# Patient Record
Sex: FEMALE | Race: WHITE | NOT HISPANIC OR LATINO | Employment: OTHER | URBAN - METROPOLITAN AREA
[De-identification: names, ages, dates, MRNs, and addresses within clinical notes are randomized per-mention and may not be internally consistent; named-entity substitution may affect disease eponyms.]

---

## 2017-01-04 ENCOUNTER — LAB VISIT (OUTPATIENT)
Dept: LAB | Facility: HOSPITAL | Age: 67
End: 2017-01-04
Attending: FAMILY MEDICINE
Payer: MEDICARE

## 2017-01-04 DIAGNOSIS — E11.9 TYPE 2 DIABETES MELLITUS WITHOUT COMPLICATION, WITHOUT LONG-TERM CURRENT USE OF INSULIN: ICD-10-CM

## 2017-01-04 PROCEDURE — 36415 COLL VENOUS BLD VENIPUNCTURE: CPT | Mod: PO

## 2017-01-04 PROCEDURE — 83036 HEMOGLOBIN GLYCOSYLATED A1C: CPT

## 2017-01-05 LAB
ESTIMATED AVG GLUCOSE: 146 MG/DL
HBA1C MFR BLD HPLC: 6.7 %

## 2017-01-11 ENCOUNTER — OFFICE VISIT (OUTPATIENT)
Dept: FAMILY MEDICINE | Facility: CLINIC | Age: 67
End: 2017-01-11
Payer: MEDICARE

## 2017-01-11 VITALS
DIASTOLIC BLOOD PRESSURE: 78 MMHG | HEIGHT: 66 IN | WEIGHT: 202.38 LBS | HEART RATE: 84 BPM | BODY MASS INDEX: 32.53 KG/M2 | SYSTOLIC BLOOD PRESSURE: 116 MMHG | TEMPERATURE: 98 F

## 2017-01-11 DIAGNOSIS — E03.9 ACQUIRED HYPOTHYROIDISM: ICD-10-CM

## 2017-01-11 DIAGNOSIS — E11.9 TYPE 2 DIABETES MELLITUS WITHOUT COMPLICATION, WITHOUT LONG-TERM CURRENT USE OF INSULIN: Primary | ICD-10-CM

## 2017-01-11 LAB
BILIRUB SERPL-MCNC: ABNORMAL MG/DL
BLOOD URINE, POC: ABNORMAL
COLOR, POC UA: YELLOW
GLUCOSE UR QL STRIP: NORMAL
KETONES UR QL STRIP: ABNORMAL
LEUKOCYTE ESTERASE URINE, POC: ABNORMAL
NITRITE, POC UA: ABNORMAL
PH, POC UA: 5
PROTEIN, POC: ABNORMAL
SPECIFIC GRAVITY, POC UA: 1.02
UROBILINOGEN, POC UA: NORMAL

## 2017-01-11 PROCEDURE — 99999 PR PBB SHADOW E&M-EST. PATIENT-LVL III: CPT | Mod: PBBFAC,,, | Performed by: FAMILY MEDICINE

## 2017-01-11 PROCEDURE — 99213 OFFICE O/P EST LOW 20 MIN: CPT | Mod: PBBFAC,PO | Performed by: FAMILY MEDICINE

## 2017-01-11 PROCEDURE — 99214 OFFICE O/P EST MOD 30 MIN: CPT | Mod: S$PBB,,, | Performed by: FAMILY MEDICINE

## 2017-01-11 PROCEDURE — 82570 ASSAY OF URINE CREATININE: CPT

## 2017-01-11 PROCEDURE — 81002 URINALYSIS NONAUTO W/O SCOPE: CPT | Mod: PBBFAC,PO | Performed by: FAMILY MEDICINE

## 2017-01-11 RX ORDER — LEVOTHYROXINE SODIUM 88 UG/1
88 TABLET ORAL DAILY
COMMUNITY
End: 2017-01-11 | Stop reason: SDUPTHER

## 2017-01-11 RX ORDER — LEVOTHYROXINE SODIUM 88 UG/1
88 TABLET ORAL DAILY
Qty: 90 TABLET | Refills: 3 | Status: SHIPPED | OUTPATIENT
Start: 2017-01-11 | End: 2018-01-08 | Stop reason: SDUPTHER

## 2017-01-11 RX ORDER — LEVOTHYROXINE SODIUM 100 UG/1
100 TABLET ORAL DAILY
Qty: 90 TABLET | Refills: 3 | Status: SHIPPED | OUTPATIENT
Start: 2017-01-11 | End: 2018-01-08 | Stop reason: SDUPTHER

## 2017-01-11 RX ORDER — ROSUVASTATIN CALCIUM 10 MG/1
TABLET, FILM COATED ORAL
Qty: 90 TABLET | Refills: 3 | Status: SHIPPED | OUTPATIENT
Start: 2017-01-11 | End: 2018-04-04 | Stop reason: SDUPTHER

## 2017-01-11 NOTE — MR AVS SNAPSHOT
[facility]  [address] [facility], [address]  [facility] [address]  Phone: [phone]  Fax: [phone]                  Bebe Valdez   2017 1:20 PM   Office Visit    Description:  Female : 1950   Provider:  Anil Nelson MD   Department:  [facility]           Reason for Visit     Diabetes           Diagnoses this Visit        Comments    Type 2 diabetes mellitus without complication, without long-term current use of insulin    -  Primary     Acquired hypothyroidism                To Do List           Goals (5 Years of Data)     None      Follow-Up and Disposition     Return in about 4 months (around 2017).       These Medications        Disp Refills Start End    levothyroxine (SYNTHROID) 88 MCG tablet 90 tablet 3 2017     Take 1 tablet (88 mcg total) by mouth once daily. - Oral    Pharmacy: [facility] [id number] - [place] [address] AT [facility] Ph #: [phone]       levothyroxine (SYNTHROID) 100 MCG tablet 90 tablet 3 2017     Take 1 tablet (100 mcg total) by mouth once daily. - Oral    Pharmacy: [facility] [id number] - [place] - [address] AT [facility] Ph #: [phone]       CRESTOR 10 mg tablet 90 tablet 3 2017     TK 1 T PO QD    Pharmacy: [id number]  [place] [address] AT [facility] Ph #: [phone]         [facility]     [facility] Care Line -  Assistance  Registered nurses in the [facility] provide clinical advisement, health education, appointment booking, and other advisory services.  Call for this free service at [phone].             Medications           Message regarding Medications     Verify the changes and/or additions to your medication regime listed below are the same as discussed with your clinician today.  If any of  these changes or additions are incorrect, please notify your healthcare provider.        START taking these NEW medications        Refills    levothyroxine (SYNTHROID) 88 MCG tablet 3    Sig: Take 1 tablet (88 mcg total) by mouth once daily.    Class: Normal    Route: Oral      STOP taking these medications     betamethasone dipropionate (DIPROLENE) 0.05 % cream Apply topically 2 (two) times daily.    VIT C/E/ZN/COPPR/LUTEIN/ZEAXAN (PRESERVISION AREDS 2 ORAL) Take by mouth.           Verify that the below list of medications is an accurate representation of the medications you are currently taking.  If none reported, the list may be blank. If incorrect, please contact your healthcare provider. Carry this list with you in case of emergency.           Current Medications     ACAI BERRY EXTRACT ORAL Take by mouth.    ascorbic acid (VITAMIN C) 100 MG tablet Take 100 mg by mouth once daily.    CINNAMON BARK (CINNAMON ORAL) Take by mouth 2 (two) times daily.    CRESTOR 10 mg tablet TK 1 T PO QD    diclofenac-misoprostol  mg-mcg (ARTHROTEC 50)  mg-mcg per tablet Take 1 tablet by mouth 2 (two) times daily.    ERGOCALCIFEROL, VITAMIN D2, (VITAMIN D ORAL) Take by mouth.    fexofenadine-pseudoephedrine (ALLEGRA-D 12 HOUR)  mg per tablet Take 1 tablet by mouth.    fish oil-omega-3 fatty acids 300-1,000 mg capsule Take 2 g by mouth once daily.    levothyroxine (SYNTHROID) 100 MCG tablet Take 1 tablet (100 mcg total) by mouth once daily.    levothyroxine (SYNTHROID) 88 MCG tablet Take 1 tablet (88 mcg total) by mouth once daily.    metformin (GLUCOPHAGE-XR) 500 MG 24 hr tablet Take 1 tablet (500 mg total) by mouth 2 (two) times daily with meals.    metronidazole 1% (METROGEL) 1 % Gel Apply topically once daily.    MV,CA,MIN/IRON/FA/GUARANA/CAFF (ONE-A-DAY WOMEN'S ACTIVE ORAL) Take by mouth.    MV-MN/OM3/DHA/EPA/FISH/LUT/JOSE G (LIPOTRIAD VISIONARY ORAL) Take by mouth.    psyllium (METAMUCIL) packet Take 1 packet by  "mouth once daily.    UNABLE TO FIND Berberine           Clinical Reference Information           Vital Signs - Last Recorded  Most recent update: 1/11/2017  1:30 PM by Ange Brock MA    BP Pulse Temp Ht Wt LMP    116/78 (BP Location: Left arm, Patient Position: Sitting, BP Method: Manual) 84 97.8 °F (36.6 °C) (Oral) 5' 6" (1.676 m) 91.8 kg (202 lb 6.1 oz) 07/18/2001    BMI                32.67 kg/m2          Blood Pressure          Most Recent Value    BP  116/78      Allergies as of 1/11/2017     Tetracycline      Immunizations Administered on Date of Encounter - 1/11/2017     None      "

## 2017-01-11 NOTE — PROGRESS NOTES
Subjective:       Patient ID: Bebe Valdez is a 66 y.o. female.    Chief Complaint: Diabetes (3 month follow up, need to discuss synthroid)    HPI Comments: Disclaimer: This note has been generated using voice-recognition software. There may be typographical errors that have been missed during proof-reading    67 yo presents today for follow up of Type II DM.  Doing well on present dose of Metformin without side effects.  Has been trying to increase exercise.  Last A1C 6.7.  Had recent treatment for UTI about 2 weeks ago after complaints of dysuria/frequency.  Asymptomatic now    Diabetes   Pertinent negatives for hypoglycemia include no dizziness or headaches. Pertinent negatives for diabetes include no fatigue, no polydipsia, no polyphagia, no polyuria and no weakness.     Review of Systems   Constitutional: Negative for activity change, fatigue and unexpected weight change.   Endocrine: Negative for polydipsia, polyphagia and polyuria.   Genitourinary: Negative for difficulty urinating, dysuria and frequency.   Neurological: Negative for dizziness, weakness, light-headedness and headaches.       Objective:      Physical Exam   Constitutional: She appears well-developed and well-nourished. No distress.   Neck: Normal range of motion. No JVD present.   Cardiovascular: Normal rate, regular rhythm and intact distal pulses.    No murmur heard.  Pulmonary/Chest: Effort normal and breath sounds normal. She has no wheezes. She has no rales.   Lymphadenopathy:     She has no cervical adenopathy.   Neurological: She has normal reflexes.       Assessment:       1.  Type II DM  2.  hypothyroid  Plan:       1.  Continue present medications  2.  Urine microalbumin  3.  F/u 4 months

## 2017-01-12 LAB
CREAT UR-MCNC: 138 MG/DL
MICROALBUMIN UR DL<=1MG/L-MCNC: 11 UG/ML
MICROALBUMIN/CREATININE RATIO: 8 UG/MG

## 2017-01-17 ENCOUNTER — TELEPHONE (OUTPATIENT)
Dept: FAMILY MEDICINE | Facility: CLINIC | Age: 67
End: 2017-01-17

## 2017-01-17 DIAGNOSIS — E11.9 TYPE 2 DIABETES MELLITUS WITHOUT COMPLICATION, WITHOUT LONG-TERM CURRENT USE OF INSULIN: Primary | ICD-10-CM

## 2017-01-17 NOTE — TELEPHONE ENCOUNTER
----- Message from Cindy Yeboah sent at 1/16/2017 11:27 AM CST -----  Contact: self/[phone]  Pt called in regard to getting orders to get lab work done on April 26, at  11 am.        Please advise

## 2017-04-24 ENCOUNTER — LAB VISIT (OUTPATIENT)
Dept: LAB | Facility: HOSPITAL | Age: 67
End: 2017-04-24
Attending: FAMILY MEDICINE
Payer: MEDICARE

## 2017-04-24 DIAGNOSIS — E11.9 TYPE 2 DIABETES MELLITUS WITHOUT COMPLICATION, WITHOUT LONG-TERM CURRENT USE OF INSULIN: ICD-10-CM

## 2017-04-24 PROCEDURE — 83036 HEMOGLOBIN GLYCOSYLATED A1C: CPT

## 2017-04-24 PROCEDURE — 36415 COLL VENOUS BLD VENIPUNCTURE: CPT | Mod: PO

## 2017-04-25 LAB
ESTIMATED AVG GLUCOSE: 151 MG/DL
HBA1C MFR BLD HPLC: 6.9 %

## 2017-05-10 ENCOUNTER — OFFICE VISIT (OUTPATIENT)
Dept: FAMILY MEDICINE | Facility: CLINIC | Age: 67
End: 2017-05-10
Payer: MEDICARE

## 2017-05-10 ENCOUNTER — TELEPHONE (OUTPATIENT)
Dept: FAMILY MEDICINE | Facility: CLINIC | Age: 67
End: 2017-05-10

## 2017-05-10 VITALS
HEIGHT: 66 IN | SYSTOLIC BLOOD PRESSURE: 118 MMHG | BODY MASS INDEX: 32.38 KG/M2 | DIASTOLIC BLOOD PRESSURE: 74 MMHG | WEIGHT: 201.5 LBS | HEART RATE: 80 BPM | TEMPERATURE: 99 F

## 2017-05-10 DIAGNOSIS — E11.9 CONTROLLED TYPE 2 DIABETES MELLITUS WITHOUT COMPLICATION, WITHOUT LONG-TERM CURRENT USE OF INSULIN: Primary | ICD-10-CM

## 2017-05-10 DIAGNOSIS — Z12.11 SCREENING FOR COLON CANCER: ICD-10-CM

## 2017-05-10 DIAGNOSIS — E11.9 DIABETES MELLITUS WITHOUT COMPLICATION: Primary | ICD-10-CM

## 2017-05-10 LAB
CREAT UR-MCNC: 73 MG/DL
MICROALBUMIN UR DL<=1MG/L-MCNC: 6 UG/ML
MICROALBUMIN/CREATININE RATIO: 8.2 UG/MG

## 2017-05-10 PROCEDURE — 82570 ASSAY OF URINE CREATININE: CPT

## 2017-05-10 PROCEDURE — 99999 PR PBB SHADOW E&M-EST. PATIENT-LVL III: CPT | Mod: PBBFAC,,, | Performed by: FAMILY MEDICINE

## 2017-05-10 PROCEDURE — 99213 OFFICE O/P EST LOW 20 MIN: CPT | Mod: PBBFAC,PO | Performed by: FAMILY MEDICINE

## 2017-05-10 PROCEDURE — 99214 OFFICE O/P EST MOD 30 MIN: CPT | Mod: S$PBB,,, | Performed by: FAMILY MEDICINE

## 2017-05-10 NOTE — TELEPHONE ENCOUNTER
----- Message from Etta Pierson sent at 5/10/2017  2:50 PM CDT -----  Doctor appointment and lab have been scheduled.  Please link lab orders to the lab appointment.  Date of doctor appointment:  08/16/2017  Physical or EP:  3 month follow up  Date of lab appointment:  08/11/2017  Comments:

## 2017-05-10 NOTE — PROGRESS NOTES
Subjective:       Patient ID: Bebe Valdez is a 66 y.o. female.    Chief Complaint: Diabetes (4 month follow up)    HPI Comments: Disclaimer: This note has been generated using voice-recognition software. There may be typographical errors that have been missed during proof-reading    67 yo presents for follow up of type II DM, last A1C 6.9.  Presently on Metformin 500mg bid, frequently forgets to take evening dose  Does not check BGs routinely    Diabetes   Pertinent negatives for diabetes include no chest pain, no fatigue, no polydipsia, no polyphagia, no polyuria and no weakness.     Review of Systems   Constitutional: Negative for activity change, fatigue and unexpected weight change.   Respiratory: Negative for shortness of breath.    Cardiovascular: Negative for chest pain, palpitations and leg swelling.   Endocrine: Negative for polydipsia, polyphagia and polyuria.   Neurological: Negative for weakness and numbness.       Objective:      Physical Exam   Constitutional: She appears well-developed and well-nourished. No distress.   Neck: Normal range of motion. No JVD present. No thyromegaly present.   Cardiovascular: Normal rate, regular rhythm and intact distal pulses.    No murmur heard.  Pulmonary/Chest: Effort normal and breath sounds normal. She has no wheezes. She has no rales.   Musculoskeletal: She exhibits no edema.   Lymphadenopathy:     She has no cervical adenopathy.       Assessment:       1. Diabetes mellitus without complication    2. Screening for colon cancer        Plan:       1.  She does not wish to change dosage of Metformin.  Plan to take 1000mg daily  2.  Urine microalbumin  3.  Encourage weight loss, exercise  4.  F/u 3 months  5.  Needs repeat colonoscopy

## 2017-05-10 NOTE — MR AVS SNAPSHOT
[facility]  [address] [facility], [address]  [facility] [address]  Phone: [phone]  Fax: [phone]                  Bebe Valdez   5/10/2017 1:40 PM   Office Visit    Description:  Female : 1950   Provider:  Anil Nelson MD   Department:  [facility]           Reason for Visit     Diabetes           Diagnoses this Visit        Comments    Diabetes mellitus without complication    -  Primary     Screening for colon cancer                To Do List           Goals (5 Years of Data)     None      Follow-Up and Disposition     Return in about 3 months (around 8/10/2017).      [facility] On Call     [facility] On Call Nurse Care Line -  Assistance  Unless otherwise directed by your provider, please contact [facility] On-Call, our nurse care line that is available for  assistance.     Registered nurses in the [facility] On Call Center provide: appointment scheduling, clinical advisement, health education, and other advisory services.  Call: [phone] (toll free)               Medications           Message regarding Medications     Verify the changes and/or additions to your medication regime listed below are the same as discussed with your clinician today.  If any of these changes or additions are incorrect, please notify your healthcare provider.             Verify that the below list of medications is an accurate representation of the medications you are currently taking.  If none reported, the list may be blank. If incorrect, please contact your healthcare provider. Carry this list with you in case of emergency.           Current Medications     ACAI BERRY EXTRACT ORAL Take by mouth.    ascorbic acid (VITAMIN C) 100 MG tablet Take 100 mg by mouth once daily.    CINNAMON BARK (CINNAMON ORAL) Take by mouth 2 (two) times daily.    CRESTOR 10 mg tablet TK 1 T PO QD    ERGOCALCIFEROL, VITAMIN D2, (VITAMIN D ORAL) Take by mouth.    fexofenadine-pseudoephedrine  "(ALLEGRA-D 12 HOUR)  mg per tablet Take 1 tablet by mouth.    fish oil-omega-3 fatty acids 300-1,000 mg capsule Take 2 g by mouth once daily.    levothyroxine (SYNTHROID) 100 MCG tablet Take 1 tablet (100 mcg total) by mouth once daily.    levothyroxine (SYNTHROID) 88 MCG tablet Take 1 tablet (88 mcg total) by mouth once daily.    metformin (GLUCOPHAGE-XR) 500 MG 24 hr tablet Take 1 tablet (500 mg total) by mouth 2 (two) times daily with meals.    metronidazole 1% (METROGEL) 1 % Gel Apply topically once daily.    MV,CA,MIN/IRON/FA/GUARANA/CAFF (ONE-A-DAY WOMEN'S ACTIVE ORAL) Take by mouth.    MV-MN/OM3/DHA/EPA/FISH/LUT/JOSE G (LIPOTRIAD VISIONARY ORAL) Take by mouth.    psyllium (METAMUCIL) packet Take 1 packet by mouth once daily.    UNABLE TO FIND Berberine    diclofenac-misoprostol  mg-mcg (ARTHROTEC 50)  mg-mcg per tablet Take 1 tablet by mouth 2 (two) times daily.           Clinical Reference Information           Your Vitals Were     BP Pulse Temp Height Weight Last Period    118/74 (BP Location: Right arm, Patient Position: Sitting, BP Method: Manual) 80 98.5 °F (36.9 °C) (Oral) 5' 6" (1.676 m) 91.4 kg (201 lb 8 oz) 07/18/2001    BMI                32.52 kg/m2          Blood Pressure          Most Recent Value    BP  118/74      Allergies as of 5/10/2017     Tetracycline      Immunizations Administered on Date of Encounter - 5/10/2017     None      Orders Placed During Today's Visit      Normal Orders This Visit    Case request GI: COLONOSCOPY     Microalbumin/creatinine urine ratio       Language Assistance Services     ATTENTION: Language assistance services are available, free of charge. Please call [phone].      ATENCIÓN: Si jack ana m, tiene a cho disposición servicios gratuitos de asistencia lingüística. Llame al [phone].     CHÚ Ý: N?u b?n nói Ti?ng Vi?t, có các d?ch v? h? tr? ngôn ng? mi?n phí dành cho b?n. G?i s? [phone].         [facility] " complies with applicable Federal civil rights laws and does not discriminate on the basis of race, color, national origin, age, disability, or sex.

## 2017-05-24 DIAGNOSIS — Z12.11 SPECIAL SCREENING FOR MALIGNANT NEOPLASMS, COLON: Primary | ICD-10-CM

## 2017-05-24 RX ORDER — POLYETHYLENE GLYCOL 3350, SODIUM SULFATE ANHYDROUS, SODIUM BICARBONATE, SODIUM CHLORIDE, POTASSIUM CHLORIDE 236; 22.74; 6.74; 5.86; 2.97 G/4L; G/4L; G/4L; G/4L; G/4L
4 POWDER, FOR SOLUTION ORAL ONCE
Qty: 4000 ML | Refills: 0 | Status: SHIPPED | OUTPATIENT
Start: 2017-05-24 | End: 2017-05-24

## 2017-06-06 ENCOUNTER — HOSPITAL ENCOUNTER (OUTPATIENT)
Facility: HOSPITAL | Age: 67
Discharge: HOME | End: 2017-06-06
Attending: COLON & RECTAL SURGERY | Admitting: COLON & RECTAL SURGERY
Payer: MEDICARE

## 2017-06-06 ENCOUNTER — ANESTHESIA (OUTPATIENT)
Dept: ENDOSCOPY | Facility: HOSPITAL | Age: 67
End: 2017-06-06
Payer: MEDICARE

## 2017-06-06 ENCOUNTER — SURGERY (OUTPATIENT)
Age: 67
End: 2017-06-06

## 2017-06-06 ENCOUNTER — ANESTHESIA EVENT (OUTPATIENT)
Dept: ENDOSCOPY | Facility: HOSPITAL | Age: 67
End: 2017-06-06
Payer: MEDICARE

## 2017-06-06 VITALS
BODY MASS INDEX: 30.01 KG/M2 | TEMPERATURE: 98 F | HEART RATE: 73 BPM | OXYGEN SATURATION: 100 % | RESPIRATION RATE: 16 BRPM | WEIGHT: 198 LBS | SYSTOLIC BLOOD PRESSURE: 130 MMHG | HEIGHT: 68 IN | DIASTOLIC BLOOD PRESSURE: 75 MMHG

## 2017-06-06 DIAGNOSIS — Z12.11 SCREENING FOR COLON CANCER: ICD-10-CM

## 2017-06-06 LAB — POCT GLUCOSE: 147 MG/DL (ref 70–110)

## 2017-06-06 PROCEDURE — 37000008 HC ANESTHESIA 1ST 15 MINUTES: Performed by: COLON & RECTAL SURGERY

## 2017-06-06 PROCEDURE — 25000003 PHARM REV CODE 250: Performed by: NURSE ANESTHETIST, CERTIFIED REGISTERED

## 2017-06-06 PROCEDURE — D9220A PRA ANESTHESIA: Mod: 33,CRNA,, | Performed by: NURSE ANESTHETIST, CERTIFIED REGISTERED

## 2017-06-06 PROCEDURE — D9220A PRA ANESTHESIA: Mod: 33,ANES,, | Performed by: ANESTHESIOLOGY

## 2017-06-06 PROCEDURE — 25000003 PHARM REV CODE 250: Performed by: NURSE PRACTITIONER

## 2017-06-06 PROCEDURE — 63600175 PHARM REV CODE 636 W HCPCS: Performed by: NURSE ANESTHETIST, CERTIFIED REGISTERED

## 2017-06-06 PROCEDURE — 37000009 HC ANESTHESIA EA ADD 15 MINS: Performed by: COLON & RECTAL SURGERY

## 2017-06-06 PROCEDURE — G0121 COLON CA SCRN NOT HI RSK IND: HCPCS | Mod: ,,, | Performed by: COLON & RECTAL SURGERY

## 2017-06-06 PROCEDURE — G0121 COLON CA SCRN NOT HI RSK IND: HCPCS | Performed by: COLON & RECTAL SURGERY

## 2017-06-06 RX ORDER — PROPOFOL 10 MG/ML
VIAL (ML) INTRAVENOUS
Status: DISCONTINUED | OUTPATIENT
Start: 2017-06-06 | End: 2017-06-06

## 2017-06-06 RX ORDER — SODIUM CHLORIDE 9 MG/ML
INJECTION, SOLUTION INTRAVENOUS CONTINUOUS
Status: DISCONTINUED | OUTPATIENT
Start: 2017-06-06 | End: 2017-06-06 | Stop reason: HOSPADM

## 2017-06-06 RX ORDER — LIDOCAINE HCL/PF 100 MG/5ML
SYRINGE (ML) INTRAVENOUS
Status: DISCONTINUED | OUTPATIENT
Start: 2017-06-06 | End: 2017-06-06

## 2017-06-06 RX ORDER — PROPOFOL 10 MG/ML
VIAL (ML) INTRAVENOUS CONTINUOUS PRN
Status: DISCONTINUED | OUTPATIENT
Start: 2017-06-06 | End: 2017-06-06

## 2017-06-06 RX ADMIN — LIDOCAINE HYDROCHLORIDE 50 MG: 20 INJECTION, SOLUTION INTRAVENOUS at 11:06

## 2017-06-06 RX ADMIN — PROPOFOL 200 MCG/KG/MIN: 10 INJECTION, EMULSION INTRAVENOUS at 11:06

## 2017-06-06 RX ADMIN — PROPOFOL 70 MG: 10 INJECTION, EMULSION INTRAVENOUS at 11:06

## 2017-06-06 RX ADMIN — SODIUM CHLORIDE: 0.9 INJECTION, SOLUTION INTRAVENOUS at 11:06

## 2017-06-06 NOTE — ANESTHESIA POSTPROCEDURE EVALUATION
"Anesthesia Post Evaluation    Patient: Bebe Valdez    Procedure(s) Performed: Procedure(s) (LRB):  COLONOSCOPY (N/A)    Final Anesthesia Type: general  Patient location during evaluation: GI PACU  Patient participation: Yes- Able to Participate  Level of consciousness: awake and alert  Post-procedure vital signs: reviewed and stable  Pain management: adequate  Airway patency: patent  PONV status at discharge: No PONV  Anesthetic complications: no      Cardiovascular status: blood pressure returned to baseline  Respiratory status: spontaneous ventilation  Hydration status: euvolemic  Follow-up not needed.        Visit Vitals  /68   Pulse 75   Temp 36.6 °C (97.8 °F) (Oral)   Resp 12   Ht 5' 8" (1.727 m)   Wt 89.8 kg (198 lb)   LMP 07/18/2001   SpO2 97%   Breastfeeding? No   BMI 30.11 kg/m²       Pain/Robert Score: Pain Assessment Performed: Yes (6/6/2017 11:55 AM)  Presence of Pain: denies (6/6/2017 11:55 AM)  Robert Score: 8 (6/6/2017 11:55 AM)      "

## 2017-06-06 NOTE — H&P
Endoscopy H&P    Procedure : Colonoscopy      asymptomatic screening exam      Past Medical History:   Diagnosis Date    Allergy     Hyperlipidemia     Hypothyroid     Obesity     Rosacea              Review of Systems -ROS:  GENERAL: No fever, chills, fatigability or weight loss.  CHEST: Denies SON, cyanosis, wheezing, cough and sputum production.  CARDIOVASCULAR: Denies chest pain, PND, orthopnea or reduced exercise tolerance.   Musculoskeletal ROS: negative for - gait disturbance or joint pain  Neurological ROS: negative for - confusion or memory loss        Physical Exam:  General: well developed, well nourished, no distress  Head: normocephalic  Neck: supple, symmetrical, trachea midline  Lungs:  clear to auscultation bilaterally and normal respiratory effort  Heart: regular rate and rhythm, S1, S2 normal, no murmur, rub or gallop and regular rate and rhythm  Abdomen: soft, non-tender non-distented; bowel sounds normal; no masses,  no organomegaly  Extremities: no cyanosis or edema, or clubbing       Moderate Sedation (choice): Mallampati Score 1    ASA : II    IMP: asymptomatic screening exam    Plan: Colonoscopy with Moderate sedation.  I have explained the procedure including indications, alternatives, expected outcomes and potential complications. The patient appears to understand and gives informed consent. The patient is medically ready for surgery.

## 2017-06-06 NOTE — TRANSFER OF CARE
"Anesthesia Transfer of Care Note    Patient: Bebe Valdez    Procedure(s) Performed: Procedure(s) (LRB):  COLONOSCOPY (N/A)    Patient location: PACU    Anesthesia Type: general    Transport from OR: Transported from OR on room air with adequate spontaneous ventilation    Post pain: adequate analgesia    Post assessment: no apparent anesthetic complications    Post vital signs: stable    Level of consciousness: awake    Nausea/Vomiting: no nausea/vomiting    Complications: none    Transfer of care protocol was followed      Last vitals:   Visit Vitals  BP (!) 151/69   Pulse 76   Temp 36.7 °C (98 °F)   Resp 15   Ht 5' 8" (1.727 m)   Wt 89.8 kg (198 lb)   LMP 07/18/2001   SpO2 98%   Breastfeeding? No   BMI 30.11 kg/m²     "

## 2017-06-06 NOTE — ANESTHESIA PREPROCEDURE EVALUATION
06/06/2017  Bebe Valdez is a 66 y.o., female.    Anesthesia Evaluation    I have reviewed the Patient Summary Reports.    I have reviewed the Nursing Notes.   I have reviewed the Medications.     Review of Systems  Anesthesia Hx:  No problems with previous Anesthesia  History of prior surgery of interest to airway management or planning: Previous anesthesia: General Denies Family Hx of Anesthesia complications.   Denies Personal Hx of Anesthesia complications.   Cardiovascular:   Exercise tolerance: good    Endocrine:   Diabetes Hypothyroidism        Physical Exam  General:  Well nourished    Airway/Jaw/Neck:  Airway Findings: Mouth Opening: Normal Tongue: Normal  General Airway Assessment: Adult  Mallampati: II  Improves to II with phonation.  TM Distance: Normal, at least 6 cm  Jaw/Neck Findings:  Neck ROM: Normal ROM      Dental:  Dental Findings: In tact   Chest/Lungs:  Chest/Lungs Findings: Clear to auscultation, Normal Respiratory Rate     Heart/Vascular:  Heart Findings: Rate: Normal  Rhythm: Regular Rhythm  Sounds: Normal        Mental Status:  Mental Status Findings:  Cooperative, Alert and Oriented         Anesthesia Plan  Type of Anesthesia, risks & benefits discussed:  Anesthesia Type:  general  Patient's Preference:   Intra-op Monitoring Plan: standard ASA monitors  Intra-op Monitoring Plan Comments:   Post Op Pain Control Plan:   Post Op Pain Control Plan Comments:   Induction:   IV  Beta Blocker:  Patient is not currently on a Beta-Blocker (No further documentation required).       Informed Consent: Patient understands risks and agrees with Anesthesia plan.  Questions answered. Anesthesia consent signed with patient.  ASA Score: 2     Day of Surgery Review of History & Physical:    H&P update referred to the surgeon.         Ready For Surgery From Anesthesia Perspective.

## 2017-06-06 NOTE — ANESTHESIA RELEASE NOTE
"Anesthesia Release from PACU Note    Patient: Bebe Valdez    Procedure(s) Performed: Procedure(s) (LRB):  COLONOSCOPY (N/A)    Anesthesia type: general    Post pain: Adequate analgesia    Post assessment: no apparent anesthetic complications    Last Vitals:   Visit Vitals  /68   Pulse 75   Temp 36.6 °C (97.8 °F) (Oral)   Resp 12   Ht 5' 8" (1.727 m)   Wt 89.8 kg (198 lb)   LMP 07/18/2001   SpO2 97%   Breastfeeding? No   BMI 30.11 kg/m²       Post vital signs: stable    Level of consciousness: awake and alert     Nausea/Vomiting: no nausea/no vomiting    Complications: none    Airway Patency: patent    Respiratory: unassisted    Cardiovascular: stable and blood pressure at baseline    Hydration: euvolemic  "

## 2017-06-06 NOTE — DISCHARGE INSTRUCTIONS
Colonoscopy     A camera attached to a flexible tube with a viewing lens is used to take video pictures.     Colonoscopy is a test to view the inside of your lower digestive tract (colon and rectum). Sometimes it can show the last part of the small intestine (ileum). During the test, small pieces of tissue may be removed for testing. This is called a biopsy. Small growths, such as polyps, may also be removed.   Why is colonoscopy done?  The test is done to help look for colon cancer. And it can help find the source of abdominal pain, bleeding, and changes in bowel habits. It may be needed once a year, depending on factors such as your:  · Age  · Health history  · Family health history  · Symptoms  · Results from any prior colonoscopy  Risks and possible complications  These include:  · Bleeding               · A puncture or tear in the colon   · Risks of anesthesia  · A cancer lesion not being seen  Getting ready   To prepare for the test:  · Talk with your healthcare provider about the risks of the test (see below). Also ask your healthcare provider about alternatives to the test.  · Tell your healthcare provider about any medicines you take. Also tell him or her about any health conditions you may have.  · Make sure your rectum and colon are empty for the test. Follow the diet and bowel prep instructions exactly. If you dont, the test may need to be rescheduled.  · Plan for a friend or family member to drive you home after the test.     Colonoscopy provides an inside view of the entire colon.     You may discuss the results with your doctor right away or at a future visit.  During the test   The test is usually done in the hospital on an outpatient basis. This means you go home the same day. The procedure takes about 30 minutes. During that time:  · You are given relaxing (sedating) medicine through an IV line. You may be drowsy, or fully asleep.  · The healthcare provider will first give you a physical exam to  check for anal and rectal problems.  · Then the anus is lubricated and the scope inserted.  · If you are awake, you may have a feeling similar to needing to have a bowel movement. You may also feel pressure as air is pumped into the colon. Its OK to pass gas during the procedure.  · Biopsy, polyp removal, or other treatments may be done during the test.  After the test   You may have gas right after the test. It can help to try to pass it to help prevent later bloating. Your healthcare provider may discuss the results with you right away. Or you may need to schedule a follow-up visit to talk about the results. After the test, you can go back to your normal eating and other activities. You may be tired from the sedation and need to rest for a few hours.  Date Last Reviewed: 11/1/2016  © 6638-6345 [employer]. [address] [place] [address], [place] [address]. All rights reserved. This information is not intended as a substitute for professional medical care. Always follow your healthcare professional's instructions.

## 2017-06-06 NOTE — PATIENT INSTRUCTIONS
Discharge Summary/Instructions for after Colonoscopy without   Biopsy/Polypectomy  Patient Name: Bebe Valdez  Patient MRN: [id number]  Patient YOB: 1950 Tuesday, June 06, 2017    Tre Alvarez MD  RESTRICTIONS ON ACTIVITY:  - Do not drive a car or operate machinery until the day after the procedure.      - The following day: return to full activity including work.  - Diet: Eat and drink normally unless instructed otherwise.  TREATMENT FOR COMMON SIDE EFFECTS:  - Mild abdominal pain and bloating or excessive gas: walk, eat lightly, and   use a heating pad.  SYMPTOMS TO WATCH FOR AND REPORT TO YOUR PHYSICIAN:  1. Severe abdominal pain.  2. Fever greater than 101 degrees F within 24 hours after a procedure.  3. A large amount of rectal bleeding. (A small amount of blood from the   rectum is not serious, especially if hemorrhoids are present.  3.  Because air was put into your colon during the procedure, expelling   large amounts of air from your rectum is normal.  4.  You may not have a bowel movement for 1-3 days because of the   colonoscopy prep.  This is normal.  5.  Call you Doctor or go to the emergency room if you notice any of the   following:   Chills and/or fever over 101   Persistent vomiting   Severe abdominal pain, other than gas cramps   Severe chest pain   Black, tarry stools   Any bleeding - exceeding one cup  Your doctor recommends these additional instructions:  You are being discharged to home.   Your physician has recommended a repeat colonoscopy in 10 years for   screening purposes.  If you have any questions or problems, please call your physician.  COLON AND RECTAL SURGERY OFFICE:  [phone]  EMERGENCY PHONE NUMBER: [phone]  Tre Alvarez MD  6/6/2017 11:51:45 AM  This report has been verified and signed electronically.

## 2017-06-13 ENCOUNTER — TELEPHONE (OUTPATIENT)
Dept: ENDOSCOPY | Facility: HOSPITAL | Age: 67
End: 2017-06-13

## 2017-06-27 ENCOUNTER — TELEPHONE (OUTPATIENT)
Dept: FAMILY MEDICINE | Facility: CLINIC | Age: 67
End: 2017-06-27

## 2017-06-27 DIAGNOSIS — E11.9 TYPE 2 DIABETES MELLITUS WITHOUT COMPLICATION, UNSPECIFIED LONG TERM INSULIN USE STATUS: ICD-10-CM

## 2017-06-27 DIAGNOSIS — E78.5 HYPERLIPIDEMIA, UNSPECIFIED HYPERLIPIDEMIA TYPE: ICD-10-CM

## 2017-06-27 DIAGNOSIS — Z00.00 ANNUAL PHYSICAL EXAM: Primary | ICD-10-CM

## 2017-06-27 DIAGNOSIS — E03.9 HYPOTHYROIDISM, UNSPECIFIED TYPE: ICD-10-CM

## 2017-06-27 NOTE — TELEPHONE ENCOUNTER
----- Message from Etta Pierson sent at 6/27/2017  2:38 PM CDT -----  Doctor appointment and lab have been scheduled.  Please link lab orders to the lab appointment.  Date of doctor appointment:  10/16/17  Physical or EP:  epp  Date of lab appointment:  10/09/17  Comments:

## 2017-08-14 ENCOUNTER — LAB VISIT (OUTPATIENT)
Dept: LAB | Facility: HOSPITAL | Age: 67
End: 2017-08-14
Attending: FAMILY MEDICINE
Payer: MEDICARE

## 2017-08-14 DIAGNOSIS — E11.9 CONTROLLED TYPE 2 DIABETES MELLITUS WITHOUT COMPLICATION, WITHOUT LONG-TERM CURRENT USE OF INSULIN: ICD-10-CM

## 2017-08-14 PROCEDURE — 36415 COLL VENOUS BLD VENIPUNCTURE: CPT | Mod: PO

## 2017-08-14 PROCEDURE — 83036 HEMOGLOBIN GLYCOSYLATED A1C: CPT

## 2017-08-15 ENCOUNTER — OFFICE VISIT (OUTPATIENT)
Dept: FAMILY MEDICINE | Facility: CLINIC | Age: 67
End: 2017-08-15
Payer: MEDICARE

## 2017-08-15 VITALS
BODY MASS INDEX: 32.17 KG/M2 | DIASTOLIC BLOOD PRESSURE: 74 MMHG | HEART RATE: 80 BPM | WEIGHT: 200.19 LBS | HEIGHT: 66 IN | TEMPERATURE: 97 F | SYSTOLIC BLOOD PRESSURE: 106 MMHG

## 2017-08-15 DIAGNOSIS — E11.9 TYPE 2 DIABETES MELLITUS WITHOUT COMPLICATION, WITHOUT LONG-TERM CURRENT USE OF INSULIN: Primary | ICD-10-CM

## 2017-08-15 LAB
ESTIMATED AVG GLUCOSE: 137 MG/DL
HBA1C MFR BLD HPLC: 6.4 %

## 2017-08-15 PROCEDURE — 1159F MED LIST DOCD IN RCRD: CPT | Mod: ,,, | Performed by: FAMILY MEDICINE

## 2017-08-15 PROCEDURE — 3008F BODY MASS INDEX DOCD: CPT | Mod: ,,, | Performed by: FAMILY MEDICINE

## 2017-08-15 PROCEDURE — 99213 OFFICE O/P EST LOW 20 MIN: CPT | Mod: PBBFAC,PO | Performed by: FAMILY MEDICINE

## 2017-08-15 PROCEDURE — 99214 OFFICE O/P EST MOD 30 MIN: CPT | Mod: S$PBB,,, | Performed by: FAMILY MEDICINE

## 2017-08-15 PROCEDURE — 82570 ASSAY OF URINE CREATININE: CPT

## 2017-08-15 PROCEDURE — 3044F HG A1C LEVEL LT 7.0%: CPT | Mod: ,,, | Performed by: FAMILY MEDICINE

## 2017-08-15 PROCEDURE — 99999 PR PBB SHADOW E&M-EST. PATIENT-LVL III: CPT | Mod: PBBFAC,,, | Performed by: FAMILY MEDICINE

## 2017-08-15 RX ORDER — METFORMIN HYDROCHLORIDE 500 MG/1
500 TABLET, EXTENDED RELEASE ORAL 2 TIMES DAILY WITH MEALS
Qty: 180 TABLET | Refills: 3 | Status: SHIPPED | OUTPATIENT
Start: 2017-08-15 | End: 2018-02-19 | Stop reason: SDUPTHER

## 2017-08-15 NOTE — PROGRESS NOTES
Subjective:       Patient ID: Bebe Valdez is a 67 y.o. female.    Chief Complaint: Diabetes    Disclaimer: This note has been generated using voice-recognition software. There may be typographical errors that have been missed during proof-reading    66 yo presents for follow up of Type II DM.  Last A1C 6.4, improved from previous 6.9.  Has increased exercise, following diet since last seen  Has appt for eye exam tomorrow      Diabetes   She has type 2 diabetes mellitus. No MedicAlert identification noted. Pertinent negatives for hypoglycemia include no confusion, dizziness, headaches, hunger, mood changes, nervousness/anxiousness, pallor, seizures, sleepiness, speech difficulty, sweats or tremors. Associated symptoms include polyphagia, polyuria and weight loss. Pertinent negatives for diabetes include no blurred vision, no chest pain, no fatigue, no foot paresthesias, no foot ulcerations, no polydipsia, no visual change and no weakness. Pertinent negatives for hypoglycemia complications include no blackouts, no hospitalization, no nocturnal hypoglycemia, no required assistance and no required glucagon injection. Symptoms are improving. Pertinent negatives for diabetic complications include no autonomic neuropathy, CVA, heart disease, impotence, nephropathy, peripheral neuropathy, PVD or retinopathy. Risk factors for coronary artery disease include family history, obesity, post-menopausal, stress and diabetes mellitus. Current diabetic treatment includes diet and oral agent (monotherapy). She is compliant with treatment most of the time. Her weight is decreasing steadily. She is following a generally healthy diet. Meal planning includes avoidance of concentrated sweets. She has not had a previous visit with a dietitian. She participates in exercise daily. There is no compliance with monitoring of blood glucose. She does not see a podiatrist.Eye exam is current.     Review of Systems   Constitutional: Positive for  weight loss. Negative for fatigue.   Eyes: Negative for blurred vision.   Respiratory: Negative for cough, chest tightness and shortness of breath.    Cardiovascular: Negative for chest pain.   Endocrine: Positive for polyphagia and polyuria. Negative for polydipsia.   Genitourinary: Negative for impotence.   Skin: Negative for pallor.   Neurological: Negative for dizziness, tremors, seizures, speech difficulty, weakness and headaches.   Psychiatric/Behavioral: Negative for confusion. The patient is not nervous/anxious.        Objective:      Physical Exam   Constitutional: She appears well-developed and well-nourished. No distress.   Eyes: EOM are normal. Pupils are equal, round, and reactive to light.   Neck: Normal range of motion. No JVD present. No thyromegaly present.   Cardiovascular: Normal rate and regular rhythm.    No murmur heard.  Pulses:       Carotid pulses are 2+ on the right side, and 2+ on the left side.       Radial pulses are 2+ on the right side, and 2+ on the left side.        Femoral pulses are 2+ on the right side, and 2+ on the left side.       Popliteal pulses are 2+ on the right side, and 2+ on the left side.        Dorsalis pedis pulses are 2+ on the right side, and 2+ on the left side.        Posterior tibial pulses are 2+ on the right side, and 2+ on the left side.   Pulmonary/Chest: Effort normal and breath sounds normal. She has no wheezes. She has no rales.   Musculoskeletal: She exhibits no edema.   Lymphadenopathy:     She has no cervical adenopathy.   Neurological: She is alert. She has normal reflexes.   Skin: No rash noted.       Assessment:       1. Type 2 diabetes mellitus without complication, without long-term current use of insulin        Plan:       1.  Labs reviewed with pt   2.  Continue present medications  3.  Microalbumin/cr ratio today  4.  RTC 2 months for physical exam

## 2017-08-16 ENCOUNTER — PATIENT MESSAGE (OUTPATIENT)
Dept: FAMILY MEDICINE | Facility: CLINIC | Age: 67
End: 2017-08-16

## 2017-08-16 LAB
CREAT UR-MCNC: 14 MG/DL
MICROALBUMIN UR DL<=1MG/L-MCNC: <2.5 UG/ML
MICROALBUMIN/CREATININE RATIO: ABNORMAL UG/MG

## 2017-10-09 ENCOUNTER — LAB VISIT (OUTPATIENT)
Dept: LAB | Facility: HOSPITAL | Age: 67
End: 2017-10-09
Attending: FAMILY MEDICINE
Payer: MEDICARE

## 2017-10-09 DIAGNOSIS — Z00.00 ANNUAL PHYSICAL EXAM: ICD-10-CM

## 2017-10-09 DIAGNOSIS — E78.5 HYPERLIPIDEMIA, UNSPECIFIED HYPERLIPIDEMIA TYPE: ICD-10-CM

## 2017-10-09 DIAGNOSIS — E03.9 HYPOTHYROIDISM, UNSPECIFIED TYPE: ICD-10-CM

## 2017-10-09 DIAGNOSIS — E11.9 TYPE 2 DIABETES MELLITUS WITHOUT COMPLICATION, UNSPECIFIED LONG TERM INSULIN USE STATUS: ICD-10-CM

## 2017-10-09 LAB
ALBUMIN SERPL BCP-MCNC: 3.5 G/DL
ALP SERPL-CCNC: 65 U/L
ALT SERPL W/O P-5'-P-CCNC: 20 U/L
ANION GAP SERPL CALC-SCNC: 8 MMOL/L
AST SERPL-CCNC: 16 U/L
BASOPHILS # BLD AUTO: 0.02 K/UL
BASOPHILS NFR BLD: 0.3 %
BILIRUB SERPL-MCNC: 0.6 MG/DL
BUN SERPL-MCNC: 14 MG/DL
CALCIUM SERPL-MCNC: 9.6 MG/DL
CHLORIDE SERPL-SCNC: 107 MMOL/L
CHOLEST SERPL-MCNC: 132 MG/DL
CHOLEST/HDLC SERPL: 2.6 {RATIO}
CO2 SERPL-SCNC: 26 MMOL/L
CREAT SERPL-MCNC: 0.7 MG/DL
DIFFERENTIAL METHOD: NORMAL
EOSINOPHIL # BLD AUTO: 0.2 K/UL
EOSINOPHIL NFR BLD: 4 %
ERYTHROCYTE [DISTWIDTH] IN BLOOD BY AUTOMATED COUNT: 13.3 %
EST. GFR  (AFRICAN AMERICAN): >60 ML/MIN/1.73 M^2
EST. GFR  (NON AFRICAN AMERICAN): >60 ML/MIN/1.73 M^2
ESTIMATED AVG GLUCOSE: 143 MG/DL
GLUCOSE SERPL-MCNC: 118 MG/DL
HBA1C MFR BLD HPLC: 6.6 %
HCT VFR BLD AUTO: 44 %
HDLC SERPL-MCNC: 51 MG/DL
HDLC SERPL: 38.6 %
HGB BLD-MCNC: 14.6 G/DL
LDLC SERPL CALC-MCNC: 60.8 MG/DL
LYMPHOCYTES # BLD AUTO: 2.2 K/UL
LYMPHOCYTES NFR BLD: 36.9 %
MCH RBC QN AUTO: 30.2 PG
MCHC RBC AUTO-ENTMCNC: 33.2 G/DL
MCV RBC AUTO: 91 FL
MONOCYTES # BLD AUTO: 0.5 K/UL
MONOCYTES NFR BLD: 8.3 %
NEUTROPHILS # BLD AUTO: 3 K/UL
NEUTROPHILS NFR BLD: 50.3 %
NONHDLC SERPL-MCNC: 81 MG/DL
PLATELET # BLD AUTO: 286 K/UL
PMV BLD AUTO: 11.2 FL
POTASSIUM SERPL-SCNC: 4.3 MMOL/L
PROT SERPL-MCNC: 6.9 G/DL
RBC # BLD AUTO: 4.83 M/UL
SODIUM SERPL-SCNC: 141 MMOL/L
TRIGL SERPL-MCNC: 101 MG/DL
TSH SERPL DL<=0.005 MIU/L-ACNC: 0.44 UIU/ML
WBC # BLD AUTO: 6.04 K/UL

## 2017-10-09 PROCEDURE — 80053 COMPREHEN METABOLIC PANEL: CPT

## 2017-10-09 PROCEDURE — 84443 ASSAY THYROID STIM HORMONE: CPT

## 2017-10-09 PROCEDURE — 36415 COLL VENOUS BLD VENIPUNCTURE: CPT | Mod: PO

## 2017-10-09 PROCEDURE — 83036 HEMOGLOBIN GLYCOSYLATED A1C: CPT

## 2017-10-09 PROCEDURE — 80061 LIPID PANEL: CPT

## 2017-10-09 PROCEDURE — 85025 COMPLETE CBC W/AUTO DIFF WBC: CPT

## 2017-10-16 ENCOUNTER — OFFICE VISIT (OUTPATIENT)
Dept: FAMILY MEDICINE | Facility: CLINIC | Age: 67
End: 2017-10-16
Payer: MEDICARE

## 2017-10-16 VITALS
HEART RATE: 72 BPM | HEIGHT: 66 IN | DIASTOLIC BLOOD PRESSURE: 62 MMHG | SYSTOLIC BLOOD PRESSURE: 100 MMHG | TEMPERATURE: 98 F | BODY MASS INDEX: 32.45 KG/M2 | WEIGHT: 201.94 LBS

## 2017-10-16 DIAGNOSIS — Z12.39 SCREENING BREAST EXAMINATION: Primary | ICD-10-CM

## 2017-10-16 DIAGNOSIS — Z00.00 ROUTINE GENERAL MEDICAL EXAMINATION AT A HEALTH CARE FACILITY: ICD-10-CM

## 2017-10-16 DIAGNOSIS — E78.5 HYPERLIPIDEMIA, UNSPECIFIED HYPERLIPIDEMIA TYPE: ICD-10-CM

## 2017-10-16 DIAGNOSIS — Z29.9 PREVENTIVE MEASURE: ICD-10-CM

## 2017-10-16 DIAGNOSIS — E03.9 ACQUIRED HYPOTHYROIDISM: ICD-10-CM

## 2017-10-16 DIAGNOSIS — E11.9 TYPE 2 DIABETES MELLITUS WITHOUT COMPLICATION, WITHOUT LONG-TERM CURRENT USE OF INSULIN: ICD-10-CM

## 2017-10-16 PROCEDURE — 99999 PR PBB SHADOW E&M-EST. PATIENT-LVL III: CPT | Mod: PBBFAC,,, | Performed by: FAMILY MEDICINE

## 2017-10-16 PROCEDURE — 99213 OFFICE O/P EST LOW 20 MIN: CPT | Mod: PBBFAC,PO | Performed by: FAMILY MEDICINE

## 2017-10-16 PROCEDURE — G0009 ADMIN PNEUMOCOCCAL VACCINE: HCPCS | Mod: PBBFAC,PO

## 2017-10-16 PROCEDURE — 90732 PPSV23 VACC 2 YRS+ SUBQ/IM: CPT | Mod: PBBFAC,PO

## 2017-10-16 PROCEDURE — 99397 PER PM REEVAL EST PAT 65+ YR: CPT | Mod: S$PBB,,, | Performed by: FAMILY MEDICINE

## 2017-10-16 NOTE — PROGRESS NOTES
Subjective:       Patient ID: Bebe Valdez is a 67 y.o. female.    Chief Complaint: Annual Exam    Disclaimer: This note has been generated using voice-recognition software. There may be typographical errors that have been missed during proof-reading    66 yo presents for routine exam, last exam one year ago  Immunizations:  Needs Pneumovax  Colonoscopy:  2017, normal  History of Type II DM, last A1C 6.6 about 2 weeks ago.  Does not check BGs routinely at home      Review of Systems   Constitutional: Negative.  Negative for activity change, appetite change, chills, diaphoresis, fatigue, fever and unexpected weight change.   HENT: Negative.  Negative for congestion, ear pain, hearing loss, rhinorrhea, sinus pressure, sore throat, tinnitus, trouble swallowing and voice change.    Eyes: Negative.  Negative for photophobia, pain and visual disturbance.   Respiratory: Negative.  Negative for cough, chest tightness and shortness of breath.    Cardiovascular: Negative.  Negative for chest pain, palpitations and leg swelling.   Gastrointestinal: Negative.  Negative for abdominal distention, abdominal pain, blood in stool, constipation, diarrhea, nausea and vomiting.   Genitourinary: Negative.  Negative for difficulty urinating, dysuria, frequency, hematuria, menstrual problem, pelvic pain, vaginal bleeding and vaginal discharge.   Musculoskeletal: Negative.  Negative for arthralgias, back pain, joint swelling, neck pain and neck stiffness.   Skin: Negative for color change, pallor and rash.   Neurological: Negative.  Negative for dizziness, tremors, speech difficulty, weakness, light-headedness, numbness and headaches.   Hematological: Negative for adenopathy. Does not bruise/bleed easily.   Psychiatric/Behavioral: Negative for agitation, confusion, dysphoric mood, hallucinations and sleep disturbance. The patient is not nervous/anxious.        Objective:      Physical Exam   Constitutional: She is oriented to person,  place, and time. She appears well-developed and well-nourished.   HENT:   Right Ear: Tympanic membrane and external ear normal.   Left Ear: Tympanic membrane and external ear normal.   Nose: Nose normal.   Mouth/Throat: Oropharynx is clear and moist.   Eyes: Conjunctivae and EOM are normal. Pupils are equal, round, and reactive to light.   Neck: Normal range of motion. Neck supple. No tracheal deviation present. No thyromegaly present.   Cardiovascular: Normal rate, regular rhythm, normal heart sounds and intact distal pulses.    Pulmonary/Chest: Effort normal. She has no wheezes. She has no rales. She exhibits no tenderness.   Abdominal: Soft. Bowel sounds are normal. She exhibits no distension and no mass. There is no rebound.   Genitourinary:   Genitourinary Comments: Breasts show no masses or nipple retraction, no axillary adenopathy     Musculoskeletal: Normal range of motion. She exhibits no edema or tenderness.   Lymphadenopathy:     She has no cervical adenopathy.   Neurological: She is alert and oriented to person, place, and time. She has normal reflexes. No cranial nerve deficit. Coordination normal.   Skin: Skin is warm and dry. No rash noted. No erythema.   Psychiatric: She has a normal mood and affect. Her behavior is normal.       Assessment:       1.  Physical exam  2.  Type II DM  3.  Hypothyroidism  4.  Hyperlipidemia  5.  obesity  Plan:       1.  Labs reviewed with pt  2.  Continue present medications  3.  Mammogram, Pneumovax  4.  F/u 4 months

## 2017-10-17 ENCOUNTER — TELEPHONE (OUTPATIENT)
Dept: FAMILY MEDICINE | Facility: CLINIC | Age: 67
End: 2017-10-17

## 2017-10-17 DIAGNOSIS — E11.9 TYPE 2 DIABETES MELLITUS WITHOUT COMPLICATION, WITHOUT LONG-TERM CURRENT USE OF INSULIN: Primary | ICD-10-CM

## 2017-10-17 NOTE — TELEPHONE ENCOUNTER
----- Message from Marce Dawson sent at 10/17/2017  9:37 AM CDT -----  Contact: self   Patient was in office on yesterday had 2nd pneumonia shot asks should her arm still be hurting from shot.  Would like to discuss RX  CRESTOR 10 mg tablet.    Please advise

## 2017-10-17 NOTE — TELEPHONE ENCOUNTER
Informed patient that it is normal to have pain at injection site. Advised to apply cool compress and take an anti-inflammatory. Instructed to report if area becomes red and warm to touch. Patient reports that when she picked up her Rx's one read rosuvastatin (brand for Synthroid). Informed patient that Synthroid is brand for levothyroxine and that Crestor is brand for rosuvastatin. Encouraged patient to return to pharmacy to verify what medications she has received. Patient verbalizes understanding.

## 2017-10-17 NOTE — TELEPHONE ENCOUNTER
----- Message from Marce Dawson sent at 10/17/2017  9:44 AM CDT -----  Contact: self [phone]  Doctor appointment and lab have been scheduled.  Please link lab orders to the lab appointment.  Date of doctor appointment:02/19    Physical or EP:  EP  Date of lab appointment:  02/14  Comments:

## 2017-10-18 ENCOUNTER — HOSPITAL ENCOUNTER (OUTPATIENT)
Dept: RADIOLOGY | Facility: HOSPITAL | Age: 67
Discharge: HOME | End: 2017-10-18
Attending: FAMILY MEDICINE
Payer: MEDICARE

## 2017-10-18 VITALS — HEIGHT: 66 IN | WEIGHT: 201 LBS | BODY MASS INDEX: 32.3 KG/M2

## 2017-10-18 DIAGNOSIS — Z12.39 SCREENING BREAST EXAMINATION: ICD-10-CM

## 2017-10-18 PROCEDURE — 77067 SCR MAMMO BI INCL CAD: CPT | Mod: 26,,, | Performed by: RADIOLOGY

## 2017-10-18 PROCEDURE — 77067 SCR MAMMO BI INCL CAD: CPT | Mod: TC

## 2017-10-18 PROCEDURE — 77063 BREAST TOMOSYNTHESIS BI: CPT | Mod: 26,,, | Performed by: RADIOLOGY

## 2017-11-27 ENCOUNTER — OFFICE VISIT (OUTPATIENT)
Dept: FAMILY MEDICINE | Facility: CLINIC | Age: 67
End: 2017-11-27
Payer: MEDICARE

## 2017-11-27 VITALS
WEIGHT: 201.5 LBS | HEART RATE: 76 BPM | HEIGHT: 66 IN | BODY MASS INDEX: 32.38 KG/M2 | SYSTOLIC BLOOD PRESSURE: 104 MMHG | DIASTOLIC BLOOD PRESSURE: 70 MMHG | TEMPERATURE: 98 F

## 2017-11-27 DIAGNOSIS — R35.0 URINARY FREQUENCY: Primary | ICD-10-CM

## 2017-11-27 DIAGNOSIS — N30.90 CYSTITIS: ICD-10-CM

## 2017-11-27 PROCEDURE — 99213 OFFICE O/P EST LOW 20 MIN: CPT | Mod: S$PBB,,, | Performed by: FAMILY MEDICINE

## 2017-11-27 PROCEDURE — 99999 PR PBB SHADOW E&M-EST. PATIENT-LVL III: CPT | Mod: PBBFAC,,, | Performed by: FAMILY MEDICINE

## 2017-11-27 PROCEDURE — 81002 URINALYSIS NONAUTO W/O SCOPE: CPT | Mod: PBBFAC,PO | Performed by: FAMILY MEDICINE

## 2017-11-27 PROCEDURE — 99213 OFFICE O/P EST LOW 20 MIN: CPT | Mod: PBBFAC,PO | Performed by: FAMILY MEDICINE

## 2017-11-27 PROCEDURE — 87086 URINE CULTURE/COLONY COUNT: CPT

## 2017-11-27 RX ORDER — NITROFURANTOIN (MACROCRYSTALS) 100 MG/1
100 CAPSULE ORAL EVERY 12 HOURS
Qty: 20 CAPSULE | Refills: 0 | Status: SHIPPED | OUTPATIENT
Start: 2017-11-27 | End: 2017-12-25 | Stop reason: SDUPTHER

## 2017-11-27 NOTE — PROGRESS NOTES
Subjective:       Patient ID: Bebe Valdez is a 67 y.o. female.    Chief Complaint: Urinary Frequency (urgency)    Disclaimer: This note has been generated using voice-recognition software. There may be typographical errors that have been missed during proof-reading    68 yo presents today with 2 day history of increased urine frequency, dysuria, without hematuria.  No abdominal pain, fever or chills      Review of Systems   Constitutional: Negative for chills and fever.   Gastrointestinal: Negative for abdominal distention and abdominal pain.   Genitourinary: Positive for dysuria, frequency and urgency. Negative for difficulty urinating and hematuria.       Objective:      Physical Exam   Constitutional: She appears well-developed and well-nourished. No distress.   Abdominal: Soft. Bowel sounds are normal. She exhibits no distension. There is no tenderness. There is no rebound.       Assessment:       1. Urinary frequency    2. Cystitis        Plan:       1.  Macrodantin bid  2.  Urine culture

## 2017-11-28 LAB — BACTERIA UR CULT: NO GROWTH

## 2017-12-21 ENCOUNTER — PATIENT MESSAGE (OUTPATIENT)
Dept: FAMILY MEDICINE | Facility: CLINIC | Age: 67
End: 2017-12-21

## 2017-12-25 ENCOUNTER — PATIENT MESSAGE (OUTPATIENT)
Dept: FAMILY MEDICINE | Facility: CLINIC | Age: 67
End: 2017-12-25

## 2017-12-25 DIAGNOSIS — N30.90 CYSTITIS: ICD-10-CM

## 2017-12-26 RX ORDER — NITROFURANTOIN (MACROCRYSTALS) 100 MG/1
CAPSULE ORAL
Qty: 20 CAPSULE | Refills: 0 | Status: SHIPPED | OUTPATIENT
Start: 2017-12-26 | End: 2018-02-19 | Stop reason: ALTCHOICE

## 2017-12-26 NOTE — TELEPHONE ENCOUNTER
----- Message from Madison Arlene sent at 12/26/2017  9:11 AM CST -----  Contact: Patient [phone]  Symptoms:   Coughing and congestion    Patient is requesting nitrofurantoin (MACRODANTIN) 100 MG capsule.    Pharmacy: [facility] [id number] [place] [address] [address] AT [facility]    Please call and advise.    Thank You

## 2018-01-08 RX ORDER — LEVOTHYROXINE SODIUM 100 UG/1
100 TABLET ORAL DAILY
Qty: 90 TABLET | Refills: 3 | Status: SHIPPED | OUTPATIENT
Start: 2018-01-08 | End: 2018-12-29 | Stop reason: SDUPTHER

## 2018-01-08 RX ORDER — LEVOTHYROXINE SODIUM 88 UG/1
88 TABLET ORAL DAILY
Qty: 90 TABLET | Refills: 3 | Status: SHIPPED | OUTPATIENT
Start: 2018-01-08 | End: 2019-01-03 | Stop reason: SDUPTHER

## 2018-02-14 ENCOUNTER — LAB VISIT (OUTPATIENT)
Dept: LAB | Facility: HOSPITAL | Age: 68
End: 2018-02-14
Attending: FAMILY MEDICINE
Payer: MEDICARE

## 2018-02-14 DIAGNOSIS — E11.9 TYPE 2 DIABETES MELLITUS WITHOUT COMPLICATION, WITHOUT LONG-TERM CURRENT USE OF INSULIN: ICD-10-CM

## 2018-02-14 LAB
ANION GAP SERPL CALC-SCNC: 6 MMOL/L
BUN SERPL-MCNC: 13 MG/DL
CALCIUM SERPL-MCNC: 10 MG/DL
CHLORIDE SERPL-SCNC: 106 MMOL/L
CO2 SERPL-SCNC: 29 MMOL/L
CREAT SERPL-MCNC: 0.7 MG/DL
EST. GFR  (AFRICAN AMERICAN): >60 ML/MIN/1.73 M^2
EST. GFR  (NON AFRICAN AMERICAN): >60 ML/MIN/1.73 M^2
ESTIMATED AVG GLUCOSE: 146 MG/DL
GLUCOSE SERPL-MCNC: 123 MG/DL
HBA1C MFR BLD HPLC: 6.7 %
POTASSIUM SERPL-SCNC: 4.4 MMOL/L
SODIUM SERPL-SCNC: 141 MMOL/L

## 2018-02-14 PROCEDURE — 36415 COLL VENOUS BLD VENIPUNCTURE: CPT | Mod: PO

## 2018-02-14 PROCEDURE — 83036 HEMOGLOBIN GLYCOSYLATED A1C: CPT

## 2018-02-14 PROCEDURE — 80048 BASIC METABOLIC PNL TOTAL CA: CPT

## 2018-02-19 ENCOUNTER — OFFICE VISIT (OUTPATIENT)
Dept: FAMILY MEDICINE | Facility: CLINIC | Age: 68
End: 2018-02-19
Payer: MEDICARE

## 2018-02-19 VITALS
HEART RATE: 79 BPM | TEMPERATURE: 98 F | RESPIRATION RATE: 16 BRPM | SYSTOLIC BLOOD PRESSURE: 130 MMHG | DIASTOLIC BLOOD PRESSURE: 86 MMHG | WEIGHT: 203.69 LBS | HEIGHT: 66 IN | BODY MASS INDEX: 32.73 KG/M2

## 2018-02-19 DIAGNOSIS — E11.9 TYPE 2 DIABETES MELLITUS WITHOUT COMPLICATION, WITHOUT LONG-TERM CURRENT USE OF INSULIN: Primary | ICD-10-CM

## 2018-02-19 PROCEDURE — 99213 OFFICE O/P EST LOW 20 MIN: CPT | Mod: PBBFAC,PO | Performed by: FAMILY MEDICINE

## 2018-02-19 PROCEDURE — 99999 PR PBB SHADOW E&M-EST. PATIENT-LVL III: CPT | Mod: PBBFAC,,, | Performed by: FAMILY MEDICINE

## 2018-02-19 PROCEDURE — 99213 OFFICE O/P EST LOW 20 MIN: CPT | Mod: S$PBB,,, | Performed by: FAMILY MEDICINE

## 2018-02-19 PROCEDURE — 1126F AMNT PAIN NOTED NONE PRSNT: CPT | Mod: ,,, | Performed by: FAMILY MEDICINE

## 2018-02-19 PROCEDURE — 1159F MED LIST DOCD IN RCRD: CPT | Mod: ,,, | Performed by: FAMILY MEDICINE

## 2018-02-19 RX ORDER — METFORMIN HYDROCHLORIDE 500 MG/1
500 TABLET, EXTENDED RELEASE ORAL 2 TIMES DAILY WITH MEALS
Qty: 180 TABLET | Refills: 3 | Status: SHIPPED | OUTPATIENT
Start: 2018-02-19 | End: 2018-10-22

## 2018-04-04 RX ORDER — ROSUVASTATIN CALCIUM 10 MG/1
TABLET, COATED ORAL
Qty: 90 TABLET | Refills: 0 | Status: SHIPPED | OUTPATIENT
Start: 2018-04-04 | End: 2018-07-01 | Stop reason: SDUPTHER

## 2018-06-19 ENCOUNTER — LAB VISIT (OUTPATIENT)
Dept: LAB | Facility: HOSPITAL | Age: 68
End: 2018-06-19
Attending: FAMILY MEDICINE
Payer: MEDICARE

## 2018-06-19 DIAGNOSIS — E11.9 TYPE 2 DIABETES MELLITUS WITHOUT COMPLICATION, WITHOUT LONG-TERM CURRENT USE OF INSULIN: ICD-10-CM

## 2018-06-19 LAB
ANION GAP SERPL CALC-SCNC: 7 MMOL/L
BUN SERPL-MCNC: 11 MG/DL
CALCIUM SERPL-MCNC: 9.9 MG/DL
CHLORIDE SERPL-SCNC: 107 MMOL/L
CO2 SERPL-SCNC: 27 MMOL/L
CREAT SERPL-MCNC: 0.7 MG/DL
EST. GFR  (AFRICAN AMERICAN): >60 ML/MIN/1.73 M^2
EST. GFR  (NON AFRICAN AMERICAN): >60 ML/MIN/1.73 M^2
ESTIMATED AVG GLUCOSE: 154 MG/DL
GLUCOSE SERPL-MCNC: 149 MG/DL
HBA1C MFR BLD HPLC: 7 %
POTASSIUM SERPL-SCNC: 4.4 MMOL/L
SODIUM SERPL-SCNC: 141 MMOL/L

## 2018-06-19 PROCEDURE — 36415 COLL VENOUS BLD VENIPUNCTURE: CPT | Mod: PO

## 2018-06-19 PROCEDURE — 83036 HEMOGLOBIN GLYCOSYLATED A1C: CPT

## 2018-06-19 PROCEDURE — 80048 BASIC METABOLIC PNL TOTAL CA: CPT

## 2018-07-01 RX ORDER — ROSUVASTATIN CALCIUM 10 MG/1
TABLET, COATED ORAL
Qty: 90 TABLET | Refills: 0 | Status: SHIPPED | OUTPATIENT
Start: 2018-07-01 | End: 2018-10-04 | Stop reason: SDUPTHER

## 2018-07-03 ENCOUNTER — TELEPHONE (OUTPATIENT)
Dept: FAMILY MEDICINE | Facility: CLINIC | Age: 68
End: 2018-07-03

## 2018-07-03 DIAGNOSIS — E03.9 HYPOTHYROIDISM, UNSPECIFIED TYPE: ICD-10-CM

## 2018-07-03 DIAGNOSIS — E11.9 TYPE 2 DIABETES MELLITUS WITHOUT COMPLICATION, WITHOUT LONG-TERM CURRENT USE OF INSULIN: Primary | ICD-10-CM

## 2018-07-03 DIAGNOSIS — E78.5 HYPERLIPIDEMIA, UNSPECIFIED HYPERLIPIDEMIA TYPE: ICD-10-CM

## 2018-07-03 NOTE — TELEPHONE ENCOUNTER
----- Message from Alison West sent at 7/3/2018 12:17 PM CDT -----  Doctor appointment and lab have been scheduled.  Please link lab orders to the lab appointment.  Date of doctor appointment:  10/22  Physical or EP:  Physical  Date of lab appointment:  10/15  Comments: pt would like to check for diabetes

## 2018-07-31 ENCOUNTER — EXTERNAL CHRONIC CARE MANAGEMENT (OUTPATIENT)
Dept: PRIMARY CARE CLINIC | Facility: CLINIC | Age: 68
End: 2018-07-31
Payer: MEDICARE

## 2018-07-31 PROCEDURE — 99490 CHRNC CARE MGMT STAFF 1ST 20: CPT | Mod: PBBFAC,PO | Performed by: FAMILY MEDICINE

## 2018-07-31 PROCEDURE — 99490 CHRNC CARE MGMT STAFF 1ST 20: CPT | Mod: S$PBB,,, | Performed by: FAMILY MEDICINE

## 2018-08-31 ENCOUNTER — EXTERNAL CHRONIC CARE MANAGEMENT (OUTPATIENT)
Dept: PRIMARY CARE CLINIC | Facility: CLINIC | Age: 68
End: 2018-08-31
Payer: MEDICARE

## 2018-08-31 PROCEDURE — 99490 CHRNC CARE MGMT STAFF 1ST 20: CPT | Mod: S$PBB,,, | Performed by: FAMILY MEDICINE

## 2018-08-31 PROCEDURE — 99490 CHRNC CARE MGMT STAFF 1ST 20: CPT | Mod: PBBFAC,PO | Performed by: FAMILY MEDICINE

## 2018-09-28 ENCOUNTER — PATIENT MESSAGE (OUTPATIENT)
Dept: FAMILY MEDICINE | Facility: CLINIC | Age: 68
End: 2018-09-28

## 2018-09-28 DIAGNOSIS — L25.9 CONTACT DERMATITIS, UNSPECIFIED CONTACT DERMATITIS TYPE, UNSPECIFIED TRIGGER: Primary | ICD-10-CM

## 2018-09-30 ENCOUNTER — EXTERNAL CHRONIC CARE MANAGEMENT (OUTPATIENT)
Dept: PRIMARY CARE CLINIC | Facility: CLINIC | Age: 68
End: 2018-09-30
Payer: MEDICARE

## 2018-09-30 PROCEDURE — 99490 CHRNC CARE MGMT STAFF 1ST 20: CPT | Mod: PBBFAC,PO | Performed by: FAMILY MEDICINE

## 2018-09-30 PROCEDURE — 99490 CHRNC CARE MGMT STAFF 1ST 20: CPT | Mod: S$PBB,,, | Performed by: FAMILY MEDICINE

## 2018-10-01 ENCOUNTER — TELEPHONE (OUTPATIENT)
Dept: DERMATOLOGY | Facility: CLINIC | Age: 68
End: 2018-10-01

## 2018-10-01 NOTE — TELEPHONE ENCOUNTER
I called and spoke to the patient and was able to schedule her for an appointment at 3:15pm tomorrow. She was pleased with this and thanked me for the call.

## 2018-10-01 NOTE — TELEPHONE ENCOUNTER
----- Message from Esteban Webb sent at 10/1/2018 10:27 AM CDT -----  Contact: Patient   Patient Requesting Sooner Appointment.     Reason for sooner appt.: Rash on face, referred by Dr Nelson   When is the first available appointment? 1/3/19  Communication Preference: [phone]  Additional Information: pt prefers to be seen with Dr Steven if possible

## 2018-10-02 ENCOUNTER — OFFICE VISIT (OUTPATIENT)
Dept: DERMATOLOGY | Facility: CLINIC | Age: 68
End: 2018-10-02
Payer: MEDICARE

## 2018-10-02 DIAGNOSIS — L74.0 HEAT RASH: Primary | ICD-10-CM

## 2018-10-02 DIAGNOSIS — L71.9 ROSACEA: ICD-10-CM

## 2018-10-02 PROCEDURE — 99999 PR PBB SHADOW E&M-EST. PATIENT-LVL II: CPT | Mod: PBBFAC,,, | Performed by: DERMATOLOGY

## 2018-10-02 PROCEDURE — 99213 OFFICE O/P EST LOW 20 MIN: CPT | Mod: S$PBB,,, | Performed by: DERMATOLOGY

## 2018-10-02 PROCEDURE — 99212 OFFICE O/P EST SF 10 MIN: CPT | Mod: PBBFAC | Performed by: DERMATOLOGY

## 2018-10-02 RX ORDER — HYDROCORTISONE 25 MG/ML
LOTION TOPICAL 2 TIMES DAILY
Qty: 59 ML | Refills: 2 | Status: SHIPPED | OUTPATIENT
Start: 2018-10-02 | End: 2019-04-29 | Stop reason: ALTCHOICE

## 2018-10-02 NOTE — PROGRESS NOTES
Subjective:       Patient ID:  Bebe Valdez is a 68 y.o. female who presents for   Chief Complaint   Patient presents with    Rash     chin, x days, asym, tx neosporin    Mole     R thigh, x yrs, asym, no tx     Pt presents today for a rash on the chin x days, asym, tx neosporin. 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 She notes that the redness on her chest seems to be better. The rash on her chin arose 3 days ago. It has improved with neosporin. It is asymptomatic. She has no other symptoms. She has known rosacea and occasionally uses metronidazole gel with no effect.    Patient also has had a bump on her right leg, present x years, which does not cause her any symptoms.         Review of Systems   Skin: Positive for rash.   Hematologic/Lymphatic: Does not bruise/bleed easily.        Objective:    Physical Exam   Constitutional: She appears well-developed and well-nourished. No distress.   Neurological: She is alert and oriented to person, place, and time. She is not disoriented.   Psychiatric: She has a normal mood and affect.   Skin:   Areas Examined (abnormalities noted in diagram):   Scalp / Hair Palpated and Inspected  Head / Face Inspection Performed  Neck Inspection Performed  Chest / Axilla Inspection Performed  Abdomen Inspection Performed  Genitals / Buttocks / Groin Inspection Performed  Back Inspection Performed  RUE Inspected  LUE Inspection Performed  RLE Inspected  LLE Inspection Performed  Nails and Digits Inspection Performed                   Diagram Legend     Erythematous scaling macule/papule c/w actinic keratosis       Vascular papule c/w angioma      Pigmented verrucoid papule/plaque c/w seborrheic keratosis      Yellow umbilicated papule c/w sebaceous hyperplasia      Irregularly shaped tan macule c/w lentigo     1-2 mm smooth white papules consistent with Milia      Movable subcutaneous cyst with punctum c/w epidermal inclusion cyst      Subcutaneous movable cyst  c/w pilar cyst      Firm pink to brown papule c/w dermatofibroma      Pedunculated fleshy papule(s) c/w skin tag(s)      Evenly pigmented macule c/w junctional nevus     Mildly variegated pigmented, slightly irregular-bordered macule c/w mildly atypical nevus      Flesh colored to evenly pigmented papule c/w intradermal nevus       Pink pearly papule/plaque c/w basal cell carcinoma      Erythematous hyperkeratotic cursted plaque c/w SCC      Surgical scar with no sign of skin cancer recurrence      Open and closed comedones      Inflammatory papules and pustules      Verrucoid papule consistent consistent with wart     Erythematous eczematous patches and plaques     Dystrophic onycholytic nail with subungual debris c/w onychomycosis     Umbilicated papule    Erythematous-base heme-crusted tan verrucoid plaque consistent with inflamed seborrheic keratosis     Erythematous Silvery Scaling Plaque c/w Psoriasis     See annotation      Assessment / Plan:        Heat rash  - neck and chin - pt also reports using a new mask from her  prior to the rash coming on - already resolving  -     hydrocortisone 2.5 % lotion; Apply topically 2 (two) times daily. To rash on chin/neck for 10 days  Dispense: 59 mL; Refill: 2    Rosacea - mainly ETR type  -     MIRVASO 0.33 % Gel; AAA face qday - dispense pump  Dispense: 30 g; Refill: 3  - given laser clinic brochure to consider PDL with Dr Yu    Dermatofibromas lower leg  Reassurance given to patient. No treatment is necessary.   Treatment of benign, asymptomatic lesions may be considered cosmetic.             No Follow-up on file.

## 2018-10-02 NOTE — LETTER
October 2, 2018      Anil Nelson MD  [address] [facility]  [address]  [facility] [address]           [facility]  [address]  [place] [address]  Phone: [phone]  Fax: [phone]          Patient: Bebe Valdez   MR Number: [id number]   YOB: 1950   Date of Visit: 10/2/2018       Dear Dr. Anil Nelson:    Thank you for referring Bebe Valdez to me for evaluation. Attached you will find relevant portions of my assessment and plan of care.    If you have questions, please do not hesitate to call me. I look forward to following Bebe Valdez along with you.    Sincerely,    Cleo Lan MD    Enclosure  CC:  No Recipients    If you would like to receive this communication electronically, please contact [email or web address] or [phone] to request more information on [employer] access.    For providers and/or their staff who would like to refer a patient to [facility], please contact us through our one-stop-shop provider referral line, [facility], [place], at [phone].    If you feel you have received this communication in error or would no longer like to receive these types of communications, please e-mail [email or web address]

## 2018-10-04 DIAGNOSIS — E78.5 HYPERLIPIDEMIA, UNSPECIFIED HYPERLIPIDEMIA TYPE: Primary | ICD-10-CM

## 2018-10-04 RX ORDER — ROSUVASTATIN CALCIUM 10 MG/1
10 TABLET, COATED ORAL DAILY
Qty: 90 TABLET | Refills: 0 | Status: SHIPPED | OUTPATIENT
Start: 2018-10-04 | End: 2018-12-29 | Stop reason: SDUPTHER

## 2018-10-06 ENCOUNTER — OFFICE VISIT (OUTPATIENT)
Dept: INTERNAL MEDICINE | Facility: CLINIC | Age: 68
End: 2018-10-06
Payer: MEDICARE

## 2018-10-06 VITALS
DIASTOLIC BLOOD PRESSURE: 83 MMHG | SYSTOLIC BLOOD PRESSURE: 134 MMHG | OXYGEN SATURATION: 97 % | HEIGHT: 66 IN | TEMPERATURE: 98 F | WEIGHT: 205.94 LBS | BODY MASS INDEX: 33.1 KG/M2 | HEART RATE: 83 BPM

## 2018-10-06 DIAGNOSIS — L50.9 HIVES: Primary | ICD-10-CM

## 2018-10-06 PROCEDURE — 99213 OFFICE O/P EST LOW 20 MIN: CPT | Mod: S$PBB,,, | Performed by: INTERNAL MEDICINE

## 2018-10-06 PROCEDURE — 99214 OFFICE O/P EST MOD 30 MIN: CPT | Mod: PBBFAC | Performed by: INTERNAL MEDICINE

## 2018-10-06 PROCEDURE — 99999 PR PBB SHADOW E&M-EST. PATIENT-LVL IV: CPT | Mod: PBBFAC,,, | Performed by: INTERNAL MEDICINE

## 2018-10-06 RX ORDER — HYDROXYZINE HYDROCHLORIDE 50 MG/1
50 TABLET, FILM COATED ORAL 3 TIMES DAILY PRN
Qty: 90 TABLET | Refills: 3 | Status: SHIPPED | OUTPATIENT
Start: 2018-10-06 | End: 2018-11-01

## 2018-10-06 NOTE — PROGRESS NOTES
HISTORY OF PRESENT ILLNESS:  She is a 68-year-old female coming to see me for   hives.  She reports that she developed hives this morning.  It is around her   belt line and on her breast and on her back.  She went to [employer] last night   and she got some food she said.  She has gotten this before.  It was a little   bit spicy and taste a little bit different and that she developed itching early   this morning.  She is not having any shortness of breath.  No PND, no orthopnea.    She develops hives when she gets tetracycline and that is well documented and   this has happened before, but she has not had it with anything else.  She has   not had any antibiotics.  No medicines are changed recently.    PAST MEDICAL HISTORY:  She is a patient of Dr. Nelson.  She has had   hyperlipidemia, hypothyroidism and diabetes.    PHYSICAL EXAMINATION:  GENERAL:  She is a well-appearing 68-year-old female in no acute distress.  No   hives on the face.  HEENT:  Her ear canals are open.  TMs clear.  Oropharynx is clear.  CHEST:  Clear without any wheeze.  CARDIOVASCULAR:  S1 and S2, regular rate and rhythm without murmur, gallop or   rub.  ABDOMEN:  Soft, obese and nontender.    I first thought she had just the heat rash, but it is actually hives around her   waist and on her breast, but also on her back and on her buttocks.  They are   bilateral.  They are not associated with any vesicles or anything for shingles.    ASSESSMENT:  Hives.  We are going to treat her antihistamines.  We tried Atarax   and we will switch her laundry detergents such as right in the beltline,   wondering she can switch her tide to tide clear and she is getting rid of those   perfumes would help at all.  If she worsens acutely, come on into the hospital   tonight, but if it gets better, but if she has a reoccurrence, next step would   be going to see our friends in All[facility].      JONATHAN  dd: 10/06/2018 16:41:06 (CDT)  td: 10/07/2018 16:32:41 (CDT)  Doc ID    #[id number]  Job ID #[id number]    CC:

## 2018-10-07 ENCOUNTER — OFFICE VISIT (OUTPATIENT)
Dept: URGENT CARE | Facility: CLINIC | Age: 68
End: 2018-10-07
Payer: MEDICARE

## 2018-10-07 VITALS
HEART RATE: 149 BPM | SYSTOLIC BLOOD PRESSURE: 112 MMHG | DIASTOLIC BLOOD PRESSURE: 72 MMHG | RESPIRATION RATE: 19 BRPM | TEMPERATURE: 102 F | WEIGHT: 205 LBS | OXYGEN SATURATION: 95 % | HEIGHT: 66 IN | BODY MASS INDEX: 32.95 KG/M2

## 2018-10-07 DIAGNOSIS — L50.9 HIVES: Primary | ICD-10-CM

## 2018-10-07 DIAGNOSIS — R50.9 FEVER, UNSPECIFIED FEVER CAUSE: ICD-10-CM

## 2018-10-07 PROCEDURE — 96372 THER/PROPH/DIAG INJ SC/IM: CPT | Mod: S$GLB,,, | Performed by: NURSE PRACTITIONER

## 2018-10-07 PROCEDURE — 99214 OFFICE O/P EST MOD 30 MIN: CPT | Mod: 25,S$GLB,, | Performed by: NURSE PRACTITIONER

## 2018-10-07 RX ORDER — BETAMETHASONE SODIUM PHOSPHATE AND BETAMETHASONE ACETATE 3; 3 MG/ML; MG/ML
6 INJECTION, SUSPENSION INTRA-ARTICULAR; INTRALESIONAL; INTRAMUSCULAR; SOFT TISSUE
Status: COMPLETED | OUTPATIENT
Start: 2018-10-07 | End: 2018-10-07

## 2018-10-07 RX ORDER — ACETAMINOPHEN 500 MG
1000 TABLET ORAL
Status: COMPLETED | OUTPATIENT
Start: 2018-10-07 | End: 2018-10-07

## 2018-10-07 RX ORDER — METHYLPREDNISOLONE 4 MG/1
TABLET ORAL
Qty: 21 TABLET | Refills: 0 | Status: SHIPPED | OUTPATIENT
Start: 2018-10-07 | End: 2018-10-22

## 2018-10-07 RX ADMIN — BETAMETHASONE SODIUM PHOSPHATE AND BETAMETHASONE ACETATE 6 MG: 3; 3 INJECTION, SUSPENSION INTRA-ARTICULAR; INTRALESIONAL; INTRAMUSCULAR; SOFT TISSUE at 05:10

## 2018-10-07 RX ADMIN — Medication 1000 MG: at 04:10

## 2018-10-07 NOTE — PATIENT INSTRUCTIONS
YOUR BLOOD SUGAR WILL BE ELEVATED D/T STEROIDS    FOLLOW UP WITH YOUR PCP THIS WEEK    CONTINUE ANTIHISTAMINE GIVEN BY PCP     Understanding Hives (Urticaria)  Urticaria (hives) are red, itchy, and swollen areas on the skin. They are most often an allergic reaction from eating a food or taking a medicine. Sometimes the cause may be unknown. A single hive can vary in size from a half inch to several inches. Hives can appear all over the body. Or they may appear on only one part of the body.  What causes hives?  Hives can be caused by food and beverages such as:  · Tree nuts (almonds, walnuts, hazelnuts)  · Peanuts  · Eggs  · Shellfish  · Milk  Hives can also be caused by medicines such as:  · Antibiotics, especially penicillin and sulfa-based medicines   · Anticonvulsant or antiseizure medicines   · Chemotherapy medicines   Other causes of hives include:  · Infection or virus  · Heat  · Cold air or cold water  · Exercise  · Scratching or rubbing your skin, or wearing tight-fitting clothes that rub your skin  · Being exposed to sunlight or light from a light bulb, in rare cases  · Inhaled-chemicals in the environment from foods and drugs, insects, plants, or other sources  In some cases, hives may occur again and again with no specific cause.  If you have hives  · Avoid the food, drink, medicine, or other factor that may be causing the hives.  · Make a thick paste of baking soda and water. Apply the paste directly to your skin. This can help lessen itching.  · Talk with your healthcare provider right away if you think a medicine gave you hives.  Watch for anaphylaxis  If you have hives, watch for symptoms of a severe reaction that can affect your entire body. This is called anaphylaxis. Symptoms can include swollen areas of the body, wheezing, trouble breathing or swallowing, and a hoarse voice. This reaction may happen right away. Or it may happen in an hour or more. In extreme cases, the airways from mouth to  lungs may swell and make breathing difficult. This is a medical emergency. Use epinephrine medicine if you have it, and call 911 or go to the emergency room.     When to call your healthcare provider  Call your healthcare provider if:  · Your hives feel uncomfortable  · You have never had hives before  · Your symptoms don't go away or come back  · Your symptoms get worse or new symptoms develop such as:   ¨ Sneezing, coughing, runny or stuffy nose  ¨ Itching of the eyes, nose, or roof of the mouth  ¨ Itching, burning, stinging, or pain  ¨ Dry, flaky, cracking, or scaly skin  ¨ Red or purple spots  When to call 911  Call 911 right away if you have:  · Swelling in your lips, tongue, or throat, called angioedema  · Drooling  · Trouble breathing, talking, or swallowing  · Cool, moist or pale (blue in color) skin  · Fast and weak heartbeat  · Wheezing or short of breath  · Feeling lightheaded or confused  · Diarrhea  · Severe nausea or vomiting  · Seizure  · Feeling dizzy or weak, or a sudden drop in blood pressure   Date Last Reviewed: 4/1/2017  © 5791-2842 [employer]. [address] [place] [address]. All rights reserved. This information is not intended as a substitute for professional medical care. Always follow your healthcare professional's instructions.        Self-Care for Skin Rashes     Pat your skin dry. Do not rub.     When your skin reacts to a substance your body is sensitive to, it can cause a rash. You can treat most rashes at home by keeping the skin clean and dry. Many rashes may get better on their own within 2 to 3 days. You may need medical attention if your rash itches, drains, or hurts, particularly if the rash is getting worse.  What can cause a skin rash?  · Sun poisoning, caused by too much exposure to the sun  · An irritant or allergic reaction to a certain type of food, plant, or chemical, such as  shellfish, poison ivy, and or cleaning products  · An infection caused  by a fungus (ringworm), virus (chickenpox), or bacteria (strep)  · Bites or infestation caused by insects or pests, such as ticks, lice, or mites  · Dry skin, which is often seen during the winter months and in older people  How can I control itching and skin damage?  · Take soothing lukewarm baths in a colloidal oatmeal product. You can buy this at the [employer].  · Do your best not to scratch. Clip fingernails short, especially in young children, to reduce skin damage if scratching does occur.  · Use moisturizing skin lotion instead of scratching your dry skin.  · Use sunscreen whenever going out into direct sun.  · Use only mild cleansing agents whenever possible.  · Wash with mild, nonirritating soap and warm water.  · Wear clothing that breathes, such as cotton shirts or canvas shoes.  · If fluid is seeping from the rash, cover it loosely with clean gauze to absorb the discharge.  · Many rashes are contagious. Prevent the rash from spreading to others by washing your hands often before or after touching others with any skin rash.  Use medicine  · Antihistamines such as diphenhydramine can help control itching. But use with caution because they can make you drowsy.  · Using over-the-counter hydrocortisone cream on small rashes may help reduce swelling and itching  · Most over-the-counter antifungal medicines can treat athletes foot and many other fungal infections of the skin.  Check with your healthcare provider  Call your healthcare provider if:  · You were told that you have a fungal infection on your skin to make sure you have the correct type of medicine.  · You have questions or concerns about medicines or their side effects.     Call 911  Call 911 if either of these occur:  · Your tongue or lips start to swell  · You have difficulty breathing      Call your healthcare provider  Call your healthcare provider if any of these occur:  · Temperature of more than 101.0°F (38.3°C), or as directed  · Sore  throat, a cough, or unusual fatigue  · Red, oozy, or painful rash gets worse. These are signs of infection.  · Rash covers your face, genitals, or most of your body  · Crusty sores or red rings that begin to spread  · You were exposed to someone who has a contagious rash, such as scabies or lice.  · Red bulls-eye rash with a white center (a sign of Lyme disease)  · You were told that you have resistant bacteria (MRSA) on your skin.   Date Last Reviewed: 5/12/2015 © 2000-2017 [employer]. [address] [place] [address]. All rights reserved. This information is not intended as a substitute for professional medical care. Always follow your healthcare professional's instructions.        Febrile Illness, Uncertain Cause (Adult)  You have a fever, but the cause is not certain. A fever is a natural reaction of the body to an illness such as infection due to a virus or bacteria. In most cases, the temperature itself is not harmful. It actually helps the body fight infections. A fever does not need to be treated unless you feel very uncomfortable.  Sometimes a fever can be an early sign of a more serious infection, so make sure to follow up if your condition worsens.  Home care  Unless given other instructions by your healthcare provider, follow these guidelines when caring for yourself at home.  General care  · If your symptoms are severe, rest at home for the first 2 to 3 days. When you resume activity, don't let yourself get too tired.  · Do not smoke. Also avoid being exposed to secondhand smoke.  · Your appetite may be poor, so a light diet is fine. Avoid dehydration by drinking 6 to 8 glasses of fluids per day (such as water, soft drinks, sports drinks, juices, tea, or soup). Extra fluids will help loosen secretions in the nose and lungs.  Medicines  · You can take acetaminophen or ibuprofen for pain, unless you were given a different fever-reducing/pain medicine to use. (Note: If you have  chronic liver or kidney disease or have ever had a stomach ulcer or gastrointestinal bleeding, talk with your healthcare provider before using these medicines. Also talk to your provider if you are taking medicine to prevent blood clots.) Aspirin should never be given to anyone younger than 18 years of age who is ill with a viral infection or fever. It may cause severe liver or brain damage.  · If you were given antibiotics, take them until they are used up, or your healthcare provider tells you to stop. It is important to finish the antibiotics even though you feel better. This is to make sure the infection has cleared. Be aware that antibiotics are not usually given for a fever with an unknown cause.  · Over-the-counter medicines will not shorten the duration of the illness. However, they may be helpful for the following symptoms: cough, sore throat, or nasal and sinus congestion. Ask your pharmacist for product suggestions. (Note: Do not use decongestants if you have high blood pressure.)  Follow-up care  Follow up with your healthcare provider, or as advised.  · If a culture was done, you will be notified if your treatment needs to be changed. You can call as directed for the results.  · If X-rays, a CT, or an ultrasound were done, a specialist will review them. You will be notified of any findings that may affect your care.  Call 911  Contact emergency services right away if any of these occur:  · Trouble breathing or swallowing, or wheezing  · Chest pain  · Confusion  · Extreme drowsiness or trouble awakening  · Fainting or loss of consciousness  · Rapid heart rate  · Low blood pressure  · Vomiting blood, or large amounts of blood in stool  · Seizure  When to seek medical advice  Call your healthcare provider right away if any of these occur:  · Cough with lots of colored sputum (mucus) or blood in your sputum  · Severe headache  · Face, neck, throat, or ear pain  · Feeling drowsy  · Abdominal pain  · Repeated  vomiting or diarrhea  · Joint pain or a new rash  · Burning when urinating  · Fever of 100.4°F (38°C) or higher, that does not get better after taking fever-reducing medicine  · Feeling weak or dizzy  Date Last Reviewed: 7/30/2015  © 4153-4100 [employer]. [address] [place] [address]. All rights reserved. This information is not intended as a substitute for professional medical care. Always follow your healthcare professional's instructions.

## 2018-10-07 NOTE — PROGRESS NOTES
"Subjective:       Patient ID: Bebe Valdez is a 68 y.o. female.    Vitals:  height is 5' 6" (1.676 m) and weight is 93 kg (205 lb). Her oral temperature is 101.9 °F (38.8 °C) (abnormal). Her blood pressure is 112/72 and her pulse is 149 (abnormal). Her respiration is 19 and oxygen saturation is 95%.     Chief Complaint: Urticaria    Pt reports hives that began on Saturday. Pt says she used hydrocortisone cream for the first time from 10/2-10/6. Pt denies facial swelling, trouble breathing, or wheezing. Pt reports seeing her PCP yesterday and given hydroxyzine without any relief. Pt reports severe itching. Pt denies dysuria, n/v/d, sore throat, ear pain, cough, congestion or recent travel. Pt denies using any new cream, perfume or detergents.       Urticaria   This is a new problem. The current episode started yesterday. The problem has been gradually worsening since onset. The rash is diffuse. The rash is characterized by redness and itchiness. She was exposed to a new medication (hydrocortisone lotion). Pertinent negatives include no diarrhea, fever, joint pain, shortness of breath, sore throat or vomiting. Past treatments include antihistamine. The treatment provided no relief.     Review of Systems   Constitution: Negative for chills and fever.   HENT: Negative for sore throat.    Eyes: Negative for blurred vision.   Cardiovascular: Negative for chest pain.   Respiratory: Negative for shortness of breath.    Skin: Positive for color change. Negative for rash.   Musculoskeletal: Negative for back pain and joint pain.   Gastrointestinal: Negative for abdominal pain, diarrhea, nausea and vomiting.   Neurological: Negative for headaches.   Psychiatric/Behavioral: The patient is not nervous/anxious.        Objective:      Physical Exam   Constitutional: She is oriented to person, place, and time. She appears well-developed and well-nourished.   HENT:   Head: Normocephalic and atraumatic. Head is without abrasion, " without contusion and without laceration.   Right Ear: Hearing, tympanic membrane, external ear and ear canal normal.   Left Ear: Hearing, tympanic membrane, external ear and ear canal normal.   Nose: Nose normal.   Mouth/Throat: Oropharynx is clear and moist and mucous membranes are normal. No oropharyngeal exudate, posterior oropharyngeal edema, posterior oropharyngeal erythema or tonsillar abscesses.   Eyes: Conjunctivae, EOM and lids are normal. Pupils are equal, round, and reactive to light.   Neck: Trachea normal, full passive range of motion without pain and phonation normal. Neck supple.   Cardiovascular: Normal rate, regular rhythm and normal heart sounds.   Pulmonary/Chest: Effort normal and breath sounds normal. No stridor. No respiratory distress. She has no decreased breath sounds. She has no wheezes.   Musculoskeletal: Normal range of motion.   Lymphadenopathy:     She has no cervical adenopathy.   Neurological: She is alert and oriented to person, place, and time.   Skin: Skin is warm, dry and intact. Capillary refill takes less than 2 seconds. Rash noted. No abrasion, no bruising, no burn, no ecchymosis, no laceration and no lesion noted. Rash is urticarial. No erythema.   Psychiatric: She has a normal mood and affect. Her speech is normal and behavior is normal. Judgment and thought content normal. Cognition and memory are normal.   Nursing note and vitals reviewed.      Assessment:       1. Hives    2. Fever, unspecified fever cause        Plan:         Hives  -     betamethasone acetate-betamethasone sodium phosphate injection 6 mg; Inject 1 mL (6 mg total) into the muscle one time.  -     methylPREDNISolone (MEDROL DOSEPACK) 4 mg tablet; Take as directed on package  Dispense: 21 tablet; Refill: 0    Fever, unspecified fever cause  -     acetaminophen tablet 1,000 mg; Take 2 tablets (1,000 mg total) by mouth one time.      Patient Instructions         YOUR BLOOD SUGAR WILL BE ELEVATED D/T  STEROIDS    FOLLOW UP WITH YOUR PCP THIS WEEK    CONTINUE ANTIHISTAMINE GIVEN BY PCP     Understanding Hives (Urticaria)  Urticaria (hives) are red, itchy, and swollen areas on the skin. They are most often an allergic reaction from eating a food or taking a medicine. Sometimes the cause may be unknown. A single hive can vary in size from a half inch to several inches. Hives can appear all over the body. Or they may appear on only one part of the body.  What causes hives?  Hives can be caused by food and beverages such as:  · Tree nuts (almonds, walnuts, hazelnuts)  · Peanuts  · Eggs  · Shellfish  · Milk  Hives can also be caused by medicines such as:  · Antibiotics, especially penicillin and sulfa-based medicines   · Anticonvulsant or antiseizure medicines   · Chemotherapy medicines   Other causes of hives include:  · Infection or virus  · Heat  · Cold air or cold water  · Exercise  · Scratching or rubbing your skin, or wearing tight-fitting clothes that rub your skin  · Being exposed to sunlight or light from a light bulb, in rare cases  · Inhaled-chemicals in the environment from foods and drugs, insects, plants, or other sources  In some cases, hives may occur again and again with no specific cause.  If you have hives  · Avoid the food, drink, medicine, or other factor that may be causing the hives.  · Make a thick paste of baking soda and water. Apply the paste directly to your skin. This can help lessen itching.  · Talk with your healthcare provider right away if you think a medicine gave you hives.  Watch for anaphylaxis  If you have hives, watch for symptoms of a severe reaction that can affect your entire body. This is called anaphylaxis. Symptoms can include swollen areas of the body, wheezing, trouble breathing or swallowing, and a hoarse voice. This reaction may happen right away. Or it may happen in an hour or more. In extreme cases, the airways from mouth to lungs may swell and make breathing  difficult. This is a medical emergency. Use epinephrine medicine if you have it, and call 911 or go to the emergency room.     When to call your healthcare provider  Call your healthcare provider if:  · Your hives feel uncomfortable  · You have never had hives before  · Your symptoms don't go away or come back  · Your symptoms get worse or new symptoms develop such as:   ¨ Sneezing, coughing, runny or stuffy nose  ¨ Itching of the eyes, nose, or roof of the mouth  ¨ Itching, burning, stinging, or pain  ¨ Dry, flaky, cracking, or scaly skin  ¨ Red or purple spots  When to call 911  Call 911 right away if you have:  · Swelling in your lips, tongue, or throat, called angioedema  · Drooling  · Trouble breathing, talking, or swallowing  · Cool, moist or pale (blue in color) skin  · Fast and weak heartbeat  · Wheezing or short of breath  · Feeling lightheaded or confused  · Diarrhea  · Severe nausea or vomiting  · Seizure  · Feeling dizzy or weak, or a sudden drop in blood pressure   Date Last Reviewed: 4/1/2017  © 4378-1370 [employer]. [address] [place] [address]. All rights reserved. This information is not intended as a substitute for professional medical care. Always follow your healthcare professional's instructions.        Self-Care for Skin Rashes     Pat your skin dry. Do not rub.     When your skin reacts to a substance your body is sensitive to, it can cause a rash. You can treat most rashes at home by keeping the skin clean and dry. Many rashes may get better on their own within 2 to 3 days. You may need medical attention if your rash itches, drains, or hurts, particularly if the rash is getting worse.  What can cause a skin rash?  · Sun poisoning, caused by too much exposure to the sun  · An irritant or allergic reaction to a certain type of food, plant, or chemical, such as  shellfish, poison ivy, and or cleaning products  · An infection caused by a fungus (ringworm), virus  (chickenpox), or bacteria (strep)  · Bites or infestation caused by insects or pests, such as ticks, lice, or mites  · Dry skin, which is often seen during the winter months and in older people  How can I control itching and skin damage?  · Take soothing lukewarm baths in a colloidal oatmeal product. You can buy this at the [employer].  · Do your best not to scratch. Clip fingernails short, especially in young children, to reduce skin damage if scratching does occur.  · Use moisturizing skin lotion instead of scratching your dry skin.  · Use sunscreen whenever going out into direct sun.  · Use only mild cleansing agents whenever possible.  · Wash with mild, nonirritating soap and warm water.  · Wear clothing that breathes, such as cotton shirts or canvas shoes.  · If fluid is seeping from the rash, cover it loosely with clean gauze to absorb the discharge.  · Many rashes are contagious. Prevent the rash from spreading to others by washing your hands often before or after touching others with any skin rash.  Use medicine  · Antihistamines such as diphenhydramine can help control itching. But use with caution because they can make you drowsy.  · Using over-the-counter hydrocortisone cream on small rashes may help reduce swelling and itching  · Most over-the-counter antifungal medicines can treat athletes foot and many other fungal infections of the skin.  Check with your healthcare provider  Call your healthcare provider if:  · You were told that you have a fungal infection on your skin to make sure you have the correct type of medicine.  · You have questions or concerns about medicines or their side effects.     Call 911  Call 911 if either of these occur:  · Your tongue or lips start to swell  · You have difficulty breathing      Call your healthcare provider  Call your healthcare provider if any of these occur:  · Temperature of more than 101.0°F (38.3°C), or as directed  · Sore throat, a cough, or unusual  fatigue  · Red, oozy, or painful rash gets worse. These are signs of infection.  · Rash covers your face, genitals, or most of your body  · Crusty sores or red rings that begin to spread  · You were exposed to someone who has a contagious rash, such as scabies or lice.  · Red bulls-eye rash with a white center (a sign of Lyme disease)  · You were told that you have resistant bacteria (MRSA) on your skin.   Date Last Reviewed: 5/12/2015 © 2000-2017 [email or web address]. [address] [place] [address]. All rights reserved. This information is not intended as a substitute for professional medical care. Always follow your healthcare professional's instructions.        Febrile Illness, Uncertain Cause (Adult)  You have a fever, but the cause is not certain. A fever is a natural reaction of the body to an illness such as infection due to a virus or bacteria. In most cases, the temperature itself is not harmful. It actually helps the body fight infections. A fever does not need to be treated unless you feel very uncomfortable.  Sometimes a fever can be an early sign of a more serious infection, so make sure to follow up if your condition worsens.  Home care  Unless given other instructions by your healthcare provider, follow these guidelines when caring for yourself at home.  General care  · If your symptoms are severe, rest at home for the first 2 to 3 days. When you resume activity, don't let yourself get too tired.  · Do not smoke. Also avoid being exposed to secondhand smoke.  · Your appetite may be poor, so a light diet is fine. Avoid dehydration by drinking 6 to 8 glasses of fluids per day (such as water, soft drinks, sports drinks, juices, tea, or soup). Extra fluids will help loosen secretions in the nose and lungs.  Medicines  · You can take acetaminophen or ibuprofen for pain, unless you were given a different fever-reducing/pain medicine to use. (Note: If you have chronic liver or kidney  disease or have ever had a stomach ulcer or gastrointestinal bleeding, talk with your healthcare provider before using these medicines. Also talk to your provider if you are taking medicine to prevent blood clots.) Aspirin should never be given to anyone younger than 18 years of age who is ill with a viral infection or fever. It may cause severe liver or brain damage.  · If you were given antibiotics, take them until they are used up, or your healthcare provider tells you to stop. It is important to finish the antibiotics even though you feel better. This is to make sure the infection has cleared. Be aware that antibiotics are not usually given for a fever with an unknown cause.  · Over-the-counter medicines will not shorten the duration of the illness. However, they may be helpful for the following symptoms: cough, sore throat, or nasal and sinus congestion. Ask your pharmacist for product suggestions. (Note: Do not use decongestants if you have high blood pressure.)  Follow-up care  Follow up with your healthcare provider, or as advised.  · If a culture was done, you will be notified if your treatment needs to be changed. You can call as directed for the results.  · If X-rays, a CT, or an ultrasound were done, a specialist will review them. You will be notified of any findings that may affect your care.  Call 911  Contact emergency services right away if any of these occur:  · Trouble breathing or swallowing, or wheezing  · Chest pain  · Confusion  · Extreme drowsiness or trouble awakening  · Fainting or loss of consciousness  · Rapid heart rate  · Low blood pressure  · Vomiting blood, or large amounts of blood in stool  · Seizure  When to seek medical advice  Call your healthcare provider right away if any of these occur:  · Cough with lots of colored sputum (mucus) or blood in your sputum  · Severe headache  · Face, neck, throat, or ear pain  · Feeling drowsy  · Abdominal pain  · Repeated vomiting or  diarrhea  · Joint pain or a new rash  · Burning when urinating  · Fever of 100.4°F (38°C) or higher, that does not get better after taking fever-reducing medicine  · Feeling weak or dizzy  Date Last Reviewed: 7/30/2015  © 2901-7290 [employer]. [address] [place] [id number]. All rights reserved. This information is not intended as a substitute for professional medical care. Always follow your healthcare professional's instructions.

## 2018-10-08 ENCOUNTER — OFFICE VISIT (OUTPATIENT)
Dept: FAMILY MEDICINE | Facility: CLINIC | Age: 68
End: 2018-10-08
Payer: MEDICARE

## 2018-10-08 ENCOUNTER — TELEPHONE (OUTPATIENT)
Dept: FAMILY MEDICINE | Facility: CLINIC | Age: 68
End: 2018-10-08

## 2018-10-08 VITALS
TEMPERATURE: 98 F | SYSTOLIC BLOOD PRESSURE: 110 MMHG | WEIGHT: 205 LBS | HEIGHT: 66 IN | BODY MASS INDEX: 32.95 KG/M2 | HEART RATE: 77 BPM | DIASTOLIC BLOOD PRESSURE: 70 MMHG

## 2018-10-08 DIAGNOSIS — L50.9 URTICARIA: Primary | ICD-10-CM

## 2018-10-08 DIAGNOSIS — Z01.82 ENCOUNTER FOR ALLERGY TESTING: ICD-10-CM

## 2018-10-08 PROCEDURE — 99999 PR PBB SHADOW E&M-EST. PATIENT-LVL IV: CPT | Mod: PBBFAC,,, | Performed by: INTERNAL MEDICINE

## 2018-10-08 PROCEDURE — 99214 OFFICE O/P EST MOD 30 MIN: CPT | Mod: S$PBB,,, | Performed by: INTERNAL MEDICINE

## 2018-10-08 PROCEDURE — 99214 OFFICE O/P EST MOD 30 MIN: CPT | Mod: PBBFAC,PO | Performed by: INTERNAL MEDICINE

## 2018-10-08 NOTE — PROGRESS NOTES
Subjective:        Patient ID: Bebe Valdez is a 68 y.o. female.    Chief Complaint: Urticaria    HPI   Bebe Valdez presents for follow up of urticaria that started 3 days ago.  Pt was seen the day hives started and prescribed Hydroxyzine which made her a little sleepy but did not seem to help much with itching.  The hives worsened over the weekend so yesterday she went to  and received a steroid injection and Rx for medrol dose pack which she started this morning.  She feels a little better; the itching is now tolerable, but she woke up this morning with new hives (b/l eyelids).  The hives she had on her abdomen seem like they're starting to spread out.  The hives are located everywhere from scalp to legs.  She denies any tongue/throat swelling, difficulty breathing.    Sx started in the AM after she ate out at [employer] the night before.  She ordered a dish she had had in the past but noted that the sauce was a little spicier than usual.  Aside from that she doesn't recall any other new exposures including soaps, detergents, lotions, etc.  She has had 1 episode of hives in the past 2/2 Tetracycline.    Review of Systems  as per HPI      Objective:        Vitals:    10/08/18 1146   BP: 110/70   Pulse: 77   Temp: 97.8 °F (36.6 °C)     Physical Exam   Constitutional: She is oriented to person, place, and time. She appears well-developed and well-nourished. No distress.   HENT:   Head: Normocephalic and atraumatic.   Right Ear: External ear normal.   Left Ear: External ear normal.   Nose: Nose normal.   Eyes: Conjunctivae and EOM are normal.   Neck: Normal range of motion. Neck supple.   Neurological: She is alert and oriented to person, place, and time.   Skin: Skin is warm and dry.   Diffuse urticaria of posterior neck, b/l upper eyelids, b/l UEs and LEs and torso   Vitals reviewed.          Assessment:         1. Urticaria    2. Encounter for allergy testing              Plan:         Bebe was seen today  for urticaria.    Diagnoses and all orders for this visit:    Urticaria  -     Ambulatory referral/consult to Allergy    Encounter for allergy testing  -     Ambulatory referral/consult to Allergy    - Finish Medrol dose pack  - Resume Allegra daily  - Try Hydroxyzine again for itching tonight, may cause increased sedation with Allegra.  - Topicals for itching: OTC Benadryl ointment, calamine lotion, aloe, hydrocortisone cream  - Referral for allergy testing  - Reschedule annual labs due to acute treatment with steroids.          Patient Instructions   - Finish methylprednisolone (steroids) prescribed by urgent care  - Start taking Allegra daily until hives resolve  - For itching:     Try Hydroxyzine again (prescribed by urgent care), may cause sleepiness when taken when Allegra    Creams/ointments: aloe vera, Benadryl ointment, calamine, Hydrocortisone cream

## 2018-10-08 NOTE — PATIENT INSTRUCTIONS
- Finish methylprednisolone (steroids) prescribed by urgent care  - Start taking Allegra daily until hives resolve  - For itching:     Try Hydroxyzine again (prescribed by urgent care), may cause sleepiness when taken when Allegra    Creams/ointments: aloe vera, Benadryl ointment, calamine, Hydrocortisone cream

## 2018-10-08 NOTE — TELEPHONE ENCOUNTER
----- Message from Morena Mccracken sent at 10/8/2018  8:04 AM CDT -----  Contact: Patient [phone]  The patient is requesting a same day appointment as suggested by urgent care for hives.    Thank you

## 2018-10-09 ENCOUNTER — PATIENT MESSAGE (OUTPATIENT)
Dept: FAMILY MEDICINE | Facility: CLINIC | Age: 68
End: 2018-10-09

## 2018-10-15 ENCOUNTER — PATIENT MESSAGE (OUTPATIENT)
Dept: FAMILY MEDICINE | Facility: CLINIC | Age: 68
End: 2018-10-15

## 2018-10-16 ENCOUNTER — PATIENT MESSAGE (OUTPATIENT)
Dept: FAMILY MEDICINE | Facility: CLINIC | Age: 68
End: 2018-10-16

## 2018-10-16 RX ORDER — DICLOFENAC SODIUM AND MISOPROSTOL 75; 200 MG/1; UG/1
1 TABLET, DELAYED RELEASE ORAL 2 TIMES DAILY
Qty: 60 TABLET | Refills: 11 | Status: SHIPPED | OUTPATIENT
Start: 2018-10-16 | End: 2019-04-29

## 2018-10-19 ENCOUNTER — OFFICE VISIT (OUTPATIENT)
Dept: DERMATOLOGY | Facility: CLINIC | Age: 68
End: 2018-10-19
Payer: MEDICARE

## 2018-10-19 ENCOUNTER — LAB VISIT (OUTPATIENT)
Dept: LAB | Facility: HOSPITAL | Age: 68
End: 2018-10-19
Attending: FAMILY MEDICINE
Payer: MEDICARE

## 2018-10-19 DIAGNOSIS — E78.5 HYPERLIPIDEMIA, UNSPECIFIED HYPERLIPIDEMIA TYPE: ICD-10-CM

## 2018-10-19 DIAGNOSIS — I78.1 TELANGIECTASIA: Primary | ICD-10-CM

## 2018-10-19 DIAGNOSIS — E03.9 HYPOTHYROIDISM, UNSPECIFIED TYPE: ICD-10-CM

## 2018-10-19 DIAGNOSIS — E11.9 TYPE 2 DIABETES MELLITUS WITHOUT COMPLICATION, WITHOUT LONG-TERM CURRENT USE OF INSULIN: ICD-10-CM

## 2018-10-19 LAB
ALBUMIN SERPL BCP-MCNC: 3.5 G/DL
ALP SERPL-CCNC: 61 U/L
ALT SERPL W/O P-5'-P-CCNC: 19 U/L
ANION GAP SERPL CALC-SCNC: 7 MMOL/L
AST SERPL-CCNC: 16 U/L
BASOPHILS # BLD AUTO: 0.05 K/UL
BASOPHILS NFR BLD: 0.7 %
BILIRUB SERPL-MCNC: 0.5 MG/DL
BUN SERPL-MCNC: 12 MG/DL
CALCIUM SERPL-MCNC: 10.2 MG/DL
CHLORIDE SERPL-SCNC: 103 MMOL/L
CHOLEST SERPL-MCNC: 111 MG/DL
CHOLEST/HDLC SERPL: 2.6 {RATIO}
CO2 SERPL-SCNC: 27 MMOL/L
CREAT SERPL-MCNC: 0.8 MG/DL
DIFFERENTIAL METHOD: ABNORMAL
EOSINOPHIL # BLD AUTO: 0.3 K/UL
EOSINOPHIL NFR BLD: 4.5 %
ERYTHROCYTE [DISTWIDTH] IN BLOOD BY AUTOMATED COUNT: 12.8 %
EST. GFR  (AFRICAN AMERICAN): >60 ML/MIN/1.73 M^2
EST. GFR  (NON AFRICAN AMERICAN): >60 ML/MIN/1.73 M^2
ESTIMATED AVG GLUCOSE: 160 MG/DL
GLUCOSE SERPL-MCNC: 137 MG/DL
HBA1C MFR BLD HPLC: 7.2 %
HCT VFR BLD AUTO: 42.2 %
HDLC SERPL-MCNC: 43 MG/DL
HDLC SERPL: 38.7 %
HGB BLD-MCNC: 13.2 G/DL
IMM GRANULOCYTES # BLD AUTO: 0.04 K/UL
IMM GRANULOCYTES NFR BLD AUTO: 0.6 %
LDLC SERPL CALC-MCNC: 50.6 MG/DL
LYMPHOCYTES # BLD AUTO: 1.8 K/UL
LYMPHOCYTES NFR BLD: 26.7 %
MCH RBC QN AUTO: 28.9 PG
MCHC RBC AUTO-ENTMCNC: 31.3 G/DL
MCV RBC AUTO: 92 FL
MONOCYTES # BLD AUTO: 0.6 K/UL
MONOCYTES NFR BLD: 8.6 %
NEUTROPHILS # BLD AUTO: 4 K/UL
NEUTROPHILS NFR BLD: 58.9 %
NONHDLC SERPL-MCNC: 68 MG/DL
NRBC BLD-RTO: 0 /100 WBC
PLATELET # BLD AUTO: 367 K/UL
PMV BLD AUTO: 10.1 FL
POTASSIUM SERPL-SCNC: 4.2 MMOL/L
PROT SERPL-MCNC: 6.7 G/DL
RBC # BLD AUTO: 4.57 M/UL
SODIUM SERPL-SCNC: 137 MMOL/L
TRIGL SERPL-MCNC: 87 MG/DL
TSH SERPL DL<=0.005 MIU/L-ACNC: 1.26 UIU/ML
WBC # BLD AUTO: 6.74 K/UL

## 2018-10-19 PROCEDURE — 83036 HEMOGLOBIN GLYCOSYLATED A1C: CPT

## 2018-10-19 PROCEDURE — 84443 ASSAY THYROID STIM HORMONE: CPT

## 2018-10-19 PROCEDURE — 85025 COMPLETE CBC W/AUTO DIFF WBC: CPT

## 2018-10-19 PROCEDURE — 80061 LIPID PANEL: CPT

## 2018-10-19 PROCEDURE — 36415 COLL VENOUS BLD VENIPUNCTURE: CPT | Mod: PO

## 2018-10-19 PROCEDURE — 80053 COMPREHEN METABOLIC PANEL: CPT

## 2018-10-19 PROCEDURE — 99213 OFFICE O/P EST LOW 20 MIN: CPT | Mod: S$GLB,,, | Performed by: DERMATOLOGY

## 2018-10-19 NOTE — PROGRESS NOTES
Subjective:       Patient ID:  Bebe Valdez is a 68 y.o. female who presents for   Chief Complaint   Patient presents with    Laser Consultation     facial redness     Rosacea  - Initial  Affected locations: face  Duration: 30 years  Signs / symptoms: redness  Severity: mild to moderate  Timing: constant  Exacerbated by: alcohol and spicy foods.  Treatments tried: metrogel, mirvaso.  Improvement on treatment: no relief      Review of Systems   Musculoskeletal: Negative for joint swelling and arthralgias.   Skin: Negative for recent sunburn.   Hematologic/Lymphatic: Does not bruise/bleed easily.        Objective:    Physical Exam   Constitutional: She appears well-developed and well-nourished. No distress.   Neurological: She is alert and oriented to person, place, and time. She is not disoriented.   Psychiatric: She has a normal mood and affect.   Skin:   Areas Examined (abnormalities noted in diagram):   Head / Face Inspection Performed  Neck Inspection Performed  Chest / Axilla Inspection Performed              Diagram Legend     Erythematous scaling macule/papule c/w actinic keratosis       Vascular papule c/w angioma      Pigmented verrucoid papule/plaque c/w seborrheic keratosis      Yellow umbilicated papule c/w sebaceous hyperplasia      Irregularly shaped tan macule c/w lentigo     1-2 mm smooth white papules consistent with Milia      Movable subcutaneous cyst with punctum c/w epidermal inclusion cyst      Subcutaneous movable cyst c/w pilar cyst      Firm pink to brown papule c/w dermatofibroma      Pedunculated fleshy papule(s) c/w skin tag(s)      Evenly pigmented macule c/w junctional nevus     Mildly variegated pigmented, slightly irregular-bordered macule c/w mildly atypical nevus      Flesh colored to evenly pigmented papule c/w intradermal nevus       Pink pearly papule/plaque c/w basal cell carcinoma      Erythematous hyperkeratotic cursted plaque c/w SCC      Surgical scar with no sign of  skin cancer recurrence      Open and closed comedones      Inflammatory papules and pustules      Verrucoid papule consistent consistent with wart     Erythematous eczematous patches and plaques     Dystrophic onycholytic nail with subungual debris c/w onychomycosis     Umbilicated papule    Erythematous-base heme-crusted tan verrucoid plaque consistent with inflamed seborrheic keratosis     Erythematous Silvery Scaling Plaque c/w Psoriasis     See annotation      Assessment / Plan:        Telangiectasia  Discussed R/ B/ A/SE of PDL. Patient was given pre-procedural instructions.    Follow-up in about 2 weeks (around 11/2/2018) for laser clinic.

## 2018-10-22 ENCOUNTER — OFFICE VISIT (OUTPATIENT)
Dept: FAMILY MEDICINE | Facility: CLINIC | Age: 68
End: 2018-10-22
Payer: MEDICARE

## 2018-10-22 VITALS
BODY MASS INDEX: 32.66 KG/M2 | DIASTOLIC BLOOD PRESSURE: 80 MMHG | SYSTOLIC BLOOD PRESSURE: 110 MMHG | TEMPERATURE: 98 F | WEIGHT: 203.25 LBS | HEIGHT: 66 IN | RESPIRATION RATE: 20 BRPM

## 2018-10-22 DIAGNOSIS — E11.9 TYPE 2 DIABETES MELLITUS WITHOUT COMPLICATION, WITHOUT LONG-TERM CURRENT USE OF INSULIN: ICD-10-CM

## 2018-10-22 DIAGNOSIS — E78.5 HYPERLIPIDEMIA, UNSPECIFIED HYPERLIPIDEMIA TYPE: ICD-10-CM

## 2018-10-22 DIAGNOSIS — Z12.39 SCREENING BREAST EXAMINATION: Primary | ICD-10-CM

## 2018-10-22 DIAGNOSIS — Z00.00 ROUTINE GENERAL MEDICAL EXAMINATION AT A HEALTH CARE FACILITY: ICD-10-CM

## 2018-10-22 DIAGNOSIS — E03.9 ACQUIRED HYPOTHYROIDISM: ICD-10-CM

## 2018-10-22 LAB
ALBUMIN/CREAT UR: 8.6 UG/MG
CREAT UR-MCNC: 58 MG/DL
MICROALBUMIN UR DL<=1MG/L-MCNC: 5 UG/ML

## 2018-10-22 PROCEDURE — 99999 PR PBB SHADOW E&M-EST. PATIENT-LVL III: CPT | Mod: PBBFAC,,, | Performed by: FAMILY MEDICINE

## 2018-10-22 PROCEDURE — 99213 OFFICE O/P EST LOW 20 MIN: CPT | Mod: PBBFAC,PO | Performed by: FAMILY MEDICINE

## 2018-10-22 PROCEDURE — 99397 PER PM REEVAL EST PAT 65+ YR: CPT | Mod: S$PBB,,, | Performed by: FAMILY MEDICINE

## 2018-10-22 PROCEDURE — 82043 UR ALBUMIN QUANTITATIVE: CPT

## 2018-10-22 RX ORDER — METFORMIN HYDROCHLORIDE 500 MG/1
1000 TABLET, EXTENDED RELEASE ORAL 2 TIMES DAILY WITH MEALS
Qty: 360 TABLET | Refills: 3 | Status: SHIPPED | OUTPATIENT
Start: 2018-10-22 | End: 2019-12-17 | Stop reason: SDUPTHER

## 2018-10-22 NOTE — PROGRESS NOTES
Subjective:       Patient ID: Bebe Valdez is a 68 y.o. female.    Chief Complaint: Annual Exam    67 yo presents today for routine exam, last exam one year ago  Immunizations:  Needs Shingrix  Colonoscopy:  2017  Would like consult with Nutrition for better control of Type II DM.  Exercises 3x weekly, along with yoga  Normal pap smear 3 years ago      Review of Systems   Constitutional: Negative.  Negative for activity change, appetite change, chills, diaphoresis, fatigue, fever and unexpected weight change.   HENT: Negative.  Negative for congestion, ear pain, hearing loss, rhinorrhea, sinus pressure, sore throat, tinnitus, trouble swallowing and voice change.    Eyes: Negative.  Negative for photophobia, pain, discharge and visual disturbance.   Respiratory: Negative.  Negative for cough, chest tightness, shortness of breath and wheezing.    Cardiovascular: Negative.  Negative for chest pain, palpitations and leg swelling.   Gastrointestinal: Negative.  Negative for abdominal distention, abdominal pain, blood in stool, constipation, diarrhea, nausea and vomiting.   Endocrine: Negative for polydipsia and polyuria.   Genitourinary: Negative.  Negative for difficulty urinating, dysuria, frequency, hematuria, menstrual problem, pelvic pain, vaginal bleeding and vaginal discharge.   Musculoskeletal: Negative.  Negative for arthralgias, back pain, joint swelling, neck pain and neck stiffness.   Skin: Negative for color change, pallor and rash.   Neurological: Negative.  Negative for dizziness, tremors, speech difficulty, weakness, light-headedness, numbness and headaches.   Hematological: Negative for adenopathy. Does not bruise/bleed easily.   Psychiatric/Behavioral: Negative for agitation, confusion, dysphoric mood, hallucinations and sleep disturbance. The patient is not nervous/anxious.        Objective:      Physical Exam   Constitutional: She is oriented to person, place, and time. She appears well-developed  and well-nourished.   HENT:   Right Ear: Tympanic membrane, external ear and ear canal normal.   Left Ear: Tympanic membrane, external ear and ear canal normal.   Nose: Nose normal.   Mouth/Throat: Oropharynx is clear and moist. No posterior oropharyngeal erythema.   Eyes: Conjunctivae and EOM are normal. Pupils are equal, round, and reactive to light.   Neck: Normal range of motion. Neck supple. No tracheal deviation present. No thyromegaly present.   Cardiovascular: Normal rate, regular rhythm, normal heart sounds and intact distal pulses.   Pulses:       Carotid pulses are 2+ on the right side, and 2+ on the left side.       Radial pulses are 2+ on the right side, and 2+ on the left side.        Popliteal pulses are 2+ on the right side, and 2+ on the left side.        Dorsalis pedis pulses are 2+ on the right side, and 2+ on the left side.        Posterior tibial pulses are 1+ on the right side, and 1+ on the left side.   Pulmonary/Chest: Effort normal. She has no wheezes. She has no rales. She exhibits no tenderness.   Abdominal: Soft. Bowel sounds are normal. She exhibits no distension and no mass. There is no rebound.   Musculoskeletal: Normal range of motion. She exhibits no edema or tenderness.   Lymphadenopathy:     She has no cervical adenopathy.   Neurological: She is alert and oriented to person, place, and time. She has normal reflexes. No cranial nerve deficit. Coordination normal.   Skin: Skin is warm and dry. No rash noted. No erythema.   Psychiatric: She has a normal mood and affect. Her behavior is normal.       Assessment:         1.  Physical exam  2.  Type II DM  3.  Hypothyroid  4.  obesity  Plan:       1.  Labs reviewed with pt  2.  Increase Metformin to 1000gms bid, f/u 3 months  3.  mammogram

## 2018-10-29 ENCOUNTER — HOSPITAL ENCOUNTER (OUTPATIENT)
Dept: RADIOLOGY | Facility: HOSPITAL | Age: 68
Discharge: HOME | End: 2018-10-29
Attending: FAMILY MEDICINE
Payer: MEDICARE

## 2018-10-29 ENCOUNTER — NUTRITION (OUTPATIENT)
Dept: NUTRITION | Facility: CLINIC | Age: 68
End: 2018-10-29
Payer: MEDICARE

## 2018-10-29 VITALS — BODY MASS INDEX: 32.66 KG/M2 | WEIGHT: 203.25 LBS | HEIGHT: 66 IN

## 2018-10-29 DIAGNOSIS — Z12.39 SCREENING BREAST EXAMINATION: ICD-10-CM

## 2018-10-29 DIAGNOSIS — E11.69 DIABETES MELLITUS TYPE 2 IN OBESE: Primary | ICD-10-CM

## 2018-10-29 DIAGNOSIS — E66.9 OBESITY (BMI 30-39.9): ICD-10-CM

## 2018-10-29 DIAGNOSIS — E66.9 DIABETES MELLITUS TYPE 2 IN OBESE: Primary | ICD-10-CM

## 2018-10-29 DIAGNOSIS — Z71.3 DIETARY COUNSELING: ICD-10-CM

## 2018-10-29 PROCEDURE — 77063 BREAST TOMOSYNTHESIS BI: CPT | Mod: 26,,, | Performed by: RADIOLOGY

## 2018-10-29 PROCEDURE — 77067 SCR MAMMO BI INCL CAD: CPT | Mod: TC,PO

## 2018-10-29 PROCEDURE — 97802 MEDICAL NUTRITION INDIV IN: CPT | Mod: PBBFAC,PO | Performed by: DIETITIAN, REGISTERED

## 2018-10-29 PROCEDURE — 99999 PR PBB SHADOW E&M-EST. PATIENT-LVL III: CPT | Mod: PBBFAC,,,

## 2018-10-29 PROCEDURE — 77063 BREAST TOMOSYNTHESIS BI: CPT | Mod: TC,PO

## 2018-10-29 PROCEDURE — 99213 OFFICE O/P EST LOW 20 MIN: CPT | Mod: PBBFAC,PO

## 2018-10-29 PROCEDURE — 77067 SCR MAMMO BI INCL CAD: CPT | Mod: 26,,, | Performed by: RADIOLOGY

## 2018-10-29 NOTE — PROGRESS NOTES
"Referring Physician:Anil Nelson MD     Reason for visit:  Chief Complaint   Patient presents with    Diabetes    Obesity    Nutrition Counseling      Initial Visit    :1950     Allergies Reviewed  Meds Reviewed    Anthropometrics  Weight:92.2 kg (203 lb 4.2 oz)  Height:5' 6" (1.676 m)  BMI:Body mass index is 32.81 kg/m².   IBW:   59.0 kg  +/-10%    Meds:  Outpatient Medications Prior to Visit   Medication Sig Dispense Refill    ACAI BERRY EXTRACT ORAL Take by mouth.      ascorbic acid (VITAMIN C) 100 MG tablet Take 100 mg by mouth once daily.      CINNAMON BARK (CINNAMON ORAL) Take by mouth 2 (two) times daily.      diclofenac-misoprostol  mg-mcg (ARTHROTEC 75)  mg-mcg per tablet Take 1 tablet by mouth 2 (two) times daily. 60 tablet 11    ERGOCALCIFEROL, VITAMIN D2, (VITAMIN D ORAL) Take by mouth.      fexofenadine-pseudoephedrine (ALLEGRA-D 12 HOUR)  mg per tablet Take 1 tablet by mouth.      fish oil-omega-3 fatty acids 300-1,000 mg capsule Take 2 g by mouth once daily.      hydrocortisone 2.5 % lotion Apply topically 2 (two) times daily. To rash on chin/neck for 10 days 59 mL 2    hydrOXYzine (ATARAX) 50 MG tablet Take 1 tablet (50 mg total) by mouth 3 (three) times daily as needed for Itching (hives). 90 tablet 3    levothyroxine (SYNTHROID) 100 MCG tablet Take 1 tablet (100 mcg total) by mouth once daily. 90 tablet 3    levothyroxine (SYNTHROID) 88 MCG tablet Take 1 tablet (88 mcg total) by mouth once daily. 90 tablet 3    metFORMIN (GLUCOPHAGE-XR) 500 MG 24 hr tablet Take 2 tablets (1,000 mg total) by mouth 2 (two) times daily with meals. 360 tablet 3    MV,CA,MIN/IRON/FA/GUARANA/CAFF (ONE-A-DAY WOMEN'S ACTIVE ORAL) Take by mouth.      MV-MN/OM3/DHA/EPA/FISH/LUT/JOSE G (LIPOTRIAD VISIONARY ORAL) Take by mouth.      psyllium (METAMUCIL) packet Take 1 packet by mouth once daily.      rosuvastatin (CRESTOR) 10 MG tablet Take 1 tablet (10 mg total) by mouth once daily. " 90 tablet 0     No facility-administered medications prior to visit.          Vitamins/Supplements/Herbs:  See med list    Labs:   HgbA1c  7.2   Chol  111   HDL  43   LDL Chol  50.6   TG  87     Nutrition Prescription:   1770 Kcals/day( 30 kcal/kg IBW),  47 g protein( 0.8 g/kg IBW)     Support System:    Pt is ; lives alone and does her own grocery shopping.  Doesn't like to cook; prefers eating out/take-out food    Diet Hx:   Pt states she had diabetic diet education several years ago in [place], and is familiar with carb counting although she doesn't follow.  When asked about her usual grocery list, she states she buys milk, avocado, spinach, lots of different fruits, regular deli turkey or ham, wheat bread.  Snacks:  Atkins protein/low sugar bars.  States she stays up late, and likes to snack then while reading.     Breakfast:   Recipe from Dr Andrade:  Dry oats, unsweetened almond milk, tablespoon of honey left overnight; in the am adds fruit/berries.  Glass of skim milk.  Sometimes eats scrambled eggs.  Hot tea with lemon and honey  Lunch:   Varies; if at home may have a turkey sandwich on wheat with american cheese slice, lettuce, tomato, mustard and quiles.  Dinner:   Last night from [facility]:  Broccoli, green beans, 1/2 order of crawfish etoufee with rice, wheat roll, cherry pie.  Or may get big salad from [employer] with shrimp.    Current activity level and/or physical limitations:    Works out with  3 times/week (about 10 min cardio then weights) and yoga twice/week.  Pt states she has not lost any weight with this regimen.  States it's too hot to walk outside    Motivation to make changes/anticipated barriers and/or expected adherence:    Pt states her metformin dose has just been doubled 2/2 A1c, and pt is adamant about not going on insulin for her diabetes, so she plans to focus on carbohydrate controlled diet and increasing her physical activity.    Nutrition-Focus Physical  Findings:    Appears well nourished      Assessment:   Pt very attentive and asked numerous relevant questions about foods recommended & to avoid; reading food labels; sample meal plan and menus; grocery shopping/snacking tips.  All questions answered, and pt verbalized understanding of information.    Nutrition Diagnosis:   Excessive carbohydrate intake RT lack of prior exposure to information AEB HgbA1c >6%    Recommendations:   Carbohydrate controlled, low fat, high fiber diet.  Exercise goal:  30 minutes per day, 3-5 days per week.     Handouts provided and reviewed: Carbohydrate Counting for People With Diabetes; Label Reading Tips for People with Diabetes; Cardiac Nutrition Therapy; 1500 Calorie Sample 5-Day Menus; Weight Loss Tips; Cooking Tips for Weight Management; Get Fit Shopping List; Eat Fit Plan...Anytime/Anywhere;  Servings of Carbohydrates for Meal Planning; Walking Works; My Plate Planner;  Heart Healthy Eating:  Shopping Tips and Label Reading Tips        Strategies Implemented:    Use of [facility] [employer] frida when eating out; avoid honey/concentrated sweets    Consultation Time:37 minutes.    Follow Up:  Pt provided with dietitian contact number and advised to call with questions or make future appointment if further intervention needed.

## 2018-10-29 NOTE — PATIENT INSTRUCTIONS
Carbohydrate controlled, low fat, high fiber diet.  Exercise goal:  30 minutes per day, 3-5 days per week.

## 2018-10-30 ENCOUNTER — PROCEDURE VISIT (OUTPATIENT)
Dept: DERMATOLOGY | Facility: CLINIC | Age: 68
End: 2018-10-30

## 2018-10-30 DIAGNOSIS — Z41.1 ELECTIVE PROCEDURE FOR UNACCEPTABLE COSMETIC APPEARANCE: Primary | ICD-10-CM

## 2018-10-30 PROCEDURE — 17999 UNLISTD PX SKN MUC MEMB SUBQ: CPT | Mod: CSM,S$GLB,, | Performed by: DERMATOLOGY

## 2018-10-30 PROCEDURE — 99499 UNLISTED E&M SERVICE: CPT | Mod: CSM,S$GLB,, | Performed by: DERMATOLOGY

## 2018-10-31 ENCOUNTER — EXTERNAL CHRONIC CARE MANAGEMENT (OUTPATIENT)
Dept: PRIMARY CARE CLINIC | Facility: CLINIC | Age: 68
End: 2018-10-31
Payer: MEDICARE

## 2018-10-31 PROCEDURE — 99490 CHRNC CARE MGMT STAFF 1ST 20: CPT | Mod: PBBFAC,PO | Performed by: FAMILY MEDICINE

## 2018-10-31 PROCEDURE — 99490 CHRNC CARE MGMT STAFF 1ST 20: CPT | Mod: S$PBB,,, | Performed by: FAMILY MEDICINE

## 2018-11-01 ENCOUNTER — OFFICE VISIT (OUTPATIENT)
Dept: ALLERGY | Facility: CLINIC | Age: 68
End: 2018-11-01
Payer: MEDICARE

## 2018-11-01 VITALS — OXYGEN SATURATION: 98 % | BODY MASS INDEX: 32.73 KG/M2 | HEART RATE: 105 BPM | WEIGHT: 203.69 LBS | HEIGHT: 66 IN

## 2018-11-01 DIAGNOSIS — L50.8 ACUTE URTICARIA: Primary | ICD-10-CM

## 2018-11-01 DIAGNOSIS — J31.0 CHRONIC RHINITIS: ICD-10-CM

## 2018-11-01 DIAGNOSIS — T78.3XXA ANGIOEDEMA, INITIAL ENCOUNTER: ICD-10-CM

## 2018-11-01 PROCEDURE — 99204 OFFICE O/P NEW MOD 45 MIN: CPT | Mod: S$PBB,,, | Performed by: ALLERGY & IMMUNOLOGY

## 2018-11-01 PROCEDURE — 99213 OFFICE O/P EST LOW 20 MIN: CPT | Mod: PBBFAC,PO | Performed by: ALLERGY & IMMUNOLOGY

## 2018-11-01 PROCEDURE — 99999 PR PBB SHADOW E&M-EST. PATIENT-LVL III: CPT | Mod: PBBFAC,,, | Performed by: ALLERGY & IMMUNOLOGY

## 2018-11-01 NOTE — PROGRESS NOTES
Subjective:       Patient ID: Bebe Valdez is a 68 y.o. female.    Chief Complaint:  Urticaria (broke out in hives a few weeks ago. unknown trigger)      67 yo woman presents for new patient evaluation of hives. She states she had hives as a little girl from tetracycline other wise never had. Then 3 weeks ago she woke one morning covered head to toe in hives. She looked at what she ate night before and nothing different. She had large red raised bumps all over. She saw couple different doctors and told hives. Was given steroid for a week and this helped. She did have lip and palm swelling, thought was from steroid but not sure. Gradually went away over 5 days. Nothing recurred since then. No SOB with episode. She has no known allergy, no food, insect or latex allergy. Had scratch test 20 years ago and negative. She does have PND, congestion, sneeze, runny nose off and on and allegra D helps. No asthma. No eczema. She has DM, hypothyroid and had labs last week with normal TSH, CBC, CMP. no H/o sinus surgery.         Environmental History: see history section for home environment  Review of Systems   Constitutional: Negative for appetite change, chills, fatigue and fever.   HENT: Positive for congestion, facial swelling, postnasal drip, rhinorrhea and sneezing. Negative for ear discharge, ear pain, nosebleeds, sinus pressure, sore throat, trouble swallowing and voice change.    Eyes: Negative for discharge, redness, itching and visual disturbance.   Respiratory: Negative for cough, choking, chest tightness, shortness of breath and wheezing.    Cardiovascular: Negative for chest pain, palpitations and leg swelling.   Gastrointestinal: Negative for abdominal distention, abdominal pain, constipation, diarrhea, nausea and vomiting.   Genitourinary: Negative for difficulty urinating.   Musculoskeletal: Negative for arthralgias, gait problem, joint swelling and myalgias.   Skin: Positive for color change and rash.    Neurological: Negative for dizziness, syncope, weakness, light-headedness and headaches.   Hematological: Negative for adenopathy. Does not bruise/bleed easily.   Psychiatric/Behavioral: Negative for agitation, behavioral problems, confusion and sleep disturbance. The patient is not nervous/anxious.         Objective:      Physical Exam   Constitutional: She is oriented to person, place, and time. She appears well-developed and well-nourished. No distress.   HENT:   Head: Normocephalic and atraumatic.   Right Ear: Hearing, tympanic membrane, external ear and ear canal normal.   Left Ear: Hearing, tympanic membrane, external ear and ear canal normal.   Nose: No mucosal edema, rhinorrhea, sinus tenderness or septal deviation. No epistaxis. Right sinus exhibits no maxillary sinus tenderness and no frontal sinus tenderness. Left sinus exhibits no maxillary sinus tenderness and no frontal sinus tenderness.   Mouth/Throat: Uvula is midline, oropharynx is clear and moist and mucous membranes are normal. No uvula swelling.   Eyes: Conjunctivae are normal. Right eye exhibits no discharge. Left eye exhibits no discharge.   Neck: Normal range of motion. No thyromegaly present.   Cardiovascular: Normal rate, regular rhythm and normal heart sounds.   No murmur heard.  Pulmonary/Chest: Effort normal and breath sounds normal. No respiratory distress. She has no wheezes.   Abdominal: Soft. She exhibits no distension. There is no tenderness.   Musculoskeletal: Normal range of motion. She exhibits no edema or tenderness.   Lymphadenopathy:     She has no cervical adenopathy.   Neurological: She is alert and oriented to person, place, and time.   Skin: Skin is warm and dry. No rash noted. No erythema.   Psychiatric: She has a normal mood and affect. Her behavior is normal. Judgment and thought content normal.   Nursing note and vitals reviewed.      Laboratory:   none performed   Assessment:       1. Acute urticaria    2.  Angioedema, initial encounter    3. Chronic rhinitis         Plan:       1. Advised pt acute hives lasting a week are likely viral. No allergic trigger and recent labs normal unlikely any other systemic cause. Advised no labs or meds needed now, if recurs return to clinic  2. For rhinitis add fluticasone 2 SEN daily and allegra as needed

## 2018-11-01 NOTE — LETTER
November 1, 2018      Zoë Nava MD  [address]  [facility] [address]           [place] [facility]  [address]  [place] [address]  Phone: [phone]          Patient: Bebe Valdez   MR Number: [id number]   YOB: 1950   Date of Visit: 11/1/2018       Dear Dr. Zoë Nava:    Thank you for referring Bebe Valdez to me for evaluation. Attached you will find relevant portions of my assessment and plan of care.    If you have questions, please do not hesitate to call me. I look forward to following Bebe Valdez along with you.    Sincerely,    Dianne Alves MD    Enclosure  CC:  No Recipients    If you would like to receive this communication electronically, please contact [email or web address] or [phone] to request more information on [employer] Link access.    For providers and/or their staff who would like to refer a patient to [facility], please contact us through our one-stop-shop provider referral line, [facility], at [phone].    If you feel you have received this communication in error or would no longer like to receive these types of communications, please e-mail [email or web address]

## 2018-11-30 ENCOUNTER — EXTERNAL CHRONIC CARE MANAGEMENT (OUTPATIENT)
Dept: PRIMARY CARE CLINIC | Facility: CLINIC | Age: 68
End: 2018-11-30
Payer: MEDICARE

## 2018-11-30 PROCEDURE — 99490 CHRNC CARE MGMT STAFF 1ST 20: CPT | Mod: PBBFAC,PO | Performed by: FAMILY MEDICINE

## 2018-11-30 PROCEDURE — 99490 CHRNC CARE MGMT STAFF 1ST 20: CPT | Mod: S$PBB,,, | Performed by: FAMILY MEDICINE

## 2018-12-03 ENCOUNTER — TELEPHONE (OUTPATIENT)
Dept: DERMATOLOGY | Facility: CLINIC | Age: 68
End: 2018-12-03

## 2018-12-03 NOTE — TELEPHONE ENCOUNTER
Called PT and rescheduled appointment. Pt expressed concern that she thinks her face looked better before laser TX for rosacea. Told PT I would book her onto laser day for a normal medical appointment to express her concerns in regards to her face improving or becoming worse. Told PT if she chose to go through with laser that we would add her back onto the laser schedule.      ----- Message from Verito Mueller sent at 12/3/2018  1:30 PM CST -----  Contact: pt            Name of Who is Calling: ARTUR REIS [[id number]]      What is the request in detail: pt calling to reschedule laser appt.. Please advise      Can the clinic reply by [facility]: no      What Number to Call Back [facility]: [phone]

## 2018-12-29 DIAGNOSIS — E78.5 HYPERLIPIDEMIA, UNSPECIFIED HYPERLIPIDEMIA TYPE: ICD-10-CM

## 2018-12-29 RX ORDER — LEVOTHYROXINE SODIUM 100 UG/1
TABLET ORAL
Qty: 90 TABLET | Refills: 0 | Status: SHIPPED | OUTPATIENT
Start: 2018-12-29 | End: 2019-01-03 | Stop reason: SDUPTHER

## 2018-12-30 RX ORDER — ROSUVASTATIN CALCIUM 10 MG/1
TABLET, COATED ORAL
Qty: 90 TABLET | Refills: 0 | Status: SHIPPED | OUTPATIENT
Start: 2018-12-30 | End: 2019-03-30 | Stop reason: SDUPTHER

## 2019-01-03 ENCOUNTER — OFFICE VISIT (OUTPATIENT)
Dept: FAMILY MEDICINE | Facility: CLINIC | Age: 69
End: 2019-01-03
Payer: MEDICARE

## 2019-01-03 ENCOUNTER — TELEPHONE (OUTPATIENT)
Dept: FAMILY MEDICINE | Facility: CLINIC | Age: 69
End: 2019-01-03

## 2019-01-03 ENCOUNTER — HOSPITAL ENCOUNTER (OUTPATIENT)
Dept: RADIOLOGY | Facility: HOSPITAL | Age: 69
Discharge: HOME | End: 2019-01-03
Attending: INTERNAL MEDICINE
Payer: MEDICARE

## 2019-01-03 VITALS
HEIGHT: 66 IN | TEMPERATURE: 98 F | WEIGHT: 202.38 LBS | DIASTOLIC BLOOD PRESSURE: 70 MMHG | SYSTOLIC BLOOD PRESSURE: 100 MMHG | BODY MASS INDEX: 32.53 KG/M2 | RESPIRATION RATE: 20 BRPM

## 2019-01-03 DIAGNOSIS — J98.8 RESPIRATORY INFECTION: Primary | ICD-10-CM

## 2019-01-03 DIAGNOSIS — E03.9 ACQUIRED HYPOTHYROIDISM: ICD-10-CM

## 2019-01-03 DIAGNOSIS — J98.8 RESPIRATORY INFECTION: ICD-10-CM

## 2019-01-03 PROCEDURE — 99999 PR PBB SHADOW E&M-EST. PATIENT-LVL III: CPT | Mod: PBBFAC,,, | Performed by: INTERNAL MEDICINE

## 2019-01-03 PROCEDURE — 99999 PR PBB SHADOW E&M-EST. PATIENT-LVL III: ICD-10-PCS | Mod: PBBFAC,,, | Performed by: INTERNAL MEDICINE

## 2019-01-03 PROCEDURE — 99214 OFFICE O/P EST MOD 30 MIN: CPT | Mod: S$PBB,,, | Performed by: INTERNAL MEDICINE

## 2019-01-03 PROCEDURE — 71046 X-RAY EXAM CHEST 2 VIEWS: CPT | Mod: TC,FY,PO

## 2019-01-03 PROCEDURE — 99213 OFFICE O/P EST LOW 20 MIN: CPT | Mod: PBBFAC,PO | Performed by: INTERNAL MEDICINE

## 2019-01-03 PROCEDURE — 71046 XR CHEST PA AND LATERAL: ICD-10-PCS | Mod: 26,,, | Performed by: RADIOLOGY

## 2019-01-03 PROCEDURE — 99214 PR OFFICE/OUTPT VISIT, EST, LEVL IV, 30-39 MIN: ICD-10-PCS | Mod: S$PBB,,, | Performed by: INTERNAL MEDICINE

## 2019-01-03 PROCEDURE — 71046 X-RAY EXAM CHEST 2 VIEWS: CPT | Mod: 26,,, | Performed by: RADIOLOGY

## 2019-01-03 RX ORDER — PROMETHAZINE HYDROCHLORIDE AND CODEINE PHOSPHATE 6.25; 1 MG/5ML; MG/5ML
5 SOLUTION ORAL EVERY 6 HOURS PRN
Qty: 180 ML | Refills: 0 | Status: SHIPPED | OUTPATIENT
Start: 2019-01-03 | End: 2019-01-30

## 2019-01-03 RX ORDER — LEVOFLOXACIN 500 MG/1
500 TABLET, FILM COATED ORAL DAILY
Qty: 5 TABLET | Refills: 0 | Status: SHIPPED | OUTPATIENT
Start: 2019-01-03 | End: 2019-01-07 | Stop reason: ALTCHOICE

## 2019-01-03 RX ORDER — LEVOTHYROXINE SODIUM 100 UG/1
100 TABLET ORAL DAILY
Qty: 90 TABLET | Refills: 3 | Status: SHIPPED | OUTPATIENT
Start: 2019-01-03 | End: 2020-01-06 | Stop reason: SDUPTHER

## 2019-01-03 RX ORDER — LEVOTHYROXINE SODIUM 88 UG/1
88 TABLET ORAL DAILY
Qty: 90 TABLET | Refills: 3 | Status: SHIPPED | OUTPATIENT
Start: 2019-01-03 | End: 2019-08-26

## 2019-01-03 NOTE — TELEPHONE ENCOUNTER
Please notify pt chest x-ray shows some fluid in her lungs.  We are going to treat it as pneumonia.  I sent an abx to [facility].  Follow up in 6 weeks with repeat chest x-ray before appt.

## 2019-01-03 NOTE — PROGRESS NOTES
Subjective:        Patient ID: Bebe Valdez is a 68 y.o. female.    Chief Complaint: Cough (ribs hurt with cough); Chest Congestion; and Hoarse    HPI   Bebe Valdez presents for the followin. Cough: Pt c/o cough, congestion, hoarseness that started 3 days ago.  Cough is mostly dry.  Pt has been coughing so much that her L lower ribs hurt.  Denies fever.  She has been taking Robitussin which helps a little and Tylenol when she felt warm.    2. Hypothyroidism: Pt would like a refill of Synthroid.  She prefers brand and she takes 100mcg + 88mcg daily.    Review of Systems  as per HPI      Objective:        Vitals:    19 1024   BP: 100/70   Resp: 20   Temp: 98.1 °F (36.7 °C)     Physical Exam   Constitutional: She is oriented to person, place, and time. She appears well-developed and well-nourished. No distress.   hoarse   HENT:   Head: Normocephalic and atraumatic.   Right Ear: External ear normal.   Left Ear: External ear normal.   Nose: Nose normal.   Mouth/Throat: Oropharynx is clear and moist. No oropharyngeal exudate.   Eyes: Conjunctivae and EOM are normal.   Neck: Neck supple.   Cardiovascular: Normal rate and regular rhythm.   Pulmonary/Chest: Effort normal. No respiratory distress. She has no wheezes. She has rales (coarse breath sounds in b/l upper lobes).   Lymphadenopathy:     She has no cervical adenopathy.   Neurological: She is alert and oriented to person, place, and time.   Skin: Skin is warm and dry. She is not diaphoretic.   Psychiatric: She has a normal mood and affect. Her behavior is normal.   Vitals reviewed.          Assessment:         1. Respiratory infection    2. Acquired hypothyroidism              Plan:         Bebe was seen today for cough, chest congestion and hoarse.    Diagnoses and all orders for this visit:    Respiratory infection: CXR today to r/o PNA.  If normal, recommend treating sx and allowing up to 7-10 days for improvement.  Promethazine/codeine for  cough; cautioned about possible sedation.  Okay to continue Robitussin as needed.  -     promethazine-codeine 6.25-10 mg/5 ml (PHENERGAN WITH CODEINE) 6.25-10 mg/5 mL syrup; Take 5 mLs by mouth every 6 (six) hours as needed for Cough. May cause sedation.  -     X-Ray Chest PA And Lateral; Future    Acquired hypothyroidism: Refill Synthroid 100 + 88 mcg daily.  Last TSH 3 months ago WNL.  -     SYNTHROID 88 mcg tablet; Take 1 tablet (88 mcg total) by mouth once daily.  -     SYNTHROID 100 mcg tablet; Take 1 tablet (100 mcg total) by mouth once daily.          Follow-up in about 1 week (around 1/10/2019), or if symptoms worsen or fail to improve.    Patient Instructions   FOR FEVER, ACHES AND PAIN:    Acetaminophen (Tylenol): Maximum 1 gram (1000mg) per dose, up to 3 times per day.  Maximum 3000mg in 24 hours    Ibuprofen (Motrin, Advil): Maximum 800mg per dose, up to 3 times per day.  Maximum 2400mg in 24 hours.  Take with food.  If you take this daily for more than 1 week, start taking over the counter Omeprazole (Prilosec) or Esomeprazole (Nexium) daily to protect your stomach.    Naproxen (Aleve): Maximum 500mg per dose, up to 2 times per day.  Maximum 2 doses in 24 hours.  Take with food.  If you take this daily for more than 1 week, start taking over the counter Omeprazole (Prilosec) or Esomeprazole (Nexium) daily to protect your stomach.    FOR ALLERGIES AND NASAL SINUS CONGESTION:  (sneezing, itching, runny nose, stuff nose)    Take an oral antihistamine (Claritin, Zyrtec, Allegra, Benadryl) daily (in the evening if it makes you sleepy).    Use a nasal steroid spray (Flonase, Nasonex, Nasacort, Rhinocort) twice daily until congestion gets better, then decrease to as needed.    Take over the counter decongestants like phenylephrine and pseudoephedrine.  **Do not take these if you have high blood pressure**    FOR CHEST CONGESTION AND MUCOUS:    Take over the counter Guaifenesin - this will loosen and break up  thick mucous.    Vicks Vapor Rub    FOR COUGH:    Over the counter Dextromethorphan    Cough drops    Warm liquids with honey    FOR THROAT PAIN:    Over the counter Chloraseptic spray, Cepacol lozenges    Warm liquids

## 2019-01-03 NOTE — PATIENT INSTRUCTIONS
FOR FEVER, ACHES AND PAIN:    Acetaminophen (Tylenol): Maximum 1 gram (1000mg) per dose, up to 3 times per day.  Maximum 3000mg in 24 hours    Ibuprofen (Motrin, Advil): Maximum 800mg per dose, up to 3 times per day.  Maximum 2400mg in 24 hours.  Take with food.  If you take this daily for more than 1 week, start taking over the counter Omeprazole (Prilosec) or Esomeprazole (Nexium) daily to protect your stomach.    Naproxen (Aleve): Maximum 500mg per dose, up to 2 times per day.  Maximum 2 doses in 24 hours.  Take with food.  If you take this daily for more than 1 week, start taking over the counter Omeprazole (Prilosec) or Esomeprazole (Nexium) daily to protect your stomach.    FOR ALLERGIES AND NASAL SINUS CONGESTION:  (sneezing, itching, runny nose, stuff nose)    Take an oral antihistamine (Claritin, Zyrtec, Allegra, Benadryl) daily (in the evening if it makes you sleepy).    Use a nasal steroid spray (Flonase, Nasonex, Nasacort, Rhinocort) twice daily until congestion gets better, then decrease to as needed.    Take over the counter decongestants like phenylephrine and pseudoephedrine.  **Do not take these if you have high blood pressure**    FOR CHEST CONGESTION AND MUCOUS:    Take over the counter Guaifenesin - this will loosen and break up thick mucous.    Vicks Vapor Rub    FOR COUGH:    Over the counter Dextromethorphan    Cough drops    Warm liquids with honey    FOR THROAT PAIN:    Over the counter Chloraseptic spray, Cepacol lozenges    Warm liquids

## 2019-01-03 NOTE — TELEPHONE ENCOUNTER
Notified patient of chest xray results and prescription sent. Asked if she wanted to schedule follow up in 6 weeks, patient states that she is seeing Dr. Nelson 01/22/19.

## 2019-01-07 ENCOUNTER — OFFICE VISIT (OUTPATIENT)
Dept: FAMILY MEDICINE | Facility: CLINIC | Age: 69
End: 2019-01-07
Payer: MEDICARE

## 2019-01-07 VITALS
RESPIRATION RATE: 20 BRPM | WEIGHT: 202.81 LBS | HEIGHT: 66 IN | SYSTOLIC BLOOD PRESSURE: 90 MMHG | BODY MASS INDEX: 32.59 KG/M2 | DIASTOLIC BLOOD PRESSURE: 60 MMHG | TEMPERATURE: 98 F

## 2019-01-07 DIAGNOSIS — L50.9 URTICARIA: Primary | ICD-10-CM

## 2019-01-07 DIAGNOSIS — E11.9 TYPE 2 DIABETES MELLITUS WITHOUT COMPLICATION, WITHOUT LONG-TERM CURRENT USE OF INSULIN: ICD-10-CM

## 2019-01-07 PROCEDURE — 99999 PR PBB SHADOW E&M-EST. PATIENT-LVL III: CPT | Mod: PBBFAC,,, | Performed by: FAMILY MEDICINE

## 2019-01-07 PROCEDURE — 99999 PR PBB SHADOW E&M-EST. PATIENT-LVL III: ICD-10-PCS | Mod: PBBFAC,,, | Performed by: FAMILY MEDICINE

## 2019-01-07 PROCEDURE — 99213 OFFICE O/P EST LOW 20 MIN: CPT | Mod: PBBFAC,PO | Performed by: FAMILY MEDICINE

## 2019-01-07 PROCEDURE — 99214 PR OFFICE/OUTPT VISIT, EST, LEVL IV, 30-39 MIN: ICD-10-PCS | Mod: S$PBB,,, | Performed by: FAMILY MEDICINE

## 2019-01-07 PROCEDURE — 99214 OFFICE O/P EST MOD 30 MIN: CPT | Mod: S$PBB,,, | Performed by: FAMILY MEDICINE

## 2019-01-07 RX ORDER — HYDROXYZINE HYDROCHLORIDE 25 MG/1
50 TABLET, FILM COATED ORAL 3 TIMES DAILY PRN
Qty: 60 TABLET | Refills: 1 | Status: SHIPPED | OUTPATIENT
Start: 2019-01-07 | End: 2019-04-29 | Stop reason: ALTCHOICE

## 2019-01-07 RX ORDER — METHYLPREDNISOLONE 4 MG/1
TABLET ORAL
Qty: 30 TABLET | Refills: 0 | Status: SHIPPED | OUTPATIENT
Start: 2019-01-07 | End: 2019-01-30

## 2019-01-07 NOTE — PROGRESS NOTES
Subjective:       Patient ID: Bebe Valdez is a 68 y.o. female.    Chief Complaint: Urticaria and Cough (pneumonia)    69 yo presents today for evaluation of a diffuse pruritic rash which started suddenly 2 days ago.  She was seen earlier with diagnosis of pneumonia and started on po Levaquin.  She is still having a cough which is non productive but denies fever or chills.  She has prior history of Type II DM, scheduled for f/u A1C in 2 weeks,  Denies polyuria/dypsia/phagia      Review of Systems   Constitutional: Negative for activity change, chills and fever.   HENT: Positive for congestion.    Respiratory: Positive for cough. Negative for shortness of breath and wheezing.    Endocrine: Negative for polydipsia, polyphagia and polyuria.   Skin: Positive for rash.       Objective:      Physical Exam   Constitutional: She appears well-developed and well-nourished. No distress.   HENT:   Right Ear: Tympanic membrane and ear canal normal.   Left Ear: Tympanic membrane and ear canal normal.   Nose: Rhinorrhea present.   Mouth/Throat: No posterior oropharyngeal erythema.   Neck: Normal range of motion. No JVD present. No thyromegaly present.   Cardiovascular: Normal rate and regular rhythm.   No murmur heard.  Pulmonary/Chest: She has rales.   Few rales noted LLL   Lymphadenopathy:     She has no cervical adenopathy.   Skin:   Diffuse urticarial rash mostly involving anterior/posterior chest and abdomen, with some involvement of upper/lower extremities       Assessment:       1. Urticaria    2. Type 2 diabetes mellitus without complication, without long-term current use of insulin        Plan:       1.  Stop Levaquin  2.  Medrol dosepak, pt warned that this may increase BG  3.  Atarax prn  4.  F/u 3 days

## 2019-01-10 ENCOUNTER — PATIENT MESSAGE (OUTPATIENT)
Dept: FAMILY MEDICINE | Facility: CLINIC | Age: 69
End: 2019-01-10

## 2019-01-10 ENCOUNTER — PES CALL (OUTPATIENT)
Dept: ADMINISTRATIVE | Facility: CLINIC | Age: 69
End: 2019-01-10

## 2019-01-11 ENCOUNTER — PATIENT MESSAGE (OUTPATIENT)
Dept: FAMILY MEDICINE | Facility: CLINIC | Age: 69
End: 2019-01-11

## 2019-01-14 ENCOUNTER — TELEPHONE (OUTPATIENT)
Dept: FAMILY MEDICINE | Facility: CLINIC | Age: 69
End: 2019-01-14

## 2019-01-14 DIAGNOSIS — E11.9 TYPE 2 DIABETES MELLITUS WITHOUT COMPLICATION, WITHOUT LONG-TERM CURRENT USE OF INSULIN: Primary | ICD-10-CM

## 2019-01-14 NOTE — TELEPHONE ENCOUNTER
----- Message from Jd Ardon sent at 1/14/2019  8:54 AM CST -----  Contact: Self [phone]  Pt coming in for labs please link orders

## 2019-01-15 ENCOUNTER — TELEPHONE (OUTPATIENT)
Dept: FAMILY MEDICINE | Facility: CLINIC | Age: 69
End: 2019-01-15

## 2019-01-15 ENCOUNTER — LAB VISIT (OUTPATIENT)
Dept: LAB | Facility: HOSPITAL | Age: 69
End: 2019-01-15
Attending: FAMILY MEDICINE
Payer: MEDICARE

## 2019-01-15 DIAGNOSIS — E11.9 TYPE 2 DIABETES MELLITUS WITHOUT COMPLICATION, WITHOUT LONG-TERM CURRENT USE OF INSULIN: ICD-10-CM

## 2019-01-15 LAB
ESTIMATED AVG GLUCOSE: 157 MG/DL
HBA1C MFR BLD HPLC: 7.1 %

## 2019-01-15 PROCEDURE — 83036 HEMOGLOBIN GLYCOSYLATED A1C: CPT

## 2019-01-15 PROCEDURE — 36415 COLL VENOUS BLD VENIPUNCTURE: CPT | Mod: PO

## 2019-01-15 NOTE — TELEPHONE ENCOUNTER
Spoke with the patient. She would like to keep her current appointment & discuss the X-Ray at that time with Dr. Nelson.

## 2019-01-22 ENCOUNTER — OFFICE VISIT (OUTPATIENT)
Dept: FAMILY MEDICINE | Facility: CLINIC | Age: 69
End: 2019-01-22
Payer: MEDICARE

## 2019-01-22 VITALS
HEART RATE: 96 BPM | SYSTOLIC BLOOD PRESSURE: 104 MMHG | WEIGHT: 200.19 LBS | TEMPERATURE: 98 F | HEIGHT: 66 IN | DIASTOLIC BLOOD PRESSURE: 70 MMHG | BODY MASS INDEX: 32.17 KG/M2

## 2019-01-22 DIAGNOSIS — E11.9 TYPE 2 DIABETES MELLITUS WITHOUT COMPLICATION, WITHOUT LONG-TERM CURRENT USE OF INSULIN: Primary | ICD-10-CM

## 2019-01-22 DIAGNOSIS — J18.9 PNEUMONIA OF BOTH LOWER LOBES DUE TO INFECTIOUS ORGANISM: ICD-10-CM

## 2019-01-22 PROCEDURE — 99214 PR OFFICE/OUTPT VISIT, EST, LEVL IV, 30-39 MIN: ICD-10-PCS | Mod: S$PBB,,, | Performed by: FAMILY MEDICINE

## 2019-01-22 PROCEDURE — 99999 PR PBB SHADOW E&M-EST. PATIENT-LVL III: CPT | Mod: PBBFAC,,, | Performed by: FAMILY MEDICINE

## 2019-01-22 PROCEDURE — 99214 OFFICE O/P EST MOD 30 MIN: CPT | Mod: S$PBB,,, | Performed by: FAMILY MEDICINE

## 2019-01-22 PROCEDURE — 99999 PR PBB SHADOW E&M-EST. PATIENT-LVL III: ICD-10-PCS | Mod: PBBFAC,,, | Performed by: FAMILY MEDICINE

## 2019-01-22 PROCEDURE — 99213 OFFICE O/P EST LOW 20 MIN: CPT | Mod: PBBFAC,PO | Performed by: FAMILY MEDICINE

## 2019-01-22 NOTE — PROGRESS NOTES
Subjective:       Patient ID: Bebe Valdez is a 68 y.o. female.    Chief Complaint: Diabetes (follow up) and Pneumonia (follow up)    67 yo presents today for follow up of Type II DM.  Tolerating medications without side effects.  BG monitoring at home [id number], no episodes of hypoglycemia  Last A1C 7.1  Has not been able to exercise recently secondary to diagnosis of pneumonia      Diabetes   She has type 2 diabetes mellitus. No MedicAlert identification noted. The initial diagnosis of diabetes was made 10 years ago. Pertinent negatives for hypoglycemia include no confusion, dizziness, headaches, hunger, mood changes, nervousness/anxiousness, pallor, seizures, sleepiness, speech difficulty, sweats or tremors. Associated symptoms include polyuria. Pertinent negatives for diabetes include no blurred vision, no chest pain, no fatigue, no foot paresthesias, no foot ulcerations, no polydipsia, no polyphagia, no visual change, no weakness and no weight loss. Pertinent negatives for hypoglycemia complications include no blackouts, no hospitalization, no nocturnal hypoglycemia, no required assistance and no required glucagon injection. Symptoms are stable. Pertinent negatives for diabetic complications include no autonomic neuropathy, CVA, heart disease, impotence, nephropathy, peripheral neuropathy, PVD or retinopathy. Risk factors for coronary artery disease include family history, obesity, post-menopausal and diabetes mellitus. Current diabetic treatment includes diet and oral agent (monotherapy). She is compliant with treatment most of the time. Her weight is stable. She is following a generally healthy diet. Meal planning includes avoidance of concentrated sweets. She has had a previous visit with a dietitian. She participates in exercise three times a week. There is no compliance with monitoring of blood glucose. Her home blood glucose trend is fluctuating minimally. She does not see a podiatrist.Eye exam is  current.     Review of Systems   Constitutional: Negative for chills, fatigue, fever and weight loss.   Eyes: Negative for blurred vision.   Respiratory: Negative for cough and shortness of breath.    Cardiovascular: Negative for chest pain.   Endocrine: Positive for polyuria. Negative for polydipsia and polyphagia.   Genitourinary: Negative for impotence.   Skin: Negative for pallor.   Neurological: Negative for dizziness, tremors, seizures, speech difficulty, weakness and headaches.   Psychiatric/Behavioral: Negative for confusion. The patient is not nervous/anxious.        Objective:      Physical Exam   Constitutional: She appears well-developed and well-nourished. No distress.   HENT:   Right Ear: Tympanic membrane and ear canal normal.   Left Ear: Tympanic membrane and ear canal normal.   Mouth/Throat: No posterior oropharyngeal erythema.   Cardiovascular: Normal rate and regular rhythm.   No murmur heard.  Pulses:       Femoral pulses are 2+ on the right side, and 2+ on the left side.       Popliteal pulses are 2+ on the right side, and 2+ on the left side.        Dorsalis pedis pulses are 2+ on the right side, and 2+ on the left side.        Posterior tibial pulses are 2+ on the right side, and 2+ on the left side.   Pulmonary/Chest: Effort normal and breath sounds normal. She has no wheezes. She has no rales.       Assessment:     1.  Type II DM  2.  pneumonia    Plan:       1.  Labs reviewed with pt, continue present dose, plan to repeat labs in 4 months  2.  Repeat CXR in 3-4 weeks

## 2019-01-28 ENCOUNTER — PATIENT MESSAGE (OUTPATIENT)
Dept: FAMILY MEDICINE | Facility: CLINIC | Age: 69
End: 2019-01-28

## 2019-01-29 ENCOUNTER — OFFICE VISIT (OUTPATIENT)
Dept: DERMATOLOGY | Facility: CLINIC | Age: 69
End: 2019-01-29
Payer: MEDICARE

## 2019-01-29 DIAGNOSIS — I78.1 TELANGIECTASIA: ICD-10-CM

## 2019-01-29 DIAGNOSIS — L71.9 ROSACEA: Primary | ICD-10-CM

## 2019-01-29 DIAGNOSIS — R23.8 VENOUS LAKE OF LIP: ICD-10-CM

## 2019-01-29 PROCEDURE — 99213 OFFICE O/P EST LOW 20 MIN: CPT | Mod: S$GLB,,, | Performed by: DERMATOLOGY

## 2019-01-29 PROCEDURE — 99213 PR OFFICE/OUTPT VISIT, EST, LEVL III, 20-29 MIN: ICD-10-PCS | Mod: S$GLB,,, | Performed by: DERMATOLOGY

## 2019-01-29 NOTE — PROGRESS NOTES
Subjective:       Patient ID:  Bebe Valdez is a 68 y.o. female who presents for   Chief Complaint   Patient presents with    Rosacea     face, worse after laser TX?      Pt is here today with a c/o of rosacea to her face. Pt states that she had laser tx in clinic a few months ago. Pt is concerned that rosacea is worse/unchanged after laser tx. Pt has only had one laser tx so far.     She also complains of a purplish spot on her lip that has been present for a few months. No change in size, shape, or color. Patient denies any itching, bleeding, pain, or rapid growth. She wants to make sure that it is nothing worrisome.        Rosacea  - Follow-up  Diagnosis: Rosacea.  Symptom course: unchanged  Currently using: PDL.  Affected locations: face  Signs / symptoms: redness  Severity: mild to moderate      Review of Systems   Eyes: Negative for eye irritation and eyelid inflammation.   Skin: Negative for itching and rash.        Objective:    Physical Exam   Constitutional: She appears well-developed and well-nourished. No distress.   HENT:   Mouth/Throat: Lips normal.    Eyes: Lids are normal.  No conjunctival no injection.   Neurological: She is alert and oriented to person, place, and time. She is not disoriented.   Psychiatric: She has a normal mood and affect.   Skin:   Areas Examined (abnormalities noted in diagram):   Head / Face Inspection Performed  Neck Inspection Performed              Diagram Legend     Erythematous scaling macule/papule c/w actinic keratosis       Vascular papule c/w angioma      Pigmented verrucoid papule/plaque c/w seborrheic keratosis      Yellow umbilicated papule c/w sebaceous hyperplasia      Irregularly shaped tan macule c/w lentigo     1-2 mm smooth white papules consistent with Milia      Movable subcutaneous cyst with punctum c/w epidermal inclusion cyst      Subcutaneous movable cyst c/w pilar cyst      Firm pink to brown papule c/w dermatofibroma      Pedunculated fleshy  papule(s) c/w skin tag(s)      Evenly pigmented macule c/w junctional nevus     Mildly variegated pigmented, slightly irregular-bordered macule c/w mildly atypical nevus      Flesh colored to evenly pigmented papule c/w intradermal nevus       Pink pearly papule/plaque c/w basal cell carcinoma      Erythematous hyperkeratotic cursted plaque c/w SCC      Surgical scar with no sign of skin cancer recurrence      Open and closed comedones      Inflammatory papules and pustules      Verrucoid papule consistent consistent with wart     Erythematous eczematous patches and plaques     Dystrophic onycholytic nail with subungual debris c/w onychomycosis     Umbilicated papule    Erythematous-base heme-crusted tan verrucoid plaque consistent with inflamed seborrheic keratosis     Erythematous Silvery Scaling Plaque c/w Psoriasis     See annotation      Assessment / Plan:        Rosacea  Telangiectasia  Telangiectasia do appeared improved s/p PDL #1. Discussed that multiple treatments are necessary to see full benefit.  Will schedule for PDL #2.    Venous lake of lip  Reassurance was given to the patient. No treatment is necessary.  PDL can help.    Follow-up for PDL.

## 2019-01-30 ENCOUNTER — TELEPHONE (OUTPATIENT)
Dept: FAMILY MEDICINE | Facility: CLINIC | Age: 69
End: 2019-01-30

## 2019-01-30 ENCOUNTER — OFFICE VISIT (OUTPATIENT)
Dept: INTERNAL MEDICINE | Facility: CLINIC | Age: 69
End: 2019-01-30
Payer: MEDICARE

## 2019-01-30 VITALS
OXYGEN SATURATION: 96 % | TEMPERATURE: 97 F | BODY MASS INDEX: 32.47 KG/M2 | HEART RATE: 93 BPM | DIASTOLIC BLOOD PRESSURE: 60 MMHG | SYSTOLIC BLOOD PRESSURE: 100 MMHG | HEIGHT: 66 IN | WEIGHT: 202 LBS | RESPIRATION RATE: 15 BRPM

## 2019-01-30 DIAGNOSIS — E11.9 TYPE 2 DIABETES MELLITUS WITHOUT COMPLICATION, WITHOUT LONG-TERM CURRENT USE OF INSULIN: ICD-10-CM

## 2019-01-30 DIAGNOSIS — E11.59 HYPERTENSION ASSOCIATED WITH DIABETES: ICD-10-CM

## 2019-01-30 DIAGNOSIS — D75.839 THROMBOCYTOSIS: ICD-10-CM

## 2019-01-30 DIAGNOSIS — E11.69 OBESITY, DIABETES, AND HYPERTENSION SYNDROME: ICD-10-CM

## 2019-01-30 DIAGNOSIS — E11.65 UNCONTROLLED TYPE 2 DIABETES MELLITUS WITH HYPERGLYCEMIA: ICD-10-CM

## 2019-01-30 DIAGNOSIS — E11.59 OBESITY, DIABETES, AND HYPERTENSION SYNDROME: ICD-10-CM

## 2019-01-30 DIAGNOSIS — E11.69 TYPE 2 DIABETES MELLITUS WITH HYPERCHOLESTEROLEMIA: ICD-10-CM

## 2019-01-30 DIAGNOSIS — I15.2 OBESITY, DIABETES, AND HYPERTENSION SYNDROME: ICD-10-CM

## 2019-01-30 DIAGNOSIS — E03.9 HYPOTHYROIDISM, UNSPECIFIED TYPE: ICD-10-CM

## 2019-01-30 DIAGNOSIS — E78.00 TYPE 2 DIABETES MELLITUS WITH HYPERCHOLESTEROLEMIA: ICD-10-CM

## 2019-01-30 DIAGNOSIS — Z00.00 ENCOUNTER FOR PREVENTIVE HEALTH EXAMINATION: Primary | ICD-10-CM

## 2019-01-30 DIAGNOSIS — E66.09 CLASS 1 OBESITY DUE TO EXCESS CALORIES WITH SERIOUS COMORBIDITY AND BODY MASS INDEX (BMI) OF 32.0 TO 32.9 IN ADULT: ICD-10-CM

## 2019-01-30 DIAGNOSIS — I15.2 HYPERTENSION ASSOCIATED WITH DIABETES: ICD-10-CM

## 2019-01-30 DIAGNOSIS — E66.9 OBESITY, DIABETES, AND HYPERTENSION SYNDROME: ICD-10-CM

## 2019-01-30 PROBLEM — E66.811 CLASS 1 OBESITY DUE TO EXCESS CALORIES WITH SERIOUS COMORBIDITY AND BODY MASS INDEX (BMI) OF 32.0 TO 32.9 IN ADULT: Status: ACTIVE | Noted: 2019-01-30

## 2019-01-30 PROBLEM — J18.9 PNEUMONIA DUE TO INFECTIOUS ORGANISM: Status: RESOLVED | Noted: 2019-01-22 | Resolved: 2019-01-30

## 2019-01-30 PROCEDURE — 99999 PR PBB SHADOW E&M-EST. PATIENT-LVL V: ICD-10-PCS | Mod: PBBFAC,,, | Performed by: NURSE PRACTITIONER

## 2019-01-30 PROCEDURE — G0439 PPPS, SUBSEQ VISIT: HCPCS | Mod: S$GLB,,, | Performed by: NURSE PRACTITIONER

## 2019-01-30 PROCEDURE — 99215 OFFICE O/P EST HI 40 MIN: CPT | Mod: PBBFAC,PO | Performed by: NURSE PRACTITIONER

## 2019-01-30 PROCEDURE — G0439 PR MEDICARE ANNUAL WELLNESS SUBSEQUENT VISIT: ICD-10-PCS | Mod: S$GLB,,, | Performed by: NURSE PRACTITIONER

## 2019-01-30 PROCEDURE — 99999 PR PBB SHADOW E&M-EST. PATIENT-LVL V: CPT | Mod: PBBFAC,,, | Performed by: NURSE PRACTITIONER

## 2019-01-30 RX ORDER — CHOLECALCIFEROL (VITAMIN D3) 25 MCG
1000 TABLET ORAL DAILY
COMMUNITY

## 2019-01-30 NOTE — PROGRESS NOTES
"Bebe Valdez presented for a  Medicare AWV and comprehensive Health Risk Assessment today. The following components were reviewed and updated:    · Medical history  · Family History  · Social history-retired teacher- self employed now-   · Allergies and Current Medications  · Health Risk Assessment  · Health Maintenance  · Care Team     ** See Completed Assessments for Annual Wellness Visit within the encounter summary.**       The following assessments were completed:  · Living Situation  · CAGE  · Depression Screening  · Timed Get Up and Go  · Whisper Test  · Cognitive Function Screening  · Nutrition Screening  · ADL Screening  · PAQ Screening    Vitals:    01/30/19 1305   BP: 100/60   Pulse: 93   Resp: 15   Temp: 97.3 °F (36.3 °C)   SpO2: 96%   Weight: 91.6 kg (202 lb)   Height: 5' 6" (1.676 m)     Body mass index is 32.6 kg/m².  Physical Exam   Constitutional: She is oriented to person, place, and time. She appears well-developed and well-nourished.   HENT:   Head: Normocephalic and atraumatic.   Right Ear: External ear normal.   Left Ear: External ear normal.   Mouth/Throat: No oropharyngeal exudate.   Cardiovascular: Normal rate, regular rhythm and normal heart sounds.   No murmur heard.  Pulmonary/Chest: Effort normal and breath sounds normal. No respiratory distress. She has no wheezes. She has no rales.   Abdominal: Soft. Bowel sounds are normal.   obese   Musculoskeletal: Normal range of motion. She exhibits no edema, tenderness or deformity.   Neurological: She is alert and oriented to person, place, and time. No cranial nerve deficit.   Skin: Skin is warm and dry.   Psychiatric: She has a normal mood and affect. Her behavior is normal. Judgment and thought content normal.   Nursing note and vitals reviewed.        Diagnoses and health risks identified today and associated recommendations/orders:    1. Type 2 diabetes mellitus without complication, without long-term current use of " insulin  stable followed by PCP    2. Hypothyroidism, unspecified type  stable followed by PCP    3. Encounter for preventive health examination  Assessments completed. Preventative health recommendations reviewed.       4. Class 1 obesity due to excess calories with serious comorbidity and body mass index (BMI) of 32.0 to 32.9 in adult  Chronic . Followed by PCP.  Reviewed with patient the Centers for Disease Control and Prevention (CDC)  weight recommendations for current BMI & ideal BMI range.   Low fat diet , DM diet & continue regular exercise regimen .  CDC handout with recommended goal weight range given to patient.     5. Obesity, diabetes, and hypertension syndrome  stable followed by PCP    6. Thrombocytosis  stable followed by PCP    7. Hypertension associated with diabetes  stable followed by PCP    8. Type 2 diabetes mellitus with hypercholesterolemia  stable followed by PCP    9. Uncontrolled type 2 diabetes mellitus with hyperglycemia  stable followed by PCP    10. DM type 2, uncontrolled, with retinopathy  stable followed by opthalmology      Provided Bebe with a 5-10 year written screening schedule and personal prevention plan. Recommendations were developed using the USPSTF age appropriate recommendations. Education, counseling, and referrals were provided as needed. After Visit Summary printed and given to patient which includes a list of additional screenings\tests needed. Does not check blood sugar at home- not interested in doing. States recent labs reflect medrol dose pack on glucose reading- SHe has a  & exercising & watching her diet. Fall prevention exercises.      Follow-up in about 1 year (around 1/30/2020) for HRA.    Ángela Berg NP

## 2019-01-30 NOTE — Clinical Note
Primary Care Providers:Anil Nelson MD, MD (General)Your patient was seen today for a HRA visit. No Gap(s) in care (HEDIS gaps) have been identified during this visit that require additional testing and possible follow up.No orders of the defined types were placed in this encounter.. I have included a copy of my visit note; please review the note and feel free to contact me with any questions. Thank you for allowing me to participate in the care of your patients.Ángela Berg NP

## 2019-01-30 NOTE — PROGRESS NOTES
I offered to discuss end of life issues, including information on how to make advance directives that the patient could use to name someone who would make medical decisions on their behalf if they became too ill to make themselves.    ___Patient declined  _X_Patient  Has completed.

## 2019-01-30 NOTE — PATIENT INSTRUCTIONS
Counseling and Referral of Other Preventative  (Italic type indicates deductible and co-insurance are waived)    Patient Name: Bebe Valdez  Today's Date: 1/30/2019    Health Maintenance       Date Due Completion Date    Hemoglobin A1c 07/15/2019   1/15/2019    Eye Exam 08/15/2019   8/15/2018 (Done)    Override on 8/15/2018: Done        DEXA SCAN 10/17/2019   10/17/2016    Lipid Panel 10/19/2019   10/19/2018    Foot Exam 10/22/2019   10/22/2018 (Done)    Override on 10/22/2018: Done    Urine Microalbumin 10/22/2019   10/22/2018    Low Dose Statin 01/30/2020 1/30/2019    Mammogram 10/29/2019   10/29/2018    TETANUS VACCINE 09/03/2025     9/3/2015    Colonoscopy 06/06/2027 6/6/2017    Override on 7/6/2007: Done        No orders of the defined types were placed in this encounter.    The following information is provided to all patients.  This information is to help you find resources for any of the problems found today that may be affecting your health:                Living healthy guide: www.[facility].[place]      Understanding Diabetes: www.diabetes.org      Eating healthy: www.cdc.gov/healthyweight      CDC home safety checklist: www.cdc.gov/steadi/patient.html      Agency on Aging: www.goea.[place].[facility]      Alcoholics anonymous (AA): www.aa.org      Physical Activity: www.sruthi.nih.gov/zp8rypp      Tobacco use: www.quitwithusla.org     Exercises to Prevent Falls  Certain types of exercises may help make you less likely to fall. Try the ones below. Or do other exercises that your health care provider suggests. Depending on your health, you may need to start slowly. Don't let that stop you. Even small amounts of exercise can help you. Be sure to talk to your health care provider before starting any exercise program.       Improve balance  Many types of exercise can help improve balance. Sánchez chi and yoga are good examples. Here's another one to try. You can do it anytime and almost anywhere.  · Stand next to a  "counter or solid support.  · Push yourself up onto your tiptoes.  · Hold for 5 seconds. If you start to lose your balance, hold on to the counter.  · Rest and repeat 5 times. Work up to holding for 20 to 30 seconds, if you can. Increase flexibility  Being more flexible makes it easier for you to move around safely. Try exercises like the seated hamstring stretch.  · Sit in a chair and put one foot on a stool.  · Straighten your leg and reach with both hands down either side of your leg. Reach as far down your leg as you can.  · Hold for about 20 seconds.  · Go back to the starting position. Then repeat 5 times. Switch legs. Build strength  "Resistance" exercises help build strength. You can do them without equipment. Or you can use weights, elastic bands, or special machines. One such exercise is called the biceps curl. You can hold a 1-pound weight or even a can of soup. Do this exercise at least 3 times a week. Strive for every day.  · Sit up straight in a chair.  · Keep your elbow close to your body and your wrist straight.  · Bend your arm, moving your hand up to your shoulder. Then slowly lower your arm.  · Repeat 5 times. Switch to the other arm.   Build your staying power  Aerobic exercises make your heart and lungs stronger so you can keep moving longer. Walking and swimming are two of the best types of exercises you can do. Using a stationary bike is great, too. Find an aerobic exercise that you enjoy. Start slowly and build up. Even 5 minutes is helpful. Aim for a goal of 30 minutes, at least 3 times a week. You don't have to do 30 minutes in 1 session. Break it up and walk a little throughout the day.  More helpful tips  · Start easy. Slowly work up to doing more.  · Talk with your health care provider about the best exercises for you.  · Call senior centers or health clubs about exercise programs.  · If needed, have a family member watch you walk every so often to check your stability.  · Exercise with a " friend. Choose an activity you both enjoy.  · Consider melina chi or yoga to strengthen your balance.  · Try exercises that you can do anytime, anywhere. Here are 2 examples. Have someone with you when you first try these:  ¨ Practice walking by placing 1 foot right in front of the other.  ¨ Stand up and sit down 10 times. Repeat this throughout the day.   Date Last Reviewed: 6/13/2015  © 8220-7263 [employer]. [address] [place] [address], [place] [address]. All rights reserved. This information is not intended as a substitute for professional medical care. Always follow your healthcare professional's instructions.

## 2019-01-31 ENCOUNTER — EXTERNAL CHRONIC CARE MANAGEMENT (OUTPATIENT)
Dept: PRIMARY CARE CLINIC | Facility: CLINIC | Age: 69
End: 2019-01-31
Payer: MEDICARE

## 2019-01-31 PROCEDURE — 99490 CHRNC CARE MGMT STAFF 1ST 20: CPT | Mod: S$PBB,,, | Performed by: FAMILY MEDICINE

## 2019-01-31 PROCEDURE — 99490 CHRNC CARE MGMT STAFF 1ST 20: CPT | Mod: PBBFAC,PO | Performed by: FAMILY MEDICINE

## 2019-01-31 PROCEDURE — 99490 PR CHRONIC CARE MGMT, 1ST 20 MIN: ICD-10-PCS | Mod: S$PBB,,, | Performed by: FAMILY MEDICINE

## 2019-02-13 ENCOUNTER — PATIENT MESSAGE (OUTPATIENT)
Dept: FAMILY MEDICINE | Facility: CLINIC | Age: 69
End: 2019-02-13

## 2019-02-13 DIAGNOSIS — J18.9 PNEUMONIA OF LOWER LOBE DUE TO INFECTIOUS ORGANISM, UNSPECIFIED LATERALITY: Primary | ICD-10-CM

## 2019-02-14 ENCOUNTER — TELEPHONE (OUTPATIENT)
Dept: FAMILY MEDICINE | Facility: CLINIC | Age: 69
End: 2019-02-14

## 2019-02-14 DIAGNOSIS — E11.9 TYPE 2 DIABETES MELLITUS WITHOUT COMPLICATION, WITHOUT LONG-TERM CURRENT USE OF INSULIN: Primary | ICD-10-CM

## 2019-02-14 NOTE — TELEPHONE ENCOUNTER
----- Message from Reyna Pozo sent at 2/14/2019  9:58 AM CST -----  Contact: [phone]  Patient is requesting orders for A1c blood check. Please advise

## 2019-02-15 ENCOUNTER — HOSPITAL ENCOUNTER (OUTPATIENT)
Dept: RADIOLOGY | Facility: HOSPITAL | Age: 69
Discharge: HOME | End: 2019-02-15
Attending: FAMILY MEDICINE
Payer: MEDICARE

## 2019-02-15 DIAGNOSIS — J18.9 PNEUMONIA OF LOWER LOBE DUE TO INFECTIOUS ORGANISM, UNSPECIFIED LATERALITY: ICD-10-CM

## 2019-02-15 PROCEDURE — 71046 X-RAY EXAM CHEST 2 VIEWS: CPT | Mod: TC,PO

## 2019-02-15 PROCEDURE — 71046 X-RAY EXAM CHEST 2 VIEWS: CPT | Mod: 26,,, | Performed by: RADIOLOGY

## 2019-02-15 PROCEDURE — 71046 XR CHEST PA AND LATERAL: ICD-10-PCS | Mod: 26,,, | Performed by: RADIOLOGY

## 2019-02-18 ENCOUNTER — PATIENT MESSAGE (OUTPATIENT)
Dept: FAMILY MEDICINE | Facility: CLINIC | Age: 69
End: 2019-02-18

## 2019-02-27 DIAGNOSIS — Z71.9 HEALTH EDUCATION/COUNSELING: Primary | ICD-10-CM

## 2019-02-28 ENCOUNTER — EXTERNAL CHRONIC CARE MANAGEMENT (OUTPATIENT)
Dept: PRIMARY CARE CLINIC | Facility: CLINIC | Age: 69
End: 2019-02-28
Payer: MEDICARE

## 2019-02-28 PROCEDURE — 99490 PR CHRONIC CARE MGMT, 1ST 20 MIN: ICD-10-PCS | Mod: S$PBB,,, | Performed by: FAMILY MEDICINE

## 2019-02-28 PROCEDURE — 99490 CHRNC CARE MGMT STAFF 1ST 20: CPT | Mod: PBBFAC,PN | Performed by: FAMILY MEDICINE

## 2019-02-28 PROCEDURE — 99490 CHRNC CARE MGMT STAFF 1ST 20: CPT | Mod: S$PBB,,, | Performed by: FAMILY MEDICINE

## 2019-03-07 ENCOUNTER — NUTRITION (OUTPATIENT)
Dept: NUTRITION | Facility: CLINIC | Age: 69
End: 2019-03-07
Payer: MEDICARE

## 2019-03-07 DIAGNOSIS — Z71.9 HEALTH EDUCATION/COUNSELING: ICD-10-CM

## 2019-03-07 LAB
ALBUMIN SERPL BCP-MCNC: 3.6 G/DL
ALP SERPL-CCNC: 53 U/L
ALT SERPL W/O P-5'-P-CCNC: 32 U/L
ANION GAP SERPL CALC-SCNC: 7 MMOL/L
AST SERPL-CCNC: 21 U/L
BILIRUB SERPL-MCNC: 0.6 MG/DL
BUN SERPL-MCNC: 13 MG/DL
CALCIUM SERPL-MCNC: 10.2 MG/DL
CHLORIDE SERPL-SCNC: 103 MMOL/L
CHOLEST SERPL-MCNC: 100 MG/DL
CHOLEST/HDLC SERPL: 2.7 {RATIO}
CO2 SERPL-SCNC: 27 MMOL/L
CREAT SERPL-MCNC: 0.8 MG/DL
CRP SERPL-MCNC: 3.44 MG/L
ERYTHROCYTE [DISTWIDTH] IN BLOOD BY AUTOMATED COUNT: 13.9 %
ERYTHROCYTE [SEDIMENTATION RATE] IN BLOOD BY WESTERGREN METHOD: <2 MM/HR
EST. GFR  (AFRICAN AMERICAN): >60 ML/MIN/1.73 M^2
EST. GFR  (NON AFRICAN AMERICAN): >60 ML/MIN/1.73 M^2
GGT SERPL-CCNC: 19 U/L
GLUCOSE SERPL-MCNC: 122 MG/DL
HCT VFR BLD AUTO: 38.6 %
HDLC SERPL-MCNC: 37 MG/DL
HDLC SERPL: 37 %
HGB BLD-MCNC: 12.5 G/DL
LDLC SERPL CALC-MCNC: 37.4 MG/DL
MCH RBC QN AUTO: 29.3 PG
MCHC RBC AUTO-ENTMCNC: 32.4 G/DL
MCV RBC AUTO: 91 FL
NONHDLC SERPL-MCNC: 63 MG/DL
PLATELET # BLD AUTO: 233 K/UL
PMV BLD AUTO: 11.7 FL
POTASSIUM SERPL-SCNC: 4.1 MMOL/L
PROT SERPL-MCNC: 6.3 G/DL
RBC # BLD AUTO: 4.26 M/UL
SODIUM SERPL-SCNC: 137 MMOL/L
TRIGL SERPL-MCNC: 128 MG/DL
WBC # BLD AUTO: 5.68 K/UL

## 2019-03-08 LAB
FOLATE SERPL-MCNC: 15.3 NG/ML
VIT B12 SERPL-MCNC: 360 PG/ML

## 2019-03-19 ENCOUNTER — PROCEDURE VISIT (OUTPATIENT)
Dept: DERMATOLOGY | Facility: CLINIC | Age: 69
End: 2019-03-19
Payer: MEDICARE

## 2019-03-19 DIAGNOSIS — Z41.1 ELECTIVE PROCEDURE FOR UNACCEPTABLE COSMETIC APPEARANCE: Primary | ICD-10-CM

## 2019-03-19 PROCEDURE — 17999 UNLISTD PX SKN MUC MEMB SUBQ: CPT | Mod: CSM,S$GLB,, | Performed by: DERMATOLOGY

## 2019-03-19 PROCEDURE — 17999 PR V BEAM,  25-50 PULSES: ICD-10-PCS | Mod: CSM,S$GLB,, | Performed by: DERMATOLOGY

## 2019-03-30 DIAGNOSIS — E78.5 HYPERLIPIDEMIA, UNSPECIFIED HYPERLIPIDEMIA TYPE: ICD-10-CM

## 2019-03-31 ENCOUNTER — EXTERNAL CHRONIC CARE MANAGEMENT (OUTPATIENT)
Dept: PRIMARY CARE CLINIC | Facility: CLINIC | Age: 69
End: 2019-03-31
Payer: MEDICARE

## 2019-03-31 PROCEDURE — 99490 PR CHRONIC CARE MGMT, 1ST 20 MIN: ICD-10-PCS | Mod: S$PBB,,, | Performed by: FAMILY MEDICINE

## 2019-03-31 PROCEDURE — 99490 CHRNC CARE MGMT STAFF 1ST 20: CPT | Mod: PBBFAC,PN | Performed by: FAMILY MEDICINE

## 2019-03-31 PROCEDURE — 99490 CHRNC CARE MGMT STAFF 1ST 20: CPT | Mod: S$PBB,,, | Performed by: FAMILY MEDICINE

## 2019-03-31 RX ORDER — ROSUVASTATIN CALCIUM 10 MG/1
TABLET, COATED ORAL
Qty: 90 TABLET | Refills: 0 | Status: SHIPPED | OUTPATIENT
Start: 2019-03-31 | End: 2019-06-28 | Stop reason: SDUPTHER

## 2019-04-04 ENCOUNTER — PATIENT OUTREACH (OUTPATIENT)
Dept: ADMINISTRATIVE | Facility: HOSPITAL | Age: 69
End: 2019-04-04

## 2019-04-05 ENCOUNTER — TELEPHONE (OUTPATIENT)
Dept: DERMATOLOGY | Facility: CLINIC | Age: 69
End: 2019-04-05

## 2019-04-05 NOTE — TELEPHONE ENCOUNTER
----- Message from Miles Lamar sent at 4/5/2019  9:29 AM CDT -----  Contact: ARTUR REIS [[id number]]  Name of Who is Calling: ARTUR REIS [[id number]]      What is the request in detail: Patient would like to speak with staff in regards to making her follow up appointment as requested by Dr. Montes De Oca. Next available May 10, patient states she needs to be seen sooner. Please advise      Can the clinic reply by [facility]: no      What Number to Call Back if not [facility]: [phone]

## 2019-04-10 DIAGNOSIS — Z71.9 HEALTH EDUCATION/COUNSELING: Primary | ICD-10-CM

## 2019-04-12 ENCOUNTER — OFFICE VISIT (OUTPATIENT)
Dept: DERMATOLOGY | Facility: CLINIC | Age: 69
End: 2019-04-12
Payer: MEDICARE

## 2019-04-12 ENCOUNTER — PATIENT MESSAGE (OUTPATIENT)
Dept: DERMATOLOGY | Facility: CLINIC | Age: 69
End: 2019-04-12

## 2019-04-12 DIAGNOSIS — L71.9 ROSACEA: Primary | ICD-10-CM

## 2019-04-12 DIAGNOSIS — I78.1 TELANGIECTASIA: ICD-10-CM

## 2019-04-12 PROCEDURE — 99214 OFFICE O/P EST MOD 30 MIN: CPT | Mod: S$GLB,,, | Performed by: DERMATOLOGY

## 2019-04-12 PROCEDURE — 99214 PR OFFICE/OUTPT VISIT, EST, LEVL IV, 30-39 MIN: ICD-10-PCS | Mod: S$GLB,,, | Performed by: DERMATOLOGY

## 2019-04-12 NOTE — PROGRESS NOTES
Subjective:       Patient ID:  Bebe Valdez is a 68 y.o. female who presents for   Chief Complaint   Patient presents with    Skin Discoloration     face, follow up      Pt is here for a follow up on rosacea. Pt states that she has a new red bump on her nose.     Skin Discoloration  - Follow-up  Diagnosis: rosacea.  Symptom course: worsening  Currently using: PDL x 2.  Affected locations: face  Signs / symptoms: redness  Severity: mild to moderate      Review of Systems   Skin: Negative for itching and rash.   Psychiatric/Behavioral: Positive for high stress.        Objective:    Physical Exam   Constitutional: She appears well-developed and well-nourished. No distress.   HENT:   Mouth/Throat: Lips normal.    Eyes: Lids are normal.  No conjunctival no injection.   Neurological: She is alert and oriented to person, place, and time. She is not disoriented.   Psychiatric: She has a normal mood and affect.   Skin:   Areas Examined (abnormalities noted in diagram):   Head / Face Inspection Performed  Neck Inspection Performed              Diagram Legend     Erythematous scaling macule/papule c/w actinic keratosis       Vascular papule c/w angioma      Pigmented verrucoid papule/plaque c/w seborrheic keratosis      Yellow umbilicated papule c/w sebaceous hyperplasia      Irregularly shaped tan macule c/w lentigo     1-2 mm smooth white papules consistent with Milia      Movable subcutaneous cyst with punctum c/w epidermal inclusion cyst      Subcutaneous movable cyst c/w pilar cyst      Firm pink to brown papule c/w dermatofibroma      Pedunculated fleshy papule(s) c/w skin tag(s)      Evenly pigmented macule c/w junctional nevus     Mildly variegated pigmented, slightly irregular-bordered macule c/w mildly atypical nevus      Flesh colored to evenly pigmented papule c/w intradermal nevus       Pink pearly papule/plaque c/w basal cell carcinoma      Erythematous hyperkeratotic cursted plaque c/w SCC      Surgical  scar with no sign of skin cancer recurrence      Open and closed comedones      Inflammatory papules and pustules      Verrucoid papule consistent consistent with wart     Erythematous eczematous patches and plaques     Dystrophic onycholytic nail with subungual debris c/w onychomycosis     Umbilicated papule    Erythematous-base heme-crusted tan verrucoid plaque consistent with inflamed seborrheic keratosis     Erythematous Silvery Scaling Plaque c/w Psoriasis     See annotation      Assessment / Plan:        Rosacea  Discussed that rosacea is a chronic condition without a definitive cure.  Reviewed with her the well-known triggers, such as stress, exercise, temperature extremes, alcohol, and spicy foods, that should be avoided to prevent a rosacea flare.  AAD informational brochure on the condition was provided to the patient.  Prescribed azelaic acid 15% + ivermectin 1% + metronidazole 1% cream. Apply to face daily - BID.    Telangiectasia  Improved, especially on nose. Rec'd that she wait until rosacea is better controlled before further PDL.       Follow up in about 2 months (around 6/12/2019), or if symptoms worsen or fail to improve.

## 2019-04-17 ENCOUNTER — NUTRITION (OUTPATIENT)
Dept: NUTRITION | Facility: CLINIC | Age: 69
End: 2019-04-17
Payer: MEDICARE

## 2019-04-17 DIAGNOSIS — Z71.9 HEALTH EDUCATION/COUNSELING: ICD-10-CM

## 2019-04-17 LAB
ALBUMIN SERPL BCP-MCNC: 4 G/DL (ref 3.5–5.2)
ALP SERPL-CCNC: 54 U/L (ref 55–135)
ALT SERPL W/O P-5'-P-CCNC: 20 U/L (ref 10–44)
ANION GAP SERPL CALC-SCNC: 9 MMOL/L (ref 8–16)
AST SERPL-CCNC: 15 U/L (ref 10–40)
BILIRUB SERPL-MCNC: 1.2 MG/DL (ref 0.1–1)
BUN SERPL-MCNC: 16 MG/DL (ref 8–23)
CALCIUM SERPL-MCNC: 11.1 MG/DL (ref 8.7–10.5)
CHLORIDE SERPL-SCNC: 103 MMOL/L (ref 95–110)
CHOLEST SERPL-MCNC: 102 MG/DL (ref 120–199)
CHOLEST/HDLC SERPL: 2.4 {RATIO} (ref 2–5)
CO2 SERPL-SCNC: 27 MMOL/L (ref 23–29)
CREAT SERPL-MCNC: 0.9 MG/DL (ref 0.5–1.4)
CRP SERPL-MCNC: 1.88 MG/L (ref 0–3.19)
ERYTHROCYTE [DISTWIDTH] IN BLOOD BY AUTOMATED COUNT: 13.2 % (ref 11.5–14.5)
ERYTHROCYTE [SEDIMENTATION RATE] IN BLOOD BY WESTERGREN METHOD: 9 MM/HR (ref 0–36)
EST. GFR  (AFRICAN AMERICAN): >60 ML/MIN/1.73 M^2
EST. GFR  (NON AFRICAN AMERICAN): >60 ML/MIN/1.73 M^2
FOLATE SERPL-MCNC: 16.9 NG/ML (ref 4–24)
GGT SERPL-CCNC: 18 U/L (ref 8–55)
GLUCOSE SERPL-MCNC: 126 MG/DL (ref 70–110)
HCT VFR BLD AUTO: 42.5 % (ref 37–48.5)
HDLC SERPL-MCNC: 43 MG/DL (ref 40–75)
HDLC SERPL: 42.2 % (ref 20–50)
HGB BLD-MCNC: 13.6 G/DL (ref 12–16)
LDLC SERPL CALC-MCNC: 40.4 MG/DL (ref 63–159)
MCH RBC QN AUTO: 28.8 PG (ref 27–31)
MCHC RBC AUTO-ENTMCNC: 32 G/DL (ref 32–36)
MCV RBC AUTO: 90 FL (ref 82–98)
NONHDLC SERPL-MCNC: 59 MG/DL
PLATELET # BLD AUTO: 302 K/UL (ref 150–350)
PMV BLD AUTO: 11.4 FL (ref 9.2–12.9)
POTASSIUM SERPL-SCNC: 4.3 MMOL/L (ref 3.5–5.1)
PROT SERPL-MCNC: 7.2 G/DL (ref 6–8.4)
RBC # BLD AUTO: 4.73 M/UL (ref 4–5.4)
SODIUM SERPL-SCNC: 139 MMOL/L (ref 136–145)
TRIGL SERPL-MCNC: 93 MG/DL (ref 30–150)
VIT B12 SERPL-MCNC: 378 PG/ML (ref 210–950)
WBC # BLD AUTO: 6.86 K/UL (ref 3.9–12.7)

## 2019-04-22 ENCOUNTER — LAB VISIT (OUTPATIENT)
Dept: LAB | Facility: HOSPITAL | Age: 69
End: 2019-04-22
Attending: FAMILY MEDICINE
Payer: MEDICARE

## 2019-04-22 DIAGNOSIS — E11.9 TYPE 2 DIABETES MELLITUS WITHOUT COMPLICATION, WITHOUT LONG-TERM CURRENT USE OF INSULIN: ICD-10-CM

## 2019-04-22 PROCEDURE — 83036 HEMOGLOBIN GLYCOSYLATED A1C: CPT

## 2019-04-22 PROCEDURE — 36415 COLL VENOUS BLD VENIPUNCTURE: CPT | Mod: PO

## 2019-04-23 LAB
ESTIMATED AVG GLUCOSE: 137 MG/DL (ref 68–131)
HBA1C MFR BLD HPLC: 6.4 % (ref 4–5.6)

## 2019-04-29 ENCOUNTER — OFFICE VISIT (OUTPATIENT)
Dept: FAMILY MEDICINE | Facility: CLINIC | Age: 69
End: 2019-04-29
Payer: MEDICARE

## 2019-04-29 VITALS
HEIGHT: 66 IN | WEIGHT: 191.13 LBS | DIASTOLIC BLOOD PRESSURE: 60 MMHG | BODY MASS INDEX: 30.72 KG/M2 | TEMPERATURE: 97 F | SYSTOLIC BLOOD PRESSURE: 92 MMHG | RESPIRATION RATE: 20 BRPM

## 2019-04-29 DIAGNOSIS — E11.618: Primary | ICD-10-CM

## 2019-04-29 DIAGNOSIS — M75.00: Primary | ICD-10-CM

## 2019-04-29 DIAGNOSIS — K64.4 EXTERNAL HEMORRHOIDS: ICD-10-CM

## 2019-04-29 DIAGNOSIS — E11.69 TYPE 2 DIABETES MELLITUS WITH HYPERCHOLESTEROLEMIA: ICD-10-CM

## 2019-04-29 DIAGNOSIS — E11.9 TYPE 2 DIABETES MELLITUS WITHOUT COMPLICATION, WITHOUT LONG-TERM CURRENT USE OF INSULIN: ICD-10-CM

## 2019-04-29 DIAGNOSIS — E78.00 TYPE 2 DIABETES MELLITUS WITH HYPERCHOLESTEROLEMIA: ICD-10-CM

## 2019-04-29 DIAGNOSIS — E03.9 HYPOTHYROIDISM, UNSPECIFIED TYPE: ICD-10-CM

## 2019-04-29 PROBLEM — E11.65 UNCONTROLLED TYPE 2 DIABETES MELLITUS WITH HYPERGLYCEMIA: Status: RESOLVED | Noted: 2019-01-30 | Resolved: 2019-04-29

## 2019-04-29 PROCEDURE — 99214 PR OFFICE/OUTPT VISIT, EST, LEVL IV, 30-39 MIN: ICD-10-PCS | Mod: S$PBB,,, | Performed by: FAMILY MEDICINE

## 2019-04-29 PROCEDURE — 99214 OFFICE O/P EST MOD 30 MIN: CPT | Mod: S$PBB,,, | Performed by: FAMILY MEDICINE

## 2019-04-29 PROCEDURE — 99999 PR PBB SHADOW E&M-EST. PATIENT-LVL III: ICD-10-PCS | Mod: PBBFAC,,, | Performed by: FAMILY MEDICINE

## 2019-04-29 PROCEDURE — 99213 OFFICE O/P EST LOW 20 MIN: CPT | Mod: PBBFAC,PO | Performed by: FAMILY MEDICINE

## 2019-04-29 PROCEDURE — 99999 PR PBB SHADOW E&M-EST. PATIENT-LVL III: CPT | Mod: PBBFAC,,, | Performed by: FAMILY MEDICINE

## 2019-04-29 RX ORDER — HYDROCORTISONE ACETATE PRAMOXINE HCL 1; 1 G/100G; G/100G
CREAM TOPICAL 2 TIMES DAILY
Qty: 1 TUBE | Refills: 6 | Status: SHIPPED | OUTPATIENT
Start: 2019-04-29 | End: 2019-08-26 | Stop reason: ALTCHOICE

## 2019-04-29 RX ORDER — CELECOXIB 200 MG/1
200 CAPSULE ORAL 2 TIMES DAILY
Qty: 30 CAPSULE | Refills: 3 | Status: SHIPPED | OUTPATIENT
Start: 2019-04-29 | End: 2019-05-15 | Stop reason: SINTOL

## 2019-04-29 NOTE — PATIENT INSTRUCTIONS
Frozen Shoulder (Adhesive Capsulitis)     Frozen shoulder is when pain and stiffness make it difficult to move your shoulder normally. This condition is also called adhesive capsulitis.  The shoulder is a ball-and-socket joint. The ball on the upper arm bone fits into a socket on the shoulder blade. The joint is covered by strong connective tissue called the shoulder capsule.  If the shoulder capsule becomes inflamed and swollen, movement is painful. Bands of scar tissue form on the joints surface. This limits your shoulder's range of motion.  In most cases, only one shoulder at a time is affected. Some people may get frozen shoulder in the other shoulder, at a later time.  Frozen shoulder develops slowly in 3 stages. Each stage can last a few months or longer:  1. Painful stage. Pain occurs when raising your arm up or to the side, or when reaching behind your back. Your range of motion decreases. The pain may be worse at night and keep you from sleeping.  2. Frozen stage. Shoulder may be less painful, but it is stiffer. Range of motion is very limited.  3. Thawing stage. Range of motion starts to improve with treatment.  Experts dont know what causes frozen shoulder. It may occur after an injury such as a fracture. Or it may happen if the shoulder is immobile for a long time, such as after surgery. It may also be an autoimmune response. Risk factors include being over age 40, or having diabetes, heart disease, lung disease, or an overactive thyroid.  The diagnosis is made by physical exam and an X-ray of the shoulder joint. Sometimes an MRI is done to look for other causes.  Mild cases may be treated with a home exercise program and anti-inflammatory medicines. More severe cases require physical therapy. In some cases a steroid is shot, or injected, into the shoulder area. In hard to treat cases, forced movement of the shoulder is done while you are asleep under general anesthesia. This will break up scar  tissue and increase your range of motion. In rare cases, surgery may be needed to remove the scar tissue. Over time, most people with frozen shoulder get back nearly all range of motion without pain. Recovery may take a few months up to 1 year. You may still feel some stiffness.  Home care  · If physical therapy was prescribed, keep up with any home exercise program you were given.  · Keep using the affected shoulder and arm as much as possible.  · You may use over-the-counter pain medicine to control pain, unless another pain medicine was prescribed. Anti-inflammatory pain medicines may work better than acetaminophen. If you have chronic liver or kidney disease or ever had a stomach ulcer or GI bleeding, talk with your healthcare provider before using these medicines.  Follow-up care  Follow up with your healthcare provider, or as advised. Or follow up sooner if pain increases or your shoulder motion decreases.  If X-rays or an MRI were done, you will be told of any new findings that may affect your care.  When to seek medical advice  Call your healthcare provider right away if any of these occur:  · Your shoulder is red or swollen  · Your arm feels weak or numb  · Your shoulder movement decreases  · Your shoulder pain increases even after using pain medicines  Date Last Reviewed: 11/24/2015  © 5182-8868 [employer]. [address] [place] [address], [place] [address]. All rights reserved. This information is not intended as a substitute for professional medical care. Always follow your healthcare professional's instructions.

## 2019-04-29 NOTE — PROGRESS NOTES
Subjective:       Patient ID: Bebe Valdez is a 68 y.o. female.    Chief Complaint: Diabetes; Hemorrhoids; Arm Pain (bilateral); and Shoulder Pain (bilateral)    67 yo presents today for follow up of type II DM.  Since last seen, has been following diet and exercising routinely.  BG monitoring at home in 120s.  Last A1C 6.4    Diabetes   She has type 2 diabetes mellitus. No MedicAlert identification noted. The initial diagnosis of diabetes was made 7 years ago. Pertinent negatives for hypoglycemia include no confusion, dizziness, headaches, hunger, mood changes, nervousness/anxiousness, pallor, seizures, sleepiness, speech difficulty, sweats or tremors. Pertinent negatives for diabetes include no blurred vision, no chest pain, no fatigue, no foot paresthesias, no foot ulcerations, no polydipsia, no polyphagia, no polyuria, no visual change, no weakness and no weight loss. Pertinent negatives for hypoglycemia complications include no blackouts, no hospitalization, no nocturnal hypoglycemia, no required assistance and no required glucagon injection. Symptoms are improving. Pertinent negatives for diabetic complications include no autonomic neuropathy, CVA, heart disease, impotence, nephropathy, peripheral neuropathy, PVD or retinopathy. Risk factors for coronary artery disease include obesity, post-menopausal and diabetes mellitus. Current diabetic treatment includes diet and oral agent (monotherapy). She is compliant with treatment most of the time. Her weight is decreasing steadily. She is following a generally healthy diet. Meal planning includes avoidance of concentrated sweets. She has had a previous visit with a dietitian. She participates in exercise three times a week. There is no compliance with monitoring of blood glucose. She does not see a podiatrist.Eye exam is current.     Review of Systems   Constitutional: Negative for activity change, fatigue, unexpected weight change and weight loss.   Eyes:  Negative for blurred vision.   Respiratory: Negative for chest tightness and shortness of breath.    Cardiovascular: Negative for chest pain.   Endocrine: Negative for polydipsia, polyphagia and polyuria.   Genitourinary: Negative for impotence.   Musculoskeletal: Positive for arthralgias.        Recurrent bilateral shoulder pain, worse on left side,     Skin: Negative for pallor.   Neurological: Negative for dizziness, tremors, seizures, speech difficulty, weakness and headaches.   Psychiatric/Behavioral: Negative for confusion. The patient is not nervous/anxious.        Objective:      Physical Exam   Constitutional: She appears well-developed and well-nourished. No distress.   Cardiovascular: Normal rate and regular rhythm.   No murmur heard.  Pulmonary/Chest: Effort normal and breath sounds normal. She has no wheezes. She has no rales.   Musculoskeletal: She exhibits no edema.   Shoulder exam shows FROM on right side but pain with abduction  Left shoulder exam shows decreased abduction to about 80 degrees with pain       Assessment:       1.  Type II DM  2.  Adhesive capsulitis  3.  Hypothyroid  4. History of external hemorrhoids   Plan:       1.  Labs reviewed with pt  2.  Continue present medications  3.  Celebrex bid, consult PT  4.  F/u 4 months

## 2019-04-30 ENCOUNTER — EXTERNAL CHRONIC CARE MANAGEMENT (OUTPATIENT)
Dept: PRIMARY CARE CLINIC | Facility: CLINIC | Age: 69
End: 2019-04-30
Payer: MEDICARE

## 2019-04-30 PROCEDURE — 99490 CHRNC CARE MGMT STAFF 1ST 20: CPT | Mod: S$PBB,,, | Performed by: FAMILY MEDICINE

## 2019-04-30 PROCEDURE — 99490 CHRNC CARE MGMT STAFF 1ST 20: CPT | Mod: PBBFAC,PN | Performed by: FAMILY MEDICINE

## 2019-04-30 PROCEDURE — 99490 PR CHRONIC CARE MGMT, 1ST 20 MIN: ICD-10-PCS | Mod: S$PBB,,, | Performed by: FAMILY MEDICINE

## 2019-05-01 ENCOUNTER — PATIENT MESSAGE (OUTPATIENT)
Dept: FAMILY MEDICINE | Facility: CLINIC | Age: 69
End: 2019-05-01

## 2019-05-05 ENCOUNTER — PATIENT MESSAGE (OUTPATIENT)
Dept: PRIMARY CARE CLINIC | Facility: CLINIC | Age: 69
End: 2019-05-05

## 2019-05-15 ENCOUNTER — PATIENT MESSAGE (OUTPATIENT)
Dept: PRIMARY CARE CLINIC | Facility: CLINIC | Age: 69
End: 2019-05-15

## 2019-05-15 ENCOUNTER — CLINICAL SUPPORT (OUTPATIENT)
Dept: REHABILITATION | Facility: HOSPITAL | Age: 69
End: 2019-05-15
Attending: FAMILY MEDICINE
Payer: MEDICARE

## 2019-05-15 DIAGNOSIS — M75.00: ICD-10-CM

## 2019-05-15 DIAGNOSIS — E11.618: ICD-10-CM

## 2019-05-15 DIAGNOSIS — M25.619 DECREASED RANGE OF MOTION OF SHOULDER, UNSPECIFIED LATERALITY: ICD-10-CM

## 2019-05-15 PROCEDURE — 97161 PT EVAL LOW COMPLEX 20 MIN: CPT | Mod: PO

## 2019-05-15 PROCEDURE — 97110 THERAPEUTIC EXERCISES: CPT | Mod: PO

## 2019-05-15 RX ORDER — DICLOFENAC SODIUM AND MISOPROSTOL 50; 200 MG/1; UG/1
1 TABLET, DELAYED RELEASE ORAL 2 TIMES DAILY PRN
Qty: 60 TABLET | Refills: 1 | Status: SHIPPED | OUTPATIENT
Start: 2019-05-15 | End: 2021-12-28

## 2019-05-15 NOTE — PLAN OF CARE
[facility]  Physical Therapy Initial Evaluation    Name: Bebe Valdez  Clinic Number: [id number]    Therapy Diagnosis:   Encounter Diagnoses   Name Primary?    Adhesive capsulitis of shoulder associated with type 2 diabetes mellitus     Decreased range of motion of shoulder, unspecified laterality      Physician: Anil Nelson MD    Physician Orders: PT Eval and Treat   Medical Diagnosis from Referral: E11.618,M75.00 (ICD-10-CM) - Adhesive capsulitis of shoulder associated with type 2 diabetes mellitus  Evaluation Date: 5/15/2019  Authorization Period Expiration: 12/31/19  Plan of Care Expiration:  7/12/19  Visit # / Visits authorized: 1/ 20    Time In: 1205 PM  Time Out: 1300 PM  Total Billable Time: 55 minutes    Precautions: Standard and Diabetes    Subjective   Date of onset: March 2019  History of current condition - Bebe reports: she noticed it began after riding in a float on [place] day. She states she has B upper arm pain and limited function.  She reports they both hurt equally. The passed couple of days she has noticed some limited mvmt. She feels the pain more at night time when she is going to sleep. Reaching behind her seems to be the most painful. She just moved into an apartment, and she states she cannot lift all of the boxes by herself. She states her pain is not always correlated to movement. Pt reports she works out regularly and performs yoga. She states she does have fear of making her current pain worse. No injections performed at this time. Denies numbness and tingling.     Medical History:   Past Medical History:   Diagnosis Date    Allergy     Hyperlipidemia     Hypertension associated with diabetes     Hypothyroid     Obesity     Rosacea        Surgical History:   Bebe Valdez  has a past surgical history that includes Colonoscopy (7/6/07); Cholecystectomy; Hysteroscopy; Dilation and curettage of uterus; and Colonoscopy (N/A,  6/6/2017).    Medications:   Bebe has a current medication list which includes the following prescription(s): acai berry extract, ascorbic acid (vitamin c), celecoxib, cinnamon bark, fexofenadine-pseudoephedrine  mg, fish oil-omega-3 fatty acids, metformin, mv,ca,min/iron/fa/guarana/caff, mv-mn/om3/dha/epa/fish/lut/cayla, pramoxine-hydrocortisone, psyllium, rosuvastatin, synthroid, synthroid, UNABLE TO FIND, and vitamin d.    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 She does work as a .  Prior Level of Function: independent, no pain with UE use  Current Level of Function: limited with B UE use including dressing and reaching    Pain:  Current 0/10, worst 4/10, best 0/10   Location: bilateral shoulder   Description: Sharp and fleeting, occasionally achy  Aggravating Factors: reaching behind her, at night,   Easing Factors: icy hot    Pts goals: to learn some exercises to help with her shoulder pain    Objective       Posture: Increased forward head, rounded shoulder posture with increased T/S kyphosis    Active Range of Motion:   Shoulder Left Right   Flexion 138 p! 143   Abduction 124p! 138   ER at 0 70 p! 77   ER at 90 NT NT   IR T12 p! T12      Passive Range of Motion:   Shoulder Left Right   Flexion 140 p! 155 p!   Abduction 130p! 153 p!   ER at 0 60p! 80 p!   ER at 90 - -   IR Full, no pain Full, no pain     Upper Extremity Strength  (R) UE  (L) UE    Shoulder flexion: 4/5 Shoulder flexion: 4/5   Shoulder Abduction: 4-/5 Shoulder abduction: 4-/5   Shoulder ER 4-/5 Shoulder ER 4-/5   Shoulder IR 4/5 Shoulder IR 4/5   Lower Trap 3+/5 Lower Trap 3+/5   Middle Trap 4-/5 Middle Trap 4-/5   Rhomboids 4-/5 Rhomboids 4-/5       Special  Tests:  AC Joint Left Rigth   AC Joint Compression Test - -   Empty Can Test - -   Drop Arm test - -   Hawkin's Kenndy + +   Neer's Test + +   Speed's test - -   Anterior Apprehension test - -   Relocation test - -   Posterior Apprehension test - -   Sulcus Sign - -       Joint Mobility: limited m obility with posterior and inferior glides and inferior distraction    Palpation: + TTP B bicipital groove, anterior and middle deltoids    Scapular Control/Dyskinesis:    Normal / Subtle / Obvious  Comments    Left  Reduced mvmt -    Right  Reduced mvmt -       CMS Impairment/Limitation/Restriction for FOTO Shoulder Survey    Therapist reviewed FOTO scores for Bebe Valdez on 5/15/2019.   FOTO documents entered into [employer] - see Media section.    Limitation Score: 42%  Category: Mobility    Current : CK = at least 40% but < 60% impaired, limited or restricted  Goal: CJ = at least 20% but < 40% impaired, limited or restricted         TREATMENT   Treatment Time In: 1245 PM  Treatment Time Out: 1300 PM  Total Treatment time separate from Evaluation: 15 minutes    Bebe received therapeutic exercises to develop strength, endurance, ROM and flexibility for 15 minutes including:  Wall washes fwd 15 x 5s  Wand ER 15 x 5s  Wand IR 15 x5s  scap retraction 2 x 10    Home Exercises and Patient Education Provided    Education provided:   - proper posture    See EMR under Patient Instructions for exercises provided 5/15/2019.    Assessment   Bebe is a 68 y.o. female referred to outpatient Physical Therapy with a medical diagnosis of shoulder adhesive capsulitis related to diabetes type II. Pt presents with decreased B shoulder ROM and joint mobility, decreased periscapular strength, poor posture and body awareness, and reduced functional mobility due to pain.    Pt prognosis is Good.   Pt will benefit from skilled outpatient Physical Therapy to address the deficits stated above and in the chart below, provide pt/family education,  and to maximize pt's level of independence.     Plan of care discussed with patient: Yes  Pt's spiritual, cultural and educational needs considered and patient is agreeable to the plan of care and goals as stated below:     Anticipated Barriers for therapy: none    Medical Necessity is demonstrated by the following  History  Co-morbidities and personal factors that may impact the plan of care Co-morbidities:   HTN, thrombocytosis, hypothyroid, clas 1 obesity    Personal Factors:   no deficits     high   Examination  Body Structures and Functions, activity limitations and participation restrictions that may impact the plan of care Body Regions:   upper extremities    Body Systems:    gross symmetry  ROM  strength  gross coordinated movement  balance  gait  transfers    Participation Restrictions:   Limited with reaching overhead and behind her back, pain at night    Activity limitations:   Learning and applying knowledge  no deficits    General Tasks and Commands  no deficits    Communication  no deficits    Mobility  lifting and carrying objects    Self care  washing oneself (bathing, drying, washing hands)  dressing    Domestic Life  shopping  cooking  doing house work (cleaning house, washing dishes, laundry)    Interactions/Relationships  no deficits    Life Areas  no deficits    Community and Social Life  community life         high   Clinical Presentation stable and uncomplicated low   Decision Making/ Complexity Score: low     Goals:      Short Term Goals:  4 weeks    -  Pt will increase B shoulder flexion ROM to at least 160 degrees in order to show improved functional mobility.  -  Pt will increase B shoulder abduction ROM to at least 150 degrees in order to show improved functional mobility.  - Pt will be independent and consistent with issued HEP in order to show carryover between therapy sessions.  - Pt. to demonstrate proper cervical and scapula retraction requiring min. to no verbal cues from  PT      Long Term Goals: 8 weeks  - Pt will report decreased shoulder pain to 1/10 at night in order to increase tolerance for sleeping at night.  - Pt will be able to reach behind her to get something from the back seat without pain in order to improve functional mobility.  - Pt will increase shoulder periscapular strength by 1 ms grade in order to increase tolerance to functional activities and ADLs.  - Pt will improve FOTO shoulder score to CJ at least 20% < 40% impaired, limited or restricted in order to demonstrate an increase in functional mobility and improved ADL performance.  - Pt will be independent with updated HEP to maintain gains following discharge with therapy.    Plan   Plan of care Certification: 5/15/2019 to 7/12/19.    Outpatient Physical Therapy 2 times weekly for 8 weeks to include the following interventions: Gait Training, Manual Therapy, Moist Heat/ Ice, Neuromuscular Re-ed, Patient Education and Therapeutic Exercise.     Ashley Holstein, PT

## 2019-05-31 ENCOUNTER — EXTERNAL CHRONIC CARE MANAGEMENT (OUTPATIENT)
Dept: PRIMARY CARE CLINIC | Facility: CLINIC | Age: 69
End: 2019-05-31
Payer: MEDICARE

## 2019-05-31 PROCEDURE — 99490 CHRNC CARE MGMT STAFF 1ST 20: CPT | Mod: PBBFAC,PN | Performed by: FAMILY MEDICINE

## 2019-05-31 PROCEDURE — 99490 PR CHRONIC CARE MGMT, 1ST 20 MIN: ICD-10-PCS | Mod: S$PBB,,, | Performed by: FAMILY MEDICINE

## 2019-05-31 PROCEDURE — 99490 CHRNC CARE MGMT STAFF 1ST 20: CPT | Mod: S$PBB,,, | Performed by: FAMILY MEDICINE

## 2019-06-03 ENCOUNTER — CLINICAL SUPPORT (OUTPATIENT)
Dept: REHABILITATION | Facility: HOSPITAL | Age: 69
End: 2019-06-03
Attending: FAMILY MEDICINE
Payer: MEDICARE

## 2019-06-03 DIAGNOSIS — M25.619 DECREASED RANGE OF MOTION OF SHOULDER, UNSPECIFIED LATERALITY: ICD-10-CM

## 2019-06-03 PROCEDURE — 97140 MANUAL THERAPY 1/> REGIONS: CPT | Mod: PO

## 2019-06-03 PROCEDURE — 97110 THERAPEUTIC EXERCISES: CPT | Mod: PO

## 2019-06-03 NOTE — PROGRESS NOTES
Physical Therapy Daily Treatment Note     Name: Bebe Valdez  Clinic Number: [id number]    Therapy Diagnosis:   Encounter Diagnosis   Name Primary?    Decreased range of motion of shoulder, unspecified laterality      Physician: Anil Nelson MD    Visit Date: 6/3/2019    Physician Orders: PT Eval and Treat   Medical Diagnosis from Referral: E11.618,M75.00 (ICD-10-CM) - Adhesive capsulitis of shoulder associated with type 2 diabetes mellitus  Evaluation Date: 5/15/2019  Authorization Period Expiration: 12/31/19  Plan of Care Expiration:  7/12/19  Visit # / Visits authorized: 2/ 20      Time In: 205 PM  Time Out: 300 PM  Total Billable Time: 30 minutes    Precautions: Standard    Subjective     Pt reports: her R shoulder is feeling worse after this past weekend. She fees like she may have slept wrong on it.  She was compliant with home exercise program.  Response to previous treatment: no change  Functional change: no change    Pain: 3/10  Location: right shoulder      Objective     Bebe received therapeutic exercises to develop strength, endurance, ROM, flexibility and posture for 45 minutes including:    Wand flexion 15 x 5s  Wand ER 15 x 5s B  Wand IR 15 x5s  Wand serratus punches 2 x 10  Pec stretch over bolster 3'  SL ER 2 x 10 B  Wall washes fwd 15 x 5s B  B shoulder extension 2 x 10 YTB  Rows YTB 2 x 10  B hip PROM    Bebe received the following manual therapy techniques: Joint mobilizations were applied to the: B shoulders for 10 minutes, including:  Post/inf mobs Grade II-III  Subscap release and stretch with L shoulder flex      Home Exercises Provided and Patient Education Provided     Education provided:   - proper posture in sitting and standing    Written Home Exercises Provided: yes.  Exercises were reviewed and Bebe was able to demonstrate them prior to the end of the session.  Bebe demonstrated good  understanding of the education provided.     See EMR under Patient Instructions for  exercises provided at initial eval.    Assessment     Good tolerance to initial tx session today with moderate verbal cues required to perform all therex with proper technique. Will assess response and adjust as needed.  Bebe is progressing well towards her goals.   Pt prognosis is Good.     Pt will continue to benefit from skilled outpatient physical therapy to address the deficits listed in the problem list box on initial evaluation, provide pt/family education and to maximize pt's level of independence in the home and community environment.     Pt's spiritual, cultural and educational needs considered and pt agreeable to plan of care and goals.     Anticipated barriers to physical therapy: none    Goals:        Short Term Goals:  4 weeks     -  Pt will increase B shoulder flexion ROM to at least 160 degrees in order to show improved functional mobility.  -  Pt will increase B shoulder abduction ROM to at least 150 degrees in order to show improved functional mobility.  - Pt will be independent and consistent with issued HEP in order to show carryover between therapy sessions.  - Pt. to demonstrate proper cervical and scapula retraction requiring min. to no verbal cues from PT        Long Term Goals: 8 weeks  - Pt will report decreased shoulder pain to 1/10 at night in order to increase tolerance for sleeping at night.  - Pt will be able to reach behind her to get something from the back seat without pain in order to improve functional mobility.  - Pt will increase shoulder periscapular strength by 1 ms grade in order to increase tolerance to functional activities and ADLs.  - Pt will improve FOTO shoulder score to CJ at least 20% < 40% impaired, limited or restricted in order to demonstrate an increase in functional mobility and improved ADL performance.  - Pt will be independent with updated HEP to maintain gains following discharge with therapy.    Plan     Cont to address B shoulder ROM, strength, and  stability    Ashley Holstein, PT

## 2019-06-10 ENCOUNTER — CLINICAL SUPPORT (OUTPATIENT)
Dept: REHABILITATION | Facility: HOSPITAL | Age: 69
End: 2019-06-10
Attending: FAMILY MEDICINE
Payer: MEDICARE

## 2019-06-10 DIAGNOSIS — M25.619 DECREASED RANGE OF MOTION OF SHOULDER, UNSPECIFIED LATERALITY: ICD-10-CM

## 2019-06-10 PROCEDURE — 97140 MANUAL THERAPY 1/> REGIONS: CPT | Mod: PO

## 2019-06-10 PROCEDURE — 97110 THERAPEUTIC EXERCISES: CPT | Mod: PO

## 2019-06-10 NOTE — PROGRESS NOTES
"  Physical Therapy Daily Treatment Note     Name: Bebe Valdez  Clinic Number: [id number]    Therapy Diagnosis:   Encounter Diagnosis   Name Primary?    Decreased range of motion of shoulder, unspecified laterality      Physician: Anil Nelson MD    Visit Date: 6/10/2019    Physician Orders: PT Eval and Treat   Medical Diagnosis from Referral: E11.618,M75.00 (ICD-10-CM) - Adhesive capsulitis of shoulder associated with type 2 diabetes mellitus  Evaluation Date: 5/15/2019  Authorization Period Expiration: 12/31/19  Plan of Care Expiration:  7/12/19  Visit # / Visits authorized: 3/ 20      Time In: 1:55 PM  Time Out: 2:47 PM  Total Billable Time: 45 minutes    Precautions: Standard    Subjective     Pt reports: "I think it's a little better, i'm not sure". Reports L is worse than the R.  Reports as the day goes on both of her shoulders start to hurt her more.   She was compliant with home exercise program.  Response to previous treatment: no change  Functional change: no change    Pain: 3/10  Location: right shoulder      Objective     Bebe received therapeutic exercises to develop strength, endurance, ROM, flexibility and posture for 35 minutes including:    Wand flexion 15 x 5s  Wand ER 15 x 5s B  Wand IR 15 x5s  Wand serratus punches 2 x 15  Pec stretch over towel roll 3'  SL ER 2 x 10 B  Wall washes fwd 15 x 5s B  B shoulder extension 2 x 10 YTB  Rows YTB 2 x 10  B shoulder PROM    Bebe received the following manual therapy techniques: Joint mobilizations were applied to the: B shoulders for 10 minutes, including:    Post/inf mobs Grade II   Subscap release and stretch with L shoulder flex      Home Exercises Provided and Patient Education Provided     Education provided:   - proper posture in sitting and standing    Written Home Exercises Provided: yes. And provided YTB for Rows   Exercises were reviewed and Bebe was able to demonstrate them prior to the end of the session.  Bebe demonstrated good  " understanding of the education provided.     See EMR under Patient Instructions for exercises provided 6/10/19.    Assessment     Patient tolerated session well today. Patient reports soreness in both of her shoulder throughout therex. Will cont to progress per patient's tolerance.   Bebe is progressing well towards her goals.   Pt prognosis is Good.     Pt will continue to benefit from skilled outpatient physical therapy to address the deficits listed in the problem list box on initial evaluation, provide pt/family education and to maximize pt's level of independence in the home and community environment.     Pt's spiritual, cultural and educational needs considered and pt agreeable to plan of care and goals.     Anticipated barriers to physical therapy: none    Goals:        Short Term Goals:  4 weeks     -  Pt will increase B shoulder flexion ROM to at least 160 degrees in order to show improved functional mobility.  -  Pt will increase B shoulder abduction ROM to at least 150 degrees in order to show improved functional mobility.  - Pt will be independent and consistent with issued HEP in order to show carryover between therapy sessions.  - Pt. to demonstrate proper cervical and scapula retraction requiring min. to no verbal cues from PT        Long Term Goals: 8 weeks  - Pt will report decreased shoulder pain to 1/10 at night in order to increase tolerance for sleeping at night.  - Pt will be able to reach behind her to get something from the back seat without pain in order to improve functional mobility.  - Pt will increase shoulder periscapular strength by 1 ms grade in order to increase tolerance to functional activities and ADLs.  - Pt will improve FOTO shoulder score to CJ at least 20% < 40% impaired, limited or restricted in order to demonstrate an increase in functional mobility and improved ADL performance.  - Pt will be independent with updated HEP to maintain gains following discharge with  therapy.    Plan     Cont to address B shoulder ROM, strength, and stability    Tete Dickson, PTA

## 2019-06-17 ENCOUNTER — CLINICAL SUPPORT (OUTPATIENT)
Dept: REHABILITATION | Facility: HOSPITAL | Age: 69
End: 2019-06-17
Attending: FAMILY MEDICINE
Payer: MEDICARE

## 2019-06-17 DIAGNOSIS — M25.619 DECREASED RANGE OF MOTION OF SHOULDER, UNSPECIFIED LATERALITY: ICD-10-CM

## 2019-06-17 PROCEDURE — 97140 MANUAL THERAPY 1/> REGIONS: CPT | Mod: PO

## 2019-06-17 PROCEDURE — 97110 THERAPEUTIC EXERCISES: CPT | Mod: PO

## 2019-06-17 NOTE — PROGRESS NOTES
Physical Therapy Daily Treatment Note     Name: Bebe Valdez  Clinic Number: [id number]    Therapy Diagnosis:   Encounter Diagnosis   Name Primary?    Decreased range of motion of shoulder, unspecified laterality      Physician: Anil Nelson MD    Visit Date: 6/17/2019    Physician Orders: PT Eval and Treat   Medical Diagnosis from Referral: E11.618,M75.00 (ICD-10-CM) - Adhesive capsulitis of shoulder associated with type 2 diabetes mellitus  Evaluation Date: 5/15/2019  Authorization Period Expiration: 12/31/19  Plan of Care Expiration:  7/12/19  Visit # / Visits authorized: 4/ 20      Time In: 200 PM  Time Out: 255 PM  Total Billable Time: 30 minutes (MT-1, TE-1)    Precautions: Standard    Subjective     Pt reports: she thinks her pain is getting better overall. She states she has been out of town and unable to do her exercises. Slight pain with carrying her suitcase.  She was compliant with home exercise program.  Response to previous treatment: no change  Functional change: no change    Pain: 3/10 with mvmt, 1/10 at rest  Location: B shoulders      Objective     Bebe received therapeutic exercises to develop strength, endurance, ROM, flexibility and posture for 45 minutes including:    Wand flexion 20 x 5s  Wand ER 20 x 5s B  Wand IR 20 x5s  Wand serratus punches 2 x 15  Pec stretch over towel roll 3'-declined due to back pain  SL ER 2 x 10 B  Wall washes fwd 15 x 5s B  B shoulder extension 2 x 10 YTB  Rows YTB 2 x 10  B shoulder PROM  Shoulder flex pulleys 3'  Prone rows 2 x 10  Prone H-abd 2 x 10    Bebe received the following manual therapy techniques: Joint mobilizations were applied to the: B shoulders for 10 minutes, including:    Post/inf mobs Grade II   Subscap release and stretch with L shoulder flex      Home Exercises Provided and Patient Education Provided     Education provided:   - proper posture in sitting and standing    Written Home Exercises Provided: yes. And provided YTB for Rows    Exercises were reviewed and Bebe was able to demonstrate them prior to the end of the session.  Bebe demonstrated good  understanding of the education provided.     See EMR under Patient Instructions for exercises provided 6/10/19.    Assessment     Pt able to tolerate the addition of exercises as bolded above without adverse effects. She required cueing during therex performance for good scapular excursion and to prevent upper trap compensation. Declined performance of pec stretch over towel roll due to low back pain, and she was also unable to perform pec stretch at doorway due to B shoulder pain. Will attempt to progress per niranjan.  Bebe is progressing well towards her goals.   Pt prognosis is Good.     Pt will continue to benefit from skilled outpatient physical therapy to address the deficits listed in the problem list box on initial evaluation, provide pt/family education and to maximize pt's level of independence in the home and community environment.     Pt's spiritual, cultural and educational needs considered and pt agreeable to plan of care and goals.     Anticipated barriers to physical therapy: none    Goals:        Short Term Goals:  4 weeks     -  Pt will increase B shoulder flexion ROM to at least 160 degrees in order to show improved functional mobility.  -  Pt will increase B shoulder abduction ROM to at least 150 degrees in order to show improved functional mobility.  - Pt will be independent and consistent with issued HEP in order to show carryover between therapy sessions.  - Pt. to demonstrate proper cervical and scapula retraction requiring min. to no verbal cues from PT        Long Term Goals: 8 weeks  - Pt will report decreased shoulder pain to 1/10 at night in order to increase tolerance for sleeping at night.  - Pt will be able to reach behind her to get something from the back seat without pain in order to improve functional mobility.  - Pt will increase shoulder periscapular  strength by 1 ms grade in order to increase tolerance to functional activities and ADLs.  - Pt will improve FOTO shoulder score to CJ at least 20% < 40% impaired, limited or restricted in order to demonstrate an increase in functional mobility and improved ADL performance.  - Pt will be independent with updated HEP to maintain gains following discharge with therapy.    Plan     Cont to address B shoulder ROM, strength, and stability    Ashley Holstein, MICHAEL

## 2019-06-26 ENCOUNTER — CLINICAL SUPPORT (OUTPATIENT)
Dept: REHABILITATION | Facility: HOSPITAL | Age: 69
End: 2019-06-26
Attending: FAMILY MEDICINE
Payer: MEDICARE

## 2019-06-26 DIAGNOSIS — M25.619 DECREASED RANGE OF MOTION OF SHOULDER, UNSPECIFIED LATERALITY: ICD-10-CM

## 2019-06-26 PROCEDURE — 97140 MANUAL THERAPY 1/> REGIONS: CPT | Mod: PO

## 2019-06-26 PROCEDURE — 97110 THERAPEUTIC EXERCISES: CPT | Mod: PO

## 2019-06-26 NOTE — PROGRESS NOTES
Physical Therapy Daily Treatment Note     Name: Bebe Valdez  Clinic Number: [id number]    Therapy Diagnosis:   Encounter Diagnosis   Name Primary?    Decreased range of motion of shoulder, unspecified laterality      Physician: Anil Nelson MD    Visit Date: 6/26/2019    Physician Orders: PT Eval and Treat   Medical Diagnosis from Referral: E11.618,M75.00 (ICD-10-CM) - Adhesive capsulitis of shoulder associated with type 2 diabetes mellitus  Evaluation Date: 5/15/2019  Authorization Period Expiration: 12/31/19  Plan of Care Expiration:  7/12/19  Visit # / Visits authorized: 5/ 20      Time In: 200 PM  Time Out: 2:50 PM  Total Billable Time: 30 minutes (MT-1, TE-1)    Precautions: Standard    Subjective     Pt reports: the L shoulder was hurting her a good bit yesterday and thinks maybe because she worked out with her .   She was compliant with home exercise program.  Response to previous treatment: no change  Functional change: no change    Pain: 5/10   Location: L shoulders      Objective     Bebe received therapeutic exercises to develop strength, endurance, ROM, flexibility and posture for 40 minutes including:    Wand flexion 20 x 5s  Wand ER 20 x 5s B  Wand IR 20 x5s  Wand serratus punches 2 x 15  Pec stretch over towel roll 3'-declined due to back pain   SL ER 2 x 10 B  Wall washes fwd 15 x 5s B  B shoulder extension 2 x 10 YTB  Rows YTB 2 x 10  B shoulder PROM  Shoulder flex pulleys 3'  Prone rows 2 x 10  Prone H-abd 2 x 10    Bebe received the following manual therapy techniques: Joint mobilizations were applied to the: B shoulders for 10 minutes, including:    Post/inf mobs Grade II   Subscap release and stretch with L shoulder flex      Home Exercises Provided and Patient Education Provided     Education provided:   - proper posture in sitting and standing    Written Home Exercises Provided: yes. And provided YTB for Rows   Exercises were reviewed and Bebe was able to demonstrate them  prior to the end of the session.  Bebe demonstrated good  understanding of the education provided.     See EMR under Patient Instructions for exercises provided 6/10/19.    Assessment     Pt tolerated treatment well today with no onset of adverse effects or increase in pain. Post treatment patient reports that her shoulders feel good. Will cont to progress per patient's tolerance.    Bebe is progressing well towards her goals.   Pt prognosis is Good.     Pt will continue to benefit from skilled outpatient physical therapy to address the deficits listed in the problem list box on initial evaluation, provide pt/family education and to maximize pt's level of independence in the home and community environment.     Pt's spiritual, cultural and educational needs considered and pt agreeable to plan of care and goals.     Anticipated barriers to physical therapy: none    Goals:        Short Term Goals:  4 weeks     -  Pt will increase B shoulder flexion ROM to at least 160 degrees in order to show improved functional mobility.  -  Pt will increase B shoulder abduction ROM to at least 150 degrees in order to show improved functional mobility.  - Pt will be independent and consistent with issued HEP in order to show carryover between therapy sessions.  - Pt. to demonstrate proper cervical and scapula retraction requiring min. to no verbal cues from PT        Long Term Goals: 8 weeks  - Pt will report decreased shoulder pain to 1/10 at night in order to increase tolerance for sleeping at night.  - Pt will be able to reach behind her to get something from the back seat without pain in order to improve functional mobility.  - Pt will increase shoulder periscapular strength by 1 ms grade in order to increase tolerance to functional activities and ADLs.  - Pt will improve FOTO shoulder score to CJ at least 20% < 40% impaired, limited or restricted in order to demonstrate an increase in functional mobility and improved ADL  performance.  - Pt will be independent with updated HEP to maintain gains following discharge with therapy.    Plan     Cont to address B shoulder ROM, strength, and stability    Tete Dickson, PTA

## 2019-06-28 DIAGNOSIS — E78.5 HYPERLIPIDEMIA, UNSPECIFIED HYPERLIPIDEMIA TYPE: ICD-10-CM

## 2019-06-28 RX ORDER — ROSUVASTATIN CALCIUM 10 MG/1
TABLET, COATED ORAL
Qty: 90 TABLET | Refills: 0 | Status: SHIPPED | OUTPATIENT
Start: 2019-06-28 | End: 2019-09-24 | Stop reason: SDUPTHER

## 2019-06-30 ENCOUNTER — EXTERNAL CHRONIC CARE MANAGEMENT (OUTPATIENT)
Dept: PRIMARY CARE CLINIC | Facility: CLINIC | Age: 69
End: 2019-06-30
Payer: MEDICARE

## 2019-06-30 PROCEDURE — 99490 PR CHRONIC CARE MGMT, 1ST 20 MIN: ICD-10-PCS | Mod: S$PBB,,, | Performed by: FAMILY MEDICINE

## 2019-06-30 PROCEDURE — 99490 CHRNC CARE MGMT STAFF 1ST 20: CPT | Mod: PBBFAC,PN | Performed by: FAMILY MEDICINE

## 2019-06-30 PROCEDURE — 99490 CHRNC CARE MGMT STAFF 1ST 20: CPT | Mod: S$PBB,,, | Performed by: FAMILY MEDICINE

## 2019-07-01 ENCOUNTER — CLINICAL SUPPORT (OUTPATIENT)
Dept: REHABILITATION | Facility: HOSPITAL | Age: 69
End: 2019-07-01
Attending: FAMILY MEDICINE
Payer: MEDICARE

## 2019-07-01 DIAGNOSIS — M25.611 DECREASED RANGE OF MOTION OF RIGHT SHOULDER: ICD-10-CM

## 2019-07-01 PROCEDURE — 97140 MANUAL THERAPY 1/> REGIONS: CPT | Mod: PO

## 2019-07-01 PROCEDURE — 97110 THERAPEUTIC EXERCISES: CPT | Mod: PO

## 2019-07-01 NOTE — PROGRESS NOTES
Physical Therapy Daily Treatment Note     Name: Bebe Valdez  Clinic Number: [id number]    Therapy Diagnosis:   Encounter Diagnosis   Name Primary?    Decreased range of motion of right shoulder      Physician: Anil Nelson MD    Visit Date: 7/1/2019    Physician Orders: PT Eval and Treat   Medical Diagnosis from Referral: E11.618,M75.00 (ICD-10-CM) - Adhesive capsulitis of shoulder associated with type 2 diabetes mellitus  Evaluation Date: 5/15/2019  Authorization Period Expiration: 12/31/19  Plan of Care Expiration:  7/12/19  Visit # / Visits authorized: 6/ 20      Time In: 3: 00 PM  Time Out: 3:55 pm  Total Billable Time: 55 minutes (MT-1, TE-3)    Precautions: Standard    Subjective     Pt reports: she was hurting a little this morning but its not bad anymore. Pt stated she felt a little sore after her last session but it went away the next day. Pt stated she is leaving tomorrow for a 2 week vacation and needs to remember to do her HEP .   She was compliant with home exercise program.  Response to previous treatment: no change  Functional change: no change    Pain: 5/10   Location: B shoulders , L>R    Objective     Bebe received therapeutic exercises to develop strength, endurance, ROM, flexibility and posture for 45 minutes including:    Wand flexion 20 x 5s  Wand ER 20 x 5s B  Wand IR 20 x5s  Wand serratus punches 2 x 15  Pec stretch over towel roll 3'-declined due to back pain   SL ER 2 x 10 B  Wall washes fwd 15 x 5s B  B shoulder extension 2 x 15 YTB  Rows OTB 2 x 15  B shoulder PROM  Shoulder flex pulleys 3'  Prone rows 2 x 10  Prone H-abd 2 x 10    Bebe received the following manual therapy techniques: Joint mobilizations were applied to the: B shoulders for 10 minutes, including:    Post/inf mobs Grade II   Subscap release and stretch with L shoulder flex      Home Exercises Provided and Patient Education Provided     Education provided:   - proper posture in sitting and standing  - HEP  compliance    Written Home Exercises Provided: updated HEP privided  Exercises were reviewed and Bebe was able to demonstrate them prior to the end of the session.  Bebe demonstrated good  understanding of the education provided.     See EMR under Patient Instructions for exercises provided 7/1/19.     Assessment     Pt was able to complete session with no adverse effects. Pt required cues with prone rows and extensions for proper scapular activation and shoulder retraction. Pt reports muscular fatigue towards completion. Pt encouraged to keep up with her HEP since she is going on a two week trip.    Bebe is progressing well towards her goals.   Pt prognosis is Good.     Pt will continue to benefit from skilled outpatient physical therapy to address the deficits listed in the problem list box on initial evaluation, provide pt/family education and to maximize pt's level of independence in the home and community environment.     Pt's spiritual, cultural and educational needs considered and pt agreeable to plan of care and goals.     Anticipated barriers to physical therapy: none    Goals:     Short Term Goals:  4 weeks     -  Pt will increase B shoulder flexion ROM to at least 160 degrees in order to show improved functional mobility.  -  Pt will increase B shoulder abduction ROM to at least 150 degrees in order to show improved functional mobility.  - Pt will be independent and consistent with issued HEP in order to show carryover between therapy sessions.  - Pt. to demonstrate proper cervical and scapula retraction requiring min. to no verbal cues from PT        Long Term Goals: 8 weeks  - Pt will report decreased shoulder pain to 1/10 at night in order to increase tolerance for sleeping at night.  - Pt will be able to reach behind her to get something from the back seat without pain in order to improve functional mobility.  - Pt will increase shoulder periscapular strength by 1 ms grade in order to  increase tolerance to functional activities and ADLs.  - Pt will improve FOTO shoulder score to CJ at least 20% < 40% impaired, limited or restricted in order to demonstrate an increase in functional mobility and improved ADL performance.  - Pt will be independent with updated HEP to maintain gains following discharge with therapy.    Plan     Cont to address B shoulder ROM, strength, and stability    Eden Ridley, PTA

## 2019-07-11 ENCOUNTER — TELEPHONE (OUTPATIENT)
Dept: PRIMARY CARE CLINIC | Facility: CLINIC | Age: 69
End: 2019-07-11

## 2019-07-11 NOTE — TELEPHONE ENCOUNTER
----- Message from Char Lemons sent at 7/11/2019 10:26 AM CDT -----  Contact: Self Call   [phone]  Doctor appointment and lab have been scheduled.  Please link lab orders to the lab appointment.  Date of doctor appointment:  11/5  Date of lab appointment:  10/29  Physical or EP: Epp  Comments:       Caller is requesting to schedule their annual screening mammogram appointment. Order is not listed in Epic.  Please enter order and contact patient to schedule.  Where would they like the mammogram performed?:  [place]   Would the patient rather receive a phone call back or a response via [facility]?:   Phone  Additional information:  Last mammo  10/29/2018

## 2019-07-22 ENCOUNTER — CLINICAL SUPPORT (OUTPATIENT)
Dept: REHABILITATION | Facility: HOSPITAL | Age: 69
End: 2019-07-22
Attending: FAMILY MEDICINE
Payer: MEDICARE

## 2019-07-22 DIAGNOSIS — M25.619 DECREASED RANGE OF MOTION OF SHOULDER, UNSPECIFIED LATERALITY: ICD-10-CM

## 2019-07-22 PROCEDURE — 97110 THERAPEUTIC EXERCISES: CPT | Mod: PO

## 2019-07-22 PROCEDURE — 97140 MANUAL THERAPY 1/> REGIONS: CPT | Mod: PO

## 2019-07-22 NOTE — PLAN OF CARE
Physical Therapy Daily Treatment Note/Updated POC     Name: Bebe Valdez  Clinic Number: [id number]    Therapy Diagnosis:   Encounter Diagnosis   Name Primary?    Decreased range of motion of shoulder, unspecified laterality      Physician: Anil Nelson MD    Visit Date: 7/22/2019    Physician Orders: PT Eval and Treat   Medical Diagnosis from Referral: E11.618,M75.00 (ICD-10-CM) - Adhesive capsulitis of shoulder associated with type 2 diabetes mellitus  Evaluation Date: 5/15/2019  Authorization Period Expiration: 12/31/19  Plan of Care Expiration:  9/20/19  Visit # / Visits authorized: 7/ 20      Time In: 200 PM  Time Out: 258 PM  Total Billable Time: 58 minutes (MT-1, TE-1)    Precautions: Standard    Subjective     Pt reports: her shoulder did pretty well while she on vacation. Every now and then she has a pain, but it did not stop her from doing anything.   She was compliant with home exercise program.  Response to previous treatment: no change  Functional change: no change    Pain: 5/10   Location: B shoulders , L>R    Objective     Reassess:    Active Range of Motion:   Shoulder Left Right   Flexion 138  155   Abduction 135 slight pain 145   ER at 0 77 77   ER at 90 NT NT   IR T10 p! (slight) T10      Passive Range of Motion:   Shoulder Left Right   Flexion 154 175   Abduction 145 157   ER at 0 70p! 80 p!   ER at 90 - -   IR Full, no pain Full, no pain      Upper Extremity Strength  (R) UE   (L) UE     Shoulder flexion: 4+/5 Shoulder flexion: 4+/5   Shoulder Abduction: 4/5 Shoulder abduction: 4/5   Shoulder ER 4-/5 Shoulder ER 4-/5   Shoulder IR 4/5 Shoulder IR 4/5   Lower Trap 3+/5 Lower Trap 3+/5   Middle Trap 4-/5 Middle Trap 4-/5   Rhomboids 4-/5 Rhomboids 4-/5        Bebe received therapeutic exercises to develop strength, endurance, ROM, flexibility and posture for 45 minutes including:    Wand flexion 20 x 5s 1#  Wand ER 20 x 5s B 1#  Wand IR 20 x5s 1#  Serratus punches 2 x 15 3#  SL ER 2 x 10  B  Wall washes fwd 15 x 5s B  Brugger's no band 2 x 10  B shoulder extension 2 x 10 OTB  Rows OTB 2 x 15  B shoulder PROM  Shoulder flex/scaption pulleys 3'  Prone rows 2 x 10  Prone H-abd 2 x 10    Bebe received the following manual therapy techniques: Joint mobilizations were applied to the: B shoulders for 10 minutes, including:    Post/inf mobs Grade II   Subscap release and stretch with L shoulder flex      Home Exercises Provided and Patient Education Provided     Education provided:   - proper posture in sitting and standing  - HEP compliance    Written Home Exercises Provided: updated HEP privided  Exercises were reviewed and Bebe was able to demonstrate them prior to the end of the session.  Bebe demonstrated good  understanding of the education provided.     See EMR under Patient Instructions for exercises provided 7/1/19.     Assessment     Pt is making good progress towards goals with improvements noted in B active and passive shoulder ROM. She is also reporting improved functional mobility in her daily life. She has met 2/4 STGs and is progressing towards LTGs. Her L shoulder remains more limited than her R due to pain. She is motivated to participate in therapy, and she is compliant with her HEP. Will cont to progress per niranjan.      Bebe is progressing well towards her goals.   Pt prognosis is Good.     Pt will continue to benefit from skilled outpatient physical therapy to address the deficits listed in the problem list box on initial evaluation, provide pt/family education and to maximize pt's level of independence in the home and community environment.     Pt's spiritual, cultural and educational needs considered and pt agreeable to plan of care and goals.     Anticipated barriers to physical therapy: none    Goals:     Short Term Goals:  4 weeks     -  Pt will increase B shoulder flexion ROM to at least 160 degrees in order to show improved functional mobility. Progressing 7/22/2019   -   Pt will increase B shoulder abduction ROM to at least 150 degrees in order to show improved functional mobility. Progressing 7/22/2019   - Pt will be independent and consistent with issued HEP in order to show carryover between therapy sessions. Met 7/22/19  - Pt. to demonstrate proper cervical and scapula retraction requiring min. to no verbal cues from PT Met 7/22/19        Long Term Goals: 8 weeks  - Pt will report decreased shoulder pain to 1/10 at night in order to increase tolerance for sleeping at night. Progressing 7/22/2019   - Pt will be able to reach behind her to get something from the back seat without pain in order to improve functional mobility. Progressing 7/22/2019   - Pt will increase shoulder periscapular strength by 1 ms grade in order to increase tolerance to functional activities and ADLs. Progressing 7/22/2019   - Pt will improve FOTO shoulder score to CJ at least 20% < 40% impaired, limited or restricted in order to demonstrate an increase in functional mobility and improved ADL performance. Progressing 7/22/2019   - Pt will be independent with updated HEP to maintain gains following discharge with therapy. Progressing 7/22/2019     Plan     Certification Period: 7/22/2019 to 9/20/19    Outpatient Physical Therapy 2 time(s) every week(s) for 8 weeks to include the following interventions: patient education, HEP, therapeutic exercises, neuromuscular re-education, therapeutic activity, manual therapy and dry needling, and modalities PRN to achieve established goals.     Ashley Holstein, PT

## 2019-07-22 NOTE — PROGRESS NOTES
Physical Therapy Daily Treatment Note/Updated POC     Name: Bebe Valdez  Clinic Number: [id number]    Therapy Diagnosis:   Encounter Diagnosis   Name Primary?    Decreased range of motion of shoulder, unspecified laterality      Physician: Anil Nelson MD    Visit Date: 7/22/2019    Physician Orders: PT Eval and Treat   Medical Diagnosis from Referral: E11.618,M75.00 (ICD-10-CM) - Adhesive capsulitis of shoulder associated with type 2 diabetes mellitus  Evaluation Date: 5/15/2019  Authorization Period Expiration: 12/31/19  Plan of Care Expiration:  9/20/19  Visit # / Visits authorized: 7/ 20      Time In: 200 PM  Time Out: 258 PM  Total Billable Time: 58 minutes (MT-1, TE-1)    Precautions: Standard    Subjective     Pt reports: her shoulder did pretty well while she on vacation. Every now and then she has a pain, but it did not stop her from doing anything.   She was compliant with home exercise program.  Response to previous treatment: no change  Functional change: no change    Pain: 5/10   Location: B shoulders , L>R    Objective     Reassess:    Active Range of Motion:   Shoulder Left Right   Flexion 138  155   Abduction 135 slight pain 145   ER at 0 77 77   ER at 90 NT NT   IR T10 p! (slight) T10      Passive Range of Motion:   Shoulder Left Right   Flexion 154 175   Abduction 145 157   ER at 0 70p! 80 p!   ER at 90 - -   IR Full, no pain Full, no pain      Upper Extremity Strength  (R) UE   (L) UE     Shoulder flexion: 4+/5 Shoulder flexion: 4+/5   Shoulder Abduction: 4/5 Shoulder abduction: 4/5   Shoulder ER 4-/5 Shoulder ER 4-/5   Shoulder IR 4/5 Shoulder IR 4/5   Lower Trap 3+/5 Lower Trap 3+/5   Middle Trap 4-/5 Middle Trap 4-/5   Rhomboids 4-/5 Rhomboids 4-/5        Bebe received therapeutic exercises to develop strength, endurance, ROM, flexibility and posture for 45 minutes including:    Wand flexion 20 x 5s 1#  Wand ER 20 x 5s B 1#  Wand IR 20 x5s 1#  Serratus punches 2 x 15 3#  SL ER 2 x 10  B  Wall washes fwd 15 x 5s B  Brugger's no band 2 x 10  B shoulder extension 2 x 10 OTB  Rows OTB 2 x 15  B shoulder PROM  Shoulder flex/scaption pulleys 3'  Prone rows 2 x 10  Prone H-abd 2 x 10    Bebe received the following manual therapy techniques: Joint mobilizations were applied to the: B shoulders for 10 minutes, including:    Post/inf mobs Grade II   Subscap release and stretch with L shoulder flex      Home Exercises Provided and Patient Education Provided     Education provided:   - proper posture in sitting and standing  - HEP compliance    Written Home Exercises Provided: updated HEP privided  Exercises were reviewed and Bebe was able to demonstrate them prior to the end of the session.  Bebe demonstrated good  understanding of the education provided.     See EMR under Patient Instructions for exercises provided 7/1/19.     Assessment     Pt is making good progress towards goals with improvements noted in B active and passive shoulder ROM. She is also reporting improved functional mobility in her daily life. She has met 2/4 STGs and is progressing towards LTGs. Her L shoulder remains more limited than her R due to pain. She is motivated to participate in therapy, and she is compliant with her HEP. Will cont to progress per niranjan.      Bebe is progressing well towards her goals.   Pt prognosis is Good.     Pt will continue to benefit from skilled outpatient physical therapy to address the deficits listed in the problem list box on initial evaluation, provide pt/family education and to maximize pt's level of independence in the home and community environment.     Pt's spiritual, cultural and educational needs considered and pt agreeable to plan of care and goals.     Anticipated barriers to physical therapy: none    Goals:     Short Term Goals:  4 weeks     -  Pt will increase B shoulder flexion ROM to at least 160 degrees in order to show improved functional mobility. Progressing 7/22/2019   -   Pt will increase B shoulder abduction ROM to at least 150 degrees in order to show improved functional mobility. Progressing 7/22/2019   - Pt will be independent and consistent with issued HEP in order to show carryover between therapy sessions. Met 7/22/19  - Pt. to demonstrate proper cervical and scapula retraction requiring min. to no verbal cues from PT Met 7/22/19        Long Term Goals: 8 weeks  - Pt will report decreased shoulder pain to 1/10 at night in order to increase tolerance for sleeping at night. Progressing 7/22/2019   - Pt will be able to reach behind her to get something from the back seat without pain in order to improve functional mobility. Progressing 7/22/2019   - Pt will increase shoulder periscapular strength by 1 ms grade in order to increase tolerance to functional activities and ADLs. Progressing 7/22/2019   - Pt will improve FOTO shoulder score to CJ at least 20% < 40% impaired, limited or restricted in order to demonstrate an increase in functional mobility and improved ADL performance. Progressing 7/22/2019   - Pt will be independent with updated HEP to maintain gains following discharge with therapy. Progressing 7/22/2019     Plan     Certification Period: 7/22/2019 to 9/20/19    Outpatient Physical Therapy 2 time(s) every week(s) for 8 weeks to include the following interventions: patient education, HEP, therapeutic exercises, neuromuscular re-education, therapeutic activity, manual therapy and dry needling, and modalities PRN to achieve established goals.     Ashley Holstein, PT

## 2019-07-31 ENCOUNTER — CLINICAL SUPPORT (OUTPATIENT)
Dept: REHABILITATION | Facility: HOSPITAL | Age: 69
End: 2019-07-31
Attending: FAMILY MEDICINE
Payer: MEDICARE

## 2019-07-31 DIAGNOSIS — M25.619 DECREASED RANGE OF MOTION OF SHOULDER, UNSPECIFIED LATERALITY: ICD-10-CM

## 2019-07-31 PROCEDURE — 97140 MANUAL THERAPY 1/> REGIONS: CPT | Mod: PO

## 2019-07-31 NOTE — PROGRESS NOTES
Physical Therapy Daily Treatment Note/Updated POC     Name: Bebe Valdez  Clinic Number: [id number]    Therapy Diagnosis:   Encounter Diagnosis   Name Primary?    Decreased range of motion of shoulder, unspecified laterality      Physician: Anil Nelson MD    Visit Date: 7/31/2019    Physician Orders: PT Eval and Treat   Medical Diagnosis from Referral: E11.618,M75.00 (ICD-10-CM) - Adhesive capsulitis of shoulder associated with type 2 diabetes mellitus  Evaluation Date: 5/15/2019  Authorization Period Expiration: 12/31/19  Plan of Care Expiration:  9/20/19  Visit # / Visits authorized: 8/ 20      Time In: 2:11 PM (pt with late arrival)   Time Out: 3:00 PM  Total Billable Time: 15 minutes     Precautions: Standard    Subjective     Pt reports: her shoulders are feeling better.    She was compliant with home exercise program.  Response to previous treatment: no change  Functional change: no change    Pain: 0/10   Location: B shoulders , L>R    Objective       Bebe received therapeutic exercises to develop strength, endurance, ROM, flexibility and posture for 38 minutes including:    Wand flexion 20 x 5s 1#  Wand ER 20 x 5s B 1#  Wand IR 20 x5s 1#  Serratus punches 2 x 15 3#  SL ER 2 x 10 B  Wall washes fwd 15 x 5s B  Brugger's no band 2 x 10  B shoulder extension 2 x 10 OTB  Rows OTB 2 x 15  B shoulder PROM  Shoulder flex/scaption pulleys 3'  Prone rows 2 x 10  Prone H-abd 2 x 10 - NP    Bebe received the following manual therapy techniques: Joint mobilizations were applied to the: B shoulders for 10 minutes, including:    Post/inf mobs Grade II   Subscap release and stretch with L shoulder flex      Home Exercises Provided and Patient Education Provided     Education provided:   - proper posture in sitting and standing  - HEP compliance    Written Home Exercises Provided: updated HEP privided  Exercises were reviewed and Bebe was able to demonstrate them prior to the end of the session.  Bebe  demonstrated good  understanding of the education provided.     See EMR under Patient Instructions for exercises provided 7/1/19.     Assessment     Pt tolerated treatment well today with no onset of adverse effects. Patient requires cueing for prone rows for proper technique and shoulder retraction/depression. Patient continues with limitation in B shoulders L>R and responds well to manual therapy. Will cont to progress per niranjan.      Bebe is progressing well towards her goals.   Pt prognosis is Good.     Pt will continue to benefit from skilled outpatient physical therapy to address the deficits listed in the problem list box on initial evaluation, provide pt/family education and to maximize pt's level of independence in the home and community environment.     Pt's spiritual, cultural and educational needs considered and pt agreeable to plan of care and goals.     Anticipated barriers to physical therapy: none    Goals:     Short Term Goals:  4 weeks     -  Pt will increase B shoulder flexion ROM to at least 160 degrees in order to show improved functional mobility. Progressing 7/31/2019   -  Pt will increase B shoulder abduction ROM to at least 150 degrees in order to show improved functional mobility. Progressing 7/22/2019   - Pt will be independent and consistent with issued HEP in order to show carryover between therapy sessions. Met 7/22/19  - Pt. to demonstrate proper cervical and scapula retraction requiring min. to no verbal cues from PT Met 7/22/19        Long Term Goals: 8 weeks  - Pt will report decreased shoulder pain to 1/10 at night in order to increase tolerance for sleeping at night. Progressing 7/22/2019   - Pt will be able to reach behind her to get something from the back seat without pain in order to improve functional mobility. Progressing 7/22/2019   - Pt will increase shoulder periscapular strength by 1 ms grade in order to increase tolerance to functional activities and ADLs. Progressing  7/22/2019   - Pt will improve FOTO shoulder score to CJ at least 20% < 40% impaired, limited or restricted in order to demonstrate an increase in functional mobility and improved ADL performance. Progressing 7/22/2019   - Pt will be independent with updated HEP to maintain gains following discharge with therapy. Progressing 7/22/2019     Plan     Certification Period: 7/31/2019 to 9/20/19    Outpatient Physical Therapy 2 time(s) every week(s) for 8 weeks to include the following interventions: patient education, HEP, therapeutic exercises, neuromuscular re-education, therapeutic activity, manual therapy and dry needling, and modalities PRN to achieve established goals.     Tete Dickson, PTA

## 2019-08-08 ENCOUNTER — CLINICAL SUPPORT (OUTPATIENT)
Dept: REHABILITATION | Facility: HOSPITAL | Age: 69
End: 2019-08-08
Attending: FAMILY MEDICINE
Payer: MEDICARE

## 2019-08-08 DIAGNOSIS — M25.619 DECREASED RANGE OF MOTION OF SHOULDER, UNSPECIFIED LATERALITY: ICD-10-CM

## 2019-08-08 PROCEDURE — 97140 MANUAL THERAPY 1/> REGIONS: CPT | Mod: PO

## 2019-08-08 PROCEDURE — 97110 THERAPEUTIC EXERCISES: CPT | Mod: PO

## 2019-08-08 NOTE — PROGRESS NOTES
Physical Therapy Daily Treatment Note/Updated POC     Name: Bebe Valdez  Clinic Number: [id number]    Therapy Diagnosis:   Encounter Diagnosis   Name Primary?    Decreased range of motion of shoulder, unspecified laterality      Physician: Anil Nelson MD    Visit Date: 8/8/2019    Physician Orders: PT Eval and Treat   Medical Diagnosis from Referral: E11.618,M75.00 (ICD-10-CM) - Adhesive capsulitis of shoulder associated with type 2 diabetes mellitus  Evaluation Date: 5/15/2019  Authorization Period Expiration: 12/31/19  Plan of Care Expiration:  9/20/19  Visit # / Visits authorized: 9/20      Time In: 2:05 PM  Time Out: 3:00 PM  Total Billable Time: 30 minutes     Precautions: Standard    Subjective     Pt reports: her shoulders are feeling better.    She was compliant with home exercise program.  Response to previous treatment: no change  Functional change: no change    Pain: 0/10   Location: B shoulders , L>R    Objective       Bebe received therapeutic exercises to develop strength, endurance, ROM, flexibility and posture for 45 minutes including:    Wand flexion 20 x 5s 1#  Wand ER 20 x 5s B 1#  Serratus punches 2 x 15 3#  SL ER 2 x 10 B 1#  PNF D2 flex x 10 B supine  Wall washes fwd 15 x 5s B  Brugger's YTB 2 x 10  B shoulder extension 2 x 10 OTB  Rows OTB 2 x 15  Wand IR 20 x5s 1#  B shoulder PROM  Shoulder flex/scaption pulleys 3'  Prone rows 2 x 10  Prone H-abd 2 x 10 - NP    Bebe received the following manual therapy techniques: Joint mobilizations were applied to the: B shoulders for 10 minutes, including:    Post/inf mobs Grade II   Subscap release and stretch with L shoulder flex      Home Exercises Provided and Patient Education Provided     Education provided:   - proper posture in sitting and standing  - HEP compliance    Written Home Exercises Provided: updated HEP privided  Exercises were reviewed and Bebe was able to demonstrate them prior to the end of the session.  Bebe  demonstrated good  understanding of the education provided.     See EMR under Patient Instructions for exercises provided 7/1/19.     Assessment     Able to advance exercises slightly as bolded above without adverse effects. She appears to continue to progress with B shoulder P/AROM at each session. Cont to progress per pt tolerance.     Bebe is progressing well towards her goals.   Pt prognosis is Good.     Pt will continue to benefit from skilled outpatient physical therapy to address the deficits listed in the problem list box on initial evaluation, provide pt/family education and to maximize pt's level of independence in the home and community environment.     Pt's spiritual, cultural and educational needs considered and pt agreeable to plan of care and goals.     Anticipated barriers to physical therapy: none    Goals:     Short Term Goals:  4 weeks     -  Pt will increase B shoulder flexion ROM to at least 160 degrees in order to show improved functional mobility. Progressing 8/8/2019   -  Pt will increase B shoulder abduction ROM to at least 150 degrees in order to show improved functional mobility. Progressing 7/22/2019   - Pt will be independent and consistent with issued HEP in order to show carryover between therapy sessions. Met 7/22/19  - Pt. to demonstrate proper cervical and scapula retraction requiring min. to no verbal cues from PT Met 7/22/19        Long Term Goals: 8 weeks  - Pt will report decreased shoulder pain to 1/10 at night in order to increase tolerance for sleeping at night. Progressing 7/22/2019   - Pt will be able to reach behind her to get something from the back seat without pain in order to improve functional mobility. Progressing 7/22/2019   - Pt will increase shoulder periscapular strength by 1 ms grade in order to increase tolerance to functional activities and ADLs. Progressing 7/22/2019   - Pt will improve FOTO shoulder score to CJ at least 20% < 40% impaired, limited or  restricted in order to demonstrate an increase in functional mobility and improved ADL performance. Progressing 7/22/2019   - Pt will be independent with updated HEP to maintain gains following discharge with therapy. Progressing 7/22/2019     Plan       Outpatient Physical Therapy 2 time(s) every week(s) for 8 weeks to include the following interventions: patient education, HEP, therapeutic exercises, neuromuscular re-education, therapeutic activity, manual therapy and dry needling, and modalities PRN to achieve established goals.     Ashley Holstein, PT

## 2019-08-12 ENCOUNTER — CLINICAL SUPPORT (OUTPATIENT)
Dept: REHABILITATION | Facility: HOSPITAL | Age: 69
End: 2019-08-12
Attending: FAMILY MEDICINE
Payer: MEDICARE

## 2019-08-12 DIAGNOSIS — M25.619 DECREASED RANGE OF MOTION OF SHOULDER, UNSPECIFIED LATERALITY: ICD-10-CM

## 2019-08-12 PROCEDURE — 97110 THERAPEUTIC EXERCISES: CPT | Mod: PO

## 2019-08-12 PROCEDURE — 97140 MANUAL THERAPY 1/> REGIONS: CPT | Mod: PO

## 2019-08-12 NOTE — PROGRESS NOTES
Physical Therapy Daily Treatment Note/Updated POC     Name: Bebe Valdez  Clinic Number: [id number]    Therapy Diagnosis:   Encounter Diagnosis   Name Primary?    Decreased range of motion of shoulder, unspecified laterality      Physician: Anil Nelson MD    Visit Date: 8/12/2019    Physician Orders: PT Eval and Treat   Medical Diagnosis from Referral: E11.618,M75.00 (ICD-10-CM) - Adhesive capsulitis of shoulder associated with type 2 diabetes mellitus  Evaluation Date: 5/15/2019  Authorization Period Expiration: 12/31/19  Plan of Care Expiration:  9/20/19  Visit # / Visits authorized: 10/20      Time In: 2:00 PM   Time Out: 2:58 PM  Total Billable Time: 30 minutes     Precautions: Standard    Subjective     Pt reports: her shoulders are feeling better. Reports she still experiences pain L>R when reaching behind her.   She was compliant with home exercise program.  Response to previous treatment: no change  Functional change: no change    Pain: 0/10   Location: B shoulders , L>R    Objective       Bebe received therapeutic exercises to develop strength, endurance, ROM, flexibility and posture for 45 minutes including:    Wand flexion 20 x 5s 1#  Wand ER 20 x 5s B 1#   Serratus punches 2 x 15 3#  SL ER 2 x 10 B 1#  PNF D2 flex x 10 B supine   Wall washes fwd 15 x 5s B   Brugger's YTB 2 x 10  B shoulder extension 2 x 10 OTB  Rows OTB 2 x 15  Wand IR 20 x5s 1#  B shoulder PROM  Shoulder flex/scaption pulleys 3'  Prone rows 2 x 10  Prone H-abd 2 x 10     Bebe received the following manual therapy techniques: Joint mobilizations were applied to the: B shoulders for 10 minutes, including:    Post/inf mobs Grade II   Subscap release and stretch with L shoulder flex      Home Exercises Provided and Patient Education Provided     Education provided:   - proper posture in sitting and standing  - HEP compliance    Written Home Exercises Provided: updated HEP privided  Exercises were reviewed and Bebe was able to  demonstrate them prior to the end of the session.  Bebe demonstrated good  understanding of the education provided.     See EMR under Patient Instructions for exercises provided 7/1/19.     Assessment     Patient with good tolerance to today's treatment overall. Patient with reports of anterior shoulder pain with sidelying ER, D2 flexion and L wall washes. As exercise continued patient reported reduced pain symptoms. Cont to progress per pt tolerance.     Bebe is progressing well towards her goals.   Pt prognosis is Good.     Pt will continue to benefit from skilled outpatient physical therapy to address the deficits listed in the problem list box on initial evaluation, provide pt/family education and to maximize pt's level of independence in the home and community environment.     Pt's spiritual, cultural and educational needs considered and pt agreeable to plan of care and goals.     Anticipated barriers to physical therapy: none    Goals:     Short Term Goals:  4 weeks     -  Pt will increase B shoulder flexion ROM to at least 160 degrees in order to show improved functional mobility. Progressing 8/12/2019   -  Pt will increase B shoulder abduction ROM to at least 150 degrees in order to show improved functional mobility. Progressing 7/22/2019   - Pt will be independent and consistent with issued HEP in order to show carryover between therapy sessions. Met 7/22/19  - Pt. to demonstrate proper cervical and scapula retraction requiring min. to no verbal cues from PT Met 7/22/19        Long Term Goals: 8 weeks  - Pt will report decreased shoulder pain to 1/10 at night in order to increase tolerance for sleeping at night. Progressing 7/22/2019   - Pt will be able to reach behind her to get something from the back seat without pain in order to improve functional mobility. Progressing 7/22/2019   - Pt will increase shoulder periscapular strength by 1 ms grade in order to increase tolerance to functional  activities and ADLs. Progressing 7/22/2019   - Pt will improve FOTO shoulder score to CJ at least 20% < 40% impaired, limited or restricted in order to demonstrate an increase in functional mobility and improved ADL performance. Progressing 7/22/2019   - Pt will be independent with updated HEP to maintain gains following discharge with therapy. Progressing 7/22/2019     Plan       Outpatient Physical Therapy 2 time(s) every week(s) for 8 weeks to include the following interventions: patient education, HEP, therapeutic exercises, neuromuscular re-education, therapeutic activity, manual therapy and dry needling, and modalities PRN to achieve established goals.     Tete Dickson, PTA

## 2019-08-13 ENCOUNTER — PATIENT OUTREACH (OUTPATIENT)
Dept: ADMINISTRATIVE | Facility: HOSPITAL | Age: 69
End: 2019-08-13

## 2019-08-13 NOTE — PROGRESS NOTES
Pre-visit chart review completed.  Immunizations reviewed and updated.    Patient eligible for digital htn and diabetes program.  Orders pended.

## 2019-08-14 NOTE — PROGRESS NOTES
Physical Therapy Daily Treatment Note/Updated POC     Name: Bebe Valdez  Clinic Number: [id number]    Therapy Diagnosis:   Encounter Diagnosis   Name Primary?    Decreased range of motion of shoulder, unspecified laterality      Physician: Anil Nelson MD    Visit Date: 8/15/2019    Physician Orders: PT Eval and Treat   Medical Diagnosis from Referral: E11.618,M75.00 (ICD-10-CM) - Adhesive capsulitis of shoulder associated with type 2 diabetes mellitus  Evaluation Date: 5/15/2019  Authorization Period Expiration: 12/31/19  Plan of Care Expiration:  9/20/19  Visit # / Visits authorized: 11/20      Time In: 2:00 PM  Time Out: 2:58 PM  Total Billable Time: 30 minutes     Precautions: Standard    Subjective     Pt reports: shoulder are feeling better overall. L shoulder has been hurting  She was compliant with home exercise program.  Response to previous treatment: no change  Functional change: no change    Pain: 0/10   Location: B shoulders , L>R    Objective       Bebe received therapeutic exercises to develop strength, endurance, ROM, flexibility and posture for 45 minutes including:    UBE 2'/2' fwd and backward  Wand flexion 20 x 5s 2#  Wand ER 20 x 5s B 1#   Serratus punches 2 x 15 3#  SL ER 2 x 10 B 1#  PNF D2 flex 2 x 10 B supine   Wall washes fwd 15 x 5s B   Brugger's YTB 2 x 10  B shoulder extension 2 x 10 OTB  Rows OTB 2 x 15  Wand IR 20 x5s 1#  B shoulder PROM  Shoulder abduction pulleys 3'  Shoulder ER/IR iso OTB 10 x 5s B ea UE  Prone rows 2 x 10  Prone extension 2 x 10  Prone H-abd 2 x 10     Bebe received the following manual therapy techniques: Joint mobilizations were applied to the: B shoulders for 10 minutes, including:    Post/inf mobs Grade II   Subscap release and stretch with L shoulder flex      Home Exercises Provided and Patient Education Provided     Education provided:   - proper posture in sitting and standing  - HEP compliance    Written Home Exercises Provided: updated HEP  privided  Exercises were reviewed and Bebe was able to demonstrate them prior to the end of the session.  Bebe demonstrated good  understanding of the education provided.     See EMR under Patient Instructions for exercises provided 7/1/19.     Assessment     Pt able to progress therex as bolded above without adverse effects. L shoulder remains more limited in ROM as compared to R; however, good progress has been overall since initial eval. Will cont to progress per niranjan.     Bebe is progressing well towards her goals.   Pt prognosis is Good.     Pt will continue to benefit from skilled outpatient physical therapy to address the deficits listed in the problem list box on initial evaluation, provide pt/family education and to maximize pt's level of independence in the home and community environment.     Pt's spiritual, cultural and educational needs considered and pt agreeable to plan of care and goals.     Anticipated barriers to physical therapy: none    Goals:     Short Term Goals:  4 weeks     -  Pt will increase B shoulder flexion ROM to at least 160 degrees in order to show improved functional mobility. Progressing 8/15/2019   -  Pt will increase B shoulder abduction ROM to at least 150 degrees in order to show improved functional mobility. Progressing 7/22/2019   - Pt will be independent and consistent with issued HEP in order to show carryover between therapy sessions. Met 7/22/19  - Pt. to demonstrate proper cervical and scapula retraction requiring min. to no verbal cues from PT Met 7/22/19        Long Term Goals: 8 weeks  - Pt will report decreased shoulder pain to 1/10 at night in order to increase tolerance for sleeping at night. Progressing 7/22/2019   - Pt will be able to reach behind her to get something from the back seat without pain in order to improve functional mobility. Progressing 7/22/2019   - Pt will increase shoulder periscapular strength by 1 ms grade in order to increase tolerance  to functional activities and ADLs. Progressing 7/22/2019   - Pt will improve FOTO shoulder score to CJ at least 20% < 40% impaired, limited or restricted in order to demonstrate an increase in functional mobility and improved ADL performance. Progressing 7/22/2019   - Pt will be independent with updated HEP to maintain gains following discharge with therapy. Progressing 7/22/2019     Plan     Outpatient Physical Therapy 2 time(s) every week(s) for 8 weeks to include the following interventions: patient education, HEP, therapeutic exercises, neuromuscular re-education, therapeutic activity, manual therapy and dry needling, and modalities PRN to achieve established goals.     Ashley Holstein, PT

## 2019-08-15 ENCOUNTER — CLINICAL SUPPORT (OUTPATIENT)
Dept: REHABILITATION | Facility: HOSPITAL | Age: 69
End: 2019-08-15
Attending: FAMILY MEDICINE
Payer: MEDICARE

## 2019-08-15 DIAGNOSIS — M25.619 DECREASED RANGE OF MOTION OF SHOULDER, UNSPECIFIED LATERALITY: ICD-10-CM

## 2019-08-15 PROCEDURE — 97110 THERAPEUTIC EXERCISES: CPT | Mod: PO

## 2019-08-15 PROCEDURE — 97140 MANUAL THERAPY 1/> REGIONS: CPT | Mod: PO

## 2019-08-19 ENCOUNTER — LAB VISIT (OUTPATIENT)
Dept: LAB | Facility: HOSPITAL | Age: 69
End: 2019-08-19
Attending: FAMILY MEDICINE
Payer: MEDICARE

## 2019-08-19 ENCOUNTER — CLINICAL SUPPORT (OUTPATIENT)
Dept: REHABILITATION | Facility: HOSPITAL | Age: 69
End: 2019-08-19
Attending: FAMILY MEDICINE
Payer: MEDICARE

## 2019-08-19 DIAGNOSIS — E78.00 TYPE 2 DIABETES MELLITUS WITH HYPERCHOLESTEROLEMIA: ICD-10-CM

## 2019-08-19 DIAGNOSIS — M25.611 DECREASED RANGE OF MOTION OF RIGHT SHOULDER: ICD-10-CM

## 2019-08-19 DIAGNOSIS — E03.9 HYPOTHYROIDISM, UNSPECIFIED TYPE: ICD-10-CM

## 2019-08-19 DIAGNOSIS — E11.69 TYPE 2 DIABETES MELLITUS WITH HYPERCHOLESTEROLEMIA: ICD-10-CM

## 2019-08-19 LAB
ANION GAP SERPL CALC-SCNC: 9 MMOL/L (ref 8–16)
BUN SERPL-MCNC: 10 MG/DL (ref 8–23)
CALCIUM SERPL-MCNC: 10.2 MG/DL (ref 8.7–10.5)
CHLORIDE SERPL-SCNC: 106 MMOL/L (ref 95–110)
CO2 SERPL-SCNC: 25 MMOL/L (ref 23–29)
CREAT SERPL-MCNC: 0.8 MG/DL (ref 0.5–1.4)
EST. GFR  (AFRICAN AMERICAN): >60 ML/MIN/1.73 M^2
EST. GFR  (NON AFRICAN AMERICAN): >60 ML/MIN/1.73 M^2
GLUCOSE SERPL-MCNC: 184 MG/DL (ref 70–110)
POTASSIUM SERPL-SCNC: 4.3 MMOL/L (ref 3.5–5.1)
SODIUM SERPL-SCNC: 140 MMOL/L (ref 136–145)
T4 FREE SERPL-MCNC: 1.49 NG/DL (ref 0.71–1.51)
TSH SERPL DL<=0.005 MIU/L-ACNC: <0.01 UIU/ML (ref 0.4–4)

## 2019-08-19 PROCEDURE — 83036 HEMOGLOBIN GLYCOSYLATED A1C: CPT

## 2019-08-19 PROCEDURE — 36415 COLL VENOUS BLD VENIPUNCTURE: CPT | Mod: PO

## 2019-08-19 PROCEDURE — 97140 MANUAL THERAPY 1/> REGIONS: CPT | Mod: PO

## 2019-08-19 PROCEDURE — 84439 ASSAY OF FREE THYROXINE: CPT

## 2019-08-19 PROCEDURE — 80048 BASIC METABOLIC PNL TOTAL CA: CPT

## 2019-08-19 PROCEDURE — 97110 THERAPEUTIC EXERCISES: CPT | Mod: PO

## 2019-08-19 PROCEDURE — 84443 ASSAY THYROID STIM HORMONE: CPT

## 2019-08-19 NOTE — PROGRESS NOTES
Physical Therapy Daily Treatment Note/Updated POC     Name: Bebe Valdez  Clinic Number: [id number]    Therapy Diagnosis:   Encounter Diagnosis   Name Primary?    Decreased range of motion of right shoulder      Physician: Anil Nelson MD    Visit Date: 8/19/2019    Physician Orders: PT Eval and Treat   Medical Diagnosis from Referral: E11.618,M75.00 (ICD-10-CM) - Adhesive capsulitis of shoulder associated with type 2 diabetes mellitus  Evaluation Date: 5/15/2019  Authorization Period Expiration: 12/31/19  Plan of Care Expiration:  9/20/19  Visit # / Visits authorized: 12/20      Time In: 400 PM  Time Out: 500 PM  Total Billable Time: 30 minutes     Precautions: Standard    Subjective     Pt reports: feeling good prior to session today. Shoulders have been feeling better overall.  She was compliant with home exercise program.  Response to previous treatment: no change  Functional change: no change    Pain: 0/10   Location: B shoulders , L>R    Objective       Bebe received therapeutic exercises to develop strength, endurance, ROM, flexibility and posture for 45 minutes including:    UBE 2'/2' fwd and backward  Wand flexion 20 x 5s 2#  Wand ER 20 x 5s B 1#   Serratus punches 2 x 15 3#  SL ER 2 x 10 B 1#  PNF D2 flex 2 x 10 B supine   Prone rows 2 x 10 1#  Prone extension 2 x 10 1#  Prone H-abd 2 x 10   Wall washes fwd 15 x 5s B   Brugger's YTB 2 x 10  B shoulder extension 2 x 10 OTB  Rows OTB 2 x 15  Wand IR 20 x5s 1#  B shoulder PROM  Shoulder abduction pulleys 3'  Shoulder ER/IR iso OTB 10 x 5s B ea UE      Bebe received the following manual therapy techniques: Joint mobilizations were applied to the: B shoulders for 10 minutes, including:    Post/inf mobs Grade II   Subscap release and stretch with L shoulder flex      Home Exercises Provided and Patient Education Provided     Education provided:   - proper posture in sitting and standing  - HEP compliance    Written Home Exercises Provided: updated HEP  privided  Exercises were reviewed and Bebe was able to demonstrate them prior to the end of the session.  Bebe demonstrated good  understanding of the education provided.     See EMR under Patient Instructions for exercises provided 7/1/19.     Assessment     Good tolerance to tx session today. Soreness reported after increased weight on prone activities; therefore, no other activities advanced at this time.     Bebe is progressing well towards her goals.   Pt prognosis is Good.     Pt will continue to benefit from skilled outpatient physical therapy to address the deficits listed in the problem list box on initial evaluation, provide pt/family education and to maximize pt's level of independence in the home and community environment.     Pt's spiritual, cultural and educational needs considered and pt agreeable to plan of care and goals.     Anticipated barriers to physical therapy: none    Goals:     Short Term Goals:  4 weeks     -  Pt will increase B shoulder flexion ROM to at least 160 degrees in order to show improved functional mobility. Progressing 8/19/2019   -  Pt will increase B shoulder abduction ROM to at least 150 degrees in order to show improved functional mobility. Progressing 7/22/2019   - Pt will be independent and consistent with issued HEP in order to show carryover between therapy sessions. Met 7/22/19  - Pt. to demonstrate proper cervical and scapula retraction requiring min. to no verbal cues from PT Met 7/22/19        Long Term Goals: 8 weeks  - Pt will report decreased shoulder pain to 1/10 at night in order to increase tolerance for sleeping at night. Progressing 7/22/2019   - Pt will be able to reach behind her to get something from the back seat without pain in order to improve functional mobility. Progressing 7/22/2019   - Pt will increase shoulder periscapular strength by 1 ms grade in order to increase tolerance to functional activities and ADLs. Progressing 7/22/2019   - Pt  will improve FOTO shoulder score to CJ at least 20% < 40% impaired, limited or restricted in order to demonstrate an increase in functional mobility and improved ADL performance. Progressing 7/22/2019   - Pt will be independent with updated HEP to maintain gains following discharge with therapy. Progressing 7/22/2019     Plan     Outpatient Physical Therapy 2 time(s) every week(s) for 8 weeks to include the following interventions: patient education, HEP, therapeutic exercises, neuromuscular re-education, therapeutic activity, manual therapy and dry needling, and modalities PRN to achieve established goals.     Ashley Holstein, PT

## 2019-08-20 LAB
ESTIMATED AVG GLUCOSE: 143 MG/DL (ref 68–131)
HBA1C MFR BLD HPLC: 6.6 % (ref 4–5.6)

## 2019-08-22 ENCOUNTER — CLINICAL SUPPORT (OUTPATIENT)
Dept: REHABILITATION | Facility: HOSPITAL | Age: 69
End: 2019-08-22
Attending: FAMILY MEDICINE
Payer: MEDICARE

## 2019-08-22 DIAGNOSIS — M25.619 DECREASED RANGE OF MOTION OF SHOULDER, UNSPECIFIED LATERALITY: ICD-10-CM

## 2019-08-22 PROCEDURE — 97110 THERAPEUTIC EXERCISES: CPT | Mod: PO

## 2019-08-22 PROCEDURE — 97140 MANUAL THERAPY 1/> REGIONS: CPT | Mod: PO

## 2019-08-22 NOTE — PROGRESS NOTES
Physical Therapy Daily Treatment Note/Updated POC     Name: Bebe Valdez  Clinic Number: [id number]    Therapy Diagnosis:   Encounter Diagnosis   Name Primary?   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 Both shoulders have a been more uncomfortable over the past couple of days.  She was compliant with home exercise program.  Response to previous treatment: no change  Functional change: no change    Pain: 3/10   Location: B shoulders , L>R    Objective       Bebe received therapeutic exercises to develop strength, endurance, ROM, flexibility and posture for 45 minutes including:  Warm-up:  · UBE 2'/2' fwd and backward  · Shoulder scaption pulleys 3'  Supine:  · Wand flexion 20 x 5s 2#  · Wand ER 20 x 5s B 1#   · Serratus punches 2 x 15 3#  · PNF D2 Flex 2 x10 B  B shoulder PROM  Sidelying:  · SL ER 2 x 10 B 1#  Prone:  · Prone rows 2 x 10 1#- NP  · Prone extension 2 x 10 1#-NP  · Prone H-abd 2 x 10 -NP  Seated:  · Posterior cap stretch 2x30s- right side only, left had pain  Anteriorly   Standing:  · Wall washes fwd 15 x 5s B   · Brugger's YTB 2 x 10  · B shoulder extension 2 x 10 OTB  · Rows OTB 2 x 15  · Wand IR 20 x5s 1#  · Shoulder ER/ IR iso OTB 10 x 5s B ea UE      Bebe received the following manual therapy techniques: Joint mobilizations were applied to the: B shoulders for 10 minutes, including:    Post/inf mobs Grade II   Subscap release and  stretch with L shoulder flex      Home Exercises Provided and Patient Education Provided     Education provided:   - proper posture in sitting and standing  - HEP compliance    Written Home Exercises Provided: updated HEP privided  Exercises were reviewed and Bebe was able to demonstrate them prior to the end of the session.  Bebe demonstrated good  understanding of the education provided.     See EMR under Patient Instructions for exercises provided 7/1/19.     Assessment     Emphasized posterior capsule stretching with manual therapy today. Tolerated tx session well with appeared improvements in ROM following. Would like to add internal rotation stretch with towel at next session if pt is able to tolerate.     Bebe is progressing well towards her goals.   Pt prognosis is Good.     Pt will continue to benefit from skilled outpatient physical therapy to address the deficits listed in the problem list box on initial evaluation, provide pt/family education and to maximize pt's level of independence in the home and community environment.     Pt's spiritual, cultural and educational needs considered and pt agreeable to plan of care and goals.     Anticipated barriers to physical therapy: none    Goals:     Short Term Goals:  4 weeks     -  Pt will increase B shoulder flexion ROM to at least 160 degrees in order to show improved functional mobility. Progressing 8/22/2019   -  Pt will increase B shoulder abduction ROM to at least 150 degrees in order to show improved functional mobility. Progressing 7/22/2019   - Pt will be independent and consistent with issued HEP in order to show carryover between therapy sessions. Met 7/22/19  - Pt. to demonstrate proper cervical and scapula retraction requiring min. to no verbal cues from PT Met 7/22/19        Long Term Goals: 8 weeks  - Pt will report decreased shoulder pain to 1/10 at night in order to increase tolerance for sleeping at night. Progressing 7/22/2019   - Pt  will be able to reach behind her to get something from the back seat without pain in order to improve functional mobility. Progressing 7/22/2019   - Pt will increase shoulder periscapular strength by 1 ms grade in order to increase tolerance to functional activities and ADLs. Progressing 7/22/2019   - Pt will improve FOTO shoulder score to CJ at least 20% < 40% impaired, limited or restricted in order to demonstrate an increase in functional mobility and improved ADL performance. Progressing 7/22/2019   - Pt will be independent with updated HEP to maintain gains following discharge with therapy. Progressing 7/22/2019     Plan     Outpatient Physical Therapy 2 time(s) every week(s) for 8 weeks to include the following interventions: patient education, HEP, therapeutic exercises, neuromuscular re-education, therapeutic activity, manual therapy and dry needling, and modalities PRN to achieve established goals.     Ashley Holstein, PT

## 2019-08-26 ENCOUNTER — OFFICE VISIT (OUTPATIENT)
Dept: PRIMARY CARE CLINIC | Facility: CLINIC | Age: 69
End: 2019-08-26
Payer: MEDICARE

## 2019-08-26 ENCOUNTER — IMMUNIZATION (OUTPATIENT)
Dept: PHARMACY | Facility: CLINIC | Age: 69
End: 2019-08-26
Payer: MEDICARE

## 2019-08-26 VITALS
RESPIRATION RATE: 20 BRPM | WEIGHT: 182.56 LBS | HEIGHT: 66 IN | BODY MASS INDEX: 29.34 KG/M2 | TEMPERATURE: 98 F | DIASTOLIC BLOOD PRESSURE: 80 MMHG | SYSTOLIC BLOOD PRESSURE: 109 MMHG

## 2019-08-26 DIAGNOSIS — I48.91 ATRIAL FIBRILLATION, UNSPECIFIED TYPE: ICD-10-CM

## 2019-08-26 DIAGNOSIS — E03.9 ACQUIRED HYPOTHYROIDISM: ICD-10-CM

## 2019-08-26 DIAGNOSIS — I49.9 CARDIAC ARRHYTHMIA, UNSPECIFIED CARDIAC ARRHYTHMIA TYPE: Primary | ICD-10-CM

## 2019-08-26 DIAGNOSIS — E11.69 TYPE 2 DIABETES MELLITUS WITH HYPERCHOLESTEROLEMIA: ICD-10-CM

## 2019-08-26 DIAGNOSIS — E78.00 TYPE 2 DIABETES MELLITUS WITH HYPERCHOLESTEROLEMIA: ICD-10-CM

## 2019-08-26 PROCEDURE — 99213 OFFICE O/P EST LOW 20 MIN: CPT | Mod: PBBFAC,PN | Performed by: FAMILY MEDICINE

## 2019-08-26 PROCEDURE — 99214 PR OFFICE/OUTPT VISIT, EST, LEVL IV, 30-39 MIN: ICD-10-PCS | Mod: S$PBB,,, | Performed by: FAMILY MEDICINE

## 2019-08-26 PROCEDURE — 93005 ELECTROCARDIOGRAM TRACING: CPT | Mod: PBBFAC,PN | Performed by: INTERNAL MEDICINE

## 2019-08-26 PROCEDURE — 93010 EKG 12-LEAD: ICD-10-PCS | Mod: S$PBB,,, | Performed by: INTERNAL MEDICINE

## 2019-08-26 PROCEDURE — 99214 OFFICE O/P EST MOD 30 MIN: CPT | Mod: S$PBB,,, | Performed by: FAMILY MEDICINE

## 2019-08-26 PROCEDURE — 99999 PR PBB SHADOW E&M-EST. PATIENT-LVL III: ICD-10-PCS | Mod: PBBFAC,,, | Performed by: FAMILY MEDICINE

## 2019-08-26 PROCEDURE — 93010 ELECTROCARDIOGRAM REPORT: CPT | Mod: S$PBB,,, | Performed by: INTERNAL MEDICINE

## 2019-08-26 PROCEDURE — 99999 PR PBB SHADOW E&M-EST. PATIENT-LVL III: CPT | Mod: PBBFAC,,, | Performed by: FAMILY MEDICINE

## 2019-08-26 RX ORDER — METOPROLOL SUCCINATE 50 MG/1
50 TABLET, EXTENDED RELEASE ORAL DAILY
Qty: 30 TABLET | Refills: 11 | Status: SHIPPED | OUTPATIENT
Start: 2019-08-26 | End: 2020-08-17

## 2019-08-26 RX ORDER — LEVOTHYROXINE SODIUM 75 UG/1
75 CAPSULE ORAL DAILY
Qty: 30 TABLET | Refills: 11 | Status: SHIPPED | OUTPATIENT
Start: 2019-08-26 | End: 2019-08-27

## 2019-08-26 NOTE — PROGRESS NOTES
Subjective:       Patient ID: Bebe Valdez is a 69 y.o. female.    Chief Complaint: Diabetes    70 yo presents today for follow up of type II DM and hypothyroidism.  Has been doing well over past 4 months, gradually losing weight with individual .  However, about one month ago she went to a spa and was told that her heart rate was elevated to about 160 during exercise.  They also thought her pulse was irregular.  She has been able to continue her exercise program without chest pain or dyspnea on exertion but rate has been persistently elevated with aerobic exercise (treadmill)    Diabetes   She has type 2 diabetes mellitus. No MedicAlert identification noted. The initial diagnosis of diabetes was made 10 years ago. Pertinent negatives for hypoglycemia include no confusion, dizziness, headaches, hunger, mood changes, nervousness/anxiousness, pallor, seizures, sleepiness, speech difficulty, sweats or tremors. Associated symptoms include polyuria and weight loss. Pertinent negatives for diabetes include no blurred vision, no chest pain, no fatigue, no foot paresthesias, no foot ulcerations, no polydipsia, no polyphagia, no visual change and no weakness. Pertinent negatives for hypoglycemia complications include no blackouts, no hospitalization, no nocturnal hypoglycemia, no required assistance and no required glucagon injection. Symptoms are improving. Pertinent negatives for diabetic complications include no autonomic neuropathy, CVA, heart disease, impotence, nephropathy, peripheral neuropathy, PVD or retinopathy. Risk factors for coronary artery disease include family history, obesity, post-menopausal and diabetes mellitus. Current diabetic treatment includes oral agent (monotherapy). She is compliant with treatment most of the time. Her weight is decreasing steadily. She is following a generally healthy diet. Meal planning includes avoidance of concentrated sweets. She has not had a previous visit with a  dietitian. She participates in exercise daily. There is no compliance with monitoring of blood glucose. Her home blood glucose trend is fluctuating minimally. She does not see a podiatrist.Eye exam is current.     Review of Systems   Constitutional: Positive for weight loss. Negative for fatigue and unexpected weight change.   Eyes: Negative for blurred vision.   Respiratory: Negative for cough, chest tightness and shortness of breath.    Cardiovascular: Negative for chest pain, palpitations and leg swelling.   Endocrine: Positive for polyuria. Negative for polydipsia and polyphagia.   Genitourinary: Negative for impotence.   Skin: Negative for pallor.   Neurological: Negative for dizziness, tremors, seizures, syncope, speech difficulty, weakness, light-headedness, numbness and headaches.   Psychiatric/Behavioral: Negative for confusion. The patient is not nervous/anxious.        Objective:      Physical Exam   Constitutional: She appears well-developed and well-nourished. No distress.   Neck: Normal range of motion. No JVD present. No thyromegaly present.   Cardiovascular:   No murmur heard.  Pulses:       Carotid pulses are 2+ on the right side, and 2+ on the left side.       Radial pulses are 2+ on the right side, and 2+ on the left side.        Popliteal pulses are 2+ on the right side, and 2+ on the left side.        Dorsalis pedis pulses are 2+ on the right side, and 2+ on the left side.        Posterior tibial pulses are 2+ on the right side, and 2+ on the left side.   Pulse irreg/irreg with rate about 80 at rest   Pulmonary/Chest: Effort normal and breath sounds normal. She has no wheezes. She has no rales.   Musculoskeletal: She exhibits no edema.   Lymphadenopathy:     She has no cervical adenopathy.   Neurological: She is alert. She displays normal reflexes. No cranial nerve deficit. She exhibits normal muscle tone.       Assessment:      1.  Atrial Fibrillation, by history, one month duration  2.   Hypothyroidism, excess Synthroid  3.  Type II DM  Plan:       1.  CHADS2 score on my evaluation is 1  Start Metoprolol 50mg qd  3.  ASA 325mg daily  4.  Consult Cardiology re ? Anticoagulation although I do not think it is recommended  5.  A Fib could be caused/worsened by excessive Synthroid.  Decrease to total of 0.175mcg, repeat TSH in one month.

## 2019-08-27 ENCOUNTER — PATIENT MESSAGE (OUTPATIENT)
Dept: PRIMARY CARE CLINIC | Facility: CLINIC | Age: 69
End: 2019-08-27

## 2019-08-27 RX ORDER — LEVOTHYROXINE SODIUM 75 UG/1
75 TABLET ORAL DAILY
Qty: 30 TABLET | Refills: 11 | Status: SHIPPED | OUTPATIENT
Start: 2019-08-27 | End: 2019-09-25

## 2019-08-27 NOTE — TELEPHONE ENCOUNTER
Problems   This clinical information was not verified, but there are updates pending review:   No Longer Applicable Problems Start Date Reported By  Comments   Obesity, diabetes, and hypertension syndrome 1/30/2019 Bebe Valdez I don't have hypertension and never did.     Patient would like this removed from her problem list.

## 2019-08-28 ENCOUNTER — PATIENT MESSAGE (OUTPATIENT)
Dept: PRIMARY CARE CLINIC | Facility: CLINIC | Age: 69
End: 2019-08-28

## 2019-08-28 ENCOUNTER — TELEPHONE (OUTPATIENT)
Dept: PRIMARY CARE CLINIC | Facility: CLINIC | Age: 69
End: 2019-08-28

## 2019-09-03 ENCOUNTER — OFFICE VISIT (OUTPATIENT)
Dept: CARDIOLOGY | Facility: CLINIC | Age: 69
End: 2019-09-03
Payer: MEDICARE

## 2019-09-03 VITALS
HEART RATE: 72 BPM | BODY MASS INDEX: 29.32 KG/M2 | WEIGHT: 182.44 LBS | SYSTOLIC BLOOD PRESSURE: 128 MMHG | DIASTOLIC BLOOD PRESSURE: 74 MMHG | HEIGHT: 66 IN

## 2019-09-03 DIAGNOSIS — E78.00 TYPE 2 DIABETES MELLITUS WITH HYPERCHOLESTEROLEMIA: ICD-10-CM

## 2019-09-03 DIAGNOSIS — E66.09 CLASS 1 OBESITY DUE TO EXCESS CALORIES WITH SERIOUS COMORBIDITY AND BODY MASS INDEX (BMI) OF 32.0 TO 32.9 IN ADULT: ICD-10-CM

## 2019-09-03 DIAGNOSIS — E03.2 HYPOTHYROIDISM DUE TO NON-MEDICATION EXOGENOUS SUBSTANCES: Primary | ICD-10-CM

## 2019-09-03 DIAGNOSIS — I48.19 PERSISTENT ATRIAL FIBRILLATION: ICD-10-CM

## 2019-09-03 DIAGNOSIS — E11.69 TYPE 2 DIABETES MELLITUS WITH HYPERCHOLESTEROLEMIA: ICD-10-CM

## 2019-09-03 PROCEDURE — 99204 PR OFFICE/OUTPT VISIT, NEW, LEVL IV, 45-59 MIN: ICD-10-PCS | Mod: S$PBB,,, | Performed by: INTERNAL MEDICINE

## 2019-09-03 PROCEDURE — 99204 OFFICE O/P NEW MOD 45 MIN: CPT | Mod: S$PBB,,, | Performed by: INTERNAL MEDICINE

## 2019-09-03 PROCEDURE — 99999 PR PBB SHADOW E&M-EST. PATIENT-LVL III: CPT | Mod: PBBFAC,,, | Performed by: INTERNAL MEDICINE

## 2019-09-03 PROCEDURE — 99213 OFFICE O/P EST LOW 20 MIN: CPT | Mod: PBBFAC,PO | Performed by: INTERNAL MEDICINE

## 2019-09-03 PROCEDURE — 99999 PR PBB SHADOW E&M-EST. PATIENT-LVL III: ICD-10-PCS | Mod: PBBFAC,,, | Performed by: INTERNAL MEDICINE

## 2019-09-03 RX ORDER — BROMPHENIRAMINE MALEATE, DEXTROMETHORPHAN HBR, PHENYLEPHRINE HCL, DIPHENHYDRAMINE HCL, PHENYLEPHRINE HCL 0.52G
0.52 KIT ORAL DAILY
COMMUNITY
End: 2019-09-17

## 2019-09-03 NOTE — PROGRESS NOTES
Subjective:   Patient ID:  Bebe Valdez is a 69 y.o. female who presents for evaulation of Atrial Fibrillation      HPI: Patient with type II diabetes presents for cardiac evaluation.  About 3-4 weeks ago patient was exercising with a  and note fast and irregular heart beating.  She was not aware of it. She saw Dr. Nelson a week ago who started patient on Metoprolol and ASA.  He also noted that TSH was very low and decreased supplemental Levothyroxin.  Also serum calcium is elevated. Last mammogram was 1 year ago.  Patient is due for another one in December    Patient states that even if HTN is listed in her diagnosis she has never been diagnosed with hypertension.    [id number] score is elevated; with no HTN it is 3 (female, over 65 with Diabetes) and with HTN it is 4.  HAS-BLED score is low (1).    She sometimes takes Allegra D, but denies smoking or drinking, snoring or RLS.    She exercises in order to loose weight and control Diabetes and was able to loose 20 lbs since April.    ECG- atrial fibrillation 87 bpm with PVC's    Past Medical History:   Diagnosis Date    Allergy     Hyperlipidemia     Hypertension associated with diabetes     Hypothyroid     Obesity     Rosacea        Past Surgical History:   Procedure Laterality Date    CHOLECYSTECTOMY      COLONOSCOPY  7/6/07    COLONOSCOPY N/A 6/6/2017    Performed by Tre [facility]varez [facility] at [facility] ([facility])    DILATION AND CURETTAGE OF UTERUS      HYSTEROSCOPY         Social History     Socioeconomic History    Marital status:      Spouse name: Not on file    Number of children: Not on file    Years of education: Not on file    Highest education level: Not on file   Occupational History    Not on file   Social Needs    Financial resource strain: Not hard at all    Food insecurity:     Worry: Never true     Inability: Never true    Transportation needs:     Medical: No     Non-medical: No   Tobacco Use    Smoking  status: Never Smoker    Smokeless tobacco: Never Used   Substance and Sexual Activity    Alcohol use: Yes     Alcohol/week: 1.2 oz     Types: 2 Glasses of wine per week     Frequency: 2-4 times a month     Drinks per session: 1 or 2     Binge frequency: Never     Comment: weekends     Drug use: Not on file    Sexual activity: Not on file   Lifestyle    Physical activity:     Days per week: 3 days     Minutes per session: 30 min    Stress: Not at all   Relationships    Social connections:     Talks on phone: Twice a week     Gets together: Once a week     Attends Sabianist service: Not on file     Active member of club or organization: Yes     Attends meetings of clubs or organizations: 1 to 4 times per year     Relationship status:    Other Topics Concern    Are you pregnant or think you may be? No    Breast-feeding No   Social History Narrative    Not on file       Review of patient's allergies indicates:   Allergen Reactions    Levaquin [levofloxacin] Hives    Tetracycline Hives       Lab Results   Component Value Date     08/19/2019    K 4.3 08/19/2019     08/19/2019    CO2 25 08/19/2019    BUN 10 08/19/2019    CREATININE 0.8 08/19/2019     (H) 08/19/2019    HGBA1C 6.6 (H) 08/19/2019    MG 2.3 04/08/2005    AST 15 04/17/2019    ALT 20 04/17/2019    ALBUMIN 4.0 04/17/2019    PROT 7.2 04/17/2019    BILITOT 1.2 (H) 04/17/2019    WBC 6.86 04/17/2019    HGB 13.6 04/17/2019    HCT 42.5 04/17/2019    MCV 90 04/17/2019     04/17/2019    INR 1.1 04/05/2005    TSH <0.010 (L) 08/19/2019    CHOL 102 (L) 04/17/2019    HDL 43 04/17/2019    LDLCALC 40.4 (L) 04/17/2019    TRIG 93 04/17/2019       Review of Systems   Constitution: Positive for weight loss.   HENT: Negative.    Eyes: Negative.    Cardiovascular: Negative.  Negative for chest pain, claudication, dyspnea on exertion, irregular heartbeat, leg swelling, near-syncope, palpitations and syncope.   Respiratory: Negative.   "Negative for cough, shortness of breath, snoring and wheezing.    Endocrine: Negative.  Negative for cold intolerance, heat intolerance, polydipsia, polyphagia and polyuria.   Skin: Negative.    Musculoskeletal: Negative.    Gastrointestinal: Negative.    Genitourinary: Negative.    Neurological: Negative.    Psychiatric/Behavioral: Negative.        Objective:   Physical Exam   Constitutional: She is oriented to person, place, and time. She appears well-developed and well-nourished.   /74   Pulse 72   Ht 5' 6" (1.676 m)   Wt 82.7 kg (182 lb 6.9 oz)   LMP 07/18/2001   BMI 29.45 kg/m²      HENT:   Head: Normocephalic.   Eyes: Pupils are equal, round, and reactive to light.   Neck: Normal range of motion. Neck supple. Carotid bruit is not present. No thyromegaly present.   Cardiovascular: Normal rate, normal heart sounds and intact distal pulses. An irregularly irregular rhythm present. Exam reveals no gallop and no friction rub.   No murmur heard.  Pulses:       Carotid pulses are 2+ on the right side, and 2+ on the left side.       Radial pulses are 2+ on the right side, and 2+ on the left side.        Femoral pulses are 2+ on the right side, and 2+ on the left side.       Popliteal pulses are 2+ on the right side, and 2+ on the left side.        Dorsalis pedis pulses are 2+ on the right side, and 2+ on the left side.        Posterior tibial pulses are 2+ on the right side, and 2+ on the left side.   Pulmonary/Chest: Effort normal and breath sounds normal. No respiratory distress. She has no wheezes. She has no rales. She exhibits no tenderness.   Abdominal: Soft. Bowel sounds are normal.   Musculoskeletal: Normal range of motion. She exhibits no edema.   Varicose veins and spider veins   Neurological: She is alert and oriented to person, place, and time.   Skin: Skin is warm and dry.   Psychiatric: She has a normal mood and affect.   Nursing note and vitals reviewed.      Current Outpatient Medications "   Medication Sig    apixaban (ELIQUIS) 5 mg Tab Take 1 tablet (5 mg total) by mouth 2 (two) times daily.    ascorbic acid (VITAMIN C) 100 MG tablet Take 100 mg by mouth once daily.    CINNAMON BARK (CINNAMON ORAL) Take by mouth once daily. Pt taking 2 pills daily.    diclofenac-misoprostol  mg-mcg (ARTHROTEC 50)  mg-mcg per tablet Take 1 tablet by mouth 2 (two) times daily as needed (pain and inflammation). Take with food.    fexofenadine-pseudoephedrine (ALLEGRA-D 12 HOUR)  mg per tablet Take 1 tablet by mouth.    fish oil-omega-3 fatty acids 300-1,000 mg capsule Take 2 g by mouth once daily.    levothyroxine (SYNTHROID) 75 MCG tablet Take 1 tablet (75 mcg total) by mouth once daily.    metFORMIN (GLUCOPHAGE-XR) 500 MG 24 hr tablet Take 2 tablets (1,000 mg total) by mouth 2 (two) times daily with meals.    metoprolol succinate (TOPROL-XL) 50 MG 24 hr tablet Take 1 tablet (50 mg total) by mouth once daily.    MV,CA,MIN/IRON/FA/GUARANA/CAFF (ONE-A-DAY WOMEN'S ACTIVE ORAL) Take by mouth.    MV-MN/OM3/DHA/EPA/FISH/LUT/JOSE G (LIPOTRIAD VISIONARY ORAL) Take by mouth.    psyllium 0.52 gram capsule Take 0.52 g by mouth once daily. Pt taking 2 pills daily.    rosuvastatin (CRESTOR) 10 MG tablet TAKE 1 TABLET(10 MG) BY MOUTH EVERY DAY    SYNTHROID 100 mcg tablet Take 1 tablet (100 mcg total) by mouth once daily.    vitamin D (VITAMIN D3) 1000 units Tab Take 1,000 Units by mouth once daily.     No current facility-administered medications for this visit.        Assessment and Plan:     Problem List Items Addressed This Visit        Cardiology Problems    Type 2 diabetes mellitus with hypercholesterolemia    Relevant Medications    apixaban (ELIQUIS) 5 mg Tab    Other Relevant Orders    Echo    Persistent atrial fibrillation    Relevant Medications    apixaban (ELIQUIS) 5 mg Tab    Other Relevant Orders    Echo       Other    Hypothyroid - Primary    Relevant Medications    apixaban (ELIQUIS) 5  mg Tab    Other Relevant Orders    Echo    Class 1 obesity due to excess calories with serious comorbidity and body mass index (BMI) of 32.0 to 32.9 in adult    Relevant Medications    apixaban (ELIQUIS) 5 mg Tab    Other Relevant Orders    Echo          Patient's Medications   New Prescriptions    No medications on file   Previous Medications    ASCORBIC ACID (VITAMIN C) 100 MG TABLET    Take 100 mg by mouth once daily.    CINNAMON BARK (CINNAMON ORAL)    Take by mouth once daily. Pt taking 2 pills daily.    DICLOFENAC-MISOPROSTOL  MG-MCG (ARTHROTEC 50)  MG-MCG PER TABLET    Take 1 tablet by mouth 2 (two) times daily as needed (pain and inflammation). Take with food.    FEXOFENADINE-PSEUDOEPHEDRINE (ALLEGRA-D 12 HOUR)  MG PER TABLET    Take 1 tablet by mouth.    FISH OIL-OMEGA-3 FATTY ACIDS 300-1,000 MG CAPSULE    Take 2 g by mouth once daily.    LEVOTHYROXINE (SYNTHROID) 75 MCG TABLET    Take 1 tablet (75 mcg total) by mouth once daily.    METFORMIN (GLUCOPHAGE-XR) 500 MG 24 HR TABLET    Take 2 tablets (1,000 mg total) by mouth 2 (two) times daily with meals.    METOPROLOL SUCCINATE (TOPROL-XL) 50 MG 24 HR TABLET    Take 1 tablet (50 mg total) by mouth once daily.    MV,CA,MIN/IRON/FA/GUARANA/CAFF (ONE-A-DAY WOMEN'S ACTIVE ORAL)    Take by mouth.    MV-MN/OM3/DHA/EPA/FISH/LUT/JOSE G (LIPOTRIAD VISIONARY ORAL)    Take by mouth.    PSYLLIUM 0.52 GRAM CAPSULE    Take 0.52 g by mouth once daily. Pt taking 2 pills daily.    ROSUVASTATIN (CRESTOR) 10 MG TABLET    TAKE 1 TABLET(10 MG) BY MOUTH EVERY DAY    SYNTHROID 100 MCG TABLET    Take 1 tablet (100 mcg total) by mouth once daily.    VITAMIN D (VITAMIN D3) 1000 UNITS TAB    Take 1,000 Units by mouth once daily.   Modified Medications    Modified Medication Previous Medication    APIXABAN (ELIQUIS) 5 MG TAB apixaban (ELIQUIS) 5 mg Tab       Take 1 tablet (5 mg total) by mouth 2 (two) times daily.    Take 5 mg by mouth 2 (two) times daily.   Discontinued  Medications    ACAI BERRY EXTRACT ORAL    Take by mouth.    PSYLLIUM (METAMUCIL) PACKET    Take 1 packet by mouth once daily.    UNABLE TO FIND    Acid-metranidazole-ivermectin compound     Start Eliquis and d/c ASA.  Continue Metoprolol.  Advised against using Allegra D.  Agree with adjusting Synthroid dose and rechecking TSH.  Review cardiac echo and advise. If Leobardo is acceptable I would consider DCCV.  Thank you for allowing me to participate in this patient's care. Assuming no more recommendations after 3 months return visit patient should follow with Dr. Nelson afterwards.  Follow up in about 3 months (around 12/3/2019).

## 2019-09-03 NOTE — LETTER
September 3, 2019      Anil Nelson MD  [address] [facility] [place] [address]           [place] [facility]  [address]  [place] [address]  Phone: [phone]          Patient: Bebe Valdez   MR Number: [id number]   YOB: 1950   Date of Visit: 9/3/2019       Dear Dr. Anil Nelson:    Thank you for referring Bebe Valdez to me for evaluation. Attached you will find relevant portions of my assessment and plan of care.    If you have questions, please do not hesitate to call me. I look forward to following Bebe Valdez along with you.    Sincerely,    Tami De Leon MD    Enclosure  CC:  No Recipients    If you would like to receive this communication electronically, please contact [email or web address] or [phone] to request more information on [facility] Link access.    For providers and/or their staff who would like to refer a patient to [facility], please contact us through our one-stop-shop provider referral line, [facility] , at [phone].    If you feel you have received this communication in error or would no longer like to receive these types of communications, please e-mail [email or web address]

## 2019-09-06 ENCOUNTER — CLINICAL SUPPORT (OUTPATIENT)
Dept: CARDIOLOGY | Facility: CLINIC | Age: 69
End: 2019-09-06
Attending: INTERNAL MEDICINE
Payer: MEDICARE

## 2019-09-06 VITALS
HEART RATE: 67 BPM | SYSTOLIC BLOOD PRESSURE: 122 MMHG | DIASTOLIC BLOOD PRESSURE: 76 MMHG | HEIGHT: 66 IN | BODY MASS INDEX: 29.25 KG/M2 | WEIGHT: 182 LBS

## 2019-09-06 DIAGNOSIS — I48.19 PERSISTENT ATRIAL FIBRILLATION: ICD-10-CM

## 2019-09-06 DIAGNOSIS — E03.2 HYPOTHYROIDISM DUE TO NON-MEDICATION EXOGENOUS SUBSTANCES: ICD-10-CM

## 2019-09-06 DIAGNOSIS — E11.69 TYPE 2 DIABETES MELLITUS WITH HYPERCHOLESTEROLEMIA: ICD-10-CM

## 2019-09-06 DIAGNOSIS — E78.00 TYPE 2 DIABETES MELLITUS WITH HYPERCHOLESTEROLEMIA: ICD-10-CM

## 2019-09-06 DIAGNOSIS — E66.09 CLASS 1 OBESITY DUE TO EXCESS CALORIES WITH SERIOUS COMORBIDITY AND BODY MASS INDEX (BMI) OF 32.0 TO 32.9 IN ADULT: ICD-10-CM

## 2019-09-06 LAB
ASCENDING AORTA: 3.31 CM
AV INDEX (PROSTH): 0.73
AV MEAN GRADIENT: 4 MMHG
AV PEAK GRADIENT: 6 MMHG
AV VALVE AREA: 2.09 CM2
AV VELOCITY RATIO: 0.72
BSA FOR ECHO PROCEDURE: 1.96 M2
CV ECHO LV RWT: 0.24 CM
DOP CALC AO PEAK VEL: 1.26 M/S
DOP CALC AO VTI: 26.34 CM
DOP CALC LVOT AREA: 2.9 CM2
DOP CALC LVOT DIAMETER: 1.91 CM
DOP CALC LVOT PEAK VEL: 0.91 M/S
DOP CALC LVOT STROKE VOLUME: 55.01 CM3
DOP CALCLVOT PEAK VEL VTI: 19.21 CM
E WAVE DECELERATION TIME: 203.43 MSEC
E/A RATIO: 3.17
E/E' RATIO: 10.22 M/S
ECHO LV POSTERIOR WALL: 0.66 CM (ref 0.6–1.1)
FRACTIONAL SHORTENING: 28 % (ref 28–44)
INTERVENTRICULAR SEPTUM: 0.69 CM (ref 0.6–1.1)
IVRT: 0.09 MSEC
LA MAJOR: 6.36 CM
LA MINOR: 6.4 CM
LA WIDTH: 3.72 CM
LEFT ATRIUM SIZE: 4.18 CM
LEFT ATRIUM VOLUME INDEX: 43.9 ML/M2
LEFT ATRIUM VOLUME: 84.32 CM3
LEFT INTERNAL DIMENSION IN SYSTOLE: 3.91 CM (ref 2.1–4)
LEFT VENTRICLE DIASTOLIC VOLUME INDEX: 75.15 ML/M2
LEFT VENTRICLE DIASTOLIC VOLUME: 144.41 ML
LEFT VENTRICLE MASS INDEX: 66 G/M2
LEFT VENTRICLE SYSTOLIC VOLUME INDEX: 34.6 ML/M2
LEFT VENTRICLE SYSTOLIC VOLUME: 66.49 ML
LEFT VENTRICULAR INTERNAL DIMENSION IN DIASTOLE: 5.45 CM (ref 3.5–6)
LEFT VENTRICULAR MASS: 127.52 G
LV LATERAL E/E' RATIO: 9.2 M/S
LV SEPTAL E/E' RATIO: 11.5 M/S
MV PEAK A VEL: 0.29 M/S
MV PEAK E VEL: 0.92 M/S
PISA TR MAX VEL: 2.87 M/S
PULM VEIN S/D RATIO: 0.43
PV PEAK D VEL: 0.8 M/S
PV PEAK S VEL: 0.34 M/S
RA MAJOR: 5.57 CM
RA PRESSURE: 3 MMHG
RA WIDTH: 3.78 CM
RETIRED EF AND QEF - SEE NOTES: 44 %
RIGHT VENTRICULAR END-DIASTOLIC DIMENSION: 3.63 CM
SINUS: 3.17 CM
STJ: 2.96 CM
TDI LATERAL: 0.1 M/S
TDI SEPTAL: 0.08 M/S
TDI: 0.09 M/S
TR MAX PG: 33 MMHG
TRICUSPID ANNULAR PLANE SYSTOLIC EXCURSION: 1.53 CM
TV REST PULMONARY ARTERY PRESSURE: 36 MMHG

## 2019-09-06 PROCEDURE — 99999 PR PBB SHADOW E&M-EST. PATIENT-LVL II: CPT | Mod: PBBFAC,,,

## 2019-09-06 PROCEDURE — 93306 ECHO (CUPID ONLY): ICD-10-PCS | Mod: 26,S$PBB,, | Performed by: INTERNAL MEDICINE

## 2019-09-06 PROCEDURE — 99999 PR PBB SHADOW E&M-EST. PATIENT-LVL II: ICD-10-PCS | Mod: PBBFAC,,,

## 2019-09-06 PROCEDURE — 93306 TTE W/DOPPLER COMPLETE: CPT | Mod: PBBFAC,PO | Performed by: INTERNAL MEDICINE

## 2019-09-06 PROCEDURE — 99212 OFFICE O/P EST SF 10 MIN: CPT | Mod: PBBFAC,PO,25

## 2019-09-09 ENCOUNTER — TELEPHONE (OUTPATIENT)
Dept: CARDIOLOGY | Facility: CLINIC | Age: 69
End: 2019-09-09

## 2019-09-09 DIAGNOSIS — I48.0 PAROXYSMAL ATRIAL FIBRILLATION: Primary | ICD-10-CM

## 2019-09-09 NOTE — TELEPHONE ENCOUNTER
----- Message from Tami De Leon MD sent at 9/9/2019 10:12 AM CDT -----  Please inform the patient that her heart function is slightly lower than normal ( it is 45% and normal start at 55%).  It may be a result of irregular heart beating. I would recommend electrical cardioversion.  If patient is willing to address it, please schedule an appointment with Dr. Sanchez to discuss it.

## 2019-09-09 NOTE — TELEPHONE ENCOUNTER
Pt notified, verbalized understanding. Pt stated that she will go and see Dr. Sanchez. Pt stated that she was at the [employer] game and started to have cramps in her feet and feel dizzy. Pt stated that she was evaluated by EMS and  her bp and it was 140/80 and 80/50. Pt stated that she was hydrated and had just ate a meal. Pt denies drinking alcohol.  Pt stated that she was given an IV and she started to feel better. Pt wanted you to be aware. Please advise.

## 2019-09-10 ENCOUNTER — PATIENT MESSAGE (OUTPATIENT)
Dept: PRIMARY CARE CLINIC | Facility: CLINIC | Age: 69
End: 2019-09-10

## 2019-09-11 ENCOUNTER — CLINICAL SUPPORT (OUTPATIENT)
Dept: REHABILITATION | Facility: HOSPITAL | Age: 69
End: 2019-09-11
Attending: FAMILY MEDICINE
Payer: MEDICARE

## 2019-09-11 DIAGNOSIS — M25.619 DECREASED RANGE OF MOTION OF SHOULDER, UNSPECIFIED LATERALITY: ICD-10-CM

## 2019-09-11 DIAGNOSIS — I48.0 PAROXYSMAL ATRIAL FIBRILLATION: Primary | ICD-10-CM

## 2019-09-11 PROCEDURE — 97110 THERAPEUTIC EXERCISES: CPT | Mod: PO

## 2019-09-11 NOTE — PROGRESS NOTES
Physical Therapy Daily Treatment Note/Updated POC     Name: Bebe Valdez  Clinic Number: [id number]    Therapy Diagnosis:   Encounter Diagnosis   Name Primary?   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   Time Out: 3:11 PM  Total Billable Time: 30 minutes     Precautions: Standard    Subjective     Pt reports: her shoulder is feeling a lot better. Patient reports she was working with her  and felt fine on the treadmill and was able to hold conversation but he noticed her HR was high. She had an EKG done and recently found out that she has A-fib. Patient reports her cardiologist told her she could go back to her  however has not yet but has done yoga. Patient also reports she went to the [employer] game and that her feet started cramping and she got dizzy. Reports her BP dropped and she had to get IV fluids. She thinks she was dehydrated. Reports she did inform her doctors.   She was compliant with home exercise program.  Response to previous treatment: no change  Functional change: no change    Pain: 0/10   Location: B shoulders , L>R    Objective     Prior to Treatment:   BP: 123/86  HR: 90     After UBE and rest:  HR: 76-84     Post treatment:  HR: 76         Bebe received therapeutic exercises to develop strength, endurance, ROM, flexibility and posture for 45 minutes including:  Warm-up:  · UBE 2'/2' fwd and backward  · Shoulder scaption pulleys 3'  Supine:  · Wand flexion 20 x 5s 2#  · Wand ER 20 x 5s B - NP  · Serratus  punches 2 x 15 2#  · PNF D2 Flex 2 x10 B  B shoulder PROM  Sidelying:  · SL ER 2 x 10 B 1#  Prone:  · Prone rows 2 x 10 1#- NP  · Prone extension 2 x 10 1#-NP  · Prone H-abd 2 x 10 -NP  Seated:  · Posterior cap stretch 2x30s- right side only, left had pain  Anteriorly - NP  Standing:  · Wall washes fwd 15 x 5s B   · Brugger's YTB 2 x 10 - mild R anterior shoulder pain   · B shoulder extension 2 x 10 OTB  · Rows OTB 2 x 15  · Wand IR 20 x5s 1#  · Shoulder ER/ IR iso OTB 10 x 5s B ea UE - NP      Bebe received the following manual therapy techniques: Joint mobilizations were applied to the: B shoulders for 0 minutes, including:    Post/inf mobs Grade II   Subscap release and stretch with L shoulder flex      Home Exercises Provided and Patient Education Provided     Education provided:   - proper posture in sitting and standing  - HEP compliance    Written Home Exercises Provided: updated HEP privided  Exercises were reviewed and Bebe was able to demonstrate them prior to the end of the session.  Bebe demonstrated good  understanding of the education provided.     See EMR under Patient Instructions for exercises provided 7/1/19.     Assessment     Prior to performing the UBE patient reported she felt like her HR was high and thinks it was d/t late arrival/rushing around, therapist obtained BP and HR and was recorded above. Patient HR was obtained again after UBE/rest and again recorded above. Patient tolerated exercises well today overall. Patient only reports mild anterior R shoulder pain with bruggers exercise. Patient reports post treatment that she felt better than when she arrived.      Bebe is progressing well towards her goals.   Pt prognosis is Good.     Pt will continue to benefit from skilled outpatient physical therapy to address the deficits listed in the problem list box on initial evaluation, provide pt/family education and to maximize pt's level of independence in the home and community  environment.     Pt's spiritual, cultural and educational needs considered and pt agreeable to plan of care and goals.     Anticipated barriers to physical therapy: none    Goals:     Short Term Goals:  4 weeks     -  Pt will increase B shoulder flexion ROM to at least 160 degrees in order to show improved functional mobility. Progressing 9/11/2019   -  Pt will increase B shoulder abduction ROM to at least 150 degrees in order to show improved functional mobility. Progressing 7/22/2019   - Pt will be independent and consistent with issued HEP in order to show carryover between therapy sessions. Met 7/22/19  - Pt. to demonstrate proper cervical and scapula retraction requiring min. to no verbal cues from PT Met 7/22/19        Long Term Goals: 8 weeks  - Pt will report decreased shoulder pain to 1/10 at night in order to increase tolerance for sleeping at night. Progressing 7/22/2019   - Pt will be able to reach behind her to get something from the back seat without pain in order to improve functional mobility. Progressing 7/22/2019   - Pt will increase shoulder periscapular strength by 1 ms grade in order to increase tolerance to functional activities and ADLs. Progressing 7/22/2019   - Pt will improve FOTO shoulder score to CJ at least 20% < 40% impaired, limited or restricted in order to demonstrate an increase in functional mobility and improved ADL performance. Progressing 7/22/2019   - Pt will be independent with updated HEP to maintain gains following discharge with therapy. Progressing 7/22/2019     Plan     Outpatient Physical Therapy 2 time(s) every week(s) for 8 weeks to include the following interventions: patient education, HEP, therapeutic exercises, neuromuscular re-education, therapeutic activity, manual therapy and dry needling, and modalities PRN to achieve established goals.     Tete Dickson, PTA

## 2019-09-16 ENCOUNTER — CLINICAL SUPPORT (OUTPATIENT)
Dept: REHABILITATION | Facility: HOSPITAL | Age: 69
End: 2019-09-16
Attending: FAMILY MEDICINE
Payer: MEDICARE

## 2019-09-16 DIAGNOSIS — M25.619 DECREASED RANGE OF MOTION OF SHOULDER, UNSPECIFIED LATERALITY: ICD-10-CM

## 2019-09-16 PROCEDURE — 97110 THERAPEUTIC EXERCISES: CPT | Mod: PO

## 2019-09-16 PROCEDURE — 97140 MANUAL THERAPY 1/> REGIONS: CPT | Mod: PO

## 2019-09-16 NOTE — PROGRESS NOTES
Physical Therapy Daily Treatment Note/Updated POC     Name: Bebe Valdez  Clinic Number: [id number]    Therapy Diagnosis:   Encounter Diagnosis   Name Primary?    Decreased range of motion of shoulder, unspecified laterality      Physician: Anil Nelson MD    Visit Date: 9/16/2019    Physician Orders: PT Eval and Treat   Medical Diagnosis from Referral: E11.618,M75.00 (ICD-10-CM) - Adhesive capsulitis of shoulder associated with type 2 diabetes mellitus  Evaluation Date: 5/15/2019  Authorization Period Expiration: 12/31/19  Plan of Care Expiration:  9/20/19  Visit # / Visits authorized: 15/20      Time In: 205 PM  Time Out: 300 PM  Total Billable Time: 25 minutes     Precautions: Standard    Subjective     Pt reports: her shoulder is feeling a lot better. Patient reports she was working with her  and felt fine on the treadmill and was able to hold conversation but he noticed her HR was high. She had an EKG done and recently found out that she has A-fib. Patient reports her cardiologist told her she could go back to her  however has not yet but has done yoga. Patient also reports she went to the [employer] game and that her feet started cramping and she got dizzy. Reports her BP dropped and she had to get IV fluids. She thinks she was dehydrated. Reports she did inform her doctors.   She was compliant with home exercise program.  Response to previous treatment: no change  Functional change: no change    Pain: 0/10   Location: B shoulders , L>R    Objective       Bebe received therapeutic exercises to develop strength, endurance, ROM, flexibility and posture for 45 minutes including:  Warm-up:  · UBE 2'/2' fwd and backward  · Shoulder scaption pulleys 3'  Supine:  · Wand flexion 20 x 5s 2#  · Serratus punches 2 x 15 2#  · PNF D2 Flex 2 x10 B  · B shoulder PROM  Sidelying:  · SL ER 2 x 10 B 1#  Prone:  · Prone rows 2 x 10 1#- NP  · Prone extension 2 x 10 1#-NP  · Prone  H-abd 2 x 10 -NP  Seated:  · Posterior cap stretch 2x30s- right side only, left had pain  Anteriorly - NP  Standing:  · Wall washes fwd 15 x 5s B   · Stair slides 15 x5s abd  · B shoulder extension 2 x 10 OTB  · Rows OTB 2 x 15  · Wand IR 20 x5s 1#  · Ball on walll cw/ccw x 10 ea way B  · Wall push ups 2 x 10  · scaption no weight mirror x 10 B  · Shoulder ER/ IR iso OTB 10 x 5s B ea UE - NP      Bebe received the following manual therapy techniques: Joint mobilizations were applied to the: B shoulders for 0 minutes, including:    Post/inf mobs Grade II   Subscap release and stretch with L shoulder flex      Home Exercises Provided and Patient Education Provided     Education provided:   - proper posture in sitting and standing  - HEP compliance    Written Home Exercises Provided: updated HEP privided  Exercises were reviewed and Bebe was able to demonstrate them prior to the end of the session.  Bebe demonstrated good  understanding of the education provided.     See EMR under Patient Instructions for exercises provided 7/1/19.     Assessment     Good niranjan to tx session today. Pt able to advance slightly with activities as bolded above without adverse effects. Slight cueing for scapular control required with arm elevation.  Pt overall demonstrating significant reductions in pain and improvements in B shoulder ROM. Will DC in two visits with an updated HEP.     Bebe is progressing well towards her goals.   Pt prognosis is Good.     Pt will continue to benefit from skilled outpatient physical therapy to address the deficits listed in the problem list box on initial evaluation, provide pt/family education and to maximize pt's level of independence in the home and community environment.     Pt's spiritual, cultural and educational needs considered and pt agreeable to plan of care and goals.     Anticipated barriers to physical therapy: none    Goals:     Short Term Goals:  4 weeks     -  Pt will increase B  shoulder flexion ROM to at least 160 degrees in order to show improved functional mobility. Progressing 9/16/2019   -  Pt will increase B shoulder abduction ROM to at least 150 degrees in order to show improved functional mobility. Progressing 7/22/2019   - Pt will be independent and consistent with issued HEP in order to show carryover between therapy sessions. Met 7/22/19  - Pt. to demonstrate proper cervical and scapula retraction requiring min. to no verbal cues from PT Met 7/22/19        Long Term Goals: 8 weeks  - Pt will report decreased shoulder pain to 1/10 at night in order to increase tolerance for sleeping at night. Progressing 7/22/2019   - Pt will be able to reach behind her to get something from the back seat without pain in order to improve functional mobility. Progressing 7/22/2019   - Pt will increase shoulder periscapular strength by 1 ms grade in order to increase tolerance to functional activities and ADLs. Progressing 7/22/2019   - Pt will improve FOTO shoulder score to CJ at least 20% < 40% impaired, limited or restricted in order to demonstrate an increase in functional mobility and improved ADL performance. Progressing 7/22/2019   - Pt will be independent with updated HEP to maintain gains following discharge with therapy. Progressing 7/22/2019     Plan     Outpatient Physical Therapy 2 time(s) every week(s) for 8 weeks to include the following interventions: patient education, HEP, therapeutic exercises, neuromuscular re-education, therapeutic activity, manual therapy and dry needling, and modalities PRN to achieve established goals.     Ashley Holstein, PT

## 2019-09-17 ENCOUNTER — OFFICE VISIT (OUTPATIENT)
Dept: ELECTROPHYSIOLOGY | Facility: CLINIC | Age: 69
End: 2019-09-17
Payer: MEDICARE

## 2019-09-17 ENCOUNTER — HOSPITAL ENCOUNTER (OUTPATIENT)
Dept: CARDIOLOGY | Facility: CLINIC | Age: 69
Discharge: HOME | End: 2019-09-17
Payer: MEDICARE

## 2019-09-17 VITALS
SYSTOLIC BLOOD PRESSURE: 124 MMHG | WEIGHT: 186 LBS | DIASTOLIC BLOOD PRESSURE: 71 MMHG | HEART RATE: 74 BPM | HEIGHT: 66 IN | BODY MASS INDEX: 29.89 KG/M2

## 2019-09-17 DIAGNOSIS — E11.69 TYPE 2 DIABETES MELLITUS WITH HYPERCHOLESTEROLEMIA: ICD-10-CM

## 2019-09-17 DIAGNOSIS — E78.00 TYPE 2 DIABETES MELLITUS WITH HYPERCHOLESTEROLEMIA: ICD-10-CM

## 2019-09-17 DIAGNOSIS — I48.19 PERSISTENT ATRIAL FIBRILLATION: Primary | ICD-10-CM

## 2019-09-17 DIAGNOSIS — I50.22 CHRONIC SYSTOLIC HEART FAILURE: ICD-10-CM

## 2019-09-17 DIAGNOSIS — I48.0 PAROXYSMAL ATRIAL FIBRILLATION: ICD-10-CM

## 2019-09-17 DIAGNOSIS — E66.09 CLASS 1 OBESITY DUE TO EXCESS CALORIES WITH SERIOUS COMORBIDITY AND BODY MASS INDEX (BMI) OF 32.0 TO 32.9 IN ADULT: ICD-10-CM

## 2019-09-17 PROCEDURE — 93005 ELECTROCARDIOGRAM TRACING: CPT | Mod: PBBFAC | Performed by: INTERNAL MEDICINE

## 2019-09-17 PROCEDURE — 93010 ELECTROCARDIOGRAM REPORT: CPT | Mod: S$PBB,,, | Performed by: INTERNAL MEDICINE

## 2019-09-17 PROCEDURE — 99204 PR OFFICE/OUTPT VISIT, NEW, LEVL IV, 45-59 MIN: ICD-10-PCS | Mod: S$PBB,,, | Performed by: INTERNAL MEDICINE

## 2019-09-17 PROCEDURE — 99204 OFFICE O/P NEW MOD 45 MIN: CPT | Mod: S$PBB,,, | Performed by: INTERNAL MEDICINE

## 2019-09-17 PROCEDURE — 99214 OFFICE O/P EST MOD 30 MIN: CPT | Mod: PBBFAC,25 | Performed by: INTERNAL MEDICINE

## 2019-09-17 PROCEDURE — 93010 RHYTHM STRIP: ICD-10-PCS | Mod: S$PBB,,, | Performed by: INTERNAL MEDICINE

## 2019-09-17 PROCEDURE — 99999 PR PBB SHADOW E&M-EST. PATIENT-LVL IV: ICD-10-PCS | Mod: PBBFAC,,, | Performed by: INTERNAL MEDICINE

## 2019-09-17 PROCEDURE — 99999 PR PBB SHADOW E&M-EST. PATIENT-LVL IV: CPT | Mod: PBBFAC,,, | Performed by: INTERNAL MEDICINE

## 2019-09-17 NOTE — PATIENT INSTRUCTIONS
Understanding Atrial Fibrillation    An arrhythmia is any problem with the speed or pattern of the heartbeat. Atrial fibrillation (AFib) is a common type of arrhythmia. It causes fast, chaotic electrical signals in the atria. This leads to poor functioning of the heart. It also affects how much blood your heart can pump out to the body.  Afib may occur once in a while and go away on its own. Or it may continue for longer periods and need treatment.  AFib can lead to serious problems, such as stroke. Your healthcare provider will need to monitor and manage it.  What happens during atrial fibrillation?   The heart has an electrical system that sends signals to control the heartbeat. As signals move through the heart, they tell the hearts upper chambers (atria) and lower chambers (ventricles) when to squeeze (contract) and relax. This lets blood move through the heart and out to the body and lungs.  With AFib, the atria receive abnormal signals. This causes them to contract in a fast and irregular way, and out of sync with the ventricles. When this happens, the atria also have a harder time moving blood into the ventricles. Blood may then pool in the atria, which increases the risk for blood clots and stroke. The ventricles also may contract too quickly and irregularly. As a result, they may not pump blood to the body and lungs as well as they should. This can weaken the heart muscle over time and cause heart failure.  What causes atrial fibrillation?  AFib is more common in older adults. It has many possible causes including:  · Coronary artery disease  · Heart valve disease  · Heart attack  · Heart surgery  · High blood pressure  · Thyroid disease  · Diabetes  · Lung disease  · Sleep apnea  · Heavy alcohol use  In some cases of AFib, doctors do not know the cause.  What are the symptoms of atrial fibrillation?  AFib may or may not cause symptoms. If symptoms do occur, they may include:  · A fast, pounding,  irregular heartbeat  · Shortness of breath  · Tiredness  · Dizziness or fainting  · Chest pain  How is atrial fibrillation treated?  Treatments for AFib can include any of the options below.  · Medicines. You may be prescribed:  ¨ Heart rate medicines to help slow down the heartbeat  ¨ Heart rhythm medicines to help the heart beat more regularly  ¨ Anti-clotting medicines to help reduce the risk for blood clots and stroke  · Electrical cardioversion. Your healthcare provider uses special pads or paddles to send one or more brief electrical shocks to the heart. This can help reset the heartbeat to normal.  · Ablation. Long, thin tubes called catheters are threaded through a blood vessel to the heart. There, the catheters send out hot or cold energy to the areas causing the abnormal signals. This energy destroys the problem tissue or cells. This improves the chances that your heart will stay in normal rhythm without using medicines. If your heart rate and rhythm cant be controlled, you may need ablation and a pacemaker. These will help control the heart rate and regularity of the heartbeat.  · Surgery. During surgery, your healthcare provider may use different methods to create scar tissue in the areas of the heart causing the abnormal signals. The scar tissue disrupts the abnormal signals and may stop AFib from occurring.  What are the complications of atrial fibrillation?  These can include:  · Blood clots  · Stroke  · Heart failure. This problem occurs when the heart muscle weakens so much that it can no longer pump blood well.  When should I call my healthcare provider?  Call your healthcare provider right away if you have any of these:  · Symptoms that dont get better with treatment, or get worse  · New symptoms   Date Last Reviewed: 5/1/2016  © 2099-7635 [employer]. [address] [place] [address], [place] [address]. All rights reserved. This information is not intended as a substitute for professional  medical care. Always follow your healthcare professional's instructions.        Having Electrical Cardioversion  Cardioversion is a procedure that is done to return your heartbeat to a normal rhythm. Its done when the heart is beating very fast or irregular. This is called an arrhythmia. During the procedure, a low-energy shock is sent to the heart to reset it to a normal rhythm. This is done with a small machine that sends electric shocks to electrode pads on your chest. Cardioversion is not the same as defibrillation. Both use shocks to reset the heart. But defibrillation uses a stronger shock to change a severe arrhythmia that can cause sudden death.  Cardioversion is most often a scheduled procedure. But in some cases, it may be done as an emergency treatment. This is done if symptoms are severe. You will be given medicine to let you sleep through the procedure.  What to tell your healthcare provider  Tell your healthcare provider about all the medicines you take. This includes over-the-counter medicines such as ibuprofen. It also includes vitamins, herbs, and other supplements.  Tests before your procedure  You may need blood tests before the procedure. This is to make sure the procedure is safe for you. And you may have a transesophageal echocardiography test before the procedure. This test is a special kind of ultrasound. A thin, flexible tube is put down your throat and into your esophagus. There, the tube is close to your heart. It lets your healthcare provider see if you have any blood clots. Your cardioversion will be delayed if a clot is found.  Getting ready for your procedure  Talk with your healthcare provider about how to get ready for your procedure. Follow his or her instructions about what medicines to take before the procedure. This includes medicines that prevent arrhythmias. Dont stop taking any medicine unless your healthcare provider tells you to.  Youll be at a higher risk for blood  clots, so your healthcare provider may want you to take blood-thinner medicine. You may take this several weeks before and after the procedure.  Make sure to:  · Ask a family member or friend to take you home from the hospital. You cannot drive yourself.  · Dont eat or drink after midnight the night before your surgery, unless your healthcare provider says its OK.  · Remove any jewelry that goes around your neck or is on your chest.  · Follow all other instructions from your healthcare provider.  You may be asked to sign a consent form that gives your permission to do the procedure. Read the form carefully. Ask questions if something is not clear.  On the day of your procedure  Ask your healthcare provider about the details of your procedure. The procedure only takes a few minutes. It may be different when done as an emergency treatment. In general, you can expect the following:  · The healthcare provider will stick soft electrode pads to your chest. He or she may also put them on your back. These areas of skin may be shaved. This is to help the electrode pads stick.  · The healthcare provider will attach wires to the electrodes. The wires connect to a cardioversion machine.  · You will get medicine through a vein in your arm. This is to make you fall asleep.  · The cardioversion machine sends a low-energy shock to your heart. This should convert your heart back to a normal rhythm. You wont feel any pain.  · Your healthcare team will closely watch your heart rhythm. They will watch for any signs of problems.  · When the procedure is done, you will wake up.  After your procedure  You will wake up 5 to 10 minutes after the procedure. Youll be closely watched for signs of problems for several hours. You will likely go home the same day. You may feel sleep for several hours because of the sedation. You will need to have a family member or friend drive you home. Your chest may be red or sore for a few days.  You may  need to take blood-thinner medicine, such as warfarin, for several weeks after the procedure. Take this exactly as directed. You may also need to take a medicine to prevent arrhythmias. Take all your medicines exactly as directed.  Follow-up care  Make sure to keep all of your follow-up appointments.  When to call your healthcare provider  Call your healthcare provider right away if you have any of these:  · Trouble breathing or chest pain (call 911)  · Skipped heartbeats  · Rapid heartbeat  · Dizziness  · Fainting   Date Last Reviewed: 8/10/2015  © 5796-7716 [employer]. [address] [place] [address]. All rights reserved. This information is not intended as a substitute for professional medical care. Always follow your healthcare professional's instructions.        Living with Atrial Fibrillation: Preventing Stroke  Atrial fibrillation (AFib) is the most common abnormal heart rhythm in the world. The heart has 2 upper chambers called atria and 2 lower chambers called ventricles. AFib causes the atria to quiver (fibrillate) instead of pumping normally. Blood can then pool in the heart instead of moving in and out as usual. This can cause blood clots to form inside the heart. A clot can break free, travel to the brain and cause a stroke. A stroke can cause brain damage very quickly.    Taking medicine to prevent stroke  Your healthcare provider may prescribe a medicine to help prevent blood clots. This type of medicine is called a blood thinner. Blood thinners include:  · Antiplatelet medicines, such as aspirin or clopidrogrel  · Anticoagulation medicines, such as warfarin, dabigatran, rivaroxaban, apixaban, or edoxaban  Risks of blood thinner medicine  Blood thinners increase your risk of bleeding. If you take certain blood thinners, you may need to take extra steps to stay healthy. You may need regular blood tests to check the levels of medicine in your blood. Youll need to be careful not  to injure yourself. And you may need to watch your diet for foods that affect blood clotting.  If your blood is too thin, you may have symptoms of excess bleeding, such as:  · Unusual bruising  · Bleeding from the gums  · Blood in the urine or stool  · Black stools  · Vomiting blood  · Nosebleeds  · An unusual or severe headache  Taking the right dose  Youll need to make sure to take the medicine exactly as directed by your healthcare provider. Take it at the same time each day. If you miss a dose, call your provider right away to find out how much to take. Never take a double dose. If you take too much, it can cause too much bleeding. It can cause bleeding you can see, on the outside of your body. And it can cause bleeding on the inside of your body that you may not be aware of.  Getting your blood tested  Depending on which blood thinner you take, you may need to have your blood tested on a regular schedule. This is to make sure you dont have too much or too little of the medicine in your blood. Too much can cause excess bleeding. Too little may not prevent blood clots from harming you.  You may need to visit a hospital or clinic every week to have your blood tested. Or a nurse may come to your home and test your blood. In some cases, you may be able to test your blood at home with a small machine. Talk with your healthcare provider to find out whats best for you. After the blood test, your healthcare provider may tell you to change your dose of medicine.  Watching your diet  Some foods can affect how certain blood thinners work. In particular, warfarin levels are sensitive to your diet. For example, many foods contain vitamin K. Vitamin K is a substance that helps your blood clot. You dont need to avoid foods that have vitamin K. But you do need to keep the amount of them you eat steady as possible from day to day. Examples of foods high in vitamin K are asparagus, avocado, broccoli, cabbage, kale, spinach,  and some other leafy green vegetables. Oils, such as soybean, canola, and olive, are also high in vitamin K.  Other foods and drinks can affect the way blood thinners work in your body. These include:  · Grapefruit and grapefruit juice  · Cranberries and cranberry juice  · Fish oil supplements  · Garlic, naomi, licorice, and turmeric  · Herbs used in herbal teas or supplements  · Alcohol  If any of these items are part of your regular diet, continue using them as you normally would. Dont make any major changes in your diet without first talking with your healthcare provider.  You may also need to limit fats in your diet to 2 to 4 tablespoons a day.  Preventing injury  Because blood thinners make you bleed more, youll need to protect yourself from breaks in the skin. Follow these guidelines:  · Don't go barefoot - always wear shoes.  · Don't trim corns or calluses yourself.  · Consider using an electric razor instead of a manual one.  · Use a soft-bristled toothbrush and waxed dental floss.  Youll also need to avoid any activities that may cause injury. If you fall or are injured, you could be bleeding inside your body and not know it. Make sure to get medical attention right away if you fall, hit your head, or have any other kind of injury.  Other safety tips  While on your medicine, be sure to:  · Tell all of your healthcare providers that you take a blood thinner for AFib. This includes all of your doctors, dental care providers, and your pharmacist.  · Ask your doctor before taking any new medicines, vitamins, or other supplements. Any of these can cause problems when you take a blood thinner.  · Wear a medical alert bracelet or carry an ID card in your wallet if you will be taking blood thinners for months or longer.  · Keep all appointments for your blood tests.  Procedures to prevent stroke  Most blood clots that form in the heart occur in a pouch of the left atrium called the appendage. This pouch can  often be large and have multiple lobes which can permit blood pooling and clot formation. Left atrial appendage closure is a nonsurgical procedure in which a self-expanding plug is placed at the opening of the left atrial appendage to close off the appendage from the rest of the heart. Once the plug has fully sealed, no blood can enter or leave the appendage. This reduces blood clot formation and stroke risk. Ask your doctor if you qualify for this type of procedure.  Other ways to help prevent stroke  Your healthcare provider might give you other advice about how to lower your risk for stroke, such as:  · Lowering your cholesterol with lifestyle changes or medicine  · Not smoking  · Getting physical activity  · Losing weight if needed  · Eating a heart-healthy diet  · Not drinking too much alcohol     When to call your healthcare provider  Call your healthcare provider right away if you have any of these:  · Unusual or severe headache  · Confusion, weakness, or numbness  · Loss of vision  · Difficulty with speech  · Bleeding that wont stop  · Coughing or vomiting blood  · Bright red blood in the stool  · Fall or injury to the head  · Symptoms of atrial fibrillation that are new or getting worse   Date Last Reviewed: 5/1/2016  © 8380-9549 [employer]. [address] [place] [address], [place] [address]. All rights reserved. This information is not intended as a substitute for professional medical care. Always follow your healthcare professional's instructions.

## 2019-09-17 NOTE — PROGRESS NOTES
Subjective:    Patient ID:  Bebe Valdez is a 69 y.o. female who presents for evaluation of Atrial Fibrillation    Referring Cardiologist: Tami De Leon MD  Primary Care Physician: Anil Nelson MD    HPI  I had the pleasure of seeing Mrs. Valdez in our electrophysiology clinic in consultation for her atrial arrhythmia. As you are aware she is a pleasant 69 year-old woman with diabetes mellitus, hypothyroidism on synthroid and recent onset of persistent atrial fibrillation. She was exercising with a  in July 2019 and pulse was noted to be elevated and irregular. She saw Dr. Nelson on 8/26/2019 and was diagnosed with atrial fibrillation. She was started on metoprolol and aspirin. TSH was checked and was undetectable. Her synthroid dosage was reduced. She was then assessed by Dr. De Leon who started her on eliquis and referred her here for further evaluation. A transthoracic echocardiogram was performed noting a LVEF of 45% with moderate left atrial enlargement and mildly depressed RV function. She reports no obvious symptoms related to AF.    I reviewed available ECGs in Epic which note sinus rhythm with normal intervals until an ECG dated 8/26/2019 which shows atrial fibrillation with an average ventricular rate of 87 bpm and a single PVC.    Review of Systems   Constitution: Negative for fever and malaise/fatigue.   HENT: Negative for congestion and sore throat.    Eyes: Negative for blurred vision and visual disturbance.   Cardiovascular: Negative for chest pain, dyspnea on exertion, irregular heartbeat, leg swelling, near-syncope, orthopnea, palpitations and paroxysmal nocturnal dyspnea.   Respiratory: Negative for cough and shortness of breath.    Hematologic/Lymphatic: Negative for bleeding problem. Does not bruise/bleed easily.   Skin: Negative.    Musculoskeletal: Negative.    Gastrointestinal: Negative for bloating, abdominal pain, hematochezia and melena.   Neurological: Negative for dizziness  and focal weakness.        Objective:    Physical Exam   Constitutional: She is oriented to person, place, and time. She appears well-developed and well-nourished. No distress.   HENT:   Head: Normocephalic and atraumatic.   Eyes: Conjunctivae are normal. Right eye exhibits no discharge.   Neck: Neck supple. No JVD present.   Cardiovascular: Normal rate. An irregularly irregular rhythm present.   Pulmonary/Chest: Effort normal and breath sounds normal. No respiratory distress. She has no wheezes. She has no rales.   Abdominal: Soft. Bowel sounds are normal. She exhibits no distension. There is no tenderness. There is no rebound.   Musculoskeletal: She exhibits no edema.   Neurological: She is alert and oriented to person, place, and time.   Skin: Skin is warm and dry. She is not diaphoretic.   Psychiatric: She has a normal mood and affect. Her behavior is normal. Thought content normal.   Vitals reviewed.        Assessment:       1. Persistent atrial fibrillation    2. Type 2 diabetes mellitus with hypercholesterolemia    3. Class 1 obesity due to excess calories with serious comorbidity and body mass index (BMI) of 32.0 to 32.9 in adult    4. Chronic systolic heart failure, in setting of iatrogenic hyperthyroidism and persistent atrial fibrillation         Plan:       In summary, Mrs. Valdez is a pleasant 69 year-old woman with diabetes mellitus, hypothyroidism on synthroid and recent onset persistent atrial fibrillation currently hyperthyroid with mildly depressed LV function. Her synthroid dosage has been lowered. I had a long discussion with the patient about the pathophysiology and risks of atrial fibrillation and its basic pathophysiology, including its health implications and treatment options. Specifically, I addressed the need for CVA (stroke) prophylaxis with aspirin versus oral anticoagulation (warfarin vs DOACs, discussed bleeding risks, and need to come to the ER for any head trauma for CT scanning even  if asymptomatic).  Her [id number] score is 4 and indefinite anticoagulation is recommended. I also discussed the goal to reduce symptomatic arrhythmic episodes by pharmacologic and/or procedural methods and utilizing a rhythm versus a rate control strategy. Since her EF is depressed I recommend rhythm control strategy. Her depressed EF very well may be from atrial fibrillation and/or hyperthyroidism. Recommend DORINA/DCCV. Would recommend anti-arrhythmic therapy to help reduce risk of recurrence and allow time for her EF to recover. She has no symptoms of heart failure and her EF is 45%. Options include dofetilide versus sotalol, both would need hospital admission for initiation. She desires to try just a cardioversion without drug therapy unless it recurs.     DORINA/DCCV. Follow-up after.    Thank you for allowing me to participate in the care of this patient. Please do not hesitate to call me with any questions or concerns.    Eduardo Sanchez MD, PhD  Cardiac Electrophysiology

## 2019-09-17 NOTE — LETTER
September 17, 2019      Tami De Leon MD  [address] [facility]  8th [address]  [place] [address]           [facility] - Arrhythmia  [address]  [place] [address]  Phone: [phone]  Fax: [phone]          Patient: Bebe Valdez   MR Number: [id number]   YOB: 1950   Date of Visit: 9/17/2019       Dear Dr. Tami De Leon:    Thank you for referring Bebe Valdez to me for evaluation. Attached you will find relevant portions of my assessment and plan of care.    If you have questions, please do not hesitate to call me. I look forward to following Bebe Valdez along with you.    Sincerely,    Eduardo Sanchez MD    Enclosure  CC:  No Recipients    If you would like to receive this communication electronically, please contact [email or web address] or [phone] to request more information on [facility] [employer] access.    For providers and/or their staff who would like to refer a patient to [facility], please contact us through our one-stop-shop provider referral line, [facility], at [phone].    If you feel you have received this communication in error or would no longer like to receive these types of communications, please e-mail [email or web address]

## 2019-09-19 ENCOUNTER — LAB VISIT (OUTPATIENT)
Dept: LAB | Facility: HOSPITAL | Age: 69
End: 2019-09-19
Attending: INTERNAL MEDICINE
Payer: MEDICARE

## 2019-09-19 ENCOUNTER — PATIENT MESSAGE (OUTPATIENT)
Dept: ELECTROPHYSIOLOGY | Facility: CLINIC | Age: 69
End: 2019-09-19

## 2019-09-19 DIAGNOSIS — I48.19 PERSISTENT ATRIAL FIBRILLATION: ICD-10-CM

## 2019-09-19 LAB
BASOPHILS # BLD AUTO: 0.02 K/UL (ref 0–0.2)
BASOPHILS NFR BLD: 0.3 % (ref 0–1.9)
DIFFERENTIAL METHOD: ABNORMAL
EOSINOPHIL # BLD AUTO: 0.2 K/UL (ref 0–0.5)
EOSINOPHIL NFR BLD: 3.2 % (ref 0–8)
ERYTHROCYTE [DISTWIDTH] IN BLOOD BY AUTOMATED COUNT: 14.1 % (ref 11.5–14.5)
HCT VFR BLD AUTO: 42 % (ref 37–48.5)
HGB BLD-MCNC: 13 G/DL (ref 12–16)
IMM GRANULOCYTES # BLD AUTO: 0.02 K/UL (ref 0–0.04)
IMM GRANULOCYTES NFR BLD AUTO: 0.3 % (ref 0–0.5)
LYMPHOCYTES # BLD AUTO: 2.1 K/UL (ref 1–4.8)
LYMPHOCYTES NFR BLD: 29 % (ref 18–48)
MCH RBC QN AUTO: 29.4 PG (ref 27–31)
MCHC RBC AUTO-ENTMCNC: 31 G/DL (ref 32–36)
MCV RBC AUTO: 95 FL (ref 82–98)
MONOCYTES # BLD AUTO: 0.6 K/UL (ref 0.3–1)
MONOCYTES NFR BLD: 8.5 % (ref 4–15)
NEUTROPHILS # BLD AUTO: 4.2 K/UL (ref 1.8–7.7)
NEUTROPHILS NFR BLD: 58.7 % (ref 38–73)
NRBC BLD-RTO: 0 /100 WBC
PLATELET # BLD AUTO: 249 K/UL (ref 150–350)
PMV BLD AUTO: 11.3 FL (ref 9.2–12.9)
RBC # BLD AUTO: 4.42 M/UL (ref 4–5.4)
WBC # BLD AUTO: 7.1 K/UL (ref 3.9–12.7)

## 2019-09-19 PROCEDURE — 85025 COMPLETE CBC W/AUTO DIFF WBC: CPT

## 2019-09-19 PROCEDURE — 85730 THROMBOPLASTIN TIME PARTIAL: CPT

## 2019-09-19 PROCEDURE — 85610 PROTHROMBIN TIME: CPT

## 2019-09-19 PROCEDURE — 36415 COLL VENOUS BLD VENIPUNCTURE: CPT | Mod: PO

## 2019-09-20 LAB
APTT BLDCRRT: 24.4 SEC (ref 21–32)
INR PPP: 1.1 (ref 0.8–1.2)
PROTHROMBIN TIME: 11.2 SEC (ref 9–12.5)

## 2019-09-24 DIAGNOSIS — E78.5 HYPERLIPIDEMIA, UNSPECIFIED HYPERLIPIDEMIA TYPE: ICD-10-CM

## 2019-09-24 RX ORDER — ROSUVASTATIN CALCIUM 10 MG/1
TABLET, COATED ORAL
Qty: 90 TABLET | Refills: 0 | Status: SHIPPED | OUTPATIENT
Start: 2019-09-24 | End: 2020-01-07 | Stop reason: SDUPTHER

## 2019-09-25 ENCOUNTER — TELEPHONE (OUTPATIENT)
Dept: ELECTROPHYSIOLOGY | Facility: CLINIC | Age: 69
End: 2019-09-25

## 2019-09-25 ENCOUNTER — TELEPHONE (OUTPATIENT)
Dept: PRIMARY CARE CLINIC | Facility: CLINIC | Age: 69
End: 2019-09-25

## 2019-09-25 DIAGNOSIS — I50.22 CHRONIC SYSTOLIC HEART FAILURE: ICD-10-CM

## 2019-09-25 DIAGNOSIS — E03.9 HYPOTHYROIDISM, UNSPECIFIED TYPE: Primary | ICD-10-CM

## 2019-09-25 DIAGNOSIS — E78.00 TYPE 2 DIABETES MELLITUS WITH HYPERCHOLESTEROLEMIA: ICD-10-CM

## 2019-09-25 DIAGNOSIS — E11.69 TYPE 2 DIABETES MELLITUS WITH HYPERCHOLESTEROLEMIA: ICD-10-CM

## 2019-09-25 RX ORDER — LEVOTHYROXINE SODIUM 75 UG/1
37.5 TABLET ORAL DAILY
Qty: 15 TABLET | Refills: 11 | Status: SHIPPED | OUTPATIENT
Start: 2019-09-25 | End: 2019-11-05

## 2019-09-25 NOTE — TELEPHONE ENCOUNTER
Spoke to Bebe Valdez    CONFIRMED procedure arrival time of 7am on 9/26  Reiterated instructions including:  -Directions to check in desk  -NPO after midnight night prior to procedure  -High importance of HOLDING Metformin on the morning of the procedure  -Confirmed compliance of Eliquis. Patient states she takes it twice daily but not necessarily with food. I recommended she take it with food.   -Pre-procedure LABS 9/17/19. Labs received. No alerts       Pt verbalizes understanding of above and appreciates call.

## 2019-09-25 NOTE — TELEPHONE ENCOUNTER
Pt informed of TSH results, presently on Synthroid 0.175 daily.  She will decrease to 0.1375mg daily and repeat TSH in one month

## 2019-09-25 NOTE — TELEPHONE ENCOUNTER
Patient is already scheduled for her annual exam labs on 10/29/19, so her TSH lab has been linked to that lab appointment

## 2019-09-26 ENCOUNTER — ANESTHESIA EVENT (OUTPATIENT)
Dept: MEDSURG UNIT | Facility: HOSPITAL | Age: 69
End: 2019-09-26
Payer: MEDICARE

## 2019-09-26 ENCOUNTER — HOSPITAL ENCOUNTER (OUTPATIENT)
Dept: CARDIOLOGY | Facility: CLINIC | Age: 69
Discharge: HOME | End: 2019-09-26
Attending: INTERNAL MEDICINE
Payer: MEDICARE

## 2019-09-26 ENCOUNTER — ANESTHESIA (OUTPATIENT)
Dept: MEDSURG UNIT | Facility: HOSPITAL | Age: 69
End: 2019-09-26
Payer: MEDICARE

## 2019-09-26 ENCOUNTER — HOSPITAL ENCOUNTER (OUTPATIENT)
Facility: HOSPITAL | Age: 69
Discharge: HOME | End: 2019-09-26
Attending: INTERNAL MEDICINE | Admitting: INTERNAL MEDICINE
Payer: MEDICARE

## 2019-09-26 VITALS
BODY MASS INDEX: 29.57 KG/M2 | HEIGHT: 66 IN | OXYGEN SATURATION: 99 % | DIASTOLIC BLOOD PRESSURE: 72 MMHG | HEART RATE: 65 BPM | RESPIRATION RATE: 18 BRPM | TEMPERATURE: 98 F | WEIGHT: 184 LBS | SYSTOLIC BLOOD PRESSURE: 117 MMHG

## 2019-09-26 DIAGNOSIS — I48.91 ATRIAL FIBRILLATION: ICD-10-CM

## 2019-09-26 DIAGNOSIS — I48.19 PERSISTENT ATRIAL FIBRILLATION: ICD-10-CM

## 2019-09-26 DIAGNOSIS — I48.19 PERSISTENT ATRIAL FIBRILLATION: Primary | ICD-10-CM

## 2019-09-26 LAB
BSA FOR ECHO PROCEDURE: 1.97 M2
POCT GLUCOSE: 120 MG/DL (ref 70–110)
POCT GLUCOSE: 122 MG/DL (ref 70–110)
POCT GLUCOSE: 28 MG/DL (ref 70–110)
PROX AORTA: 3.4 CM
SINUS: 3.3 CM
STJ: 2.5 CM

## 2019-09-26 PROCEDURE — 92960 CARDIOVERSION ELECTRIC EXT: CPT | Performed by: INTERNAL MEDICINE

## 2019-09-26 PROCEDURE — 93312 TRANSESOPHAGEAL ECHO (TEE) (CUPID ONLY): ICD-10-PCS | Mod: 26,,, | Performed by: INTERNAL MEDICINE

## 2019-09-26 PROCEDURE — D9220A PRA ANESTHESIA: Mod: CRNA,,, | Performed by: NURSE ANESTHETIST, CERTIFIED REGISTERED

## 2019-09-26 PROCEDURE — D9220A PRA ANESTHESIA: Mod: ANES,,, | Performed by: ANESTHESIOLOGY

## 2019-09-26 PROCEDURE — 93325 TRANSESOPHAGEAL ECHO (TEE) (CUPID ONLY): ICD-10-PCS | Mod: 26,,, | Performed by: INTERNAL MEDICINE

## 2019-09-26 PROCEDURE — D9220A PRA ANESTHESIA: ICD-10-PCS | Mod: ANES,,, | Performed by: ANESTHESIOLOGY

## 2019-09-26 PROCEDURE — D9220A PRA ANESTHESIA: ICD-10-PCS | Mod: CRNA,,, | Performed by: NURSE ANESTHETIST, CERTIFIED REGISTERED

## 2019-09-26 PROCEDURE — 93010 ELECTROCARDIOGRAM REPORT: CPT | Mod: ,,, | Performed by: INTERNAL MEDICINE

## 2019-09-26 PROCEDURE — 25000003 PHARM REV CODE 250: Performed by: NURSE ANESTHETIST, CERTIFIED REGISTERED

## 2019-09-26 PROCEDURE — 93010 ELECTROCARDIOGRAM REPORT: CPT | Mod: 76,,, | Performed by: INTERNAL MEDICINE

## 2019-09-26 PROCEDURE — 93312 ECHO TRANSESOPHAGEAL: CPT | Mod: 26,,, | Performed by: INTERNAL MEDICINE

## 2019-09-26 PROCEDURE — 63600175 PHARM REV CODE 636 W HCPCS: Performed by: NURSE PRACTITIONER

## 2019-09-26 PROCEDURE — 93010 EKG 12-LEAD: ICD-10-PCS | Mod: ,,, | Performed by: INTERNAL MEDICINE

## 2019-09-26 PROCEDURE — 93325 DOPPLER ECHO COLOR FLOW MAPG: CPT

## 2019-09-26 PROCEDURE — 25000003 PHARM REV CODE 250: Performed by: INTERNAL MEDICINE

## 2019-09-26 PROCEDURE — 92960 CARDIOVERSION ELECTRIC EXT: CPT | Mod: ,,, | Performed by: INTERNAL MEDICINE

## 2019-09-26 PROCEDURE — 93320 TRANSESOPHAGEAL ECHO (TEE) (CUPID ONLY): ICD-10-PCS | Mod: 26,,, | Performed by: INTERNAL MEDICINE

## 2019-09-26 PROCEDURE — 93005 ELECTROCARDIOGRAM TRACING: CPT

## 2019-09-26 PROCEDURE — 82962 GLUCOSE BLOOD TEST: CPT

## 2019-09-26 PROCEDURE — 93005 ELECTROCARDIOGRAM TRACING: CPT | Mod: 59

## 2019-09-26 PROCEDURE — 92960 PR CARDIOVERSION, ELECTIVE;EXTERN: ICD-10-PCS | Mod: ,,, | Performed by: INTERNAL MEDICINE

## 2019-09-26 PROCEDURE — 37000008 HC ANESTHESIA 1ST 15 MINUTES: Performed by: INTERNAL MEDICINE

## 2019-09-26 PROCEDURE — 93320 DOPPLER ECHO COMPLETE: CPT | Mod: 26,,, | Performed by: INTERNAL MEDICINE

## 2019-09-26 PROCEDURE — 93325 DOPPLER ECHO COLOR FLOW MAPG: CPT | Mod: 26,,, | Performed by: INTERNAL MEDICINE

## 2019-09-26 PROCEDURE — 82962 GLUCOSE BLOOD TEST: CPT | Performed by: INTERNAL MEDICINE

## 2019-09-26 PROCEDURE — 37000009 HC ANESTHESIA EA ADD 15 MINS: Performed by: INTERNAL MEDICINE

## 2019-09-26 PROCEDURE — 63600175 PHARM REV CODE 636 W HCPCS: Performed by: NURSE ANESTHETIST, CERTIFIED REGISTERED

## 2019-09-26 RX ORDER — SODIUM CHLORIDE 9 MG/ML
INJECTION, SOLUTION INTRAVENOUS CONTINUOUS
Status: ACTIVE | OUTPATIENT
Start: 2019-09-26

## 2019-09-26 RX ORDER — SODIUM CHLORIDE 0.9 % (FLUSH) 0.9 %
3 SYRINGE (ML) INJECTION
Status: DISCONTINUED | OUTPATIENT
Start: 2019-09-26 | End: 2019-09-26 | Stop reason: HOSPADM

## 2019-09-26 RX ORDER — PROPOFOL 10 MG/ML
VIAL (ML) INTRAVENOUS
Status: DISCONTINUED | OUTPATIENT
Start: 2019-09-26 | End: 2019-09-26

## 2019-09-26 RX ORDER — SILVER SULFADIAZINE 10 G/1000G
CREAM TOPICAL
Status: DISCONTINUED | OUTPATIENT
Start: 2019-09-26 | End: 2019-09-26 | Stop reason: HOSPADM

## 2019-09-26 RX ORDER — SODIUM CHLORIDE 0.9 % (FLUSH) 0.9 %
5 SYRINGE (ML) INJECTION
Status: ACTIVE | OUTPATIENT
Start: 2019-09-26

## 2019-09-26 RX ORDER — LIDOCAINE HYDROCHLORIDE 20 MG/ML
SOLUTION OROPHARYNGEAL
Status: DISCONTINUED | OUTPATIENT
Start: 2019-09-26 | End: 2019-09-26

## 2019-09-26 RX ORDER — PROPOFOL 10 MG/ML
VIAL (ML) INTRAVENOUS CONTINUOUS PRN
Status: DISCONTINUED | OUTPATIENT
Start: 2019-09-26 | End: 2019-09-26

## 2019-09-26 RX ORDER — LIDOCAINE HYDROCHLORIDE 20 MG/ML
INJECTION, SOLUTION EPIDURAL; INFILTRATION; INTRACAUDAL; PERINEURAL
Status: DISCONTINUED | OUTPATIENT
Start: 2019-09-26 | End: 2019-09-26

## 2019-09-26 RX ADMIN — LIDOCAINE HYDROCHLORIDE 20 MG: 20 INJECTION, SOLUTION EPIDURAL; INFILTRATION; INTRACAUDAL; PERINEURAL at 09:09

## 2019-09-26 RX ADMIN — PROPOFOL 20 MG: 10 INJECTION, EMULSION INTRAVENOUS at 09:09

## 2019-09-26 RX ADMIN — PROPOFOL 150 MCG/KG/MIN: 10 INJECTION, EMULSION INTRAVENOUS at 09:09

## 2019-09-26 RX ADMIN — PROPOFOL 50 MG: 10 INJECTION, EMULSION INTRAVENOUS at 09:09

## 2019-09-26 RX ADMIN — SODIUM CHLORIDE 1000 ML: 0.9 INJECTION, SOLUTION INTRAVENOUS at 07:09

## 2019-09-26 RX ADMIN — LIDOCAINE HYDROCHLORIDE 80 MG: 20 INJECTION, SOLUTION EPIDURAL; INFILTRATION; INTRACAUDAL; PERINEURAL at 09:09

## 2019-09-26 RX ADMIN — SODIUM CHLORIDE: 0.9 INJECTION, SOLUTION INTRAVENOUS at 09:09

## 2019-09-26 RX ADMIN — LIDOCAINE HYDROCHLORIDE 5 ML: 20 SOLUTION ORAL; TOPICAL at 09:09

## 2019-09-26 NOTE — ANESTHESIA POSTPROCEDURE EVALUATION
Anesthesia Post Evaluation    Patient: Bebe Valdez    Procedure(s) Performed: Procedure(s) (LRB):  CARDIOVERSION (N/A)  Transesophageal echo (DORINA) intra-procedure log documentation (N/A)    Final Anesthesia Type: general  Patient location during evaluation: PACU  Patient participation: Yes- Able to Participate  Level of consciousness: awake and alert  Post-procedure vital signs: reviewed and stable  Pain management: adequate  Airway patency: patent  PONV status at discharge: No PONV  Anesthetic complications: no      Cardiovascular status: blood pressure returned to baseline  Respiratory status: unassisted  Hydration status: euvolemic  Follow-up not needed.          Vitals Value Taken Time   /72 9/26/2019 10:55 AM   Temp 36.4 °C (97.5 °F) 9/26/2019 10:55 AM   Pulse 65 9/26/2019 10:55 AM   Resp 12 9/26/2019 10:55 AM   SpO2 99 % 9/26/2019 10:55 AM         No case tracking events are documented in the log.      Pain/Robert Score: Pain Rating Prior to Med Admin: 0 (9/26/2019 10:16 AM)  Robert Score: 9 (9/26/2019 10:30 AM)

## 2019-09-26 NOTE — ASSESSMENT & PLAN NOTE
1. DORINA/DCCV  -No absolute contraindications of esophageal stricture, tumor, perforation, laceration,or diverticulum and/or active GI bleed  -The risks, benefits & alternatives of the procedure were explained to the patient.   -The risks of transesophageal echo include but are not limited to:  Dental trauma, esophageal trauma/perforation, bleeding, laryngospasm/brochospasm, aspiration, sore throat/hoarseness, & dislodgement of the endotracheal tube/nasogastric tube (where applicable).    -The risks of moderate sedation include hypotension, respiratory depression, arrhythmias, bronchospasm, & death.    -Informed consent was obtained. The patient is agreeable to proceed with the procedure and all questions and concerns addressed.    Case discussed with an attending in echocardiography lab.     Further recommendations per attending addendum

## 2019-09-26 NOTE — NURSING
Blood glucose of 28 resulted from instrument error, recheck with different glucometer 122.  Reported error to charge nurse to run controls on glucometer.

## 2019-09-26 NOTE — H&P
[facility]  Cardiology  History and Physical     Patient Name: Bebe Valdez  MRN: [id number]  Admission Date: 9/26/2019  Code Status: No Order   Attending Provider: Eduardo Sanchez MD   Primary Care Physician: Anil Nelson MD  Principal Problem:<principal problem not specified>    Patient information was obtained from patient, past medical records and ER records.     Subjective:     Chief Complaint:  DORINA/DCCV     HPI:  69 y.o. woman with PMHx of HFrEF (45%), AF, DM2, obesity who presents for DORINA/DCCV.    Dysphagia or odynophagia:  No  Liver Disease, esophageal disease, or known varices:  No  Upper GI Bleeding: No  Snoring:  No  Sleep Apnea:  No  Prior neck surgery or radiation:  No  History of anesthetic difficulties:  No  Family history of anesthetic difficulties:  No  Last oral intake:  12 hours ago  Able to move neck in all directions:  Yes  Anticoagulation/Antiplatelets: Apixaban    Platelet count:   Lab Results   Component Value Date     09/19/2019       INR:  Lab Results   Component Value Date    INR 1.1 09/19/2019    INR 1.1 04/05/2005       Last TTE:   · Atrial fibrillation and frequent ectopy was present during the study.  · Moderate left atrial enlargement.  · Mild left ventricular enlargement: 5.5 cm.  · Mildly decreased left ventricular systolic function. The estimated ejection fraction is 45%  · Mild global hypokinesis.  · Diastolic pattern consistent with atrial fibrillation observed.  · Mild right atrial enlargement.  · Low normal right ventricular systolic function.  · The estimated PA systolic pressure is 36 mm Hg  · Mild tricuspid regurgitation.  · Normal central venous pressure (3 mm Hg).    Last DORINA: None on file.    Last EGD: None on file    Past Medical History:   Diagnosis Date    Allergy     Hyperlipidemia     Hypertension associated with diabetes     Hypothyroid     Obesity     Rosacea        Past Surgical History:   Procedure Laterality Date     CHOLECYSTECTOMY      COLONOSCOPY  7/6/07    COLONOSCOPY N/A 6/6/2017    Procedure: COLONOSCOPY;  Surgeon: Tre Alvarez MD;  Location: [facility] ([address] [place] [address]);  Service: Endoscopy;  Laterality: N/A;    DILATION AND CURETTAGE OF UTERUS      HYSTEROSCOPY         Review of patient's allergies indicates:   Allergen Reactions    Levaquin [levofloxacin] Hives    Tetracycline Hives       No current facility-administered medications on file prior to encounter.      Current Outpatient Medications on File Prior to Encounter   Medication Sig    apixaban (ELIQUIS) 5 mg Tab Take 1 tablet (5 mg total) by mouth 2 (two) times daily.    ascorbic acid (VITAMIN C) 100 MG tablet Take 100 mg by mouth once daily.    CINNAMON BARK (CINNAMON ORAL) Take by mouth once daily. Pt taking 2 pills daily.    diclofenac-misoprostol  mg-mcg (ARTHROTEC 50)  mg-mcg per tablet Take 1 tablet by mouth 2 (two) times daily as needed (pain and inflammation). Take with food.    fish oil-omega-3 fatty acids 300-1,000 mg capsule Take 2 g by mouth once daily.    metFORMIN (GLUCOPHAGE-XR) 500 MG 24 hr tablet Take 2 tablets (1,000 mg total) by mouth 2 (two) times daily with meals.    metoprolol succinate (TOPROL-XL) 50 MG 24 hr tablet Take 1 tablet (50 mg total) by mouth once daily.    MV,CA,MIN/IRON/FA/GUARANA/CAFF (ONE-A-DAY WOMEN'S ACTIVE ORAL) Take by mouth.    MV-MN/OM3/DHA/EPA/FISH/LUT/JOSE G (LIPOTRIAD VISIONARY ORAL) Take by mouth.    SYNTHROID 100 mcg tablet Take 1 tablet (100 mcg total) by mouth once daily.    vitamin D (VITAMIN D3) 1000 units Tab Take 1,000 Units by mouth once daily.     Family History     Problem Relation (Age of Onset)    Allergies Mother    Atrial fibrillation Father    Breast cancer Mother (42)    Cancer Mother, Maternal Grandfather    Heart disease Father    Hyperlipidemia Father    Hypertension Father    Hypothyroidism Father    No Known Problems Sister, Brother    Stroke Father        Tobacco Use     Smoking status: Never Smoker    Smokeless tobacco: Never Used   Substance and Sexual Activity    Alcohol use: Yes     Alcohol/week: 2.0 standard drinks     Types: 2 Glasses of wine per week     Frequency: 2-4 times a month     Drinks per session: 1 or 2     Binge frequency: Never     Comment: weekends     Drug use: Not on file    Sexual activity: Not on file     Review of Systems   Constitution: Negative for chills and decreased appetite.   HENT: Negative for congestion, ear discharge and ear pain.    Eyes: Negative for blurred vision and discharge.   Cardiovascular: Negative for chest pain, claudication, cyanosis and dyspnea on exertion.   Respiratory: Negative for cough and hemoptysis.    Endocrine: Negative for cold intolerance and heat intolerance.   Skin: Negative for color change and nail changes.   Musculoskeletal: Negative for arthritis and back pain.   Gastrointestinal: Negative for bloating and abdominal pain.   Genitourinary: Negative for bladder incontinence and decreased libido.   Neurological: Negative for aphonia and brief paralysis.     Objective:     Vital Signs (Most Recent):    Vital Signs (24h Range):  BP: ()/()   Arterial Line BP: ()/()         There is no height or weight on file to calculate BMI.            No intake or output data in the 24 hours ending 09/26/19 0717    Lines/Drains/Airways     None                 Physical Exam   Constitutional: She is oriented to person, place, and time. She appears well-developed and well-nourished.   HENT:   Head: Normocephalic and atraumatic.   Nose: Nose normal.   Mouth/Throat: No oropharyngeal exudate.   Eyes: Right eye exhibits no discharge. Left eye exhibits no discharge. No scleral icterus.   Neck: Normal range of motion. Neck supple. No JVD present.   Cardiovascular: S1 normal and S2 normal. Exam reveals no gallop, no S3, no S4, no distant heart sounds, no friction rub, no midsystolic click and no opening snap.   No murmur heard.  Pulses:        Radial pulses are 2+ on the right side, and 2+ on the left side.        Femoral pulses are 2+ on the right side, and 2+ on the left side.  Pulmonary/Chest: Effort normal and breath sounds normal. No respiratory distress. She has no wheezes. She has no rales. She exhibits no tenderness.   Abdominal: Soft. Bowel sounds are normal. She exhibits no distension. There is no tenderness. There is no rebound.   Musculoskeletal: Normal range of motion. She exhibits no edema, tenderness or deformity.   Lymphadenopathy:     She has no cervical adenopathy.   Neurological: She is alert and oriented to person, place, and time. No cranial nerve deficit.   Skin: Skin is warm and dry.   Psychiatric: She has a normal mood and affect. Her behavior is normal.       Significant Labs: All pertinent lab results from the last 24 hours have been reviewed.    Significant Imaging: All pertinent images from the last 24h have been reviewed.      Assessment and Plan:     Persistent atrial fibrillation  1. DORINA/DCCV  -No absolute contraindications of esophageal stricture, tumor, perforation, laceration,or diverticulum and/or active GI bleed  -The risks, benefits & alternatives of the procedure were explained to the patient.   -The risks of transesophageal echo include but are not limited to:  Dental trauma, esophageal trauma/perforation, bleeding, laryngospasm/brochospasm, aspiration, sore throat/hoarseness, & dislodgement of the endotracheal tube/nasogastric tube (where applicable).    -The risks of moderate sedation include hypotension, respiratory depression, arrhythmias, bronchospasm, & death.    -Informed consent was obtained. The patient is agreeable to proceed with the procedure and all questions and concerns addressed.    Case discussed with an attending in echocardiography lab.     Further recommendations per attending addendum          VTE Risk Mitigation (From admission, onward)    None          Brian Santoyo,  MD  Cardiology   [facility]

## 2019-09-26 NOTE — TRANSFER OF CARE
"Anesthesia Transfer of Care Note    Patient: Bebe Valdez    Procedure(s) Performed: Procedure(s) (LRB):  CARDIOVERSION (N/A)  Transesophageal echo (DORINA) intra-procedure log documentation (N/A)    Patient location: PACU    Anesthesia Type: general    Transport from OR: Transported from OR on 2-3 L/min O2 by NC with adequate spontaneous ventilation    Post pain: adequate analgesia    Post assessment: no apparent anesthetic complications and tolerated procedure well    Post vital signs: stable    Level of consciousness: awake, alert and oriented    Nausea/Vomiting: no nausea/vomiting    Complications: none    Transfer of care protocol was followed      Last vitals:   Visit Vitals  /77 (BP Location: Right arm, Patient Position: Lying)   Pulse 77   Temp 36.5 °C (97.7 °F) (Oral)   Resp 18   Ht 5' 6" (1.676 m)   Wt 83.5 kg (184 lb)   LMP 07/18/2001   SpO2 96%   Breastfeeding? No   BMI 29.70 kg/m²     "

## 2019-09-26 NOTE — LETTER
Addended by: ADINA AGUILAR on: 6/15/2018 08:13 AM     Modules accepted: Orders     September 19, 2019    Bebe F Valdez  [address]   [place] [address]             Patient Instructions  DORINA (Transesophageal Echocardiogram) with Cardioversion         Important Medication Information:    · HOLD (do NOT take) Metformin on the day of your procedure.  Your last dose should be taken on 9/25/19.  · It is recommended that you eat a high protein bedtime snack the evening prior to your procedure.  · You may take your other usual morning medications with a sip of water on the day of your procedure.       Day of Procedure:    9/26/19 at 7 AM   Please report to Cardiology Waiting Room on the 3rd floor of the Hospital    · Do not eat or drink anything after 12 midnight on the night before your procedure.  · Please do not wear makeup, especially mascara, when arriving for your procedure.  · You will be going home after your procedure.  · You will need someone to drive you home from the hospital due to anesthesia.       Post Procedure Testing:    10/3/19 at 1:15 PM   You will have a post procedural EKG one week after your cardioversion to assess your heart rhythm and rate.       Directions to Cardiology Waiting Room  If you park in the Parking Garage:  Take elevators to the 2nd floor  Walk up ramp and turn right by Gold Elevators  Take elevator to the 3rd floor  Upon exiting the elevator, turn away from the clinic areas  Walk long cooper around to front of hospital to area with windows overlooking [facility]  Check in at Reception Desk  OR  If family is dropping you off:  Have them drop you off at the front of the Hospital  (Near the ER, where all the flags are hung).  Take the E elevators to the 3rd floor.  Check in at the Reception Desk in the waiting room.      Any need to reschedule or cancel procedures, or any questions regarding your procedures should be addressed directly with the Arrhythmia Department Nurses at the following phone number: [phone].

## 2019-09-26 NOTE — PROGRESS NOTES
grace perez ep np at bedside, updating pt on procedure, verbalizes understanding.  No tele box needed upon transfer to sscu per ep np verbal order.  nsr noted. Pt's friend to be updated by ep np in waiting room.  Discharge orders to be placed by ep np.

## 2019-09-26 NOTE — NURSING
Pt d/c'd to home per md orders.  Vss.  piv removed intact and without difficulty.  Instructed pt on home medications, post procedure precautions and follow up visits.  Pt verbalizes understanding. Pt in no acute distress and verbalizes no complaints.

## 2019-09-26 NOTE — NURSING TRANSFER
Nursing Transfer Note      9/26/2019     Transfer To: ep pacu 3 to sscu 05    Transfer via stretcher    Transfer with none per order    Transported by jennifer,rina    Medicines sent: silvadene ointment    Chart send with patient: Yes    Notified: friend    Patient reassessed at: 9/26/19 1045    Upon arrival to floor: patient oriented to room, call bell in reach and bed in lowest position

## 2019-09-26 NOTE — ANESTHESIA PREPROCEDURE EVALUATION
09/26/2019  Bebe Valdez is a 69 y.o., female.    Anesthesia Evaluation    I have reviewed the Patient Summary Reports.     I have reviewed the Medications.     Review of Systems  Anesthesia Hx:  History of prior surgery of interest to airway management or planning:  Denies Personal Hx of Anesthesia complications.   Social:  Non-Smoker    Cardiovascular:   Hypertension Dysrhythmias atrial fibrillation hyperlipidemia EF 45% with low NL LV Fxn   Endocrine:   Diabetes Hypothyroidism BMI >32       Physical Exam  General:  Well nourished    Airway/Jaw/Neck:  Airway Findings: Mouth Opening: Normal Tongue: Normal  General Airway Assessment: Adult  Mallampati: III  Improves to II with phonation.  TM Distance: Normal, at least 6 cm  Jaw/Neck Findings:  Neck ROM: Normal ROM       Chest/Lungs:  Chest/Lungs Findings: Normal Respiratory Rate     Heart/Vascular:  Heart Findings: Rate: Normal        Mental Status:  Mental Status Findings:  Alert and Oriented         Anesthesia Plan  Type of Anesthesia, risks & benefits discussed:  Anesthesia Type:  general  Patient's Preference: Natural Airway General   Intra-op Monitoring Plan: standard ASA monitors  Intra-op Monitoring Plan Comments:   Post Op Pain Control Plan: IV/PO Opioids PRN  Post Op Pain Control Plan Comments:   Induction:   IV  Beta Blocker:  Patient is on a Beta-Blocker and has received one dose within the past 24 hours (No further documentation required).       Informed Consent: Patient understands risks and agrees with Anesthesia plan.  Questions answered. Anesthesia consent signed with patient.  ASA Score: 3     Day of Surgery Review of History & Physical:    H&P update referred to the surgeon.     Anesthesia Plan Notes: NPO confirmed.   No history of anesthesia problems.         Ready For Surgery From Anesthesia Perspective.

## 2019-09-26 NOTE — SUBJECTIVE & OBJECTIVE
Past Medical History:   Diagnosis Date    Allergy     Hyperlipidemia     Hypertension associated with diabetes     Hypothyroid     Obesity     Rosacea        Past Surgical History:   Procedure Laterality Date    CHOLECYSTECTOMY      COLONOSCOPY  7/6/07    COLONOSCOPY N/A 6/6/2017    Procedure: COLONOSCOPY;  Surgeon: Tre Alvarez MD;  Location: [facility] ([address] [place] [address]);  Service: Endoscopy;  Laterality: N/A;    DILATION AND CURETTAGE OF UTERUS      HYSTEROSCOPY         Review of patient's allergies indicates:   Allergen Reactions    Levaquin [levofloxacin] Hives    Tetracycline Hives       No current facility-administered medications on file prior to encounter.      Current Outpatient Medications on File Prior to Encounter   Medication Sig    apixaban (ELIQUIS) 5 mg Tab Take 1 tablet (5 mg total) by mouth 2 (two) times daily.    ascorbic acid (VITAMIN C) 100 MG tablet Take 100 mg by mouth once daily.    CINNAMON BARK (CINNAMON ORAL) Take by mouth once daily. Pt taking 2 pills daily.    diclofenac-misoprostol  mg-mcg (ARTHROTEC 50)  mg-mcg per tablet Take 1 tablet by mouth 2 (two) times daily as needed (pain and inflammation). Take with food.    fish oil-omega-3 fatty acids 300-1,000 mg capsule Take 2 g by mouth once daily.    metFORMIN (GLUCOPHAGE-XR) 500 MG 24 hr tablet Take 2 tablets (1,000 mg total) by mouth 2 (two) times daily with meals.    metoprolol succinate (TOPROL-XL) 50 MG 24 hr tablet Take 1 tablet (50 mg total) by mouth once daily.    MV,CA,MIN/IRON/FA/GUARANA/CAFF (ONE-A-DAY WOMEN'S ACTIVE ORAL) Take by mouth.    MV-MN/OM3/DHA/EPA/FISH/LUT/JOSE G (LIPOTRIAD VISIONARY ORAL) Take by mouth.    SYNTHROID 100 mcg tablet Take 1 tablet (100 mcg total) by mouth once daily.    vitamin D (VITAMIN D3) 1000 units Tab Take 1,000 Units by mouth once daily.     Family History     Problem Relation (Age of Onset)    Allergies Mother    Atrial fibrillation Father    Breast cancer Mother (42)     Cancer Mother, Maternal Grandfather    Heart disease Father    Hyperlipidemia Father    Hypertension Father    Hypothyroidism Father    No Known Problems Sister, Brother    Stroke Father        Tobacco Use    Smoking status: Never Smoker    Smokeless tobacco: Never Used   Substance and Sexual Activity    Alcohol use: Yes     Alcohol/week: 2.0 standard drinks     Types: 2 Glasses of wine per week     Frequency: 2-4 times a month     Drinks per session: 1 or 2     Binge frequency: Never     Comment: weekends     Drug use: Not on file    Sexual activity: Not on file     Review of Systems   Constitution: Negative for chills and decreased appetite.   HENT: Negative for congestion, ear discharge and ear pain.    Eyes: Negative for blurred vision and discharge.   Cardiovascular: Negative for chest pain, claudication, cyanosis and dyspnea on exertion.   Respiratory: Negative for cough and hemoptysis.    Endocrine: Negative for cold intolerance and heat intolerance.   Skin: Negative for color change and nail changes.   Musculoskeletal: Negative for arthritis and back pain.   Gastrointestinal: Negative for bloating and abdominal pain.   Genitourinary: Negative for bladder incontinence and decreased libido.   Neurological: Negative for aphonia and brief paralysis.     Objective:     Vital Signs (Most Recent):    Vital Signs (24h Range):  BP: ()/()   Arterial Line BP: ()/()         There is no height or weight on file to calculate BMI.            No intake or output data in the 24 hours ending 09/26/19 0717    Lines/Drains/Airways     None                 Physical Exam   Constitutional: She is oriented to person, place, and time. She appears well-developed and well-nourished.   HENT:   Head: Normocephalic and atraumatic.   Nose: Nose normal.   Mouth/Throat: No oropharyngeal exudate.   Eyes: Right eye exhibits no discharge. Left eye exhibits no discharge. No scleral icterus.   Neck: Normal range of motion. Neck supple.  No JVD present.   Cardiovascular: S1 normal and S2 normal. Exam reveals no gallop, no S3, no S4, no distant heart sounds, no friction rub, no midsystolic click and no opening snap.   No murmur heard.  Pulses:       Radial pulses are 2+ on the right side, and 2+ on the left side.        Femoral pulses are 2+ on the right side, and 2+ on the left side.  Pulmonary/Chest: Effort normal and breath sounds normal. No respiratory distress. She has no wheezes. She has no rales. She exhibits no tenderness.   Abdominal: Soft. Bowel sounds are normal. She exhibits no distension. There is no tenderness. There is no rebound.   Musculoskeletal: Normal range of motion. She exhibits no edema, tenderness or deformity.   Lymphadenopathy:     She has no cervical adenopathy.   Neurological: She is alert and oriented to person, place, and time. No cranial nerve deficit.   Skin: Skin is warm and dry.   Psychiatric: She has a normal mood and affect. Her behavior is normal.       Significant Labs: All pertinent lab results from the last 24 hours have been reviewed.    Significant Imaging: All pertinent images from the last 24h have been reviewed.

## 2019-09-26 NOTE — PLAN OF CARE
vss sats 96% on room air.  Pt denies sob or pain.  Pt does have post nasal drip noted.  Viscous lidocaine given to pt by crna in procedure at 0912, npo until 1012.  Pt tolerating sips of water in ep pacu.  Pt's friend updated by ep pacu rn. Verbalizes understanding. Remains in waiting room.  12 lead ekg done per md order. nsr noted.  Silvadene ointment to chest and back, silvadene ointment with pt in gown pocket.  Pt educated on purpose of silvadene, verbalizes understanding.  See flowsheet for full assessment.

## 2019-09-26 NOTE — PLAN OF CARE
Pt transferred from recovery via stretcher.  Vss.  Post op orders and assessment initiated.  Pt aao, tolerating po.  Pt in no acute distress and verbalizes no complaints.  Will continue to monitor.  Call bell within reach.

## 2019-09-26 NOTE — PLAN OF CARE
Pt arrived to unit accompanied by family.  Vss.  Oriented pt to rm and unit.  Pre op orders and assessment initiated.  Pt remains npo.  Pt in no acute distress.  Will continue to monitor.  Call bell within reach.

## 2019-09-26 NOTE — DISCHARGE SUMMARY
[facility]  Cardiac Electrophysiology  Discharge Summary      Patient Name: Bebe Valdez  MRN: [id number]  Admission Date: 9/26/2019  Hospital Length of Stay: 0 days  Discharge Date and Time:  09/26/2019 11:32 AM  Attending Physician: Eduardo Sanchez MD    Discharging Provider: Madison Victoria NP  Primary Care Physician: Anil Nelson MD    HPI:   69 year-old woman with PMH  diabetes mellitus, hypothyroidism on synthroid and recent onset persistent atrial fibrillation currently hyperthyroid with mildly depressed LV function.   ON last EP clinic visit with MD Sanchez on 09/17/19 , referred for cardioversion with DORINA, and discussions regarding antiarrythmic therapy.   She desires to try just a cardioversion without drug therapy unless it recurs for now.      Patient presented to short stay unit on 09/26/19 for planned DORINA/Cardioversion  ,  denies any bleeding, chest pains, shortness of breath or any other acute symptoms.       Procedure(s) (LRB):  CARDIOVERSION (N/A)  Transesophageal echo (DORINA) intra-procedure log documentation (N/A)          Hospital Course: DORINA done showed no GIO thrombus, hence proceeded to DCCV which converted patient from AF to sinus rhythm.   Patient tolerated procedure, no immediate complications.   . Post procedure  EKG showed  NSR.  Patient denies any  symptoms post procedure.    Plan to continue home medications including Eliquis 5 mg BID for CVA prophylaxis   Pt to follow up with EKG in 1 week,  and 4 weeks with MD Eduardo Sanchez     Discharge plans/instructions discussed with patient    who verbalized understanding  and agreement of plans of care. No further questions or concerns  voiced at this time.       New Medications:  -None.       Consults:    -Anesthesia       Pending Diagnostic Studies:     Procedure Component Value Units Date/Time    EKG 12-LEAD [[id number]] Collected:  09/26/19 1001    Order Status:  Sent Lab Status:  In process Updated:  09/26/19 1031     Narrative:       Test Reason : I48.91,    Vent. Rate : 068 BPM     Atrial Rate : 068 BPM     P-R Int : 204 ms          QRS Dur : 084 ms      QT Int : 412 ms       P-R-T Axes : 035 017 041 degrees     QTc Int : 438 ms    Normal sinus rhythm  Nonspecific ST and/or T wave abnormalities    Since previous tracing  Normal sinus rhythm has replaced Atrial fibrillation  Confirmed by fellow Jemal MOREJON (Fellow's Unofficial Report), Dereje ([id number])  on 9/26/2019 10:30:59 AM    Referred By: KENDALL ORELLANA           Confirmed By:           Final Active Diagnoses:    Diagnosis Date Noted POA    PRINCIPAL PROBLEM:  Persistent atrial fibrillation [I48.1] 09/03/2019 Yes      Problems Resolved During this Admission:     No new Assessment & Plan notes have been filed under this hospital service since the last note was generated.  Service: Arrhythmia      Discharged Condition: good    Disposition: Home or Self Care    Follow Up:  Follow-up Information     Eduardo Sanchez MD In 4 weeks.    Specialties:  Electrophysiology, Cardiology  Why:  EKG in 1 week   Contact information:  [address] [place] [id number]  [phone]                 Patient Instructions:      No driving until:   Order Comments: For 24 hours post procedure     Notify your health care provider if you experience any of the following:  temperature >100.4     Notify your health care provider if you experience any of the following:  persistent nausea and vomiting or diarrhea     Notify your health care provider if you experience any of the following:  severe uncontrolled pain     Notify your health care provider if you experience any of the following:  redness, tenderness, or signs of infection (pain, swelling, redness, odor or green/yellow discharge around incision site)     Notify your health care provider if you experience any of the following:  difficulty breathing or increased cough     Notify your health care provider if you experience any of the following:   severe persistent headache     Notify your health care provider if you experience any of the following:  worsening rash     Notify your health care provider if you experience any of the following:  persistent dizziness, light-headedness, or visual disturbances     Notify your health care provider if you experience any of the following:  increased confusion or weakness     Notify your health care provider if you experience any of the following:   Order Comments: For any concerning medical symptoms     Other restrictions (specify):   Order Comments: Medications:  - Continue blood thinner Eliquis 5 mg twice daily for stroke prevention          Diet  -You may resume oral intake after you are discharged, as long you have no swallowing difficulties.     Side effects from your sedation:  -You may be drowsy for the remainder of the day.     Because you have received sedation for this procedure:  -Limit activity for the remainder of the day.  -Do not drive or operate any equipment for  24 hours   -Do not smoke for at least 6 hours and until you are fully awake and alert.  -Do not drink alcoholic beverage for 24 hours.  -Defer important decision making until the following day.     Go to the Emergency Department if you develop:  -Bleeding  -Weakness or numbness  -Visual, gait or speech disturbance  -New chest pain, palpitations, shortness of breath, rapid heart beat, or fainting  -Fever     Follow up:  -EKG in 1 week   -Dr. Daniel Clark  in 1 month.     Medications:  Reconciled Home Medications:      Medication List      CONTINUE taking these medications    apixaban 5 mg Tab  Commonly known as:  ELIQUIS  Take 1 tablet (5 mg total) by mouth 2 (two) times daily.     CINNAMON ORAL  Take by mouth once daily. Pt taking 2 pills daily.     diclofenac-misoprostol  mg-mcg  mg-mcg per tablet  Commonly known as:  ARTHROTEC 50  Take 1 tablet by mouth 2 (two) times daily as needed (pain and inflammation). Take with food.      fish oil-omega-3 fatty acids 300-1,000 mg capsule  Take 2 g by mouth once daily.     LIPOTRIAD VISIONARY ORAL  Take by mouth.     metFORMIN 500 MG 24 hr tablet  Commonly known as:  GLUCOPHAGE-XR  Take 2 tablets (1,000 mg total) by mouth 2 (two) times daily with meals.     metoprolol succinate 50 MG 24 hr tablet  Commonly known as:  TOPROL-XL  Take 1 tablet (50 mg total) by mouth once daily.     ONE-A-DAY WOMEN'S ACTIVE ORAL  Take by mouth.     rosuvastatin 10 MG tablet  Commonly known as:  CRESTOR  TAKE 1 TABLET(10 MG) BY MOUTH EVERY DAY     * SYNTHROID 100 MCG tablet  Generic drug:  levothyroxine  Take 1 tablet (100 mcg total) by mouth once daily.     * levothyroxine 75 MCG tablet  Commonly known as:  SYNTHROID  Take 0.5 tablets (37.5 mcg total) by mouth once daily. Take     VITAMIN C 100 MG tablet  Generic drug:  ascorbic acid (vitamin C)  Take 100 mg by mouth once daily.     vitamin D 1000 units Tab  Commonly known as:  VITAMIN D3  Take 1,000 Units by mouth once daily.         * This list has 2 medication(s) that are the same as other medications prescribed for you. Read the directions carefully, and ask your doctor or other care provider to review them with you.                Time spent on the discharge of patient: 20  minutes    Madison Victoria NP  Cardiac Electrophysiology  [facility]    STAFF MD Eduardo Sanchez

## 2019-09-26 NOTE — HPI
"69 y.o. woman with PMHx of HFrEF (45%), AF, DM2, obesity who presents for DORINA/DCCV.    Dysphagia or odynophagia:  {Blank single:67020::"Yes","No"}  Liver Disease, esophageal disease, or known varices:  {Blank single:94829::"Yes","No"}  Upper GI Bleeding: {Blank single:92738::"Yes","No"}  Snoring:  {Blank single:68706::"Yes","No"}  Sleep Apnea:  {Blank single:14648::"Yes","No"}  Prior neck surgery or radiation:  {Blank single:17259::"Yes","No"}  History of anesthetic difficulties:  {Blank single:40432::"Yes","No"}  Family history of anesthetic difficulties:  {Blank single:90446::"Yes","No"}  Last oral intake:  {Blank single:18210::"12 hours ago","24 hours ago"}  Able to move neck in all directions:  {Blank single:44283::"Yes","No"}  Anticoagulation/Antiplatelets: ***    Platelet count:   Lab Results   Component Value Date     09/19/2019       INR:  Lab Results   Component Value Date    INR 1.1 09/19/2019    INR 1.1 04/05/2005       Last TTE:   · Atrial fibrillation and frequent ectopy was present during the study.  · Moderate left atrial enlargement.  · Mild left ventricular enlargement: 5.5 cm.  · Mildly decreased left ventricular systolic function. The estimated ejection fraction is 45%  · Mild global hypokinesis.  · Diastolic pattern consistent with atrial fibrillation observed.  · Mild right atrial enlargement.  · Low normal right ventricular systolic function.  · The estimated PA systolic pressure is 36 mm Hg  · Mild tricuspid regurgitation.  · Normal central venous pressure (3 mm Hg).    Last DORINA: None on file.    Last EGD: None on file  "

## 2019-09-30 ENCOUNTER — CLINICAL SUPPORT (OUTPATIENT)
Dept: REHABILITATION | Facility: HOSPITAL | Age: 69
End: 2019-09-30
Attending: FAMILY MEDICINE
Payer: MEDICARE

## 2019-09-30 DIAGNOSIS — M25.619 DECREASED RANGE OF MOTION OF SHOULDER, UNSPECIFIED LATERALITY: ICD-10-CM

## 2019-09-30 PROCEDURE — 97110 THERAPEUTIC EXERCISES: CPT | Mod: PO

## 2019-09-30 NOTE — PROGRESS NOTES
Physical Therapy Daily Treatment Note/Updated POC     Name: Bebe Valdez  Clinic Number: [id number]    Therapy Diagnosis:   Encounter Diagnosis   Name Primary?   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 Patient reports she was working with her  and felt fine on the treadmill and was able to hold conversation but he noticed her HR was high. She had an EKG done and recently found out that she has A-fib. Patient reports her cardiologist told her she could go back to her  however has not yet but has done yoga. Patient also reports she went to the [email or web address] game and that her feet started cramping and she got dizzy. Reports her BP dropped and she had to get IV fluids. She thinks she was dehydrated. Reports she did inform her doctors.   She was compliant with home exercise program.  Response to previous treatment: no change  Functional change: no change    Pain: 0/10   Location: B shoulders , L>R    Objective        Active Range of Motion:   Shoulder Left Right   Flexion 150 160   Abduction 145 150   ER at 0 77 80   ER at 90 NT NT   IR T10 p! (slight) T10      Passive Range of Motion:   Shoulder Left Right   Flexion 156 175   Abduction 150 157   ER at 0 70p! 80 p!   ER at 90 - -   IR Full, no pain Full, no pain      Upper Extremity Strength  (R) UE   (L) UE     Shoulder flexion: 5/5 Shoulder flexion: 5/5   Shoulder  Abduction: 4+/5 Shoulder abduction: 4+/5   Shoulder ER 4+/5 Shoulder ER 4+/5   Shoulder IR 5/5 Shoulder IR 5/5   Lower Trap 4-/5 Lower Trap 4-/5   Middle Trap 4/5 Middle Trap 4/5   Rhomboids 4/5 Rhomboids 4/5      CMS Impairment/Limitation/Restriction for FOTO Shoulder Survey  Status Limitation G-Code CMS Severity Modifier  Intake 58% 42%  Predicted 70% 30% Goal Status+ CJ - At least 20 percent but less than 40 percent  9/30/2019 63% 37% Current Status CJ - At least 20 percent but less than 40 percent      Bebe received therapeutic exercises to develop strength, endurance, ROM, flexibility and posture for 50 minutes including:  Warm-up:  · UBE 2'/2' fwd and backward  · Shoulder scaption pulleys 3'  Supine:  · Wand flexion 20 x 5s 2#  · B shoulder PROM  Standing:  · Wall washes fwd 15 x 5s B   · Stair slides 15 x5s abd  · Rows OTB 2 x 15  · Ball on wall cw/ccw x 10 ea way B  · Wall push ups 2 x 10  · scaption no weight mirror x 10 B      Home Exercises Provided and Patient Education Provided     Education provided:   - proper posture in sitting and standing  - HEP compliance    Written Home Exercises Provided: updated HEP privided  Exercises were reviewed and Bebe was able to demonstrate them prior to the end of the session.  Bebe demonstrated good  understanding of the education provided.     See EMR under Patient Instructions for exercises provided 7/1/19.     Assessment     Patient was seen for 17 outpatient PT visits from 5/15/19 to 9/30/2019. Treatment included: evaluation, HEP, pt education, manual therapy, ther ex. Pt has met most goals. She is reporting an overall improvement in functional mobility and significantly decreased pain levels since SOC. She is now able to perform all ADLs and sleep uninterrupted without pain. Pt to continue HEP and she is to follow-up with MD as planned. This patient is discharged from outpatient PT Services.    Goals:     Short Term Goals:  4 weeks     -  Pt will  increase B shoulder flexion ROM to at least 160 degrees in order to show improved functional mobility. Progressing 9/30/2019   -  Pt will increase B shoulder abduction ROM to at least 150 degrees in order to show improved functional mobility.Met 9/30/2019   - Pt will be independent and consistent with issued HEP in order to show carryover between therapy sessions. Met 7/22/19  - Pt. to demonstrate proper cervical and scapula retraction requiring min. to no verbal cues from PT Met 7/22/19        Long Term Goals: 8 weeks  - Pt will report decreased shoulder pain to 1/10 at night in order to increase tolerance for sleeping at night. Met 9/30/2019   - Pt will be able to reach behind her to get something from the back seat without pain in order to improve functional mobility. Met 9/30/2019   - Pt will increase shoulder periscapular strength by 1 ms grade in order to increase tolerance to functional activities and ADLs. Met 9/30/2019   - Pt will improve FOTO shoulder score to CJ at least 20% < 40% impaired, limited or restricted in order to demonstrate an increase in functional mobility and improved ADL performance. Met 9/30/2019   - Pt will be independent with updated HEP to maintain gains following discharge with therapy. Met 9/30/2019     Plan     Discharge from PT with updated HEP    Ashley Holstein, PT

## 2019-10-03 ENCOUNTER — HOSPITAL ENCOUNTER (OUTPATIENT)
Dept: CARDIOLOGY | Facility: CLINIC | Age: 69
Discharge: HOME | End: 2019-10-03
Payer: MEDICARE

## 2019-10-03 DIAGNOSIS — I48.0 PAROXYSMAL ATRIAL FIBRILLATION: ICD-10-CM

## 2019-10-03 PROCEDURE — 93010 RHYTHM STRIP: ICD-10-PCS | Mod: S$PBB,,, | Performed by: INTERNAL MEDICINE

## 2019-10-03 PROCEDURE — 93005 ELECTROCARDIOGRAM TRACING: CPT | Mod: PBBFAC | Performed by: INTERNAL MEDICINE

## 2019-10-03 PROCEDURE — 93010 ELECTROCARDIOGRAM REPORT: CPT | Mod: S$PBB,,, | Performed by: INTERNAL MEDICINE

## 2019-10-06 ENCOUNTER — PATIENT MESSAGE (OUTPATIENT)
Dept: ELECTROPHYSIOLOGY | Facility: CLINIC | Age: 69
End: 2019-10-06

## 2019-10-08 ENCOUNTER — PATIENT MESSAGE (OUTPATIENT)
Dept: ELECTROPHYSIOLOGY | Facility: CLINIC | Age: 69
End: 2019-10-08

## 2019-10-22 ENCOUNTER — PATIENT OUTREACH (OUTPATIENT)
Dept: ADMINISTRATIVE | Facility: HOSPITAL | Age: 69
End: 2019-10-22

## 2019-10-22 ENCOUNTER — LAB VISIT (OUTPATIENT)
Dept: LAB | Facility: HOSPITAL | Age: 69
End: 2019-10-22
Attending: FAMILY MEDICINE
Payer: MEDICARE

## 2019-10-22 DIAGNOSIS — E03.9 HYPOTHYROIDISM, UNSPECIFIED TYPE: ICD-10-CM

## 2019-10-22 DIAGNOSIS — E78.00 TYPE 2 DIABETES MELLITUS WITH HYPERCHOLESTEROLEMIA: ICD-10-CM

## 2019-10-22 DIAGNOSIS — E11.69 TYPE 2 DIABETES MELLITUS WITH HYPERCHOLESTEROLEMIA: ICD-10-CM

## 2019-10-22 DIAGNOSIS — I50.22 CHRONIC SYSTOLIC HEART FAILURE: ICD-10-CM

## 2019-10-22 LAB
ALBUMIN SERPL BCP-MCNC: 3.8 G/DL (ref 3.5–5.2)
ALP SERPL-CCNC: 57 U/L (ref 55–135)
ALT SERPL W/O P-5'-P-CCNC: 15 U/L (ref 10–44)
ANION GAP SERPL CALC-SCNC: 8 MMOL/L (ref 8–16)
AST SERPL-CCNC: 15 U/L (ref 10–40)
BASOPHILS # BLD AUTO: 0.04 K/UL (ref 0–0.2)
BASOPHILS NFR BLD: 0.7 % (ref 0–1.9)
BILIRUB SERPL-MCNC: 0.7 MG/DL (ref 0.1–1)
BUN SERPL-MCNC: 9 MG/DL (ref 8–23)
CALCIUM SERPL-MCNC: 9.8 MG/DL (ref 8.7–10.5)
CHLORIDE SERPL-SCNC: 106 MMOL/L (ref 95–110)
CHOLEST SERPL-MCNC: 107 MG/DL (ref 120–199)
CHOLEST/HDLC SERPL: 2.5 {RATIO} (ref 2–5)
CO2 SERPL-SCNC: 27 MMOL/L (ref 23–29)
CREAT SERPL-MCNC: 0.7 MG/DL (ref 0.5–1.4)
DIFFERENTIAL METHOD: NORMAL
EOSINOPHIL # BLD AUTO: 0.2 K/UL (ref 0–0.5)
EOSINOPHIL NFR BLD: 3.8 % (ref 0–8)
ERYTHROCYTE [DISTWIDTH] IN BLOOD BY AUTOMATED COUNT: 13.6 % (ref 11.5–14.5)
EST. GFR  (AFRICAN AMERICAN): >60 ML/MIN/1.73 M^2
EST. GFR  (NON AFRICAN AMERICAN): >60 ML/MIN/1.73 M^2
ESTIMATED AVG GLUCOSE: 140 MG/DL (ref 68–131)
GLUCOSE SERPL-MCNC: 119 MG/DL (ref 70–110)
HBA1C MFR BLD HPLC: 6.5 % (ref 4–5.6)
HCT VFR BLD AUTO: 42 % (ref 37–48.5)
HDLC SERPL-MCNC: 42 MG/DL (ref 40–75)
HDLC SERPL: 39.3 % (ref 20–50)
HGB BLD-MCNC: 13.5 G/DL (ref 12–16)
IMM GRANULOCYTES # BLD AUTO: 0.01 K/UL (ref 0–0.04)
IMM GRANULOCYTES NFR BLD AUTO: 0.2 % (ref 0–0.5)
LDLC SERPL CALC-MCNC: 48.2 MG/DL (ref 63–159)
LYMPHOCYTES # BLD AUTO: 1.9 K/UL (ref 1–4.8)
LYMPHOCYTES NFR BLD: 32.8 % (ref 18–48)
MCH RBC QN AUTO: 29.5 PG (ref 27–31)
MCHC RBC AUTO-ENTMCNC: 32.1 G/DL (ref 32–36)
MCV RBC AUTO: 92 FL (ref 82–98)
MONOCYTES # BLD AUTO: 0.5 K/UL (ref 0.3–1)
MONOCYTES NFR BLD: 9.4 % (ref 4–15)
NEUTROPHILS # BLD AUTO: 3.1 K/UL (ref 1.8–7.7)
NEUTROPHILS NFR BLD: 53.1 % (ref 38–73)
NONHDLC SERPL-MCNC: 65 MG/DL
NRBC BLD-RTO: 0 /100 WBC
PLATELET # BLD AUTO: 239 K/UL (ref 150–350)
PMV BLD AUTO: 11.3 FL (ref 9.2–12.9)
POTASSIUM SERPL-SCNC: 4.3 MMOL/L (ref 3.5–5.1)
PROT SERPL-MCNC: 6.5 G/DL (ref 6–8.4)
RBC # BLD AUTO: 4.57 M/UL (ref 4–5.4)
SODIUM SERPL-SCNC: 141 MMOL/L (ref 136–145)
T4 FREE SERPL-MCNC: 1.48 NG/DL (ref 0.71–1.51)
TRIGL SERPL-MCNC: 84 MG/DL (ref 30–150)
TSH SERPL DL<=0.005 MIU/L-ACNC: 0.04 UIU/ML (ref 0.4–4)
WBC # BLD AUTO: 5.74 K/UL (ref 3.9–12.7)

## 2019-10-22 PROCEDURE — 83036 HEMOGLOBIN GLYCOSYLATED A1C: CPT

## 2019-10-22 PROCEDURE — 84439 ASSAY OF FREE THYROXINE: CPT

## 2019-10-22 PROCEDURE — 84443 ASSAY THYROID STIM HORMONE: CPT

## 2019-10-22 PROCEDURE — 36415 COLL VENOUS BLD VENIPUNCTURE: CPT | Mod: PO

## 2019-10-22 PROCEDURE — 80053 COMPREHEN METABOLIC PANEL: CPT

## 2019-10-22 PROCEDURE — 85025 COMPLETE CBC W/AUTO DIFF WBC: CPT

## 2019-10-22 PROCEDURE — 80061 LIPID PANEL: CPT

## 2019-10-24 NOTE — PROGRESS NOTES
Ms. Valdez is a patient of Dr. Sanchez and was last seen in clinic 9/17/2019.      Subjective:   Patient ID:  Bebe Valdez is a 69 y.o. female who presents for follow-up of Atrial Fibrillation  .     HPI:    Ms. Valdez is a 69 y.o. female with hypothyroid, DM, cardiomyopathy here for follow up after cardioversion.    Background:    Referring Cardiologist: Tami De Leon MD  Primary Care Physician: Anil Nelson MD    Ms. Valdez has a history of diabetes mellitus, hypothyroidism on synthroid and recent onset of persistent atrial fibrillation. She was exercising with a  in July 2019 and pulse was noted to be elevated and irregular. She saw Dr. Nelson on 8/26/2019 and was diagnosed with atrial fibrillation. She was started on metoprolol and aspirin. TSH was checked and was undetectable. Her synthroid dosage was reduced. She was then assessed by Dr. De Leon who started her on eliquis and referred her here for further evaluation. A transthoracic echocardiogram was performed noting a LVEF of 45% with moderate left atrial enlargement and mildly depressed RV function. She reports no obvious symptoms related to AF.    Available ECGs in Epic which note sinus rhythm with normal intervals until an ECG dated 8/26/2019 which shows atrial fibrillation with an average ventricular rate of 87 bpm and a single PVC.  Her [id number] score is 4 and indefinite anticoagulation is recommended. Since her EF is depressed I recommend rhythm control strategy. Her depressed EF very well may be from atrial fibrillation and/or hyperthyroidism. Recommend DORINA/DCCV.     Would recommend anti-arrhythmic therapy to help reduce risk of recurrence and allow time for her EF to recover. She has no symptoms of heart failure and her EF is 45%. Options include dofetilide versus sotalol, both would need hospital admission for initiation. She desires to try just a cardioversion without drug therapy unless it recurs. DORINA/DCCV. Follow-up after.      Update  (10/25/2019):    9/26/2019: Underwent successful DORINA/DCCV. EF on DORINA was 45%.     Today she says she feels fine. Had no symptom improvement in SR. Does not know when she went into AF. Ms. Valdez denies chest pain with exertion or at rest, palpitations, SOB, SON, dizziness, or syncope. She continues to exercise without issues.    She is currently taking eliquis 5mg BID for stroke prophylaxis and denies significant bleeding episodes. She is currently being treated with metoprolol succinate 100mg daily for HR control. Kidney function is stable, with a creatinine of 0.7 on 10/22/2019.    I have personally reviewed the patient's EKG today, which shows AF at 64bpm. QRS is 82. QTc is 400. Occasional PVCs.    Recent Cardiac Tests:    DORINA (9/26/2019):  · Mildly decreased left ventricular systolic function. The estimated ejection fraction is 45%  · Mildly reduced right ventricular systolic function.  · Normal appearing left atrial appendage. No thrombus is present in the appendage. Abnormal appendage velocities.  · Mild tricuspid regurgitation.    Current Outpatient Medications   Medication Sig    apixaban (ELIQUIS) 5 mg Tab Take 1 tablet (5 mg total) by mouth 2 (two) times daily.    ascorbic acid (VITAMIN C) 100 MG tablet Take 100 mg by mouth once daily.    CINNAMON BARK (CINNAMON ORAL) Take by mouth once daily. Pt taking 2 pills daily.    diclofenac-misoprostol  mg-mcg (ARTHROTEC 50)  mg-mcg per tablet Take 1 tablet by mouth 2 (two) times daily as needed (pain and inflammation). Take with food.    fish oil-omega-3 fatty acids 300-1,000 mg capsule Take 2 g by mouth once daily.    levothyroxine (SYNTHROID) 75 MCG tablet Take 0.5 tablets (37.5 mcg total) by mouth once daily. Take    metFORMIN (GLUCOPHAGE-XR) 500 MG 24 hr tablet Take 2 tablets (1,000 mg total) by mouth 2 (two) times daily with meals.    metoprolol succinate (TOPROL-XL) 50 MG 24 hr tablet Take 1 tablet (50 mg total) by mouth once daily.     "MV,CA,MIN/IRON/FA/GUARANA/CAFF (ONE-A-DAY WOMEN'S ACTIVE ORAL) Take by mouth.    MV-MN/OM3/DHA/EPA/FISH/LUT/JOSE G (LIPOTRIAD VISIONARY ORAL) Take by mouth.    rosuvastatin (CRESTOR) 10 MG tablet TAKE 1 TABLET(10 MG) BY MOUTH EVERY DAY    SYNTHROID 100 mcg tablet Take 1 tablet (100 mcg total) by mouth once daily.    vitamin D (VITAMIN D3) 1000 units Tab Take 1,000 Units by mouth once daily.     No current facility-administered medications for this visit.      Facility-Administered Medications Ordered in Other Visits   Medication    0.9%  NaCl infusion    sodium chloride 0.9% flush 5 mL     Review of Systems   Constitution: Negative for malaise/fatigue.   Cardiovascular: Negative for chest pain, dyspnea on exertion, irregular heartbeat, leg swelling and palpitations.   Respiratory: Negative for shortness of breath.    Hematologic/Lymphatic: Negative for bleeding problem.   Skin: Negative for rash.   Musculoskeletal: Negative for myalgias.   Gastrointestinal: Negative for hematemesis, hematochezia and nausea.   Genitourinary: Negative for hematuria.   Neurological: Negative for light-headedness.   Psychiatric/Behavioral: Negative for altered mental status.   Allergic/Immunologic: Negative for persistent infections.     Objective:        /68   Pulse 61   Ht 5' 6" (1.676 m)   Wt 84.1 kg (185 lb 6.5 oz)   LMP 07/18/2001   BMI 29.93 kg/m²     Physical Exam   Constitutional: She is oriented to person, place, and time. She appears well-developed and well-nourished.   HENT:   Head: Normocephalic.   Nose: Nose normal.   Eyes: Pupils are equal, round, and reactive to light.   Cardiovascular: Normal rate, S1 normal and S2 normal. An irregularly irregular rhythm present.   No murmur heard.  Pulses:       Radial pulses are 2+ on the right side, and 2+ on the left side.   Pulmonary/Chest: Breath sounds normal. No respiratory distress.   Abdominal: Normal appearance.   Musculoskeletal: Normal range of motion. She " exhibits no edema.   Neurological: She is alert and oriented to person, place, and time.   Skin: Skin is warm and dry. No erythema.   Psychiatric: She has a normal mood and affect. Her speech is normal and behavior is normal.   Nursing note and vitals reviewed.    Lab Results   Component Value Date     10/22/2019    K 4.3 10/22/2019    MG 2.3 04/08/2005    BUN 9 10/22/2019    CREATININE 0.7 10/22/2019    ALT 15 10/22/2019    AST 15 10/22/2019    HGB 13.5 10/22/2019    HCT 42.0 10/22/2019    TSH 0.039 (L) 10/22/2019    LDLCALC 48.2 (L) 10/22/2019       Recent Labs   Lab 09/19/19  1415   INR 1.1       Assessment:     1. Persistent atrial fibrillation    2. History of cardioversion    3. Chronic anticoagulation      Plan:     In summary, Ms. Valdez is a 69 y.o. female with hypothyroid, DM, cardiomyopathy here for follow up after cardioversion.  She is one month s/p cardioversion and back in atrial fibrillation. Rate is controlled. She experienced no symptom improvement in sinus rhythm. However, EF is 45%. We discussed that this could be from the AF or from her period of hyperthyroidism. She is still hyperthyroid and is going to decrease her synthroid again per PCP (has a follow up soon). It would increase the likelihood of maintaining sinus rhythm after cardioversion if her thyroid levels are normal. We discussed anti-arrhythmic options, which include tikosyn or sotalol which both require hospital stays. She does not want to proceed with that route. It might be possible to use amio after her DCCV, after her thyroid has normalized and her synthroid dose is appropriate, until her follow up echo. If her EF normalizes, could switch to 1C or Multaq. Will discuss further with Dr. Sanchez. At this point, she is seeing her PCP in about a week to reduce her synthroid dose as she is still hyperthyroid. Will plan on rhythm control after she is euthyroid.     Follow up with PCP re: synthroid.  Will develop rhythm control  strategy.  Placed recall for 6 weeks as her TSH should be normal by that point (she is being tested monthly).    *A copy of this note has been sent to Dr. Sanchez*    Follow up in about 6 weeks (around 12/6/2019).    ------------------------------------------------------------------    CHRISTA Soares, NP-C  Cardiac Electrophysiology

## 2019-10-25 ENCOUNTER — OFFICE VISIT (OUTPATIENT)
Dept: ELECTROPHYSIOLOGY | Facility: CLINIC | Age: 69
End: 2019-10-25
Payer: MEDICARE

## 2019-10-25 ENCOUNTER — HOSPITAL ENCOUNTER (OUTPATIENT)
Dept: CARDIOLOGY | Facility: CLINIC | Age: 69
Discharge: HOME | End: 2019-10-25
Payer: MEDICARE

## 2019-10-25 VITALS
DIASTOLIC BLOOD PRESSURE: 68 MMHG | WEIGHT: 185.44 LBS | HEIGHT: 66 IN | BODY MASS INDEX: 29.8 KG/M2 | SYSTOLIC BLOOD PRESSURE: 134 MMHG | HEART RATE: 61 BPM

## 2019-10-25 DIAGNOSIS — I48.0 PAROXYSMAL ATRIAL FIBRILLATION: ICD-10-CM

## 2019-10-25 DIAGNOSIS — Z79.01 CHRONIC ANTICOAGULATION: ICD-10-CM

## 2019-10-25 DIAGNOSIS — Z92.89 HISTORY OF CARDIOVERSION: ICD-10-CM

## 2019-10-25 DIAGNOSIS — I48.19 PERSISTENT ATRIAL FIBRILLATION: Primary | ICD-10-CM

## 2019-10-25 PROCEDURE — 99999 PR PBB SHADOW E&M-EST. PATIENT-LVL III: CPT | Mod: PBBFAC,,, | Performed by: NURSE PRACTITIONER

## 2019-10-25 PROCEDURE — 99214 PR OFFICE/OUTPT VISIT, EST, LEVL IV, 30-39 MIN: ICD-10-PCS | Mod: S$PBB,,, | Performed by: NURSE PRACTITIONER

## 2019-10-25 PROCEDURE — 93005 ELECTROCARDIOGRAM TRACING: CPT | Mod: PBBFAC | Performed by: INTERNAL MEDICINE

## 2019-10-25 PROCEDURE — 99214 OFFICE O/P EST MOD 30 MIN: CPT | Mod: S$PBB,,, | Performed by: NURSE PRACTITIONER

## 2019-10-25 PROCEDURE — 93010 ELECTROCARDIOGRAM REPORT: CPT | Mod: S$PBB,,, | Performed by: INTERNAL MEDICINE

## 2019-10-25 PROCEDURE — 93010 RHYTHM STRIP: ICD-10-PCS | Mod: S$PBB,,, | Performed by: INTERNAL MEDICINE

## 2019-10-25 PROCEDURE — 99213 OFFICE O/P EST LOW 20 MIN: CPT | Mod: PBBFAC,25 | Performed by: NURSE PRACTITIONER

## 2019-10-25 PROCEDURE — 99999 PR PBB SHADOW E&M-EST. PATIENT-LVL III: ICD-10-PCS | Mod: PBBFAC,,, | Performed by: NURSE PRACTITIONER

## 2019-10-25 NOTE — Clinical Note
Back in AF, still hyperthyroid, plan is to reduce her synthroid again. She is very much against hospitalization for tikosyn/sotalol. Would you consider short term amio (after her thyroid is normal) to see if her EF improves then switch her to 1C if it does? Or multaq? DENNIS Friedman

## 2019-11-05 ENCOUNTER — HOSPITAL ENCOUNTER (OUTPATIENT)
Dept: RADIOLOGY | Facility: HOSPITAL | Age: 69
Discharge: HOME | End: 2019-11-05
Attending: FAMILY MEDICINE
Payer: MEDICARE

## 2019-11-05 ENCOUNTER — IMMUNIZATION (OUTPATIENT)
Dept: PHARMACY | Facility: CLINIC | Age: 69
End: 2019-11-05
Payer: MEDICARE

## 2019-11-05 ENCOUNTER — OFFICE VISIT (OUTPATIENT)
Dept: PRIMARY CARE CLINIC | Facility: CLINIC | Age: 69
End: 2019-11-05
Payer: MEDICARE

## 2019-11-05 VITALS
HEART RATE: 74 BPM | BODY MASS INDEX: 29.55 KG/M2 | RESPIRATION RATE: 14 BRPM | TEMPERATURE: 98 F | DIASTOLIC BLOOD PRESSURE: 75 MMHG | OXYGEN SATURATION: 97 % | HEIGHT: 66 IN | WEIGHT: 183.88 LBS | SYSTOLIC BLOOD PRESSURE: 130 MMHG

## 2019-11-05 DIAGNOSIS — I48.19 PERSISTENT ATRIAL FIBRILLATION: ICD-10-CM

## 2019-11-05 DIAGNOSIS — E11.69 OBESITY, DIABETES, AND HYPERTENSION SYNDROME: ICD-10-CM

## 2019-11-05 DIAGNOSIS — Z12.31 SCREENING MAMMOGRAM FOR HIGH-RISK PATIENT: ICD-10-CM

## 2019-11-05 DIAGNOSIS — I15.2 OBESITY, DIABETES, AND HYPERTENSION SYNDROME: ICD-10-CM

## 2019-11-05 DIAGNOSIS — E11.59 OBESITY, DIABETES, AND HYPERTENSION SYNDROME: ICD-10-CM

## 2019-11-05 DIAGNOSIS — E11.69 TYPE 2 DIABETES MELLITUS WITH HYPERCHOLESTEROLEMIA: ICD-10-CM

## 2019-11-05 DIAGNOSIS — Z00.00 ROUTINE GENERAL MEDICAL EXAMINATION AT A HEALTH CARE FACILITY: ICD-10-CM

## 2019-11-05 DIAGNOSIS — Z12.31 SCREENING MAMMOGRAM FOR HIGH-RISK PATIENT: Primary | ICD-10-CM

## 2019-11-05 DIAGNOSIS — E78.00 TYPE 2 DIABETES MELLITUS WITH HYPERCHOLESTEROLEMIA: ICD-10-CM

## 2019-11-05 DIAGNOSIS — E66.9 OBESITY, DIABETES, AND HYPERTENSION SYNDROME: ICD-10-CM

## 2019-11-05 DIAGNOSIS — E03.9 HYPOTHYROIDISM, UNSPECIFIED TYPE: ICD-10-CM

## 2019-11-05 PROCEDURE — 99999 PR PBB SHADOW E&M-EST. PATIENT-LVL IV: ICD-10-PCS | Mod: PBBFAC,,, | Performed by: FAMILY MEDICINE

## 2019-11-05 PROCEDURE — 99999 PR PBB SHADOW E&M-EST. PATIENT-LVL IV: CPT | Mod: PBBFAC,,, | Performed by: FAMILY MEDICINE

## 2019-11-05 PROCEDURE — 99397 PER PM REEVAL EST PAT 65+ YR: CPT | Mod: S$PBB,,, | Performed by: FAMILY MEDICINE

## 2019-11-05 PROCEDURE — 99397 PR PREVENTIVE VISIT,EST,65 & OVER: ICD-10-PCS | Mod: S$PBB,,, | Performed by: FAMILY MEDICINE

## 2019-11-05 PROCEDURE — 77067 MAMMO DIGITAL SCREENING BILAT WITH TOMOSYNTHESIS_CAD: ICD-10-PCS | Mod: 26,,, | Performed by: RADIOLOGY

## 2019-11-05 PROCEDURE — 77063 BREAST TOMOSYNTHESIS BI: CPT | Mod: 26,,, | Performed by: RADIOLOGY

## 2019-11-05 PROCEDURE — 77067 SCR MAMMO BI INCL CAD: CPT | Mod: 26,,, | Performed by: RADIOLOGY

## 2019-11-05 PROCEDURE — 77067 SCR MAMMO BI INCL CAD: CPT | Mod: TC,PN

## 2019-11-05 PROCEDURE — 77063 MAMMO DIGITAL SCREENING BILAT WITH TOMOSYNTHESIS_CAD: ICD-10-PCS | Mod: 26,,, | Performed by: RADIOLOGY

## 2019-11-05 PROCEDURE — 99214 OFFICE O/P EST MOD 30 MIN: CPT | Mod: PBBFAC,PN | Performed by: FAMILY MEDICINE

## 2019-11-05 RX ORDER — PROMETHAZINE HYDROCHLORIDE AND CODEINE PHOSPHATE 6.25; 1 MG/5ML; MG/5ML
5 SOLUTION ORAL EVERY 4 HOURS PRN
Qty: 118 ML | Refills: 0 | Status: SHIPPED | OUTPATIENT
Start: 2019-11-05 | End: 2019-11-15

## 2019-11-05 RX ORDER — AMOXICILLIN 500 MG/1
500 TABLET, FILM COATED ORAL EVERY 12 HOURS
Qty: 20 TABLET | Refills: 0 | Status: SHIPPED | OUTPATIENT
Start: 2019-11-05 | End: 2019-11-15

## 2019-11-05 NOTE — PROGRESS NOTES
Subjective:       Patient ID: Bebe Valdez is a 69 y.o. female.    Chief Complaint: Annual Exam and Sinus Problem    68 YO presents for routine exam.  Since last seen, has been followed by Cardiology and EP department for recurrent A Fib.  She is still not under good control for history of hypothyroidism.  Has planned follow up with Arrhythmia service in about 6 weeks  She has now finished PT for history of adhesive capsulitis    Review of Systems   Constitutional: Negative.  Negative for activity change, appetite change, chills, diaphoresis, fatigue, fever and unexpected weight change.   HENT: Positive for rhinorrhea and sinus pressure. Negative for congestion, ear pain, hearing loss, sore throat, tinnitus, trouble swallowing and voice change.    Eyes: Negative.  Negative for photophobia, pain, discharge and visual disturbance.   Respiratory: Negative.  Negative for cough, chest tightness, shortness of breath and wheezing.    Cardiovascular: Negative.  Negative for chest pain, palpitations and leg swelling.   Gastrointestinal: Negative.  Negative for abdominal distention, abdominal pain, blood in stool, constipation, diarrhea, nausea and vomiting.   Endocrine: Negative for polydipsia and polyuria.   Genitourinary: Negative.  Negative for difficulty urinating, dysuria, frequency, hematuria and menstrual problem.   Musculoskeletal: Negative.  Negative for arthralgias, back pain, joint swelling, neck pain and neck stiffness.   Skin: Negative for color change, pallor and rash.   Neurological: Negative.  Negative for dizziness, tremors, speech difficulty, weakness, light-headedness, numbness and headaches.   Hematological: Negative for adenopathy. Does not bruise/bleed easily.   Psychiatric/Behavioral: Negative for agitation, confusion, dysphoric mood, hallucinations and sleep disturbance. The patient is not nervous/anxious.        Objective:      Physical Exam   Constitutional: She is oriented to person, place, and  time. She appears well-developed and well-nourished.   HENT:   Right Ear: Tympanic membrane, external ear and ear canal normal.   Left Ear: Tympanic membrane, external ear and ear canal normal.   Nose: Mucosal edema and rhinorrhea present. Right sinus exhibits maxillary sinus tenderness. Left sinus exhibits maxillary sinus tenderness.   Mouth/Throat: Uvula is midline and oropharynx is clear and moist. No posterior oropharyngeal erythema.   Eyes: Pupils are equal, round, and reactive to light. Conjunctivae and EOM are normal.   Neck: Normal range of motion. Neck supple. No tracheal deviation present. No thyromegaly present.   Cardiovascular: Normal heart sounds and intact distal pulses. An irregularly irregular rhythm present.   Pulses:       Carotid pulses are 2+ on the right side, and 2+ on the left side.       Dorsalis pedis pulses are 2+ on the right side, and 2+ on the left side.        Posterior tibial pulses are 2+ on the right side, and 2+ on the left side.   Pulmonary/Chest: Effort normal. She has no wheezes. She has no rales. She exhibits no tenderness.   Abdominal: Soft. Bowel sounds are normal. She exhibits no distension and no mass. There is no rebound.   Genitourinary: Vagina normal and uterus normal. No vaginal discharge found.   Musculoskeletal: Normal range of motion. She exhibits no edema or tenderness.   Lymphadenopathy:     She has no cervical adenopathy.   Neurological: She is alert and oriented to person, place, and time. She has normal reflexes. No cranial nerve deficit. Coordination normal.   Skin: Skin is warm and dry. No rash noted. No erythema.   Psychiatric: She has a normal mood and affect. Her behavior is normal.       Assessment:     1.  Physical exam  2.  Hypothyroid  3.  A Fib  4.  Type II DM  5.  Obesity  6.  Adhesive capsulitis, resolved   7.  Acute maxillary sinusitis  Plan:       1.  Labs reviewed with pt  2.  She will only take Synthroid 100mcg, plan to repeat TSH in one month   3.  Mammogram  4.  Shingrix, second dose  5.  Amoxil/Phenergan with codeine syrup

## 2019-12-04 ENCOUNTER — LAB VISIT (OUTPATIENT)
Dept: LAB | Facility: HOSPITAL | Age: 69
End: 2019-12-04
Attending: FAMILY MEDICINE
Payer: MEDICARE

## 2019-12-04 ENCOUNTER — OFFICE VISIT (OUTPATIENT)
Dept: CARDIOLOGY | Facility: CLINIC | Age: 69
End: 2019-12-04
Payer: MEDICARE

## 2019-12-04 VITALS
BODY MASS INDEX: 30.16 KG/M2 | WEIGHT: 187.63 LBS | HEIGHT: 66 IN | DIASTOLIC BLOOD PRESSURE: 84 MMHG | HEART RATE: 84 BPM | SYSTOLIC BLOOD PRESSURE: 130 MMHG

## 2019-12-04 DIAGNOSIS — Z79.01 CHRONIC ANTICOAGULATION: ICD-10-CM

## 2019-12-04 DIAGNOSIS — I50.22 CHRONIC SYSTOLIC HEART FAILURE: ICD-10-CM

## 2019-12-04 DIAGNOSIS — I48.19 PERSISTENT ATRIAL FIBRILLATION: Primary | ICD-10-CM

## 2019-12-04 DIAGNOSIS — E03.9 HYPOTHYROIDISM, UNSPECIFIED TYPE: ICD-10-CM

## 2019-12-04 DIAGNOSIS — Z92.89 HISTORY OF CARDIOVERSION: ICD-10-CM

## 2019-12-04 DIAGNOSIS — E66.09 CLASS 1 OBESITY DUE TO EXCESS CALORIES WITH SERIOUS COMORBIDITY AND BODY MASS INDEX (BMI) OF 32.0 TO 32.9 IN ADULT: ICD-10-CM

## 2019-12-04 DIAGNOSIS — E78.00 TYPE 2 DIABETES MELLITUS WITH HYPERCHOLESTEROLEMIA: ICD-10-CM

## 2019-12-04 DIAGNOSIS — E03.2 HYPOTHYROIDISM DUE TO MEDICATION: ICD-10-CM

## 2019-12-04 DIAGNOSIS — R79.89 LOW SERUM THYROID STIMULATING HORMONE (TSH): ICD-10-CM

## 2019-12-04 DIAGNOSIS — E11.69 TYPE 2 DIABETES MELLITUS WITH HYPERCHOLESTEROLEMIA: ICD-10-CM

## 2019-12-04 LAB — TSH SERPL DL<=0.005 MIU/L-ACNC: 2.86 UIU/ML (ref 0.4–4)

## 2019-12-04 PROCEDURE — 1159F PR MEDICATION LIST DOCUMENTED IN MEDICAL RECORD: ICD-10-PCS | Mod: ,,, | Performed by: INTERNAL MEDICINE

## 2019-12-04 PROCEDURE — 99999 PR PBB SHADOW E&M-EST. PATIENT-LVL III: ICD-10-PCS | Mod: PBBFAC,,, | Performed by: INTERNAL MEDICINE

## 2019-12-04 PROCEDURE — 84443 ASSAY THYROID STIM HORMONE: CPT

## 2019-12-04 PROCEDURE — 99213 PR OFFICE/OUTPT VISIT, EST, LEVL III, 20-29 MIN: ICD-10-PCS | Mod: S$PBB,,, | Performed by: INTERNAL MEDICINE

## 2019-12-04 PROCEDURE — 99213 OFFICE O/P EST LOW 20 MIN: CPT | Mod: PBBFAC,PO | Performed by: INTERNAL MEDICINE

## 2019-12-04 PROCEDURE — 99213 OFFICE O/P EST LOW 20 MIN: CPT | Mod: S$PBB,,, | Performed by: INTERNAL MEDICINE

## 2019-12-04 PROCEDURE — 1126F AMNT PAIN NOTED NONE PRSNT: CPT | Mod: ,,, | Performed by: INTERNAL MEDICINE

## 2019-12-04 PROCEDURE — 1159F MED LIST DOCD IN RCRD: CPT | Mod: ,,, | Performed by: INTERNAL MEDICINE

## 2019-12-04 PROCEDURE — 1126F PR PAIN SEVERITY QUANTIFIED, NO PAIN PRESENT: ICD-10-PCS | Mod: ,,, | Performed by: INTERNAL MEDICINE

## 2019-12-04 PROCEDURE — 36415 COLL VENOUS BLD VENIPUNCTURE: CPT | Mod: PO

## 2019-12-04 PROCEDURE — 99999 PR PBB SHADOW E&M-EST. PATIENT-LVL III: CPT | Mod: PBBFAC,,, | Performed by: INTERNAL MEDICINE

## 2019-12-04 NOTE — PROGRESS NOTES
Subjective:   Patient ID:  Bebe Valdez is a 69 y.o. female who presents for follow-up of Atrial Fibrillation      HPI: Since the last visit patient underwent unsuccessful DCCV. She saw Zoë Valera afterwards and visit with Dr. Sanchez is planned for the next week.  Last TSH still low, but a bit better (0.03) ; one is pending today.    She is asymptomatic. [facility] ithout any symptoms.    Lab Results   Component Value Date     10/22/2019    K 4.3 10/22/2019     10/22/2019    CO2 27 10/22/2019    BUN 9 10/22/2019    CREATININE 0.7 10/22/2019     (H) 10/22/2019    HGBA1C 6.5 (H) 10/22/2019    MG 2.3 04/08/2005    AST 15 10/22/2019    ALT 15 10/22/2019    ALBUMIN 3.8 10/22/2019    PROT 6.5 10/22/2019    BILITOT 0.7 10/22/2019    WBC 5.74 10/22/2019    HGB 13.5 10/22/2019    HCT 42.0 10/22/2019    MCV 92 10/22/2019     10/22/2019    INR 1.1 09/19/2019    TSH 0.039 (L) 10/22/2019    CHOL 107 (L) 10/22/2019    HDL 42 10/22/2019    LDLCALC 48.2 (L) 10/22/2019    TRIG 84 10/22/2019       Review of Systems   Constitution: Negative.   HENT: Negative.    Eyes: Negative.    Cardiovascular: Negative.  Negative for chest pain, claudication, dyspnea on exertion, irregular heartbeat, leg swelling, near-syncope, palpitations and syncope.   Respiratory: Negative.  Negative for cough, shortness of breath, snoring and wheezing.    Endocrine: Negative.  Negative for cold intolerance, heat intolerance, polydipsia, polyphagia and polyuria.   Skin: Negative.    Musculoskeletal: Negative.    Gastrointestinal: Negative.    Genitourinary: Negative.    Neurological: Negative.    Psychiatric/Behavioral: Negative.        Objective:   Physical Exam   Nursing note and vitals reviewed.    Vitals:    12/04/19 1316   BP: 130/84   Pulse: 84          Assessment and Plan:     Problem List Items Addressed This Visit        Cardiology Problems    Type 2 diabetes mellitus  with hypercholesterolemia    Persistent atrial fibrillation - Primary    Chronic systolic heart failure, in setting of synthroid mediated hyperthyroidism and persistent atrial fibrillation       Other    Hypothyroid    Class 1 obesity due to excess calories with serious comorbidity and body mass index (BMI) of 32.0 to 32.9 in adult    History of cardioversion    Chronic anticoagulation    Low serum thyroid stimulating hormone (TSH)          Patient's Medications   New Prescriptions    No medications on file   Previous Medications    APIXABAN (ELIQUIS) 5 MG TAB    Take 1 tablet (5 mg total) by mouth 2 (two) times daily.    ASCORBIC ACID (VITAMIN C) 100 MG TABLET    Take 100 mg by mouth once daily.    CINNAMON BARK (CINNAMON ORAL)    Take by mouth once daily. Pt taking 2 pills daily.    DICLOFENAC-MISOPROSTOL  MG-MCG (ARTHROTEC 50)  MG-MCG PER TABLET    Take 1 tablet by mouth 2 (two) times daily as needed (pain and inflammation). Take with food.    FISH OIL-OMEGA-3 FATTY ACIDS 300-1,000 MG CAPSULE    Take 2 g by mouth once daily.    METFORMIN (GLUCOPHAGE-XR) 500 MG 24 HR TABLET    Take 2 tablets (1,000 mg total) by mouth 2 (two) times daily with meals.    METOPROLOL SUCCINATE (TOPROL-XL) 50 MG 24 HR TABLET    Take 1 tablet (50 mg total) by mouth once daily.    MV,CA,MIN/IRON/FA/GUARANA/CAFF (ONE-A-DAY WOMEN'S ACTIVE ORAL)    Take by mouth.    ROSUVASTATIN (CRESTOR) 10 MG TABLET    TAKE 1 TABLET(10 MG) BY MOUTH EVERY DAY    SYNTHROID 100 MCG TABLET    Take 1 tablet (100 mcg total) by mouth once daily.    VARICELLA-ZOSTER GE-AS01B, PF, (SHINGRIX, PF,) 50 MCG/0.5 ML INJECTION    Inject into the muscle.    VITAMIN D (VITAMIN D3) 1000 UNITS TAB    Take 1,000 Units by mouth once daily.   Modified Medications    No medications on file   Discontinued Medications    MV-MN/OM3/DHA/EPA/FISH/LUT/JOSE G (LIPOTRIAD VISIONARY ORAL)    Take by mouth.   Long discussion regarding atrial fibrillations and therapeutic and  management options. Patient is not interested in any of the chemical cardioversion options or RFA.  She would consider another DCCV.  I have explained to her that ideally TSH should be better before attempting another DCCV.  She states she could pursue rate control only if that option is still on the table.  She will discuss it with Dr. Sanchez next week.  For now continue on the present regimen and discharge from general cardiology clinic.  Follow up in EP clinic.      No follow-ups on file.

## 2019-12-09 NOTE — PROGRESS NOTES
Ms. Valdez is a patient of Dr. Sanchez and was last seen in clinic 10/25/2019.      Subjective:   Patient ID:  Bebe Valdez is a 69 y.o. female who presents for follow-up of Atrial Fibrillation  .     HPI:    Ms. Valdez is a 69 y.o. female with hypothyroid, DM, cardiomyopathy here for follow up.    Background:    Referring Cardiologist: Tami De Leon MD  Primary Care Physician: nAil Nelson MD    Ms. Valdez has a history of diabetes mellitus, hypothyroidism on synthroid and recent onset of persistent atrial fibrillation. She was exercising with a  in July 2019 and pulse was noted to be elevated and irregular. She saw Dr. Nelson on 8/26/2019 and was diagnosed with atrial fibrillation. She was started on metoprolol and aspirin. TSH was checked and was undetectable. Her synthroid dosage was reduced. She was then assessed by Dr. De Leon who started her on eliquis and referred her here for further evaluation. A transthoracic echocardiogram was performed noting a LVEF of 45% with moderate left atrial enlargement and mildly depressed RV function. She reports no obvious symptoms related to AF.    Available ECGs in Epic which note sinus rhythm with normal intervals until an ECG dated 8/26/2019 which shows atrial fibrillation with an average ventricular rate of 87 bpm and a single PVC.  Her [id number] score is 4 and indefinite anticoagulation is recommended. Since her EF is depressed I recommend rhythm control strategy. Her depressed EF very well may be from atrial fibrillation and/or hyperthyroidism. Recommend DORINA/DCCV.     Would recommend anti-arrhythmic therapy to help reduce risk of recurrence and allow time for her EF to recover. She has no symptoms of heart failure and her EF is 45%. Options include dofetilide versus sotalol, both would need hospital admission for initiation. She desires to try just a cardioversion without drug therapy unless it recurs. DORINA/DCCV. Follow-up after.    9/26/2019: Underwent  successful DORINA/DCCV. EF on DORINA was 45%.     10/25/2019: She is one month s/p cardioversion and back in atrial fibrillation. Rate is controlled. She experienced no symptom improvement in sinus rhythm. However, EF is 45%. We discussed that this could be from the AF or from her period of hyperthyroidism. She is still hyperthyroid and is going to decrease her synthroid again per PCP (has a follow up soon). It would increase the likelihood of maintaining sinus rhythm after cardioversion if her thyroid levels are normal. We discussed anti-arrhythmic options, which include tikosyn or sotalol which both require hospital stays. She does not want to proceed with that route. It might be possible to use amio after her DCCV, after her thyroid has normalized and her synthroid dose is appropriate, until her follow up echo. If her EF normalizes, could switch to 1C or Multaq.     Update (12/10/2019):    Today she says she feels at baseline. Some mild fatigue. Mild chronic SON. Denies CP, palpitations, LH, syncope.    She is currently taking eliquis 5mg BID for stroke prophylaxis and denies significant bleeding episodes. She is currently being treated with metoprolol succinate 50mg daily for HR control.  Kidney function is stable, with a creatinine of 0.7 on 10/22/2019.    I have personally reviewed the patient's EKG today, which shows AF at 76bpm. QRS is 86. QTc is 438.    Recent Cardiac Tests:    DORINA (9/26/2019):  · Mildly decreased left ventricular systolic function. The estimated ejection fraction is 45%  · Mildly reduced right ventricular systolic function.  · Normal appearing left atrial appendage. No thrombus is present in the appendage. Abnormal appendage velocities.  · Mild tricuspid regurgitation.    Current Outpatient Medications   Medication Sig    apixaban (ELIQUIS) 5 mg Tab Take 1 tablet (5 mg total) by mouth 2 (two) times daily.    ascorbic acid (VITAMIN C) 100 MG tablet Take 100 mg by mouth once daily.    CINNAMON  "BARK (CINNAMON ORAL) Take by mouth once daily. Pt taking 2 pills daily.    diclofenac-misoprostol  mg-mcg (ARTHROTEC 50)  mg-mcg per tablet Take 1 tablet by mouth 2 (two) times daily as needed (pain and inflammation). Take with food.    fish oil-omega-3 fatty acids 300-1,000 mg capsule Take 2 g by mouth once daily.    metFORMIN (GLUCOPHAGE-XR) 500 MG 24 hr tablet Take 2 tablets (1,000 mg total) by mouth 2 (two) times daily with meals.    metoprolol succinate (TOPROL-XL) 50 MG 24 hr tablet Take 1 tablet (50 mg total) by mouth once daily.    MV,CA,MIN/IRON/FA/GUARANA/CAFF (ONE-A-DAY WOMEN'S ACTIVE ORAL) Take by mouth.    rosuvastatin (CRESTOR) 10 MG tablet TAKE 1 TABLET(10 MG) BY MOUTH EVERY DAY    SYNTHROID 100 mcg tablet Take 1 tablet (100 mcg total) by mouth once daily.    varicella-zoster gE-AS01B, PF, (SHINGRIX, PF,) 50 mcg/0.5 mL injection Inject into the muscle.    vitamin D (VITAMIN D3) 1000 units Tab Take 1,000 Units by mouth once daily.     No current facility-administered medications for this visit.      Facility-Administered Medications Ordered in Other Visits   Medication    0.9%  NaCl infusion    sodium chloride 0.9% flush 5 mL       Review of Systems   Constitution: Negative for malaise/fatigue.   Cardiovascular: Negative for chest pain, dyspnea on exertion, irregular heartbeat, leg swelling and palpitations.   Respiratory: Negative for shortness of breath.    Hematologic/Lymphatic: Negative for bleeding problem.   Skin: Negative for rash.   Musculoskeletal: Negative for myalgias.   Gastrointestinal: Negative for hematemesis, hematochezia and nausea.   Genitourinary: Negative for hematuria.   Neurological: Negative for light-headedness.   Psychiatric/Behavioral: Negative for altered mental status.   Allergic/Immunologic: Negative for persistent infections.         Objective:          /81   Pulse 84   Ht 5' 6" (1.676 m)   Wt 84.1 kg (185 lb 6.5 oz)   LMP 07/18/2001   BMI " 29.93 kg/m²     Physical Exam   Constitutional: She is oriented to person, place, and time. She appears well-developed and well-nourished.   HENT:   Head: Normocephalic.   Nose: Nose normal.   Eyes: Pupils are equal, round, and reactive to light.   Cardiovascular: Normal rate, S1 normal and S2 normal. An irregularly irregular rhythm present.   No murmur heard.  Pulses:       Radial pulses are 2+ on the right side, and 2+ on the left side.   Pulmonary/Chest: Breath sounds normal. No respiratory distress.   Abdominal: Normal appearance.   Musculoskeletal: Normal range of motion. She exhibits no edema.   Neurological: She is alert and oriented to person, place, and time.   Skin: Skin is warm and dry. No erythema.   Psychiatric: She has a normal mood and affect. Her speech is normal and behavior is normal.   Nursing note and vitals reviewed.    Lab Results   Component Value Date     10/22/2019    K 4.3 10/22/2019    MG 2.3 04/08/2005    BUN 9 10/22/2019    CREATININE 0.7 10/22/2019    ALT 15 10/22/2019    AST 15 10/22/2019    HGB 13.5 10/22/2019    HCT 42.0 10/22/2019    TSH 2.858 12/04/2019    LDLCALC 48.2 (L) 10/22/2019       Recent Labs   Lab 09/19/19  1415   INR 1.1       Assessment:     1. Persistent atrial fibrillation    2. History of cardioversion    3. Chronic anticoagulation      Plan:     In summary, Ms. Valdez is a 69 y.o. female with hypothyroid, DM, cardiomyopathy here for follow up.  She remains in AF, minimal symptoms although in the presence of a cardiomyopathy. Plan was to proceed with rhythm control once she is euthyroid, and evaluate any symptom improvement or increase in LV function. She remained in AF only one week s/p prior cardioversion, and likely needs AAD. She refuses amiodarone, tikosyn, and sotalol. Multaq would likely be a reasonable option for her. Will confirm plan with Dr. Sanchez. She remains on eliquis for CVA prophylaxis.    Price Multaq.   Schedule DORINA/DCCV with plans to start  multaq afterward.  Echo 3 months after DCCV.  RTC as scheduled following procedure.    *A copy of this note has been sent to Dr. Sanchez*    Follow up as scheduled following procedure.    ------------------------------------------------------------------    CHRISTA Soares, NP-C  Cardiac Electrophysiology

## 2019-12-10 ENCOUNTER — OFFICE VISIT (OUTPATIENT)
Dept: ELECTROPHYSIOLOGY | Facility: CLINIC | Age: 69
End: 2019-12-10
Payer: MEDICARE

## 2019-12-10 ENCOUNTER — HOSPITAL ENCOUNTER (OUTPATIENT)
Dept: CARDIOLOGY | Facility: CLINIC | Age: 69
Discharge: HOME | End: 2019-12-10
Payer: MEDICARE

## 2019-12-10 VITALS
HEIGHT: 66 IN | BODY MASS INDEX: 29.8 KG/M2 | SYSTOLIC BLOOD PRESSURE: 132 MMHG | DIASTOLIC BLOOD PRESSURE: 81 MMHG | WEIGHT: 185.44 LBS | HEART RATE: 84 BPM

## 2019-12-10 DIAGNOSIS — Z92.89 HISTORY OF CARDIOVERSION: ICD-10-CM

## 2019-12-10 DIAGNOSIS — Z79.01 CHRONIC ANTICOAGULATION: ICD-10-CM

## 2019-12-10 DIAGNOSIS — I48.0 PAROXYSMAL ATRIAL FIBRILLATION: ICD-10-CM

## 2019-12-10 DIAGNOSIS — I48.19 PERSISTENT ATRIAL FIBRILLATION: Primary | ICD-10-CM

## 2019-12-10 PROCEDURE — 99213 OFFICE O/P EST LOW 20 MIN: CPT | Mod: PBBFAC,25 | Performed by: NURSE PRACTITIONER

## 2019-12-10 PROCEDURE — 99214 OFFICE O/P EST MOD 30 MIN: CPT | Mod: S$PBB,,, | Performed by: NURSE PRACTITIONER

## 2019-12-10 PROCEDURE — 99999 PR PBB SHADOW E&M-EST. PATIENT-LVL III: ICD-10-PCS | Mod: PBBFAC,,, | Performed by: NURSE PRACTITIONER

## 2019-12-10 PROCEDURE — 99999 PR PBB SHADOW E&M-EST. PATIENT-LVL III: CPT | Mod: PBBFAC,,, | Performed by: NURSE PRACTITIONER

## 2019-12-10 PROCEDURE — 93010 RHYTHM STRIP: ICD-10-PCS | Mod: S$PBB,,, | Performed by: INTERNAL MEDICINE

## 2019-12-10 PROCEDURE — 1159F PR MEDICATION LIST DOCUMENTED IN MEDICAL RECORD: ICD-10-PCS | Mod: ,,, | Performed by: NURSE PRACTITIONER

## 2019-12-10 PROCEDURE — 1126F AMNT PAIN NOTED NONE PRSNT: CPT | Mod: ,,, | Performed by: NURSE PRACTITIONER

## 2019-12-10 PROCEDURE — 93010 ELECTROCARDIOGRAM REPORT: CPT | Mod: S$PBB,,, | Performed by: INTERNAL MEDICINE

## 2019-12-10 PROCEDURE — 99214 PR OFFICE/OUTPT VISIT, EST, LEVL IV, 30-39 MIN: ICD-10-PCS | Mod: S$PBB,,, | Performed by: NURSE PRACTITIONER

## 2019-12-10 PROCEDURE — 93005 ELECTROCARDIOGRAM TRACING: CPT | Mod: PBBFAC | Performed by: INTERNAL MEDICINE

## 2019-12-10 PROCEDURE — 1159F MED LIST DOCD IN RCRD: CPT | Mod: ,,, | Performed by: NURSE PRACTITIONER

## 2019-12-10 PROCEDURE — 1126F PR PAIN SEVERITY QUANTIFIED, NO PAIN PRESENT: ICD-10-PCS | Mod: ,,, | Performed by: NURSE PRACTITIONER

## 2019-12-10 NOTE — Clinical Note
Are you still ok with plan to cardiovert and start multaq (pt refuses amio, sotalol, tikosyn). EF 45%. Idalia J

## 2019-12-17 RX ORDER — METFORMIN HYDROCHLORIDE 500 MG/1
TABLET, EXTENDED RELEASE ORAL
Qty: 360 TABLET | Refills: 0 | Status: SHIPPED | OUTPATIENT
Start: 2019-12-17 | End: 2020-04-03 | Stop reason: SDUPTHER

## 2019-12-21 ENCOUNTER — PATIENT MESSAGE (OUTPATIENT)
Dept: ELECTROPHYSIOLOGY | Facility: CLINIC | Age: 69
End: 2019-12-21

## 2019-12-21 DIAGNOSIS — I48.19 PERSISTENT ATRIAL FIBRILLATION: Primary | ICD-10-CM

## 2019-12-26 ENCOUNTER — PATIENT MESSAGE (OUTPATIENT)
Dept: ELECTROPHYSIOLOGY | Facility: CLINIC | Age: 69
End: 2019-12-26

## 2019-12-26 DIAGNOSIS — E11.9 TYPE 2 DIABETES MELLITUS WITHOUT COMPLICATION: ICD-10-CM

## 2019-12-26 NOTE — PROGRESS NOTES
Patient Instructions  DORINA (Transesophageal Echocardiogram) with Cardioversion     Pre-Procedure Testin/06/20 at 10 AM   Pre-procedure labs have been ordered for you at:  [facility]  · Be sure to arrive at your scheduled time.   · You do NOT need to fast for this blood work.       Important Medication Information:    · HOLD (do NOT take) METFORMIN the day of your procedure.  Your last dose should be taken on 2020.  · You may take all other morning medications with a sip of water on the day of your procedure.       Day of Procedure:    2020 at 8 AM - DORINA / CARDIOVERSION  Please report to Cardiology Waiting Room on the 3rd floor of the Hospital    · Do not eat or drink anything after 12 midnight on the night before your procedure.  · Please do not wear makeup, especially mascara, when arriving for your procedure.  · You will be going home after your procedure.  · You will need someone to drive you home from the hospital due to anesthesia.       Post Procedure Testin2020 at 10 AM   You will have a post procedural EKG one week after your cardioversion to assess your heart rhythm and rate.       Directions to Cardiology Waiting Room  If you park in the Parking Garage:  Take elevators to the 2nd floor  Walk up ramp and turn right by Gold Elevators  Take elevator to the 3rd floor  Upon exiting the elevator, turn away from the clinic areas  Walk long cooper around to front of hospital to area with windows overlooking [facility]  Check in at Reception Desk  OR  If family is dropping you off:  Have them drop you off at the front of the Hospital  (Near the ER, where all the flags are hung).  Take the E elevators to the 3rd floor.  Check in at the Reception Desk in the waiting room.      Any need to reschedule or cancel procedures, or any questions regarding your procedures should be addressed directly with the Arrhythmia Department Nurses at the following phone number:  [phone].

## 2020-01-06 ENCOUNTER — LAB VISIT (OUTPATIENT)
Dept: LAB | Facility: HOSPITAL | Age: 70
End: 2020-01-06
Attending: INTERNAL MEDICINE
Payer: MEDICARE

## 2020-01-06 ENCOUNTER — PATIENT MESSAGE (OUTPATIENT)
Dept: PRIMARY CARE CLINIC | Facility: CLINIC | Age: 70
End: 2020-01-06

## 2020-01-06 DIAGNOSIS — E78.5 HYPERLIPIDEMIA, UNSPECIFIED HYPERLIPIDEMIA TYPE: ICD-10-CM

## 2020-01-06 DIAGNOSIS — E03.9 ACQUIRED HYPOTHYROIDISM: ICD-10-CM

## 2020-01-06 DIAGNOSIS — I48.19 PERSISTENT ATRIAL FIBRILLATION: ICD-10-CM

## 2020-01-06 LAB
ANION GAP SERPL CALC-SCNC: 5 MMOL/L (ref 8–16)
APTT BLDCRRT: 25.6 SEC (ref 21–32)
BASOPHILS # BLD AUTO: 0.03 K/UL (ref 0–0.2)
BASOPHILS NFR BLD: 0.5 % (ref 0–1.9)
BUN SERPL-MCNC: 11 MG/DL (ref 8–23)
CALCIUM SERPL-MCNC: 10.1 MG/DL (ref 8.7–10.5)
CHLORIDE SERPL-SCNC: 105 MMOL/L (ref 95–110)
CO2 SERPL-SCNC: 29 MMOL/L (ref 23–29)
CREAT SERPL-MCNC: 0.7 MG/DL (ref 0.5–1.4)
DIFFERENTIAL METHOD: ABNORMAL
EOSINOPHIL # BLD AUTO: 0.3 K/UL (ref 0–0.5)
EOSINOPHIL NFR BLD: 5.5 % (ref 0–8)
ERYTHROCYTE [DISTWIDTH] IN BLOOD BY AUTOMATED COUNT: 14.3 % (ref 11.5–14.5)
EST. GFR  (AFRICAN AMERICAN): >60 ML/MIN/1.73 M^2
EST. GFR  (NON AFRICAN AMERICAN): >60 ML/MIN/1.73 M^2
GLUCOSE SERPL-MCNC: 117 MG/DL (ref 70–110)
HCT VFR BLD AUTO: 41.7 % (ref 37–48.5)
HGB BLD-MCNC: 13 G/DL (ref 12–16)
IMM GRANULOCYTES # BLD AUTO: 0.01 K/UL (ref 0–0.04)
IMM GRANULOCYTES NFR BLD AUTO: 0.2 % (ref 0–0.5)
INR PPP: 1.1 (ref 0.8–1.2)
LYMPHOCYTES # BLD AUTO: 2 K/UL (ref 1–4.8)
LYMPHOCYTES NFR BLD: 31.6 % (ref 18–48)
MCH RBC QN AUTO: 29.6 PG (ref 27–31)
MCHC RBC AUTO-ENTMCNC: 31.2 G/DL (ref 32–36)
MCV RBC AUTO: 95 FL (ref 82–98)
MONOCYTES # BLD AUTO: 0.5 K/UL (ref 0.3–1)
MONOCYTES NFR BLD: 8.5 % (ref 4–15)
NEUTROPHILS # BLD AUTO: 3.4 K/UL (ref 1.8–7.7)
NEUTROPHILS NFR BLD: 53.7 % (ref 38–73)
NRBC BLD-RTO: 0 /100 WBC
PLATELET # BLD AUTO: 216 K/UL (ref 150–350)
PMV BLD AUTO: 11.4 FL (ref 9.2–12.9)
POTASSIUM SERPL-SCNC: 4.3 MMOL/L (ref 3.5–5.1)
PROTHROMBIN TIME: 11.5 SEC (ref 9–12.5)
RBC # BLD AUTO: 4.39 M/UL (ref 4–5.4)
SODIUM SERPL-SCNC: 139 MMOL/L (ref 136–145)
WBC # BLD AUTO: 6.23 K/UL (ref 3.9–12.7)

## 2020-01-06 PROCEDURE — 80048 BASIC METABOLIC PNL TOTAL CA: CPT

## 2020-01-06 PROCEDURE — 85610 PROTHROMBIN TIME: CPT

## 2020-01-06 PROCEDURE — 85730 THROMBOPLASTIN TIME PARTIAL: CPT

## 2020-01-06 PROCEDURE — 36415 COLL VENOUS BLD VENIPUNCTURE: CPT | Mod: PO

## 2020-01-06 PROCEDURE — 85025 COMPLETE CBC W/AUTO DIFF WBC: CPT

## 2020-01-06 RX ORDER — LEVOTHYROXINE SODIUM 100 UG/1
100 TABLET ORAL DAILY
Qty: 90 TABLET | Refills: 3 | Status: SHIPPED | OUTPATIENT
Start: 2020-01-06 | End: 2020-05-04

## 2020-01-06 RX ORDER — ROSUVASTATIN CALCIUM 10 MG/1
TABLET, COATED ORAL
Qty: 90 TABLET | Refills: 0 | OUTPATIENT
Start: 2020-01-06

## 2020-01-07 RX ORDER — ROSUVASTATIN CALCIUM 10 MG/1
TABLET, COATED ORAL
Qty: 90 TABLET | Refills: 0 | Status: SHIPPED | OUTPATIENT
Start: 2020-01-07 | End: 2020-04-01 | Stop reason: SDUPTHER

## 2020-01-08 ENCOUNTER — TELEPHONE (OUTPATIENT)
Dept: ELECTROPHYSIOLOGY | Facility: CLINIC | Age: 70
End: 2020-01-08

## 2020-01-08 NOTE — TELEPHONE ENCOUNTER
Attempting to reach Ms. Valdez to confirm procedure tomorrow.  No answer, message left asking for her to return call to our office.       When patient returns call:  DORINA/TONJA 1/9/20 8 am arrival time. Continue taking Eliquis.  Will need to hold Metformin tomorrow morning due to fasting.       Test claim for Multaq 400 mg BID re-done with Bal England, (due to new year) and cost is $53.10/month.

## 2020-01-08 NOTE — TELEPHONE ENCOUNTER
Spoke to MsEdith Courtney    CONFIRMED procedure arrival time of 0800    Reiterated instructions including:  -Directions to check in desk  -NPO after midnight night prior to procedure  -High importance of HOLDING Metformin tomorrow morning (due to fasting)   -Confirmed compliance of Eliquis BID dosing  -Pre-procedure LABS reviewed and nothing of concern noted  -Confirmed no recent fever, bleeding, infection or skin rash in the past 30 days  -Do not wear mascara day of procedure  -Test claim price completed for Multaq 400 mg BID is $53.10/month per Bal Ko     Ms. Valdez verbalizes understanding of above and appreciates call.

## 2020-01-08 NOTE — TELEPHONE ENCOUNTER
----- Message from Tereza Weinstein sent at 1/8/2020 12:01 PM CST -----  Contact: pt  Margarita pt returning your call in ref to her procedure that's eboni for tomorrow.    Thanks

## 2020-01-09 ENCOUNTER — HOSPITAL ENCOUNTER (OUTPATIENT)
Dept: CARDIOLOGY | Facility: CLINIC | Age: 70
Discharge: HOME | End: 2020-01-09
Attending: INTERNAL MEDICINE
Payer: MEDICARE

## 2020-01-09 ENCOUNTER — HOSPITAL ENCOUNTER (OUTPATIENT)
Facility: HOSPITAL | Age: 70
Discharge: HOME | End: 2020-01-09
Attending: INTERNAL MEDICINE | Admitting: INTERNAL MEDICINE
Payer: MEDICARE

## 2020-01-09 ENCOUNTER — ANESTHESIA EVENT (OUTPATIENT)
Dept: MEDSURG UNIT | Facility: HOSPITAL | Age: 70
End: 2020-01-09
Payer: MEDICARE

## 2020-01-09 ENCOUNTER — ANESTHESIA (OUTPATIENT)
Dept: MEDSURG UNIT | Facility: HOSPITAL | Age: 70
End: 2020-01-09
Payer: MEDICARE

## 2020-01-09 VITALS
SYSTOLIC BLOOD PRESSURE: 109 MMHG | HEART RATE: 59 BPM | BODY MASS INDEX: 30.22 KG/M2 | OXYGEN SATURATION: 96 % | DIASTOLIC BLOOD PRESSURE: 65 MMHG | HEIGHT: 66 IN | TEMPERATURE: 97 F | WEIGHT: 188 LBS | RESPIRATION RATE: 18 BRPM

## 2020-01-09 VITALS
DIASTOLIC BLOOD PRESSURE: 85 MMHG | BODY MASS INDEX: 30.22 KG/M2 | HEIGHT: 66 IN | WEIGHT: 188 LBS | SYSTOLIC BLOOD PRESSURE: 144 MMHG

## 2020-01-09 DIAGNOSIS — I48.19 PERSISTENT ATRIAL FIBRILLATION: ICD-10-CM

## 2020-01-09 DIAGNOSIS — I48.91 ATRIAL FIBRILLATION: ICD-10-CM

## 2020-01-09 DIAGNOSIS — I48.19 PERSISTENT ATRIAL FIBRILLATION: Primary | ICD-10-CM

## 2020-01-09 LAB
BSA FOR ECHO PROCEDURE: 1.99 M2
PROX AORTA: 3.5 CM
SINUS: 3.4 CM
STJ: 3 CM

## 2020-01-09 PROCEDURE — 37000009 HC ANESTHESIA EA ADD 15 MINS: Performed by: INTERNAL MEDICINE

## 2020-01-09 PROCEDURE — 93010 ELECTROCARDIOGRAM REPORT: CPT | Mod: 76,59,, | Performed by: INTERNAL MEDICINE

## 2020-01-09 PROCEDURE — D9220A PRA ANESTHESIA: ICD-10-PCS | Mod: CRNA,,, | Performed by: NURSE ANESTHETIST, CERTIFIED REGISTERED

## 2020-01-09 PROCEDURE — 93312 ECHO TRANSESOPHAGEAL: CPT | Mod: 26,,, | Performed by: INTERNAL MEDICINE

## 2020-01-09 PROCEDURE — 63600175 PHARM REV CODE 636 W HCPCS: Performed by: NURSE ANESTHETIST, CERTIFIED REGISTERED

## 2020-01-09 PROCEDURE — 93320 DOPPLER ECHO COMPLETE: CPT | Mod: 26,,, | Performed by: INTERNAL MEDICINE

## 2020-01-09 PROCEDURE — 37000008 HC ANESTHESIA 1ST 15 MINUTES: Performed by: INTERNAL MEDICINE

## 2020-01-09 PROCEDURE — 93005 ELECTROCARDIOGRAM TRACING: CPT

## 2020-01-09 PROCEDURE — 93320 TRANSESOPHAGEAL ECHO (TEE) (CUPID ONLY): ICD-10-PCS | Mod: 26,,, | Performed by: INTERNAL MEDICINE

## 2020-01-09 PROCEDURE — 25000003 PHARM REV CODE 250: Performed by: INTERNAL MEDICINE

## 2020-01-09 PROCEDURE — 25000003 PHARM REV CODE 250: Performed by: NURSE ANESTHETIST, CERTIFIED REGISTERED

## 2020-01-09 PROCEDURE — 82962 GLUCOSE BLOOD TEST: CPT

## 2020-01-09 PROCEDURE — 93325 DOPPLER ECHO COLOR FLOW MAPG: CPT

## 2020-01-09 PROCEDURE — 93325 DOPPLER ECHO COLOR FLOW MAPG: CPT | Mod: 26,,, | Performed by: INTERNAL MEDICINE

## 2020-01-09 PROCEDURE — 63600175 PHARM REV CODE 636 W HCPCS: Performed by: NURSE PRACTITIONER

## 2020-01-09 PROCEDURE — D9220A PRA ANESTHESIA: Mod: CRNA,,, | Performed by: NURSE ANESTHETIST, CERTIFIED REGISTERED

## 2020-01-09 PROCEDURE — 93312 TRANSESOPHAGEAL ECHO (TEE) (CUPID ONLY): ICD-10-PCS | Mod: 26,,, | Performed by: INTERNAL MEDICINE

## 2020-01-09 PROCEDURE — 93010 EKG 12-LEAD: ICD-10-PCS | Mod: 59,,, | Performed by: INTERNAL MEDICINE

## 2020-01-09 PROCEDURE — 93325 TRANSESOPHAGEAL ECHO (TEE) (CUPID ONLY): ICD-10-PCS | Mod: 26,,, | Performed by: INTERNAL MEDICINE

## 2020-01-09 PROCEDURE — 92960 CARDIOVERSION ELECTRIC EXT: CPT | Performed by: INTERNAL MEDICINE

## 2020-01-09 PROCEDURE — 93010 ELECTROCARDIOGRAM REPORT: CPT | Mod: 59,,, | Performed by: INTERNAL MEDICINE

## 2020-01-09 PROCEDURE — 92960 PR CARDIOVERSION, ELECTIVE;EXTERN: ICD-10-PCS | Mod: ,,, | Performed by: INTERNAL MEDICINE

## 2020-01-09 PROCEDURE — 92960 CARDIOVERSION ELECTRIC EXT: CPT | Mod: ,,, | Performed by: INTERNAL MEDICINE

## 2020-01-09 PROCEDURE — 93005 ELECTROCARDIOGRAM TRACING: CPT | Mod: 59

## 2020-01-09 PROCEDURE — D9220A PRA ANESTHESIA: ICD-10-PCS | Mod: ANES,,, | Performed by: ANESTHESIOLOGY

## 2020-01-09 PROCEDURE — D9220A PRA ANESTHESIA: Mod: ANES,,, | Performed by: ANESTHESIOLOGY

## 2020-01-09 PROCEDURE — 82962 GLUCOSE BLOOD TEST: CPT | Performed by: INTERNAL MEDICINE

## 2020-01-09 RX ORDER — SILVER SULFADIAZINE 10 G/1000G
CREAM TOPICAL
Status: DISCONTINUED | OUTPATIENT
Start: 2020-01-09 | End: 2020-01-09 | Stop reason: HOSPADM

## 2020-01-09 RX ORDER — LIDOCAINE HYDROCHLORIDE 20 MG/ML
SOLUTION OROPHARYNGEAL
Status: DISCONTINUED | OUTPATIENT
Start: 2020-01-09 | End: 2020-01-09

## 2020-01-09 RX ORDER — DIPHENHYDRAMINE HYDROCHLORIDE 50 MG/ML
25 INJECTION INTRAMUSCULAR; INTRAVENOUS EVERY 6 HOURS PRN
Status: DISCONTINUED | OUTPATIENT
Start: 2020-01-09 | End: 2020-01-09 | Stop reason: HOSPADM

## 2020-01-09 RX ORDER — HYDROMORPHONE HYDROCHLORIDE 1 MG/ML
0.2 INJECTION, SOLUTION INTRAMUSCULAR; INTRAVENOUS; SUBCUTANEOUS EVERY 5 MIN PRN
Status: DISCONTINUED | OUTPATIENT
Start: 2020-01-09 | End: 2020-01-09 | Stop reason: HOSPADM

## 2020-01-09 RX ORDER — ONDANSETRON 2 MG/ML
4 INJECTION INTRAMUSCULAR; INTRAVENOUS ONCE AS NEEDED
Status: DISCONTINUED | OUTPATIENT
Start: 2020-01-09 | End: 2020-01-09 | Stop reason: HOSPADM

## 2020-01-09 RX ORDER — SODIUM CHLORIDE 9 MG/ML
INJECTION, SOLUTION INTRAVENOUS CONTINUOUS
Status: DISCONTINUED | OUTPATIENT
Start: 2020-01-09 | End: 2020-01-09 | Stop reason: HOSPADM

## 2020-01-09 RX ORDER — FENTANYL CITRATE 50 UG/ML
25 INJECTION, SOLUTION INTRAMUSCULAR; INTRAVENOUS EVERY 5 MIN PRN
Status: DISCONTINUED | OUTPATIENT
Start: 2020-01-09 | End: 2020-01-09 | Stop reason: HOSPADM

## 2020-01-09 RX ORDER — SODIUM CHLORIDE 0.9 % (FLUSH) 0.9 %
5 SYRINGE (ML) INJECTION
Status: DISCONTINUED | OUTPATIENT
Start: 2020-01-09 | End: 2020-01-09 | Stop reason: HOSPADM

## 2020-01-09 RX ORDER — PROPOFOL 10 MG/ML
VIAL (ML) INTRAVENOUS CONTINUOUS PRN
Status: DISCONTINUED | OUTPATIENT
Start: 2020-01-09 | End: 2020-01-09

## 2020-01-09 RX ORDER — LIDOCAINE HCL/PF 100 MG/5ML
SYRINGE (ML) INTRAVENOUS
Status: DISCONTINUED | OUTPATIENT
Start: 2020-01-09 | End: 2020-01-09

## 2020-01-09 RX ORDER — PROPOFOL 10 MG/ML
VIAL (ML) INTRAVENOUS
Status: DISCONTINUED | OUTPATIENT
Start: 2020-01-09 | End: 2020-01-09

## 2020-01-09 RX ADMIN — LIDOCAINE HYDROCHLORIDE 15 ML: 20 SOLUTION OROPHARYNGEAL at 09:01

## 2020-01-09 RX ADMIN — PROPOFOL 60 MG: 10 INJECTION, EMULSION INTRAVENOUS at 09:01

## 2020-01-09 RX ADMIN — PROPOFOL 150 MCG/KG/MIN: 10 INJECTION, EMULSION INTRAVENOUS at 09:01

## 2020-01-09 RX ADMIN — LIDOCAINE HYDROCHLORIDE 80 MG: 20 INJECTION, SOLUTION INTRAVENOUS at 09:01

## 2020-01-09 RX ADMIN — PROPOFOL 30 MG: 10 INJECTION, EMULSION INTRAVENOUS at 09:01

## 2020-01-09 NOTE — ANESTHESIA POSTPROCEDURE EVALUATION
Anesthesia Post Evaluation    Patient: Bebe Valdez    Procedure(s) Performed: Procedure(s) (LRB):  CARDIOVERSION (N/A)  Transesophageal echo (DORINA) intra-procedure log documentation (N/A)    Final Anesthesia Type: general    Patient location during evaluation: PACU  Patient participation: Yes- Able to Participate  Level of consciousness: awake and alert  Post-procedure vital signs: reviewed and stable  Pain management: adequate  Airway patency: patent    PONV status at discharge: No PONV  Anesthetic complications: no      Cardiovascular status: hemodynamically stable  Respiratory status: unassisted  Hydration status: euvolemic  Follow-up not needed.          Vitals Value Taken Time   /65 1/9/2020 10:30 AM   Temp 35.9 °C (96.6 °F) 1/9/2020 10:30 AM   Pulse 59 1/9/2020 10:30 AM   Resp 18 1/9/2020 10:30 AM   SpO2 96 % 1/9/2020 10:30 AM         Event Time     Out of Recovery 10:25:00          Pain/Robert Score: Robert Score: 10 (1/9/2020 10:00 AM)

## 2020-01-09 NOTE — HPI
Ms. Valdez is a 69 y.o. female with hypothyroid, DM, cardiomyopathy here for Scheduled DORINA/DCCV with plans to start multaq afterward.  She remains in AF, minimal symptoms although in the presence of a cardiomyopathy. She remained in AF only one week s/p prior cardioversion, and likely needs AAD. She refuses amiodarone, tikosyn, and sotalol. Multaq would likely be a reasonable option for her.    TTE 9/19  · Mildly decreased left ventricular systolic function. The estimated ejection fraction is 45%  · Mildly reduced right ventricular systolic function.  · Normal appearing left atrial appendage. No thrombus is present in the appendage. Abnormal appendage velocities.  · Mild tricuspid regurgitation.    Dysphagia or odynophagia:  No  Liver Disease, esophageal disease, or known varices:  No  Upper GI Bleeding: No  Snoring:  Yes  Sleep Apnea:  No  Prior neck surgery or radiation:  No  History of anesthetic difficulties:  No  Family history of anesthetic difficulties:  No  Last oral intake:  12 hours ago  Able to move neck in all directions:  Yes

## 2020-01-09 NOTE — ASSESSMENT & PLAN NOTE
1. DORINA for evaluation of GIO for DCCV  -No absolute contraindications of esophageal stricture, tumor, perforation, laceration,or diverticulum and/or active GI bleed  -The risks, benefits & alternatives of the procedure were explained to the patient.   -The risks of transesophageal echo include but are not limited to:  Dental trauma, esophageal trauma/perforation, bleeding, laryngospasm/brochospasm, aspiration, sore throat/hoarseness, & dislodgement of the endotracheal tube/nasogastric tube (where applicable).    -The risks of moderate sedation include hypotension, respiratory depression, arrhythmias, bronchospasm, & death.    -Informed consent was obtained. The patient is agreeable to proceed with the procedure and all questions and concerns addressed.    Case discussed with an attending in echocardiography lab.     Further recommendations per attending addendum

## 2020-01-09 NOTE — NURSING TRANSFER
Nursing Transfer Note      1/9/2020     Transfer To: [facility]    Transfer via stretcher    Transfer with soraida rn    Transported by soraida rn    Medicines sent: silvadene cream    Chart send with patient: Yes    Notified: friend    Patient reassessed at: see epic (date, time)    Upon arrival to floor: to room no complaints no distress noted.

## 2020-01-09 NOTE — TRANSFER OF CARE
"Anesthesia Transfer of Care Note    Patient: Bebe Valdez    Procedure(s) Performed: Procedure(s) (LRB):  CARDIOVERSION (N/A)  Transesophageal echo (DORINA) intra-procedure log documentation (N/A)    Patient location: PACU    Anesthesia Type: general    Transport from OR: Transported from OR on 6-10 L/min O2 by face mask with adequate spontaneous ventilation    Post pain: adequate analgesia    Post assessment: no apparent anesthetic complications and tolerated procedure well    Post vital signs: stable    Level of consciousness: awake and alert    Nausea/Vomiting: no nausea/vomiting    Complications: none    Transfer of care protocol was followed      Last vitals:   Visit Vitals  BP (!) 131/93 (BP Location: Left arm, Patient Position: Lying)   Pulse 65   Temp 36.3 °C (97.3 °F) (Oral)   Resp 18   Ht 5' 6" (1.676 m)   Wt 85.3 kg (188 lb)   LMP 07/18/2001   SpO2 95%   Breastfeeding? No   BMI 30.34 kg/m²     "

## 2020-01-09 NOTE — PROGRESS NOTES
Patient admitted to recovery see [facility] for complete assessment pacu bcg's maintained safety measures verified patient instructed on pain scale and patient verbalized understanding. Also called for ekg ekg done and placed in patient's chart also called patient's friend (devin) and updated on patient location with the permission of patient.

## 2020-01-09 NOTE — H&P
[facility] Short Stay Cardiac Unit  Cardiology  History and Physical     Patient Name: Bebe Valdez  MRN: [id number]  Admission Date: 1/9/2020  Code Status: No Order   Attending Provider: Eduardo Sanchez MD   Primary Care Physician: Anil Nelson MD  Principal Problem:<principal problem not specified>    Patient information was obtained from patient and ER records.     Subjective:     Chief Complaint:  AF     HPI:  Ms. Valdez is a 69 y.o. female with hypothyroid, DM, cardiomyopathy here for Scheduled DORINA/DCCV with plans to start multaq afterward.  She remains in AF, minimal symptoms although in the presence of a cardiomyopathy. She remained in AF only one week s/p prior cardioversion, and likely needs AAD. She refuses amiodarone, tikosyn, and sotalol. Multaq would likely be a reasonable option for her.    TTE 9/19  · Mildly decreased left ventricular systolic function. The estimated ejection fraction is 45%  · Mildly reduced right ventricular systolic function.  · Normal appearing left atrial appendage. No thrombus is present in the appendage. Abnormal appendage velocities.  · Mild tricuspid regurgitation.    Dysphagia or odynophagia:  No  Liver Disease, esophageal disease, or known varices:  No  Upper GI Bleeding: No  Snoring:  Yes  Sleep Apnea:  No  Prior neck surgery or radiation:  No  History of anesthetic difficulties:  No  Family history of anesthetic difficulties:  No  Last oral intake:  12 hours ago  Able to move neck in all directions:  Yes        Past Medical History:   Diagnosis Date    Allergy     Anticoagulant long-term use     Hyperlipidemia     Hypertension associated with diabetes     Hypothyroid     Obesity     Rosacea        Past Surgical History:   Procedure Laterality Date    CHOLECYSTECTOMY      COLONOSCOPY  7/6/07    COLONOSCOPY N/A 6/6/2017    Procedure: COLONOSCOPY;  Surgeon: Tre Alvarez MD;  Location: [address];  Service: Endoscopy;  Laterality: N/A;     DILATION AND CURETTAGE OF UTERUS      HYSTEROSCOPY      TREATMENT OF CARDIAC ARRHYTHMIA N/A 9/26/2019    Procedure: CARDIOVERSION;  Surgeon: Eduardo Sanchez MD;  Location: [facility] EP LAB;  Service: Cardiology;  Laterality: N/A;  AF, DORINA, DCCV, MAC, [place], 3 Prep       Review of patient's allergies indicates:   Allergen Reactions    Levaquin [levofloxacin] Hives    Tetracycline Hives       Current Facility-Administered Medications on File Prior to Encounter   Medication    0.9%  NaCl infusion    sodium chloride 0.9% flush 5 mL     Current Outpatient Medications on File Prior to Encounter   Medication Sig    apixaban (ELIQUIS) 5 mg Tab Take 1 tablet (5 mg total) by mouth 2 (two) times daily.    ascorbic acid (VITAMIN C) 100 MG tablet Take 100 mg by mouth once daily.    CINNAMON BARK (CINNAMON ORAL) Take by mouth once daily. Pt taking 2 pills daily.    fish oil-omega-3 fatty acids 300-1,000 mg capsule Take 2 g by mouth once daily.    metFORMIN (GLUCOPHAGE-XR) 500 MG 24 hr tablet TAKE 2 TABLETS(1000 MG) BY MOUTH TWICE DAILY WITH MEALS    metoprolol succinate (TOPROL-XL) 50 MG 24 hr tablet Take 1 tablet (50 mg total) by mouth once daily.    MV,CA,MIN/IRON/FA/GUARANA/CAFF (ONE-A-DAY WOMEN'S ACTIVE ORAL) Take by mouth.    vitamin D (VITAMIN D3) 1000 units Tab Take 1,000 Units by mouth once daily.    diclofenac-misoprostol  mg-mcg (ARTHROTEC 50)  mg-mcg per tablet Take 1 tablet by mouth 2 (two) times daily as needed (pain and inflammation). Take with food.    varicella-zoster gE-AS01B, PF, (SHINGRIX, PF,) 50 mcg/0.5 mL injection Inject into the muscle.     Family History     Problem Relation (Age of Onset)    Allergies Mother    Atrial fibrillation Father    Breast cancer Mother (42)    Cancer Mother, Maternal Grandfather    Heart disease Father    Hyperlipidemia Father    Hypertension Father    Hypothyroidism Father    No Known Problems Sister, Brother    Stroke Father        Tobacco Use    Smoking  status: Never Smoker    Smokeless tobacco: Never Used   Substance and Sexual Activity    Alcohol use: Yes     Alcohol/week: 2.0 standard drinks     Types: 2 Glasses of wine per week     Frequency: 2-4 times a month     Drinks per session: 1 or 2     Binge frequency: Never     Comment: weekends     Drug use: Not on file    Sexual activity: Not on file     Review of Systems   Constitution: Negative for chills, decreased appetite and diaphoresis.   HENT: Negative for congestion and ear discharge.    Eyes: Negative for blurred vision and discharge.   Cardiovascular: Negative for chest pain, dyspnea on exertion, irregular heartbeat, leg swelling and paroxysmal nocturnal dyspnea.   Respiratory: Negative for cough, hemoptysis and shortness of breath.    Gastrointestinal: Negative for abdominal pain.     Objective:     Vital Signs (Most Recent):    Vital Signs (24h Range):           There is no height or weight on file to calculate BMI.            No intake or output data in the 24 hours ending 01/09/20 0850    Lines/Drains/Airways     None                 Physical Exam   Constitutional: She is oriented to person, place, and time. She appears well-developed and well-nourished. No distress.   Eyes: Pupils are equal, round, and reactive to light. Conjunctivae are normal.   Neck: No tracheal deviation present. No thyromegaly present.   Cardiovascular: Normal rate, normal heart sounds and intact distal pulses. An irregularly irregular rhythm present. Exam reveals no gallop and no friction rub.   No murmur heard.  Pulses:       Radial pulses are 2+ on the right side, and 2+ on the left side.        Femoral pulses are 2+ on the right side, and 2+ on the left side.  Pulmonary/Chest: Effort normal and breath sounds normal. No respiratory distress. She has no wheezes. She has no rales.   Abdominal: Soft. Bowel sounds are normal. She exhibits no distension. There is no tenderness.   Musculoskeletal: She exhibits no edema or  deformity.   Neurological: She is alert and oriented to person, place, and time. No cranial nerve deficit. Coordination normal.   Skin: Skin is warm and dry. She is not diaphoretic.   Psychiatric: She has a normal mood and affect. Her behavior is normal.       Significant Labs: BMP: No results for input(s): GLU, NA, K, CL, CO2, BUN, CREATININE, CALCIUM, MG in the last 48 hours.    Significant Imaging: Echocardiogram:   Transthoracic echo (TTE) complete (Cupid Only):   Results for orders placed or performed in visit on 09/06/19   Echo   Result Value Ref Range    Ascending aorta 3.31 cm    STJ 2.96 cm    AV mean gradient 4 mmHg    Ao peak anibal 1.26 m/s    Ao VTI 26.34 cm    IVRT 0.09 msec    IVS 0.69 0.6 - 1.1 cm    LA size 4.18 cm    Left Atrium Major Axis 6.36 cm    Left Atrium Minor Axis 6.40 cm    LVIDD 5.45 3.5 - 6.0 cm    LVIDS 3.91 2.1 - 4.0 cm    LVOT diameter 1.91 cm    LVOT peak VTI 19.21 cm    PW 0.66 0.6 - 1.1 cm    MV Peak A Anibal 0.29 m/s    E wave decelartion time 203.43 msec    MV Peak E Anibal 0.92 m/s    PV Peak D Anibal 0.80 m/s    PV Peak S Anibal 0.34 m/s    RA Major Axis 5.57 cm    RA Width 3.78 cm    RVDD 3.63 cm    Sinus 3.17 cm    TAPSE 1.53 cm    TR Max Anibal 2.87 m/s    TDI LATERAL 0.10 m/s    TDI SEPTAL 0.08 m/s    LA WIDTH 3.72 cm    LV Diastolic Volume 144.41 mL    LV Systolic Volume 66.49 mL    LVOT peak anibal 0.91 m/s    LV LATERAL E/E' RATIO 9.20 m/s    LV SEPTAL E/E' RATIO 11.50 m/s    FS 28 %    LA volume 84.32 cm3    LV mass 127.52 g    Left Ventricle Relative Wall Thickness 0.24 cm    AV valve area 2.09 cm2    AV Velocity Ratio 0.72     AV index (prosthetic) 0.73     E/A ratio 3.17     Mean e' 0.09 m/s    Pulm vein S/D ratio 0.43     LVOT area 2.9 cm2    LVOT stroke volume 55.01 cm3    AV peak gradient 6 mmHg    E/E' ratio 10.22 m/s    Triscuspid Valve Regurgitation Peak Gradient 33 mmHg    BSA 1.96 m2    LV Systolic Volume Index 34.6 mL/m2    LV Diastolic Volume Index 75.15 mL/m2    LA Volume  Index 43.9 mL/m2    LV Mass Index 66 g/m2    Right Atrial Pressure (from IVC) 3 mmHg    QEF 44 %    TV rest pulmonary artery pressure 36 mmHg    Narrative    · Atrial fibrillation and frequent ectopy was present during the study.  · Moderate left atrial enlargement.  · Mild left ventricular enlargement: 5.5 cm.  · Mildly decreased left ventricular systolic function. The estimated   ejection fraction is 45%  · Mild global hypokinesis.  · Diastolic pattern consistent with atrial fibrillation observed.  · Mild right atrial enlargement.  · Low normal right ventricular systolic function.  · The estimated PA systolic pressure is 36 mm Hg  · Mild tricuspid regurgitation.  · Normal central venous pressure (3 mm Hg).        Assessment and Plan:     Persistent atrial fibrillation  1. DORINA for evaluation of GIO for DCCV  -No absolute contraindications of esophageal stricture, tumor, perforation, laceration,or diverticulum and/or active GI bleed  -The risks, benefits & alternatives of the procedure were explained to the patient.   -The risks of transesophageal echo include but are not limited to:  Dental trauma, esophageal trauma/perforation, bleeding, laryngospasm/brochospasm, aspiration, sore throat/hoarseness, & dislodgement of the endotracheal tube/nasogastric tube (where applicable).    -The risks of moderate sedation include hypotension, respiratory depression, arrhythmias, bronchospasm, & death.    -Informed consent was obtained. The patient is agreeable to proceed with the procedure and all questions and concerns addressed.    Case discussed with an attending in echocardiography lab.     Further recommendations per attending addendum          VTE Risk Mitigation (From admission, onward)    None          Glenna Santoyo MD  Cardiology   [facility] Stay Cardiac [facility]

## 2020-01-09 NOTE — PROGRESS NOTES
cozette np here seeing patient's ekg ok to go to short stay without portable telemetry. Called report to comfort andino rn

## 2020-01-09 NOTE — NURSING
Discharge instructions and medlist given.  Patient and family verbalized understanding.  Denies need for escort or wheelchair off unit.  Off unit walking with family who will drive her home.

## 2020-01-09 NOTE — DISCHARGE SUMMARY
[facility] Stay Cardiac Unit  Cardiac Electrophysiology  Discharge Summary      Patient Name: Bebe Valdez  MRN: [id number]  Admission Date: 1/9/2020  Hospital Length of Stay: 0 days  Discharge Date and Time:  01/09/2020 10:41 AM  Attending Physician: Eduardo Sanchez MD    Discharging Provider: Madison Victoria NP  Primary Care Physician: Anil Nelson MD    HPI:     Ms. Valdez is a 69 y.o. female with PMH hypothyroid, DM, cardiomyopathy presented for planned DORINA/ Cardioversion, with plans to start multaq afterwards.      Background:     Referring Cardiologist: Tami De eLon MD  Primary Care Physician: Anil Nelson MD     Ms. Valdez has a history of diabetes mellitus, hypothyroidism on synthroid and recent onset of persistent atrial fibrillation. She was exercising with a  in July 2019 and pulse was noted to be elevated and irregular. She saw Dr. Nelson on 8/26/2019 and was diagnosed with atrial fibrillation. She was started on metoprolol and aspirin. TSH was checked and was undetectable. Her synthroid dosage was reduced. She was then assessed by Dr. De Leon who started her on eliquis and referred her here for further evaluation. A transthoracic echocardiogram was performed noting a LVEF of 45% with moderate left atrial enlargement and mildly depressed RV function. She reports no obvious symptoms related to AF.     Available ECGs in Epic which note sinus rhythm with normal intervals until an ECG dated 8/26/2019 which shows atrial fibrillation with an average ventricular rate of 87 bpm and a single PVC.  Her [id number] score is 4 and indefinite anticoagulation is recommended. Since her EF is depressed I recommend rhythm control strategy. Her depressed EF very well may be from atrial fibrillation and/or hyperthyroidism. Recommend DORINA/DCCV.      Would recommend anti-arrhythmic therapy to help reduce risk of recurrence and allow time for her EF to recover. She has no symptoms of heart failure  and her EF is 45%. Options include dofetilide versus sotalol, both would need hospital admission for initiation. She desires to try just a cardioversion without drug therapy unless it recurs. DORINA/DCCV. Follow-up after.     9/26/2019: Underwent successful DORINA/DCCV. EF on DORINA was 45%.      10/25/2019: She is one month s/p cardioversion and back in atrial fibrillation. Rate is controlled. She experienced no symptom improvement in sinus rhythm. However, EF is 45%. We discussed that this could be from the AF or from her period of hyperthyroidism. She is still hyperthyroid and is going to decrease her synthroid again per PCP (has a follow up soon). It would increase the likelihood of maintaining sinus rhythm after cardioversion if her thyroid levels are normal. We discussed anti-arrhythmic options, which include tikosyn or sotalol which both require hospital stays. She does not want to proceed with that route. It might be possible to use amio after her DCCV, after her thyroid has normalized and her synthroid dose is appropriate, until her follow up echo. If her EF normalizes, could switch to 1C or Multaq.      Patient was last seen in EP clinic on 12/10/19 >   She remains in AF, minimal symptoms although in the presence of a cardiomyopathy. Plan was to proceed with rhythm control once she is euthyroid, and evaluate any symptom improvement or increase in LV function. She remained in AF only one week s/p prior cardioversion, and likely needs AAD. She refuses amiodarone, tikosyn, and sotalol. Multaq would likely be a reasonable option for her.  She remains on Eliquis 5 mg BID  for CVA prophylaxis.    Patient elected to proceed for planned cardioversion, with DORINA prior.      Patient presented to short stay cardiac unit on 11/9/2020  for this procedure   ,Patient denies any  noted bleeding,  overt shortness of breath, chest pains, rash , fevers, chills, or any other acute symptoms.  My interpretation ECG is AF    Patient  reports  Compliance with Eliquis 5 mg BID for CVA prophylaxis  ,denies any bleeding episodes.             Procedure(s) (LRB):  CARDIOVERSION (N/A)  Transesophageal echo (DORINA) intra-procedure log documentation (N/A)       Hospital Course: DORINA done showed no GIO thrombus, hence proceeded to DCCV which converted patient from AF to sinus rhythm  ( see procedure note for details )     Patient tolerated procedure. Post procedure  EKG showed NSR at 68 BPM. Patient states she feels well   Instructed to  continue home medications including Eliquis 5 mg BID   Patient to start Multaq 400 mg BID.   Pt to follow up with EKG in 1 week,  and 4 weeks clinic  with MD Sanchez     Discharge plans/instructions discussed with patient   who verbalized understanding  and agreement of plans of care. No further questions or concerns  voiced at this time.         Consults:    -Anesthesia       Significant Diagnostic Studies: DORINA      Final Active Diagnoses:    Diagnosis Date Noted POA    PRINCIPAL PROBLEM:  Persistent atrial fibrillation [I48.19] 09/03/2019 Yes      Problems Resolved During this Admission:         Discharged Condition: good    Disposition: HOME    Follow Up:  Follow-up Information     Eduardo Sanchez MD In 4 weeks.    Specialties:  Electrophysiology, Cardiology  Why:  EKG in 1 week   Contact information:  [address] [facility] [address]  [phone]                 Patient Instructions:      Diet Cardiac     No driving until:   Order Comments: No driving for 24 hours post procedure     Other restrictions (specify):   Order Comments: Diet  -You may resume oral intake after you are discharged, as long you have no swallowing difficulties.     Side effects:  -You may be drowsy for the remainder of the day from the sedation.     Because you have received sedation for this procedure:  -Limit activity for the remainder of the day.    -Do not smoke for at least 6 hours and until you are fully awake and alert.  -Do not drink  alcoholic beverage for 24 hours.  -Defer important decision making until the following day.     Go to the Emergency Department if you develop:   -Bleeding  -Weakness or numbness  -Visual, gait or speech disturbance  -New chest pain, palpitations, shortness of breath, rapid heart beat, or fainting  -Fever        Notify your health care provider if you experience any of the following:  temperature >100.4     Notify your health care provider if you experience any of the following:  persistent nausea and vomiting or diarrhea     Notify your health care provider if you experience any of the following:  severe uncontrolled pain     Notify your health care provider if you experience any of the following:  redness, tenderness, or signs of infection (pain, swelling, redness, odor or green/yellow discharge around incision site)     Notify your health care provider if you experience any of the following:  severe persistent headache     Notify your health care provider if you experience any of the following:  difficulty breathing or increased cough     Notify your health care provider if you experience any of the following:  worsening rash     Notify your health care provider if you experience any of the following:  persistent dizziness, light-headedness, or visual disturbances     Notify your health care provider if you experience any of the following:  increased confusion or weakness     Notify your health care provider if you experience any of the following:   Order Comments: For any concerning medical symptoms     Medications:  Reconciled Home Medications:      Medication List      START taking these medications    dronedarone 400 mg Tab  Commonly known as:  MULTAQ  Take 1 tablet (400 mg total) by mouth 2 (two) times daily with meals.        CONTINUE taking these medications    apixaban 5 mg Tab  Commonly known as:  ELIQUIS  Take 1 tablet (5 mg total) by mouth 2 (two) times daily.     CINNAMON ORAL  Take by mouth once  daily. Pt taking 2 pills daily.     diclofenac-misoprostol  mg-mcg  mg-mcg per tablet  Commonly known as:  ARTHROTEC 50  Take 1 tablet by mouth 2 (two) times daily as needed (pain and inflammation). Take with food.     fish oil-omega-3 fatty acids 300-1,000 mg capsule  Take 2 g by mouth once daily.     metFORMIN 500 MG 24 hr tablet  Commonly known as:  GLUCOPHAGE-XR  TAKE 2 TABLETS(1000 MG) BY MOUTH TWICE DAILY WITH MEALS     metoprolol succinate 50 MG 24 hr tablet  Commonly known as:  TOPROL-XL  Take 1 tablet (50 mg total) by mouth once daily.     ONE-A-DAY WOMEN'S ACTIVE ORAL  Take by mouth.     OPW TEST CLAIM - DO NOT FILL  OPW test claim. Do not fill.     rosuvastatin 10 MG tablet  Commonly known as:  CRESTOR  TAKE 1 TABLET(10 MG) BY MOUTH EVERY DAY     Synthroid 100 MCG tablet  Generic drug:  levothyroxine  Take 1 tablet (100 mcg total) by mouth once daily.     varicella-zoster gE-AS01B (PF) 50 mcg/0.5 mL injection  Commonly known as:  Shingrix (PF)  Inject into the muscle.     Vitamin C 100 MG tablet  Generic drug:  ascorbic acid (vitamin C)  Take 100 mg by mouth once daily.     vitamin D 1000 units Tab  Commonly known as:  VITAMIN D3  Take 1,000 Units by mouth once daily.            Time spent on the discharge of patient: 30 minutes    Madison Victoria NP  Cardiac Electrophysiology  [facility]  STAFF MD Daniel Clark

## 2020-01-09 NOTE — ANESTHESIA PREPROCEDURE EVALUATION
01/09/2020  Bebe Valdez is a 69 y.o., female.  Patient Active Problem List   Diagnosis    Hypothyroid    Class 1 obesity due to excess calories with serious comorbidity and body mass index (BMI) of 32.0 to 32.9 in adult    Obesity, diabetes, and hypertension syndrome    Thrombocytosis    Type 2 diabetes mellitus with hypercholesterolemia    Decreased range of motion (ROM) of shoulder    Persistent atrial fibrillation    Chronic systolic heart failure, in setting of synthroid mediated hyperthyroidism and persistent atrial fibrillation    History of cardioversion    Chronic anticoagulation    Low serum thyroid stimulating hormone (TSH)         Anesthesia Evaluation         Review of Systems      Physical Exam  General:  Well nourished    Airway/Jaw/Neck:  Airway Findings: Mouth Opening: Normal Tongue: Normal  General Airway Assessment: Adult  Mallampati: II  Improves to II with phonation.  TM Distance: Normal, at least 6 cm      Dental:  Dental Findings:   Chest/Lungs:  Chest/Lungs Findings: Clear to auscultation     Heart/Vascular:  Heart Findings: Rate: Normal  Rhythm: Irregularly Irregular  Sounds: Normal        Mental Status:  Mental Status Findings:  Cooperative, Alert and Oriented         Anesthesia Plan  Type of Anesthesia, risks & benefits discussed:  Anesthesia Type:  general  Patient's Preference: General  Intra-op Monitoring Plan: standard ASA monitors  Intra-op Monitoring Plan Comments: Standard ASA monitors.   Post Op Pain Control Plan: per primary service following discharge from PACU  Post Op Pain Control Plan Comments: Per primary service.     Induction:   IV  Beta Blocker:  Patient is not currently on a Beta-Blocker (No further documentation required).       Informed Consent: Patient understands risks and agrees with Anesthesia plan.  Questions answered. Anesthesia consent signed  with patient.  ASA Score: 3     Day of Surgery Review of History & Physical:    H&P update referred to the surgeon.     Anesthesia Plan Notes: Chart reviewed, patient interviewed and examined.  The plan for general anesthesia was explained.  Questions were answered and the consent was signed.  Aileen GRACE         Ready For Surgery From Anesthesia Perspective.

## 2020-01-09 NOTE — SUBJECTIVE & OBJECTIVE
Past Medical History:   Diagnosis Date    Allergy     Anticoagulant long-term use     Hyperlipidemia     Hypertension associated with diabetes     Hypothyroid     Obesity     Rosacea        Past Surgical History:   Procedure Laterality Date    CHOLECYSTECTOMY      COLONOSCOPY  7/6/07    COLONOSCOPY N/A 6/6/2017    Procedure: COLONOSCOPY;  Surgeon: Tre Alvarez MD;  [facility]  Service: Endoscopy;  Laterality: N/A;    DILATION AND CURETTAGE OF UTERUS      HYSTEROSCOPY      TREATMENT OF CARDIAC ARRHYTHMIA N/A 9/26/2019    Procedure: CARDIOVERSION;  Surgeon: Eduardo Sanchez MD;  Location: [facility] EP LAB;  Service: Cardiology;  Laterality: N/A;  AF, DORINA, DCCV, MAC, KS, 3 Prep       Review of patient's allergies indicates:   Allergen Reactions    Levaquin [levofloxacin] Hives    Tetracycline Hives       Current Facility-Administered Medications on File Prior to Encounter   Medication    0.9%  NaCl infusion    sodium chloride 0.9% flush 5 mL     Current Outpatient Medications on File Prior to Encounter   Medication Sig    apixaban (ELIQUIS) 5 mg Tab Take 1 tablet (5 mg total) by mouth 2 (two) times daily.    ascorbic acid (VITAMIN C) 100 MG tablet Take 100 mg by mouth once daily.    CINNAMON BARK (CINNAMON ORAL) Take by mouth once daily. Pt taking 2 pills daily.    fish oil-omega-3 fatty acids 300-1,000 mg capsule Take 2 g by mouth once daily.    metFORMIN (GLUCOPHAGE-XR) 500 MG 24 hr tablet TAKE 2 TABLETS(1000 MG) BY MOUTH TWICE DAILY WITH MEALS    metoprolol succinate (TOPROL-XL) 50 MG 24 hr tablet Take 1 tablet (50 mg total) by mouth once daily.    MV,CA,MIN/IRON/FA/GUARANA/CAFF (ONE-A-DAY WOMEN'S ACTIVE ORAL) Take by mouth.    vitamin D (VITAMIN D3) 1000 units Tab Take 1,000 Units by mouth once daily.    diclofenac-misoprostol  mg-mcg (ARTHROTEC 50)  mg-mcg per tablet Take 1 tablet by mouth 2 (two) times daily as needed (pain and inflammation). Take with food.     varicella-zoster gE-AS01B, PF, (SHINGRIX, PF,) 50 mcg/0.5 mL injection Inject into the muscle.     Family History     Problem Relation (Age of Onset)    Allergies Mother    Atrial fibrillation Father    Breast cancer Mother (42)    Cancer Mother, Maternal Grandfather    Heart disease Father    Hyperlipidemia Father    Hypertension Father    Hypothyroidism Father    No Known Problems Sister, Brother    Stroke Father        Tobacco Use    Smoking status: Never Smoker    Smokeless tobacco: Never Used   Substance and Sexual Activity    Alcohol use: Yes     Alcohol/week: 2.0 standard drinks     Types: 2 Glasses of wine per week     Frequency: 2-4 times a month     Drinks per session: 1 or 2     Binge frequency: Never     Comment: weekends     Drug use: Not on file    Sexual activity: Not on file     Review of Systems   Constitution: Negative for chills, decreased appetite and diaphoresis.   HENT: Negative for congestion and ear discharge.    Eyes: Negative for blurred vision and discharge.   Cardiovascular: Negative for chest pain, dyspnea on exertion, irregular heartbeat, leg swelling and paroxysmal nocturnal dyspnea.   Respiratory: Negative for cough, hemoptysis and shortness of breath.    Gastrointestinal: Negative for abdominal pain.     Objective:     Vital Signs (Most Recent):    Vital Signs (24h Range):           There is no height or weight on file to calculate BMI.            No intake or output data in the 24 hours ending 01/09/20 0850    Lines/Drains/Airways     None                 Physical Exam   Constitutional: She is oriented to person, place, and time. She appears well-developed and well-nourished. No distress.   Eyes: Pupils are equal, round, and reactive to light. Conjunctivae are normal.   Neck: No tracheal deviation present. No thyromegaly present.   Cardiovascular: Normal rate, normal heart sounds and intact distal pulses. An irregularly irregular rhythm present. Exam reveals no gallop and  no friction rub.   No murmur heard.  Pulses:       Radial pulses are 2+ on the right side, and 2+ on the left side.        Femoral pulses are 2+ on the right side, and 2+ on the left side.  Pulmonary/Chest: Effort normal and breath sounds normal. No respiratory distress. She has no wheezes. She has no rales.   Abdominal: Soft. Bowel sounds are normal. She exhibits no distension. There is no tenderness.   Musculoskeletal: She exhibits no edema or deformity.   Neurological: She is alert and oriented to person, place, and time. No cranial nerve deficit. Coordination normal.   Skin: Skin is warm and dry. She is not diaphoretic.   Psychiatric: She has a normal mood and affect. Her behavior is normal.       Significant Labs: BMP: No results for input(s): GLU, NA, K, CL, CO2, BUN, CREATININE, CALCIUM, MG in the last 48 hours.    Significant Imaging: Echocardiogram:   Transthoracic echo (TTE) complete (Cupid Only):   Results for orders placed or performed in visit on 09/06/19   Echo   Result Value Ref Range    Ascending aorta 3.31 cm    STJ 2.96 cm    AV mean gradient 4 mmHg    Ao peak anibal 1.26 m/s    Ao VTI 26.34 cm    IVRT 0.09 msec    IVS 0.69 0.6 - 1.1 cm    LA size 4.18 cm    Left Atrium Major Axis 6.36 cm    Left Atrium Minor Axis 6.40 cm    LVIDD 5.45 3.5 - 6.0 cm    LVIDS 3.91 2.1 - 4.0 cm    LVOT diameter 1.91 cm    LVOT peak VTI 19.21 cm    PW 0.66 0.6 - 1.1 cm    MV Peak A Anibal 0.29 m/s    E wave decelartion time 203.43 msec    MV Peak E Anibal 0.92 m/s    PV Peak D Anibal 0.80 m/s    PV Peak S Anibal 0.34 m/s    RA Major Axis 5.57 cm    RA Width 3.78 cm    RVDD 3.63 cm    Sinus 3.17 cm    TAPSE 1.53 cm    TR Max Anibal 2.87 m/s    TDI LATERAL 0.10 m/s    TDI SEPTAL 0.08 m/s    LA WIDTH 3.72 cm    LV Diastolic Volume 144.41 mL    LV Systolic Volume 66.49 mL    LVOT peak anibal 0.91 m/s    LV LATERAL E/E' RATIO 9.20 m/s    LV SEPTAL E/E' RATIO 11.50 m/s    FS 28 %    LA volume 84.32 cm3    LV mass 127.52 g    Left Ventricle  Relative Wall Thickness 0.24 cm    AV valve area 2.09 cm2    AV Velocity Ratio 0.72     AV index (prosthetic) 0.73     E/A ratio 3.17     Mean e' 0.09 m/s    Pulm vein S/D ratio 0.43     LVOT area 2.9 cm2    LVOT stroke volume 55.01 cm3    AV peak gradient 6 mmHg    E/E' ratio 10.22 m/s    Triscuspid Valve Regurgitation Peak Gradient 33 mmHg    BSA 1.96 m2    LV Systolic Volume Index 34.6 mL/m2    LV Diastolic Volume Index 75.15 mL/m2    LA Volume Index 43.9 mL/m2    LV Mass Index 66 g/m2    Right Atrial Pressure (from IVC) 3 mmHg    QEF 44 %    TV rest pulmonary artery pressure 36 mmHg    Narrative    · Atrial fibrillation and frequent ectopy was present during the study.  · Moderate left atrial enlargement.  · Mild left ventricular enlargement: 5.5 cm.  · Mildly decreased left ventricular systolic function. The estimated   ejection fraction is 45%  · Mild global hypokinesis.  · Diastolic pattern consistent with atrial fibrillation observed.  · Mild right atrial enlargement.  · Low normal right ventricular systolic function.  · The estimated PA systolic pressure is 36 mm Hg  · Mild tricuspid regurgitation.  · Normal central venous pressure (3 mm Hg).

## 2020-01-10 LAB
POCT GLUCOSE: 110 MG/DL (ref 70–110)
POCT GLUCOSE: 122 MG/DL (ref 70–110)

## 2020-01-16 ENCOUNTER — HOSPITAL ENCOUNTER (OUTPATIENT)
Dept: CARDIOLOGY | Facility: CLINIC | Age: 70
Discharge: HOME | End: 2020-01-16
Payer: MEDICARE

## 2020-01-16 ENCOUNTER — TELEPHONE (OUTPATIENT)
Dept: ELECTROPHYSIOLOGY | Facility: CLINIC | Age: 70
End: 2020-01-16

## 2020-01-16 DIAGNOSIS — I49.8 OTHER SPECIFIED CARDIAC ARRHYTHMIAS: Primary | ICD-10-CM

## 2020-01-16 DIAGNOSIS — I48.0 PAROXYSMAL ATRIAL FIBRILLATION: ICD-10-CM

## 2020-01-16 PROCEDURE — 93010 ELECTROCARDIOGRAM REPORT: CPT | Mod: S$PBB,,, | Performed by: INTERNAL MEDICINE

## 2020-01-16 PROCEDURE — 93010 RHYTHM STRIP: ICD-10-PCS | Mod: S$PBB,,, | Performed by: INTERNAL MEDICINE

## 2020-01-16 PROCEDURE — 93005 ELECTROCARDIOGRAM TRACING: CPT | Mod: PBBFAC | Performed by: INTERNAL MEDICINE

## 2020-01-17 ENCOUNTER — TELEPHONE (OUTPATIENT)
Dept: ELECTROPHYSIOLOGY | Facility: CLINIC | Age: 70
End: 2020-01-17

## 2020-01-17 NOTE — TELEPHONE ENCOUNTER
Spoke to Ms. Valdez.  Let her know her EKG appears to be sinus rhythm.  She verbalizes understanding and appreciates call.

## 2020-01-17 NOTE — TELEPHONE ENCOUNTER
----- Message from Eduardo Sanchez MD sent at 1/16/2020  4:19 PM CST -----  Please notify the patient that she remains in normal rhythm

## 2020-01-17 NOTE — TELEPHONE ENCOUNTER
----- Message from Mitzi Merrill MA sent at 1/16/2020  8:43 AM CST -----  Good Morning    is coming for a EKG post cardioversion done on 1/09/2020 her EKG is for 10:00am  She said she would like to know results of EKG today or possibly before her next appointment which is one 2/13/2020

## 2020-02-11 NOTE — PROGRESS NOTES
Ms. Valdez is a patient of Dr. Sanchez and was last seen in clinic 12/10/2019.      Subjective:   Patient ID:  Bebe Valdez is a 69 y.o. female who presents for follow-up of Atrial Fibrillation  .     HPI:    Ms. Valdez is a 69 y.o. female with hypothyroid, DM, cardiomyopathy here for follow up after cardioversion.    Background:    Referring Cardiologist: Tami De Leon MD  Primary Care Physician: Anli Nelson MD    Ms. Valdez has a history of diabetes mellitus, hypothyroidism on synthroid and recent onset of persistent atrial fibrillation. She was exercising with a  in July 2019 and pulse was noted to be elevated and irregular. She saw Dr. Nelson on 8/26/2019 and was diagnosed with atrial fibrillation. She was started on metoprolol and aspirin. TSH was checked and was undetectable. Her synthroid dosage was reduced. She was then assessed by Dr. De Leon who started her on eliquis and referred her here for further evaluation. A transthoracic echocardiogram was performed noting a LVEF of 45% with moderate left atrial enlargement and mildly depressed RV function. She reports no obvious symptoms related to AF.    Available ECGs in Epic which note sinus rhythm with normal intervals until an ECG dated 8/26/2019 which shows atrial fibrillation with an average ventricular rate of 87 bpm and a single PVC.  Her [id number] score is 4 and indefinite anticoagulation is recommended. Since her EF is depressed I recommend rhythm control strategy. Her depressed EF very well may be from atrial fibrillation and/or hyperthyroidism. Recommend DORINA/DCCV.      Would recommend anti-arrhythmic therapy to help reduce risk of recurrence and allow time for her EF to recover. She has no symptoms of heart failure and her EF is 45%. Options include dofetilide versus sotalol, both would need hospital admission for initiation. She desires to try just a cardioversion without drug therapy unless it recurs. DORINA/DCCV. Follow-up  after.    9/26/2019: Underwent successful DORINA/DCCV. EF on DORINA was 45%.     10/25/2019: She is one month s/p cardioversion and back in atrial fibrillation. Rate is controlled. She experienced no symptom improvement in sinus rhythm. However, EF is 45%. We discussed that this could be from the AF or from her period of hyperthyroidism. She is still hyperthyroid and is going to decrease her synthroid again per PCP (has a follow up soon). It would increase the likelihood of maintaining sinus rhythm after cardioversion if her thyroid levels are normal. We discussed anti-arrhythmic options, which include tikosyn or sotalol which both require hospital stays. She does not want to proceed with that route. It might be possible to use amio after her DCCV, after her thyroid has normalized and her synthroid dose is appropriate, until her follow up echo. If her EF normalizes, could switch to 1C or Multaq.     12/2019: She remains in AF, minimal symptoms although in the presence of a cardiomyopathy. Plan was to proceed with rhythm control once she is euthyroid, and evaluate any symptom improvement or increase in LV function.     Update (02/13/2020):    1/9/2020: successful DCCV. Multaq initiated.    Today she says she feels well - better in SR. Ms. Valdez denies chest pain with exertion or at rest, palpitations, SOB, SON, dizziness, or syncope.     Jerson is currently taking eliquis 5mg BID for stroke prophylaxis and denies significant bleeding episodes. She is currently being treated with multaq 400mg BID for rhythm control and metoprolol succinate 50mg daily for HR control.  Kidney function is stable, with a creatinine of 0.7 on 1/6/2020.    I have personally reviewed the patient's EKG today, which shows sinus rhythm with 1st degree AVB at 62bpm. UT interval is 216. QRS is 86. QTc is 464.    Recent Cardiac Tests:    DORINA (1/9/2020):  · Normal appearing left atrial appendage. No thrombus is present in the appendage. Normal appendage  velocities.  · No thrombus is present in the left atrium.  · Mildly decreased left ventricular systolic function. The estimated ejection fraction is 40%.  · Mildly reduced right ventricular systolic function.  · Mild tricuspid regurgitation.  · No interatrial septal defect present.    Current Outpatient Medications   Medication Sig    apixaban (ELIQUIS) 5 mg Tab Take 1 tablet (5 mg total) by mouth 2 (two) times daily.    ascorbic acid (VITAMIN C) 100 MG tablet Take 100 mg by mouth once daily.    CINNAMON BARK (CINNAMON ORAL) Take by mouth once daily. Pt taking 2 pills daily.    diclofenac-misoprostol  mg-mcg (ARTHROTEC 50)  mg-mcg per tablet Take 1 tablet by mouth 2 (two) times daily as needed (pain and inflammation). Take with food.    dronedarone (MULTAQ) 400 mg Tab Take 1 tablet (400 mg total) by mouth 2 (two) times daily with meals.    fish oil-omega-3 fatty acids 300-1,000 mg capsule Take 2 g by mouth once daily.    metFORMIN (GLUCOPHAGE-XR) 500 MG 24 hr tablet TAKE 2 TABLETS(1000 MG) BY MOUTH TWICE DAILY WITH MEALS    metoprolol succinate (TOPROL-XL) 50 MG 24 hr tablet Take 1 tablet (50 mg total) by mouth once daily.    MV,CA,MIN/IRON/FA/GUARANA/CAFF (ONE-A-DAY WOMEN'S ACTIVE ORAL) Take by mouth.    OPW TEST CLAIM - DO NOT FILL OPW test claim. Do not fill.    rosuvastatin (CRESTOR) 10 MG tablet TAKE 1 TABLET(10 MG) BY MOUTH EVERY DAY    SYNTHROID 100 mcg tablet Take 1 tablet (100 mcg total) by mouth once daily.    varicella-zoster gE-AS01B, PF, (SHINGRIX, PF,) 50 mcg/0.5 mL injection Inject into the muscle.    vitamin D (VITAMIN D3) 1000 units Tab Take 1,000 Units by mouth once daily.     No current facility-administered medications for this visit.      Facility-Administered Medications Ordered in Other Visits   Medication    0.9%  NaCl infusion    sodium chloride 0.9% flush 5 mL       Review of Systems   Constitution: Negative for malaise/fatigue.   Cardiovascular: Negative for  "chest pain, dyspnea on exertion, irregular heartbeat, leg swelling and palpitations.   Respiratory: Negative for shortness of breath.    Hematologic/Lymphatic: Negative for bleeding problem.   Skin: Negative for rash.   Musculoskeletal: Negative for myalgias.   Gastrointestinal: Negative for hematemesis, hematochezia and nausea.   Genitourinary: Negative for hematuria.   Neurological: Negative for light-headedness.   Psychiatric/Behavioral: Negative for altered mental status.   Allergic/Immunologic: Negative for persistent infections.         Objective:          BP (!) 112/58   Pulse 62   Ht 5' 6" (1.676 m)   Wt 84.7 kg (186 lb 11.7 oz)   LMP 07/18/2001   BMI 30.14 kg/m²     Physical Exam   Constitutional: She is oriented to person, place, and time. She appears well-developed and well-nourished.   HENT:   Head: Normocephalic.   Nose: Nose normal.   Eyes: Pupils are equal, round, and reactive to light.   Cardiovascular: Normal rate, regular rhythm, S1 normal and S2 normal.   No murmur heard.  Pulses:       Radial pulses are 2+ on the right side, and 2+ on the left side.   Pulmonary/Chest: Breath sounds normal. No respiratory distress.   Abdominal: Normal appearance.   Musculoskeletal: Normal range of motion. She exhibits no edema.   Neurological: She is alert and oriented to person, place, and time.   Skin: Skin is warm and dry. No erythema.   Psychiatric: She has a normal mood and affect. Her speech is normal and behavior is normal.   Nursing note and vitals reviewed.    Lab Results   Component Value Date     01/06/2020    K 4.3 01/06/2020    MG 2.3 04/08/2005    BUN 11 01/06/2020    CREATININE 0.7 01/06/2020    ALT 15 10/22/2019    AST 15 10/22/2019    HGB 13.0 01/06/2020    HCT 41.7 01/06/2020    TSH 2.858 12/04/2019    LDLCALC 48.2 (L) 10/22/2019       Recent Labs   Lab 09/19/19  1415 01/06/20  1028   INR 1.1 1.1       Assessment:     1. Persistent atrial fibrillation    2. Chronic anticoagulation  "   3. History of cardioversion    4. Other cardiomyopathy      Plan:     In summary, Ms. Valdez is a 69 y.o. female with hypothyroid, DM, cardiomyopathy here for follow up after cardioversion.  She is now 5 weeks s/p cardioversion and initiation of Multaq. She feels well - better in SR. EKG with acceptable intervals. On eliquis for CVA prophylaxis. Thyroid function now normal. Will repeat echo in 3 months to observe for improvement in LV function in SR.    TTE in 3 mo.  Continue current medications.  RTC 3-4 mo, sooner if needed.    *A copy of this note has been sent to Dr. Sanchez*    Follow up in about 3 months (around 5/13/2020).    ------------------------------------------------------------------    CHRISTA Soares, NP-C  Cardiac Electrophysiology

## 2020-02-13 ENCOUNTER — OFFICE VISIT (OUTPATIENT)
Dept: ELECTROPHYSIOLOGY | Facility: CLINIC | Age: 70
End: 2020-02-13
Payer: MEDICARE

## 2020-02-13 ENCOUNTER — HOSPITAL ENCOUNTER (OUTPATIENT)
Dept: CARDIOLOGY | Facility: CLINIC | Age: 70
Discharge: HOME | End: 2020-02-13
Payer: MEDICARE

## 2020-02-13 VITALS
DIASTOLIC BLOOD PRESSURE: 58 MMHG | HEART RATE: 62 BPM | SYSTOLIC BLOOD PRESSURE: 112 MMHG | WEIGHT: 186.75 LBS | BODY MASS INDEX: 30.01 KG/M2 | HEIGHT: 66 IN

## 2020-02-13 DIAGNOSIS — Z79.01 CHRONIC ANTICOAGULATION: ICD-10-CM

## 2020-02-13 DIAGNOSIS — I48.19 PERSISTENT ATRIAL FIBRILLATION: Primary | ICD-10-CM

## 2020-02-13 DIAGNOSIS — I49.8 OTHER SPECIFIED CARDIAC ARRHYTHMIAS: ICD-10-CM

## 2020-02-13 DIAGNOSIS — I48.0 PAF (PAROXYSMAL ATRIAL FIBRILLATION): Primary | ICD-10-CM

## 2020-02-13 DIAGNOSIS — I42.8 OTHER CARDIOMYOPATHY: ICD-10-CM

## 2020-02-13 DIAGNOSIS — Z92.89 HISTORY OF CARDIOVERSION: ICD-10-CM

## 2020-02-13 PROCEDURE — 99214 PR OFFICE/OUTPT VISIT, EST, LEVL IV, 30-39 MIN: ICD-10-PCS | Mod: S$PBB,,, | Performed by: NURSE PRACTITIONER

## 2020-02-13 PROCEDURE — 99999 PR PBB SHADOW E&M-EST. PATIENT-LVL III: ICD-10-PCS | Mod: PBBFAC,,, | Performed by: NURSE PRACTITIONER

## 2020-02-13 PROCEDURE — 93010 RHYTHM STRIP: ICD-10-PCS | Mod: S$PBB,,, | Performed by: INTERNAL MEDICINE

## 2020-02-13 PROCEDURE — 93005 ELECTROCARDIOGRAM TRACING: CPT | Mod: PBBFAC | Performed by: INTERNAL MEDICINE

## 2020-02-13 PROCEDURE — 93010 ELECTROCARDIOGRAM REPORT: CPT | Mod: S$PBB,,, | Performed by: INTERNAL MEDICINE

## 2020-02-13 PROCEDURE — 99999 PR PBB SHADOW E&M-EST. PATIENT-LVL III: CPT | Mod: PBBFAC,,, | Performed by: NURSE PRACTITIONER

## 2020-02-13 PROCEDURE — 99214 OFFICE O/P EST MOD 30 MIN: CPT | Mod: S$PBB,,, | Performed by: NURSE PRACTITIONER

## 2020-02-13 PROCEDURE — 99213 OFFICE O/P EST LOW 20 MIN: CPT | Mod: PBBFAC | Performed by: NURSE PRACTITIONER

## 2020-02-17 ENCOUNTER — TELEPHONE (OUTPATIENT)
Dept: PRIMARY CARE CLINIC | Facility: CLINIC | Age: 70
End: 2020-02-17

## 2020-02-17 DIAGNOSIS — Z00.00 ROUTINE GENERAL MEDICAL EXAMINATION AT A HEALTH CARE FACILITY: Primary | ICD-10-CM

## 2020-02-17 DIAGNOSIS — Z79.899 ENCOUNTER FOR LONG-TERM (CURRENT) USE OF OTHER MEDICATIONS: ICD-10-CM

## 2020-02-17 NOTE — TELEPHONE ENCOUNTER
----- Message from Ada Mehta sent at 2/17/2020  1:19 PM CST -----  Contact: Patient [phone]  Doctor appointment and lab have been scheduled.  Please link lab orders to the lab appointment.  Date of doctor appointment:  5/4/2020  Date of lab appointment:  4/29/2020  Physical or EP: fu

## 2020-02-21 ENCOUNTER — OFFICE VISIT (OUTPATIENT)
Dept: INTERNAL MEDICINE | Facility: CLINIC | Age: 70
End: 2020-02-21
Payer: MEDICARE

## 2020-02-21 VITALS
SYSTOLIC BLOOD PRESSURE: 130 MMHG | HEART RATE: 70 BPM | HEIGHT: 66 IN | WEIGHT: 183 LBS | DIASTOLIC BLOOD PRESSURE: 82 MMHG | BODY MASS INDEX: 29.41 KG/M2 | OXYGEN SATURATION: 96 %

## 2020-02-21 DIAGNOSIS — N30.01 ACUTE CYSTITIS WITH HEMATURIA: Primary | ICD-10-CM

## 2020-02-21 LAB
BACTERIA #/AREA URNS AUTO: ABNORMAL /HPF
BILIRUB UR QL STRIP: NEGATIVE
CLARITY UR REFRACT.AUTO: ABNORMAL
COLOR UR AUTO: YELLOW
GLUCOSE UR QL STRIP: NEGATIVE
HGB UR QL STRIP: ABNORMAL
HYALINE CASTS UR QL AUTO: 0 /LPF
KETONES UR QL STRIP: NEGATIVE
LEUKOCYTE ESTERASE UR QL STRIP: ABNORMAL
MICROSCOPIC COMMENT: ABNORMAL
NITRITE UR QL STRIP: NEGATIVE
PH UR STRIP: 6 [PH] (ref 5–8)
PROT UR QL STRIP: ABNORMAL
RBC #/AREA URNS AUTO: >100 /HPF (ref 0–4)
SP GR UR STRIP: 1.01 (ref 1–1.03)
URN SPEC COLLECT METH UR: ABNORMAL
WBC #/AREA URNS AUTO: >100 /HPF (ref 0–5)
WBC CLUMPS UR QL AUTO: ABNORMAL

## 2020-02-21 PROCEDURE — 87077 CULTURE AEROBIC IDENTIFY: CPT

## 2020-02-21 PROCEDURE — 99213 PR OFFICE/OUTPT VISIT, EST, LEVL III, 20-29 MIN: ICD-10-PCS | Mod: S$PBB,,, | Performed by: INTERNAL MEDICINE

## 2020-02-21 PROCEDURE — 99999 PR PBB SHADOW E&M-EST. PATIENT-LVL III: ICD-10-PCS | Mod: PBBFAC,,, | Performed by: INTERNAL MEDICINE

## 2020-02-21 PROCEDURE — 99213 OFFICE O/P EST LOW 20 MIN: CPT | Mod: PBBFAC | Performed by: INTERNAL MEDICINE

## 2020-02-21 PROCEDURE — 87088 URINE BACTERIA CULTURE: CPT

## 2020-02-21 PROCEDURE — 87086 URINE CULTURE/COLONY COUNT: CPT

## 2020-02-21 PROCEDURE — 99213 OFFICE O/P EST LOW 20 MIN: CPT | Mod: S$PBB,,, | Performed by: INTERNAL MEDICINE

## 2020-02-21 PROCEDURE — 99999 PR PBB SHADOW E&M-EST. PATIENT-LVL III: CPT | Mod: PBBFAC,,, | Performed by: INTERNAL MEDICINE

## 2020-02-21 PROCEDURE — 81001 URINALYSIS AUTO W/SCOPE: CPT

## 2020-02-21 PROCEDURE — 87186 SC STD MICRODIL/AGAR DIL: CPT

## 2020-02-21 RX ORDER — SULFAMETHOXAZOLE AND TRIMETHOPRIM 800; 160 MG/1; MG/1
1 TABLET ORAL 2 TIMES DAILY
Qty: 14 TABLET | Refills: 0 | Status: SHIPPED | OUTPATIENT
Start: 2020-02-21 | End: 2020-02-28

## 2020-02-21 RX ORDER — LEVOTHYROXINE SODIUM 75 UG/1
TABLET ORAL
COMMUNITY
Start: 2020-01-06 | End: 2020-05-06

## 2020-02-21 NOTE — PROGRESS NOTES
69-year-old female    This morning started noticing dysuria, urine frequency, urgency, and blood in the urine  No fever chills, no back or abdominal pain     UTI once couple years ago, symptoms are similar    Past medical history  Hypothyroid  Paroxysmal atrial fibrillation  Type 2 diabetes      Medication per med card    Examination  Weight 183  Pulse 72  Blood pressure 130/82  Lungs clear  Heart regular rate and rhythm  Abdominal exam nontender soft  No CVA or flank tenderness    Urine dip 4+ leukocytes and red blood cells    Impression  Acute cystitis with hematuria    Plan send urine off for analysis  Bactrim DS twice a day for 7 days

## 2020-02-24 LAB — BACTERIA UR CULT: ABNORMAL

## 2020-03-19 DIAGNOSIS — I48.19 PERSISTENT ATRIAL FIBRILLATION: ICD-10-CM

## 2020-03-19 DIAGNOSIS — E11.69 TYPE 2 DIABETES MELLITUS WITH HYPERCHOLESTEROLEMIA: ICD-10-CM

## 2020-03-19 DIAGNOSIS — E78.00 TYPE 2 DIABETES MELLITUS WITH HYPERCHOLESTEROLEMIA: ICD-10-CM

## 2020-03-19 DIAGNOSIS — E03.2 HYPOTHYROIDISM DUE TO NON-MEDICATION EXOGENOUS SUBSTANCES: ICD-10-CM

## 2020-03-19 DIAGNOSIS — E66.09 CLASS 1 OBESITY DUE TO EXCESS CALORIES WITH SERIOUS COMORBIDITY AND BODY MASS INDEX (BMI) OF 32.0 TO 32.9 IN ADULT: ICD-10-CM

## 2020-03-19 RX ORDER — APIXABAN 5 MG/1
TABLET, FILM COATED ORAL
Qty: 60 TABLET | Refills: 6 | Status: SHIPPED | OUTPATIENT
Start: 2020-03-19 | End: 2020-11-05 | Stop reason: SDUPTHER

## 2020-04-01 DIAGNOSIS — E78.5 HYPERLIPIDEMIA, UNSPECIFIED HYPERLIPIDEMIA TYPE: ICD-10-CM

## 2020-04-01 RX ORDER — ROSUVASTATIN CALCIUM 10 MG/1
TABLET, COATED ORAL
Qty: 90 TABLET | Refills: 0 | Status: SHIPPED | OUTPATIENT
Start: 2020-04-01 | End: 2020-06-25 | Stop reason: SDUPTHER

## 2020-04-03 RX ORDER — METFORMIN HYDROCHLORIDE 500 MG/1
TABLET, EXTENDED RELEASE ORAL
Qty: 360 TABLET | Refills: 0 | Status: SHIPPED | OUTPATIENT
Start: 2020-04-03 | End: 2020-06-29

## 2020-04-28 ENCOUNTER — TELEPHONE (OUTPATIENT)
Dept: PRIMARY CARE CLINIC | Facility: CLINIC | Age: 70
End: 2020-04-28

## 2020-04-28 ENCOUNTER — PATIENT MESSAGE (OUTPATIENT)
Dept: PRIMARY CARE CLINIC | Facility: CLINIC | Age: 70
End: 2020-04-28

## 2020-04-29 ENCOUNTER — LAB VISIT (OUTPATIENT)
Dept: LAB | Facility: HOSPITAL | Age: 70
End: 2020-04-29
Attending: FAMILY MEDICINE
Payer: MEDICARE

## 2020-04-29 DIAGNOSIS — Z79.899 ENCOUNTER FOR LONG-TERM (CURRENT) USE OF OTHER MEDICATIONS: ICD-10-CM

## 2020-04-29 LAB
ALBUMIN SERPL BCP-MCNC: 3.9 G/DL (ref 3.5–5.2)
ALP SERPL-CCNC: 42 U/L (ref 55–135)
ALT SERPL W/O P-5'-P-CCNC: 22 U/L (ref 10–44)
ANION GAP SERPL CALC-SCNC: 6 MMOL/L (ref 8–16)
AST SERPL-CCNC: 19 U/L (ref 10–40)
BASOPHILS # BLD AUTO: 0.04 K/UL (ref 0–0.2)
BASOPHILS NFR BLD: 0.5 % (ref 0–1.9)
BILIRUB SERPL-MCNC: 0.7 MG/DL (ref 0.1–1)
BUN SERPL-MCNC: 11 MG/DL (ref 8–23)
CALCIUM SERPL-MCNC: 10.4 MG/DL (ref 8.7–10.5)
CHLORIDE SERPL-SCNC: 104 MMOL/L (ref 95–110)
CHOLEST SERPL-MCNC: 96 MG/DL (ref 120–199)
CHOLEST/HDLC SERPL: 2.4 {RATIO} (ref 2–5)
CO2 SERPL-SCNC: 29 MMOL/L (ref 23–29)
CREAT SERPL-MCNC: 0.9 MG/DL (ref 0.5–1.4)
DIFFERENTIAL METHOD: ABNORMAL
EOSINOPHIL # BLD AUTO: 0.5 K/UL (ref 0–0.5)
EOSINOPHIL NFR BLD: 7.3 % (ref 0–8)
ERYTHROCYTE [DISTWIDTH] IN BLOOD BY AUTOMATED COUNT: 13 % (ref 11.5–14.5)
EST. GFR  (AFRICAN AMERICAN): >60 ML/MIN/1.73 M^2
EST. GFR  (NON AFRICAN AMERICAN): >60 ML/MIN/1.73 M^2
ESTIMATED AVG GLUCOSE: 131 MG/DL (ref 68–131)
GLUCOSE SERPL-MCNC: 109 MG/DL (ref 70–110)
HBA1C MFR BLD HPLC: 6.2 % (ref 4–5.6)
HCT VFR BLD AUTO: 43.4 % (ref 37–48.5)
HDLC SERPL-MCNC: 40 MG/DL (ref 40–75)
HDLC SERPL: 41.7 % (ref 20–50)
HGB BLD-MCNC: 13.5 G/DL (ref 12–16)
IMM GRANULOCYTES # BLD AUTO: 0.02 K/UL (ref 0–0.04)
IMM GRANULOCYTES NFR BLD AUTO: 0.3 % (ref 0–0.5)
LDLC SERPL CALC-MCNC: 40 MG/DL (ref 63–159)
LYMPHOCYTES # BLD AUTO: 2.8 K/UL (ref 1–4.8)
LYMPHOCYTES NFR BLD: 38.2 % (ref 18–48)
MCH RBC QN AUTO: 30.8 PG (ref 27–31)
MCHC RBC AUTO-ENTMCNC: 31.1 G/DL (ref 32–36)
MCV RBC AUTO: 99 FL (ref 82–98)
MONOCYTES # BLD AUTO: 0.6 K/UL (ref 0.3–1)
MONOCYTES NFR BLD: 8.7 % (ref 4–15)
NEUTROPHILS # BLD AUTO: 3.3 K/UL (ref 1.8–7.7)
NEUTROPHILS NFR BLD: 45 % (ref 38–73)
NONHDLC SERPL-MCNC: 56 MG/DL
NRBC BLD-RTO: 0 /100 WBC
PLATELET # BLD AUTO: 239 K/UL (ref 150–350)
PMV BLD AUTO: 11.5 FL (ref 9.2–12.9)
POTASSIUM SERPL-SCNC: 4.3 MMOL/L (ref 3.5–5.1)
PROT SERPL-MCNC: 6.9 G/DL (ref 6–8.4)
RBC # BLD AUTO: 4.38 M/UL (ref 4–5.4)
SODIUM SERPL-SCNC: 139 MMOL/L (ref 136–145)
T4 FREE SERPL-MCNC: 0.81 NG/DL (ref 0.71–1.51)
TRIGL SERPL-MCNC: 80 MG/DL (ref 30–150)
TSH SERPL DL<=0.005 MIU/L-ACNC: 29.46 UIU/ML (ref 0.4–4)
WBC # BLD AUTO: 7.36 K/UL (ref 3.9–12.7)

## 2020-04-29 PROCEDURE — 80053 COMPREHEN METABOLIC PANEL: CPT

## 2020-04-29 PROCEDURE — 83036 HEMOGLOBIN GLYCOSYLATED A1C: CPT

## 2020-04-29 PROCEDURE — 36415 COLL VENOUS BLD VENIPUNCTURE: CPT | Mod: PO

## 2020-04-29 PROCEDURE — 85025 COMPLETE CBC W/AUTO DIFF WBC: CPT

## 2020-04-29 PROCEDURE — 84439 ASSAY OF FREE THYROXINE: CPT

## 2020-04-29 PROCEDURE — 84443 ASSAY THYROID STIM HORMONE: CPT

## 2020-04-29 PROCEDURE — 80061 LIPID PANEL: CPT

## 2020-04-30 ENCOUNTER — PATIENT MESSAGE (OUTPATIENT)
Dept: PRIMARY CARE CLINIC | Facility: CLINIC | Age: 70
End: 2020-04-30

## 2020-05-04 ENCOUNTER — TELEPHONE (OUTPATIENT)
Dept: PRIMARY CARE CLINIC | Facility: CLINIC | Age: 70
End: 2020-05-04

## 2020-05-04 ENCOUNTER — OFFICE VISIT (OUTPATIENT)
Dept: PRIMARY CARE CLINIC | Facility: CLINIC | Age: 70
End: 2020-05-04
Payer: MEDICARE

## 2020-05-04 DIAGNOSIS — E03.9 ACQUIRED HYPOTHYROIDISM: ICD-10-CM

## 2020-05-04 DIAGNOSIS — E03.2 HYPOTHYROIDISM DUE TO MEDICATION: Primary | ICD-10-CM

## 2020-05-04 DIAGNOSIS — E78.00 TYPE 2 DIABETES MELLITUS WITH HYPERCHOLESTEROLEMIA: ICD-10-CM

## 2020-05-04 DIAGNOSIS — E11.69 TYPE 2 DIABETES MELLITUS WITH HYPERCHOLESTEROLEMIA: ICD-10-CM

## 2020-05-04 PROCEDURE — 99442 PR PHYSICIAN TELEPHONE EVALUATION 11-20 MIN: CPT | Mod: 95,,, | Performed by: FAMILY MEDICINE

## 2020-05-04 PROCEDURE — 99442 PR PHYSICIAN TELEPHONE EVALUATION 11-20 MIN: ICD-10-PCS | Mod: 95,,, | Performed by: FAMILY MEDICINE

## 2020-05-04 RX ORDER — LEVOTHYROXINE SODIUM 112 UG/1
112 TABLET ORAL
Qty: 30 TABLET | Refills: 11 | Status: SHIPPED | OUTPATIENT
Start: 2020-05-04 | End: 2020-05-04

## 2020-05-04 RX ORDER — LEVOTHYROXINE SODIUM 112 UG/1
112 TABLET ORAL
Qty: 90 TABLET | Refills: 3 | Status: SHIPPED | OUTPATIENT
Start: 2020-05-04 | End: 2020-05-06 | Stop reason: SDUPTHER

## 2020-05-04 NOTE — PROGRESS NOTES
Subjective:       Patient ID: Bebe Valdez is a 69 y.o. female.    Chief Complaint: No chief complaint on file.    The patient location is: home  The chief complaint leading to consultation is: follow up abnormal labs  Visit type: audio only  Total time spent with patient: 20minutes  Each patient to whom he or she provides medical services by telemedicine is:  (1) informed of the relationship between the physician and patient and the respective role of any other health care provider with respect to management of the patient; and (2) notified that he or she may decline to receive medical services by telemedicine and may withdraw from such care at any time.    Notes: 70 yo presents today for follow up and discussion of labs.  Prior history of hypothyroidism, with replacement.  Pt became hyperthyroid which may have led to onset of Atrial Fibrillation.  She is doing well at present and has scheduled f/u appt for repeat EKG/Echo on 5/14/2020.  However, she is feeling fatigued, exercising routinely but unable to lose weight.  Her last TSH level is over 29, with prior study on 12/19 at 2.858.  It is of note that pt was started on Multaq in January of this year    Review of Systems   Constitutional: Positive for fatigue. Negative for activity change, appetite change, chills and unexpected weight change.   Respiratory: Negative for chest tightness and shortness of breath.    Cardiovascular: Negative for chest pain, palpitations and leg swelling.   Endocrine: Negative for cold intolerance and heat intolerance.   Neurological: Negative for dizziness, weakness and light-headedness.       Objective:      Physical Exam   Pulmonary/Chest:   Sounds normal during interview, no difficulty breathing       Assessment:       1.  Hypothyroid  2.  A Fib  3.  Type II DM, well controlled  4.  hyperlipidemia  Plan:       1.  Results of recent labs reviewed with pt  2.  Slowly increase dosage of Synthroid to 0.112mcg  3.  Repeat TSH in 6  weeks

## 2020-05-04 NOTE — TELEPHONE ENCOUNTER
Please check with pharmacy, I needed to order #90 tablets of Synthroid for patient (no generic)  thanks

## 2020-05-05 ENCOUNTER — PATIENT MESSAGE (OUTPATIENT)
Dept: PRIMARY CARE CLINIC | Facility: CLINIC | Age: 70
End: 2020-05-05

## 2020-05-06 ENCOUNTER — TELEPHONE (OUTPATIENT)
Dept: ELECTROPHYSIOLOGY | Facility: CLINIC | Age: 70
End: 2020-05-06

## 2020-05-06 DIAGNOSIS — E03.9 ACQUIRED HYPOTHYROIDISM: ICD-10-CM

## 2020-05-06 RX ORDER — LEVOTHYROXINE SODIUM 112 UG/1
112 TABLET ORAL
Qty: 90 TABLET | Refills: 3 | Status: SHIPPED | OUTPATIENT
Start: 2020-05-06 | End: 2020-06-08

## 2020-05-06 NOTE — TELEPHONE ENCOUNTER
Appointments with CHRISTIANO Valera moved to 6/4/2020. Attempted to call patient. Voice mail box is full/ Appointment slips mailed

## 2020-06-02 ENCOUNTER — PATIENT OUTREACH (OUTPATIENT)
Dept: ADMINISTRATIVE | Facility: OTHER | Age: 70
End: 2020-06-02

## 2020-06-02 NOTE — LETTER
June 2, 2020        Tobi Louie MD  [address] [place] [id number]             [facility] [address] [place] [address]  Phone: [phone]   Patient: Bebe Valdez   MR Number: [id number]   YOB: 1950           Dear Dr. Louie:    Bebe Valdez is a patient of Dr. Anil Nelson (PCP) at [facility]. While reviewing her chart, it has come to our attention that there is an outstanding procedure. Please help keep our Health Maintenance records as accurate and as up to date as possible by supplying the following:     Eye exam                                                                             Please fax to [facility] @ [phone].    Thank you for your assistance in our patient's healthcare.     Sincerely,     Allyssa Prado MA  Panel Care Coordinator  [facility]

## 2020-06-02 NOTE — PROGRESS NOTES
Ms. Valdez is a patient of Dr. Sanchez and was last seen in clinic 2/13/2020.      Subjective:   Patient ID:  Bebe Valdez is a 69 y.o. female who presents for follow-up of Atrial Fibrillation  .     HPI:    Ms. Valdez is a 69 y.o. female with AF, hypothyroid, DM, cardiomyopathy here for follow up.    Background:    Referring Cardiologist: Tami De Leon MD  Primary Care Physician: Anil Nelson MD    Ms. Valdez has a history of diabetes mellitus, hypothyroidism on synthroid and recent onset of persistent atrial fibrillation. She was exercising with a  in July 2019 and pulse was noted to be elevated and irregular. She saw Dr. Nelson on 8/26/2019 and was diagnosed with atrial fibrillation. She was started on metoprolol and aspirin. TSH was checked and was undetectable. Her synthroid dosage was reduced. She was then assessed by Dr. De Leon who started her on eliquis and referred her here for further evaluation. A transthoracic echocardiogram was performed noting a LVEF of 45% with moderate left atrial enlargement and mildly depressed RV function. She reports no obvious symptoms related to AF.    Available ECGs in Epic which note sinus rhythm with normal intervals until an ECG dated 8/26/2019 which shows atrial fibrillation with an average ventricular rate of 87 bpm and a single PVC.  Her QQDSL2YALt score is 4 and indefinite anticoagulation is recommended. Since her EF is depressed I recommend rhythm control strategy. Her depressed EF very well may be from atrial fibrillation and/or hyperthyroidism. Recommend DORINA/DCCV.      Would recommend anti-arrhythmic therapy to help reduce risk of recurrence and allow time for her EF to recover. She has no symptoms of heart failure and her EF is 45%. Options include dofetilide versus sotalol, both would need hospital admission for initiation. She desires to try just a cardioversion without drug therapy unless it recurs. DORINA/DCCV. Follow-up after.    9/26/2019: Underwent  successful DORINA/DCCV. EF on DORINA was 45%.     10/25/2019: She is one month s/p cardioversion and back in atrial fibrillation. Rate is controlled. She experienced no symptom improvement in sinus rhythm. However, EF is 45%. We discussed that this could be from the AF or from her period of hyperthyroidism. She is still hyperthyroid and is going to decrease her synthroid again per PCP (has a follow up soon). It would increase the likelihood of maintaining sinus rhythm after cardioversion if her thyroid levels are normal. We discussed anti-arrhythmic options, which include tikosyn or sotalol which both require hospital stays. She does not want to proceed with that route. It might be possible to use amio after her DCCV, after her thyroid has normalized and her synthroid dose is appropriate, until her follow up echo. If her EF normalizes, could switch to 1C or Multaq.     12/2019: She remains in AF, minimal symptoms although in the presence of a cardiomyopathy. Plan was to proceed with rhythm control once she is euthyroid, and evaluate any symptom improvement or increase in LV function.     1/9/2020: successful DCCV. Multaq initiated. Felt better afterward. Thyroid function now normal. Will repeat echo in 3 months to observe for improvement in LV function in SR.    Update (06/04/2020):    Today she says she feels well. Ms. Valdez denies chest pain with exertion or at rest, palpitations, SOB, SON, dizziness, or syncope.     She is currently taking eliquis 5mg BID for stroke prophylaxis and denies significant bleeding episodes. She is currently being treated with multaq 400mg BID for rhythm control and metoprolol succinate 50mg daily for HR control.  Kidney function is stable, with a creatinine of 0.9 on 4/29/2020.    I have personally reviewed the patient's EKG today, which shows sinus bradycardia with 1st degree AVB at 57bpm. AR interval is 214. QRS is 90. QT is 430.    Recent Cardiac Tests:    TTE (6/3/2020):  · Normal left  ventricular systolic function. The estimated ejection fraction is 55%.  · Normal LV diastolic function.  · No wall motion abnormalities.  · Normal right ventricular systolic function.  · Mild left atrial enlargement.  · Mild tricuspid regurgitation.  · The estimated PA systolic pressure is 24 mmHg.  · Normal central venous pressure (3 mmHg).    DORINA (1/9/2020):  · Normal appearing left atrial appendage. No thrombus is present in the appendage. Normal appendage velocities.  · No thrombus is present in the left atrium.  · Mildly decreased left ventricular systolic function. The estimated ejection fraction is 40%.  · Mildly reduced right ventricular systolic function.  · Mild tricuspid regurgitation.  · No interatrial septal defect present.    Current Outpatient Medications   Medication Sig    ascorbic acid (VITAMIN C) 100 MG tablet Take 100 mg by mouth once daily.    CINNAMON BARK (CINNAMON ORAL) Take by mouth once daily. Pt taking 2 pills daily.    diclofenac-misoprostol  mg-mcg (ARTHROTEC 50)  mg-mcg per tablet Take 1 tablet by mouth 2 (two) times daily as needed (pain and inflammation). Take with food.    dronedarone (MULTAQ) 400 mg Tab Take 1 tablet (400 mg total) by mouth 2 (two) times daily with meals.    ELIQUIS 5 mg Tab TAKE 1 TABLET(5 MG) BY MOUTH TWICE DAILY    fish oil-omega-3 fatty acids 300-1,000 mg capsule Take 2 g by mouth once daily.    levothyroxine (SYNTHROID) 112 MCG tablet Take 1 tablet (112 mcg total) by mouth before breakfast.    metFORMIN (GLUCOPHAGE-XR) 500 MG XR 24hr tablet TAKE 2 TABLETS(1000 MG) BY MOUTH TWICE DAILY WITH MEALS    metoprolol succinate (TOPROL-XL) 50 MG 24 hr tablet Take 1 tablet (50 mg total) by mouth once daily.    MV,CA,MIN/IRON/FA/GUARANA/CAFF (ONE-A-DAY WOMEN'S ACTIVE ORAL) Take by mouth.    rosuvastatin (CRESTOR) 10 MG tablet TAKE 1 TABLET(10 MG) BY MOUTH EVERY DAY    vitamin D (VITAMIN D3) 1000 units Tab Take 1,000 Units by mouth once daily.     "OPW TEST CLAIM - DO NOT FILL OPW test claim. Do not fill. (Patient not taking: Reported on 6/4/2020)    varicella-zoster gE-AS01B, PF, (SHINGRIX, PF,) 50 mcg/0.5 mL injection Inject into the muscle.     No current facility-administered medications for this visit.      Facility-Administered Medications Ordered in Other Visits   Medication    0.9%  NaCl infusion    sodium chloride 0.9% flush 5 mL       Review of Systems   Constitution: Negative for malaise/fatigue.   Cardiovascular: Negative for chest pain, dyspnea on exertion, irregular heartbeat, leg swelling and palpitations.   Respiratory: Negative for shortness of breath.    Hematologic/Lymphatic: Negative for bleeding problem.   Skin: Negative for rash.   Musculoskeletal: Negative for myalgias.   Gastrointestinal: Negative for hematemesis, hematochezia and nausea.   Genitourinary: Negative for hematuria.   Neurological: Negative for light-headedness.   Psychiatric/Behavioral: Negative for altered mental status.   Allergic/Immunologic: Negative for persistent infections.     Objective:        /66   Pulse (!) 57   Ht 5' 6" (1.676 m)   Wt 83.5 kg (184 lb 1.4 oz)   LMP 07/18/2001   BMI 29.71 kg/m²     Physical Exam   Constitutional: She is oriented to person, place, and time. She appears well-developed and well-nourished.   HENT:   Head: Normocephalic.   Nose: Nose normal.   Eyes: Pupils are equal, round, and reactive to light.   Cardiovascular: Normal rate, regular rhythm, S1 normal and S2 normal.   No murmur heard.  Pulses:       Radial pulses are 2+ on the right side, and 2+ on the left side.   Pulmonary/Chest: Breath sounds normal. No respiratory distress.   Abdominal: Normal appearance.   Musculoskeletal: Normal range of motion. She exhibits no edema.   Neurological: She is alert and oriented to person, place, and time.   Skin: Skin is warm and dry. No erythema.   Psychiatric: She has a normal mood and affect. Her speech is normal and behavior is " normal.   Nursing note and vitals reviewed.    Lab Results   Component Value Date     04/29/2020    K 4.3 04/29/2020    MG 2.3 04/08/2005    BUN 11 04/29/2020    CREATININE 0.9 04/29/2020    ALT 22 04/29/2020    AST 19 04/29/2020    HGB 13.5 04/29/2020    HCT 43.4 04/29/2020    TSH 29.458 (H) 04/29/2020    LDLCALC 40.0 (L) 04/29/2020       Recent Labs   Lab 09/19/19  1415 01/06/20  1028   INR 1.1 1.1       Assessment:     1. Persistent atrial fibrillation    2. Obesity, diabetes, and hypertension syndrome    3. Chronic anticoagulation    4. History of cardioversion      Plan:     In summary, Ms. Valdez is a 69 y.o. female with AF, hypothyroid, DM, cardiomyopathy here for follow up after cardioversion.  She is now 5 months s/p cardioversion and initiation of Multaq. Echo yesterday shows normalization of LV function (40%->55%). EKG with acceptable intervals. She is feeling well. Remains on eliquis for CVA prophylaxis.    Continue current medications.  RTC 6 mo, sooner if needed.    *A copy of this note has been sent to Dr. Sanchez*    Follow up in about 6 months (around 12/4/2020).    ------------------------------------------------------------------    CHRISTA Soares, NP-C  Cardiac Electrophysiology

## 2020-06-03 ENCOUNTER — HOSPITAL ENCOUNTER (OUTPATIENT)
Dept: CARDIOLOGY | Facility: CLINIC | Age: 70
Discharge: HOME | End: 2020-06-03
Attending: NURSE PRACTITIONER
Payer: MEDICARE

## 2020-06-03 VITALS
SYSTOLIC BLOOD PRESSURE: 130 MMHG | DIASTOLIC BLOOD PRESSURE: 82 MMHG | WEIGHT: 183 LBS | HEIGHT: 66 IN | BODY MASS INDEX: 29.41 KG/M2 | HEART RATE: 68 BPM

## 2020-06-03 DIAGNOSIS — I42.8 OTHER CARDIOMYOPATHY: ICD-10-CM

## 2020-06-03 DIAGNOSIS — I48.19 PERSISTENT ATRIAL FIBRILLATION: ICD-10-CM

## 2020-06-03 LAB
ASCENDING AORTA: 3.44 CM
AV INDEX (PROSTH): 0.56
AV MEAN GRADIENT: 6 MMHG
AV PEAK GRADIENT: 10 MMHG
AV VALVE AREA: 1.73 CM2
AV VELOCITY RATIO: 0.53
BSA FOR ECHO PROCEDURE: 1.97 M2
CV ECHO LV RWT: 0.32 CM
DOP CALC AO PEAK VEL: 1.6 M/S
DOP CALC AO VTI: 35.48 CM
DOP CALC LVOT AREA: 3.1 CM2
DOP CALC LVOT DIAMETER: 1.99 CM
DOP CALC LVOT PEAK VEL: 0.85 M/S
DOP CALC LVOT STROKE VOLUME: 61.4 CM3
DOP CALCLVOT PEAK VEL VTI: 19.75 CM
E WAVE DECELERATION TIME: 375.97 MSEC
E/A RATIO: 0.78
E/E' RATIO: 6.45 M/S
ECHO LV POSTERIOR WALL: 0.76 CM (ref 0.6–1.1)
FRACTIONAL SHORTENING: 32 % (ref 28–44)
INTERVENTRICULAR SEPTUM: 0.74 CM (ref 0.6–1.1)
IVRT: 111.32 MSEC
LA MAJOR: 5.34 CM
LA MINOR: 5.33 CM
LA WIDTH: 4.08 CM
LEFT ATRIUM SIZE: 4.21 CM
LEFT ATRIUM VOLUME INDEX: 40.4 ML/M2
LEFT ATRIUM VOLUME: 77.89 CM3
LEFT INTERNAL DIMENSION IN SYSTOLE: 3.26 CM (ref 2.1–4)
LEFT VENTRICLE DIASTOLIC VOLUME INDEX: 56.15 ML/M2
LEFT VENTRICLE DIASTOLIC VOLUME: 108.14 ML
LEFT VENTRICLE MASS INDEX: 61 G/M2
LEFT VENTRICLE SYSTOLIC VOLUME INDEX: 22.3 ML/M2
LEFT VENTRICLE SYSTOLIC VOLUME: 42.98 ML
LEFT VENTRICULAR INTERNAL DIMENSION IN DIASTOLE: 4.81 CM (ref 3.5–6)
LEFT VENTRICULAR MASS: 117.04 G
LV LATERAL E/E' RATIO: 5.46 M/S
LV SEPTAL E/E' RATIO: 7.89 M/S
MV PEAK A VEL: 0.91 M/S
MV PEAK E VEL: 0.71 M/S
PISA TR MAX VEL: 2.29 M/S
PULM VEIN S/D RATIO: 1.5
PV PEAK D VEL: 0.36 M/S
PV PEAK S VEL: 0.54 M/S
RA MAJOR: 4.64 CM
RA PRESSURE: 3 MMHG
RA WIDTH: 3.59 CM
RIGHT VENTRICULAR END-DIASTOLIC DIMENSION: 2.52 CM
RV TISSUE DOPPLER FREE WALL SYSTOLIC VELOCITY 1 (APICAL 4 CHAMBER VIEW): 12.3 CM/S
SINUS: 3.14 CM
STJ: 3.02 CM
TDI LATERAL: 0.13 M/S
TDI SEPTAL: 0.09 M/S
TDI: 0.11 M/S
TR MAX PG: 21 MMHG
TRICUSPID ANNULAR PLANE SYSTOLIC EXCURSION: 2.24 CM
TV REST PULMONARY ARTERY PRESSURE: 24 MMHG

## 2020-06-03 PROCEDURE — 93306 TTE W/DOPPLER COMPLETE: CPT | Mod: PBBFAC | Performed by: INTERNAL MEDICINE

## 2020-06-03 PROCEDURE — 93306 ECHO (CUPID ONLY): ICD-10-PCS | Mod: 26,S$PBB,, | Performed by: INTERNAL MEDICINE

## 2020-06-04 ENCOUNTER — HOSPITAL ENCOUNTER (OUTPATIENT)
Dept: CARDIOLOGY | Facility: CLINIC | Age: 70
Discharge: HOME | End: 2020-06-04
Payer: MEDICARE

## 2020-06-04 ENCOUNTER — OFFICE VISIT (OUTPATIENT)
Dept: ELECTROPHYSIOLOGY | Facility: CLINIC | Age: 70
End: 2020-06-04
Payer: MEDICARE

## 2020-06-04 VITALS
HEART RATE: 57 BPM | SYSTOLIC BLOOD PRESSURE: 110 MMHG | HEIGHT: 66 IN | BODY MASS INDEX: 29.58 KG/M2 | DIASTOLIC BLOOD PRESSURE: 66 MMHG | WEIGHT: 184.06 LBS

## 2020-06-04 DIAGNOSIS — E66.9 OBESITY, DIABETES, AND HYPERTENSION SYNDROME: ICD-10-CM

## 2020-06-04 DIAGNOSIS — E11.69 OBESITY, DIABETES, AND HYPERTENSION SYNDROME: ICD-10-CM

## 2020-06-04 DIAGNOSIS — E11.59 OBESITY, DIABETES, AND HYPERTENSION SYNDROME: ICD-10-CM

## 2020-06-04 DIAGNOSIS — I15.2 OBESITY, DIABETES, AND HYPERTENSION SYNDROME: ICD-10-CM

## 2020-06-04 DIAGNOSIS — I48.19 PERSISTENT ATRIAL FIBRILLATION: Primary | ICD-10-CM

## 2020-06-04 DIAGNOSIS — I48.0 PAF (PAROXYSMAL ATRIAL FIBRILLATION): ICD-10-CM

## 2020-06-04 DIAGNOSIS — Z79.01 CHRONIC ANTICOAGULATION: ICD-10-CM

## 2020-06-04 DIAGNOSIS — Z92.89 HISTORY OF CARDIOVERSION: ICD-10-CM

## 2020-06-04 PROCEDURE — 93005 ELECTROCARDIOGRAM TRACING: CPT | Mod: PBBFAC | Performed by: INTERNAL MEDICINE

## 2020-06-04 PROCEDURE — 99214 OFFICE O/P EST MOD 30 MIN: CPT | Mod: S$GLB,,, | Performed by: NURSE PRACTITIONER

## 2020-06-04 PROCEDURE — 99999 PR PBB SHADOW E&M-EST. PATIENT-LVL III: ICD-10-PCS | Mod: PBBFAC,,, | Performed by: NURSE PRACTITIONER

## 2020-06-04 PROCEDURE — 99213 OFFICE O/P EST LOW 20 MIN: CPT | Mod: PBBFAC,25 | Performed by: NURSE PRACTITIONER

## 2020-06-04 PROCEDURE — 99999 PR PBB SHADOW E&M-EST. PATIENT-LVL III: CPT | Mod: PBBFAC,,, | Performed by: NURSE PRACTITIONER

## 2020-06-04 PROCEDURE — 93005 ELECTROCARDIOGRAM TRACING: CPT

## 2020-06-04 PROCEDURE — 93010 RHYTHM STRIP: ICD-10-PCS | Mod: S$GLB,,, | Performed by: INTERNAL MEDICINE

## 2020-06-04 PROCEDURE — 93010 ELECTROCARDIOGRAM REPORT: CPT | Mod: S$GLB,,, | Performed by: INTERNAL MEDICINE

## 2020-06-04 PROCEDURE — 99214 PR OFFICE/OUTPT VISIT, EST, LEVL IV, 30-39 MIN: ICD-10-PCS | Mod: S$GLB,,, | Performed by: NURSE PRACTITIONER

## 2020-06-06 ENCOUNTER — PATIENT MESSAGE (OUTPATIENT)
Dept: PRIMARY CARE CLINIC | Facility: CLINIC | Age: 70
End: 2020-06-06

## 2020-06-06 DIAGNOSIS — E03.9 ACQUIRED HYPOTHYROIDISM: ICD-10-CM

## 2020-06-08 RX ORDER — LEVOTHYROXINE SODIUM 125 UG/1
125 TABLET ORAL
Qty: 30 TABLET | Refills: 3 | Status: SHIPPED | OUTPATIENT
Start: 2020-06-08 | End: 2020-09-25

## 2020-06-17 ENCOUNTER — PES CALL (OUTPATIENT)
Dept: ADMINISTRATIVE | Facility: CLINIC | Age: 70
End: 2020-06-17

## 2020-06-30 ENCOUNTER — OFFICE VISIT (OUTPATIENT)
Dept: INTERNAL MEDICINE | Facility: CLINIC | Age: 70
End: 2020-06-30
Payer: MEDICARE

## 2020-06-30 VITALS
WEIGHT: 184.75 LBS | BODY MASS INDEX: 29.69 KG/M2 | HEIGHT: 66 IN | HEART RATE: 58 BPM | RESPIRATION RATE: 15 BRPM | DIASTOLIC BLOOD PRESSURE: 63 MMHG | OXYGEN SATURATION: 96 % | SYSTOLIC BLOOD PRESSURE: 110 MMHG

## 2020-06-30 DIAGNOSIS — E11.9 TYPE 2 DIABETES MELLITUS WITHOUT COMPLICATION, WITHOUT LONG-TERM CURRENT USE OF INSULIN: ICD-10-CM

## 2020-06-30 DIAGNOSIS — M25.619 DECREASED RANGE OF MOTION OF SHOULDER, UNSPECIFIED LATERALITY: ICD-10-CM

## 2020-06-30 DIAGNOSIS — Z13.820 SCREENING FOR OSTEOPOROSIS: ICD-10-CM

## 2020-06-30 DIAGNOSIS — Z79.01 CHRONIC ANTICOAGULATION: ICD-10-CM

## 2020-06-30 DIAGNOSIS — D75.839 THROMBOCYTOSIS: ICD-10-CM

## 2020-06-30 DIAGNOSIS — R79.89 LOW SERUM THYROID STIMULATING HORMONE (TSH): ICD-10-CM

## 2020-06-30 DIAGNOSIS — Z92.89 HISTORY OF CARDIOVERSION: ICD-10-CM

## 2020-06-30 DIAGNOSIS — E11.59 OBESITY, DIABETES, AND HYPERTENSION SYNDROME: ICD-10-CM

## 2020-06-30 DIAGNOSIS — M85.88 OTHER SPECIFIED DISORDERS OF BONE DENSITY AND STRUCTURE, OTHER SITE: ICD-10-CM

## 2020-06-30 DIAGNOSIS — E03.9 HYPOTHYROIDISM, UNSPECIFIED TYPE: ICD-10-CM

## 2020-06-30 DIAGNOSIS — I50.22 CHRONIC SYSTOLIC HEART FAILURE: ICD-10-CM

## 2020-06-30 DIAGNOSIS — E66.09 CLASS 1 OBESITY DUE TO EXCESS CALORIES WITH SERIOUS COMORBIDITY AND BODY MASS INDEX (BMI) OF 32.0 TO 32.9 IN ADULT: ICD-10-CM

## 2020-06-30 DIAGNOSIS — E66.9 OBESITY, DIABETES, AND HYPERTENSION SYNDROME: ICD-10-CM

## 2020-06-30 DIAGNOSIS — E78.00 TYPE 2 DIABETES MELLITUS WITH HYPERCHOLESTEROLEMIA: ICD-10-CM

## 2020-06-30 DIAGNOSIS — I70.0 AORTIC ATHEROSCLEROSIS: ICD-10-CM

## 2020-06-30 DIAGNOSIS — I15.2 OBESITY, DIABETES, AND HYPERTENSION SYNDROME: ICD-10-CM

## 2020-06-30 DIAGNOSIS — E11.69 OBESITY, DIABETES, AND HYPERTENSION SYNDROME: ICD-10-CM

## 2020-06-30 DIAGNOSIS — R35.0 URINARY FREQUENCY: ICD-10-CM

## 2020-06-30 DIAGNOSIS — E11.69 TYPE 2 DIABETES MELLITUS WITH HYPERCHOLESTEROLEMIA: ICD-10-CM

## 2020-06-30 DIAGNOSIS — I48.19 PERSISTENT ATRIAL FIBRILLATION: ICD-10-CM

## 2020-06-30 DIAGNOSIS — Z00.00 ENCOUNTER FOR PREVENTIVE HEALTH EXAMINATION: Primary | ICD-10-CM

## 2020-06-30 PROCEDURE — G0439 PPPS, SUBSEQ VISIT: HCPCS | Mod: S$GLB,,, | Performed by: NURSE PRACTITIONER

## 2020-06-30 PROCEDURE — 99999 PR PBB SHADOW E&M-EST. PATIENT-LVL V: CPT | Mod: PBBFAC,,, | Performed by: NURSE PRACTITIONER

## 2020-06-30 PROCEDURE — G0439 PR MEDICARE ANNUAL WELLNESS SUBSEQUENT VISIT: ICD-10-PCS | Mod: S$GLB,,, | Performed by: NURSE PRACTITIONER

## 2020-06-30 PROCEDURE — 99215 OFFICE O/P EST HI 40 MIN: CPT | Mod: PBBFAC,PO | Performed by: NURSE PRACTITIONER

## 2020-06-30 PROCEDURE — 99999 PR PBB SHADOW E&M-EST. PATIENT-LVL V: ICD-10-PCS | Mod: PBBFAC,,, | Performed by: NURSE PRACTITIONER

## 2020-06-30 NOTE — PATIENT INSTRUCTIONS
Counseling and Referral of Other Preventative  (Italic type indicates deductible and co-insurance are waived)    Patient Name: Bebe Valdez  Today's Date: 6/30/2020    Health Maintenance       Date Due Completion Date    Urine Microalbumin ordered   10/22/2018    DEXA SCAN Ordered   10/17/2016    Eye Exam 08/15/2020   8/15/2019    Hemoglobin A1c 10/29/2020   4/29/2020    Foot Exam 11/05/2020 11/5/2019 (Done)    Lipid Panel 04/29/2021 4/29/2020    High Dose Statin 06/30/2021 6/30/2020    Mammogram 11/05/2020 11/5/2019    TETANUS VACCINE 09/03/2025   9/3/2015    Colorectal Cancer Screening 06/06/2027 6/6/2017              The following information is provided to all patients.  This information is to help you find resources for any of the problems found today that may be affecting your health:                Living healthy guide: www.[email or web address]      Understanding Diabetes: www.diabetes.org      Eating healthy: www.cdc.gov/healthyweight      [facility] home safety checklist: www.cdc.gov/steadi/patient.html      Agency on Aging: www.[email or web address].[email or web address]      Alcoholics anonymous (AA): www.aa.org      Physical Activity: www.sruthi.nih.gov/ni9npqb      Tobacco use: www.quitwithusla.org

## 2020-06-30 NOTE — PROGRESS NOTES
"Bebe Valdez presented for a  Medicare AWV and comprehensive Health Risk Assessment today. The following components were reviewed and updated:    · Medical history  · Family History  · Social history  · Allergies and Current Medications  · Health Risk Assessment  · Health Maintenance  · Care Team     ** See Completed Assessments for Annual Wellness Visit within the encounter summary.**       The following assessments were completed:  · Living Situation  · CAGE  · Depression Screening  · Timed Get Up and Go  · Whisper Test  · Cognitive Function Screening  · Nutrition Screening  · ADL Screening  · PAQ Screening    Vitals:    06/30/20 1334   BP: 110/63   BP Location: Left arm   Patient Position: Sitting   BP Method: Medium (Manual)   Pulse: (!) 58   Resp: 15   SpO2: 96%   Weight: 83.8 kg (184 lb 11.9 oz)   Height: 5' 6" (1.676 m)     Body mass index is 29.82 kg/m².  Physical Exam  Vitals signs and nursing note reviewed.   Constitutional:       Appearance: She is well-developed.   HENT:      Head: Normocephalic.      Comments: Wears mask  Cardiovascular:      Rate and Rhythm: Normal rate and regular rhythm.      Heart sounds: Normal heart sounds. No murmur.   Pulmonary:      Effort: Pulmonary effort is normal.      Breath sounds: Normal breath sounds.   Abdominal:      General: Bowel sounds are normal.      Palpations: Abdomen is soft. There is no mass.   Musculoskeletal: Normal range of motion.   Neurological:      Mental Status: She is alert and oriented to person, place, and time.      Motor: No abnormal muscle tone.   Psychiatric:         Mood and Affect: Mood normal.         Behavior: Behavior normal.         Thought Content: Thought content normal.         Judgment: Judgment normal.           Lab Results   Component Value Date    HGBA1C 6.2 (H) 04/29/2020    CHOL 96 (L) 04/29/2020    HDL 40 04/29/2020    LDLCALC 40.0 (L) 04/29/2020    TRIG 80 04/29/2020    CHOLHDL 41.7 04/29/2020      Results for orders placed in " visit on 10/17/16   DXA Bone Density Spine And Hip_Axial Skeleton    Narrative : 1950 ORDERING PHYSICIAN: Area,L LOCATION: [facility]    HISTORY: 67y/o female with no hx of fractures. She had menopausal symptoms at 51 y/o. She is taking 1,000 units of Vit D supplements. She has a hx of Diabetes. She exercises daily and does not smoke.    TECHNIQUE: Bone Mineral Density performed using [employer] W (S/N [id number]) reveals good positioning of lumbar spine and hip.    BONE MINERAL DENSITY RESULTS:  Lumbar Spine: Lumbar bone mineral density L1-L4 is 1.147g/cm2, which is a t-score of 0.9. The z-score is 2.7.    Total Hip: The total hip bone mineral density is 0.958g/cm2.  The t-score is 0.1, and the z-score is 1.4.  Femoral neck BMD is 0.741g/cm2 and the t-score is -1.0.    COMPARISONS:  Date   Location  BMD  T-score  08  L-spine  1.140  0.8     Total Hip  1.055  0.9    Impression Normal BMD of total hip and lumbar spine. There is a significant decline in BMD at the total hip from previous study done in , no change at the lumbar spine.    RECOMMENDATIONS:  1) Adequate calcium and Vitamin D therapy  2) Appropriate exercise  3) No need to repeat BMD in near future unless clinical change.     EXPLANATION OF RESULTS:  The t-score compares this results to the bone density of a 25 year old of the same gender. The z-score compares this result to the average bone density to people of the same age and gender. The amounts indicate the number of standard deviations above or below the mean.  * Osteoporosis is generally defined as having a t-score between less than -2.5.  * Osteopenia is generally defined as having a t-score between -1 and -2.5.  * The normal range is generally defined as having a t-score between -1 to 1.      Electronically signed by: KARELY MACHADO MD  Date:     10/19/16  Time:    10:57      Results for orders placed during the hospital encounter of 12   Mammo Digital Screening  Bilateral With CAD    Narrative The present examination has been compared to prior imaging studies dated  09/12/2011.    BILATERAL MAMMOGRAM FINDINGS:  There are scattered fibroglandular densities.    No significant abnormalities are seen.    These images  were processed to produce digital images analyzed for potential  abnormalities.      Impression There is no mammographic evidence of malignancy.    Mammogram in 1 year is recommended.    ACR BI-RADS Category 1: Negative      SANJAY JOSEPH, KERRY  09/12/2012       Diagnoses and health risks identified today and associated recommendations/orders:    1. Low serum thyroid stimulating hormone (TSH)  Stable- followed by PCP    2. Chronic anticoagulation  Stable- followed by cardiology    3. History of cardioversion  Stable- followed by cardiology    4. Chronic systolic heart failure, in setting of synthroid mediated hyperthyroidism and persistent atrial fibrillation  Stable- followed by cardiology    5. Persistent atrial fibrillation  Stable- followed by cardiology    6. Decreased range of motion of shoulder, unspecified laterality  Stable- followed by PCP    7. Hypothyroidism, unspecified type  Stable- followed by PCP    8. Type 2 diabetes mellitus with hypercholesterolemia  Stable- followed by PCP    9. Thrombocytosis  Stable- followed by PCP    10. Obesity, diabetes, and hypertension syndrome  Stable- followed by PCP    11. Class 1 obesity due to excess calories with serious comorbidity and body mass index (BMI) of 32.0 to 32.9 in adult  Chronic . Followed by PCP.   Centers for Disease Control and Prevention (CDC)  weight recommendations for current BMI & ideal BMI range discussed with patient.  Recommended  Low fat diet, exercise encouraged.     12. Aortic atherosclerosis  Stable- followed by cardiology    13. Urinary frequency  Stable- followed by PCP  - Urinalysis; Future  - Microalbumin/creatinine urine ratio; Future    14. Type 2 diabetes mellitus without  complication, without long-term current use of insulin  Stable- followed by PCP  - Microalbumin/creatinine urine ratio; Future    15. Encounter for preventive health examination  Here for Health Risk Assessment/Annual Wellness Visit.  Health maintenance reviewed and updated. Follow up in one year.       16. Screening for osteoporosis  Stable- followed by PCP  - DXA Bone Density Spine And Hip; Future    17. Other specified disorders of bone density and structure, other site   Stable- followed by PCP  - DXA Bone Density Spine And Hip; Future      Provided Bebe with a 5-10 year written screening schedule and personal prevention plan. Recommendations were developed using the USPSTF age appropriate recommendations. Education, counseling, and referrals were provided as needed. After Visit Summary printed and given to patient which includes a list of additional screenings\tests needed. Denies bleeding. Does not monitor blood sugar. Fall & covid 19 prevention. Abnormal cognitive function screening- referral to PCP for further evaluation- pt states she gets nervous with clock draw test- denies memory problems.    Follow up in about 1 year (around 6/30/2021) for HRA.    Ángela Berg NP I offered to discuss end of life issues, including information on how to make advance directives that the patient could use to name someone who would make medical decisions on their behalf if they became too ill to make themselves.    ___Patient declined  _X_Patient has completed.

## 2020-07-07 ENCOUNTER — APPOINTMENT (OUTPATIENT)
Dept: RADIOLOGY | Facility: CLINIC | Age: 70
End: 2020-07-07
Attending: NURSE PRACTITIONER
Payer: MEDICARE

## 2020-07-07 ENCOUNTER — OFFICE VISIT (OUTPATIENT)
Dept: DERMATOLOGY | Facility: CLINIC | Age: 70
End: 2020-07-07
Payer: MEDICARE

## 2020-07-07 VITALS — TEMPERATURE: 98 F

## 2020-07-07 DIAGNOSIS — L28.0 FRICTIONAL LICHENOID DERMATOSIS: Primary | ICD-10-CM

## 2020-07-07 DIAGNOSIS — L71.9 ROSACEA: ICD-10-CM

## 2020-07-07 DIAGNOSIS — Z13.820 SCREENING FOR OSTEOPOROSIS: ICD-10-CM

## 2020-07-07 DIAGNOSIS — M85.88 OTHER SPECIFIED DISORDERS OF BONE DENSITY AND STRUCTURE, OTHER SITE: ICD-10-CM

## 2020-07-07 PROCEDURE — 99214 PR OFFICE/OUTPT VISIT, EST, LEVL IV, 30-39 MIN: ICD-10-PCS | Mod: S$GLB,,, | Performed by: DERMATOLOGY

## 2020-07-07 PROCEDURE — 77080 DXA BONE DENSITY AXIAL: CPT | Mod: 26,,, | Performed by: INTERNAL MEDICINE

## 2020-07-07 PROCEDURE — 99214 OFFICE O/P EST MOD 30 MIN: CPT | Mod: S$GLB,,, | Performed by: DERMATOLOGY

## 2020-07-07 PROCEDURE — 77080 DXA BONE DENSITY AXIAL: CPT | Mod: TC,PO

## 2020-07-07 PROCEDURE — 77080 DEXA BONE DENSITY SPINE HIP: ICD-10-PCS | Mod: 26,,, | Performed by: INTERNAL MEDICINE

## 2020-07-07 RX ORDER — UREA 40 %
CREAM (GRAM) TOPICAL
Qty: 198.6 G | Refills: 3 | Status: SHIPPED | OUTPATIENT
Start: 2020-07-07 | End: 2021-02-02

## 2020-07-07 NOTE — PROGRESS NOTES
Subjective:       Patient ID:  Bebe Valdez is a 69 y.o. female who presents for   Chief Complaint   Patient presents with    Rosacea    Spot     Rosacea - Follow-up  Symptom course: stable  Currently using: using compound from [employer].  Affected locations: face  Signs / symptoms: redness    Spot - Initial  Affected locations: left elbow  Duration: 6 months  Signs / symptoms: rough and dryness  Severity: mild to moderate  Timing: intermittent  Aggravated by: nothing  Relieving factors/Treatments tried: OTC moisturizer  Improvement on treatment: no relief      Review of Systems   Constitutional: Negative for fever.   Respiratory: Negative for cough and shortness of breath.    Musculoskeletal: Negative for joint swelling and arthralgias.   Skin: Negative for recent sunburn.   Hematologic/Lymphatic: Bruises/bleeds easily (due to Eliquis).        Objective:    Physical Exam   Constitutional: She appears well-developed and well-nourished. No distress.   HENT:   Mouth/Throat: Lips normal.    Eyes: Lids are normal.  No conjunctival no injection.   Neurological: She is alert and oriented to person, place, and time. She is not disoriented.   Psychiatric: She has a normal mood and affect.   Skin:   Areas Examined (abnormalities noted in diagram):   Head / Face Inspection Performed  Neck Inspection Performed  RUE Inspected  LUE Inspection Performed                   Diagram Legend     Erythematous scaling macule/papule c/w actinic keratosis       Vascular papule c/w angioma      Pigmented verrucoid papule/plaque c/w seborrheic keratosis      Yellow umbilicated papule c/w sebaceous hyperplasia      Irregularly shaped tan macule c/w lentigo     1-2 mm smooth white papules consistent with Milia      Movable subcutaneous cyst with punctum c/w epidermal inclusion cyst      Subcutaneous movable cyst c/w pilar cyst      Firm pink to brown papule c/w dermatofibroma      Pedunculated fleshy papule(s) c/w skin tag(s)       Evenly pigmented macule c/w junctional nevus     Mildly variegated pigmented, slightly irregular-bordered macule c/w mildly atypical nevus      Flesh colored to evenly pigmented papule c/w intradermal nevus       Pink pearly papule/plaque c/w basal cell carcinoma      Erythematous hyperkeratotic cursted plaque c/w SCC      Surgical scar with no sign of skin cancer recurrence      Open and closed comedones      Inflammatory papules and pustules      Verrucoid papule consistent consistent with wart     Erythematous eczematous patches and plaques     Dystrophic onycholytic nail with subungual debris c/w onychomycosis     Umbilicated papule    Erythematous-base heme-crusted tan verrucoid plaque consistent with inflamed seborrheic keratosis     Erythematous Silvery Scaling Plaque c/w Psoriasis     See annotation      Assessment / Plan:        Frictional lichenoid dermatosis  -     urea (CARMOL) 40 % Crea; AAA of elbow qday-BID  Dispense: 198.6 g; Refill: 3    Rosacea  Prescribed azelaic acid 15% + ivermectin 1% + metronidazole 1% cream. Apply to face daily - BID.      Follow up in about 1 year (around 7/7/2021) for or sooner if any new problems or changing lesions.

## 2020-07-16 ENCOUNTER — LAB VISIT (OUTPATIENT)
Dept: LAB | Facility: HOSPITAL | Age: 70
End: 2020-07-16
Attending: FAMILY MEDICINE
Payer: MEDICARE

## 2020-07-16 DIAGNOSIS — E03.9 ACQUIRED HYPOTHYROIDISM: ICD-10-CM

## 2020-07-16 PROCEDURE — 36415 COLL VENOUS BLD VENIPUNCTURE: CPT | Mod: PO

## 2020-07-16 PROCEDURE — 84439 ASSAY OF FREE THYROXINE: CPT

## 2020-07-16 PROCEDURE — 84443 ASSAY THYROID STIM HORMONE: CPT

## 2020-07-17 DIAGNOSIS — Z71.89 COMPLEX CARE COORDINATION: ICD-10-CM

## 2020-07-17 LAB
T4 FREE SERPL-MCNC: 0.97 NG/DL (ref 0.71–1.51)
TSH SERPL DL<=0.005 MIU/L-ACNC: 9.93 UIU/ML (ref 0.4–4)

## 2020-08-17 ENCOUNTER — OFFICE VISIT (OUTPATIENT)
Dept: DERMATOLOGY | Facility: CLINIC | Age: 70
End: 2020-08-17
Payer: MEDICARE

## 2020-08-17 VITALS — TEMPERATURE: 98 F

## 2020-08-17 DIAGNOSIS — L40.0 PSORIASIS VULGARIS: Primary | ICD-10-CM

## 2020-08-17 DIAGNOSIS — L71.9 ROSACEA: ICD-10-CM

## 2020-08-17 DIAGNOSIS — L72.0 EIC (EPIDERMAL INCLUSION CYST): ICD-10-CM

## 2020-08-17 PROCEDURE — 99214 OFFICE O/P EST MOD 30 MIN: CPT | Mod: S$GLB,,, | Performed by: DERMATOLOGY

## 2020-08-17 PROCEDURE — 99214 PR OFFICE/OUTPT VISIT, EST, LEVL IV, 30-39 MIN: ICD-10-PCS | Mod: S$GLB,,, | Performed by: DERMATOLOGY

## 2020-08-17 RX ORDER — BETAMETHASONE DIPROPIONATE 0.5 MG/G
CREAM TOPICAL
Qty: 50 G | Refills: 2 | Status: SHIPPED | OUTPATIENT
Start: 2020-08-17 | End: 2022-05-03 | Stop reason: SDUPTHER

## 2020-08-17 NOTE — PROGRESS NOTES
Subjective:       Patient ID:  Bebe Valdez is a 70 y.o. female who presents for   Chief Complaint   Patient presents with    Dry Skin     elbows     Dry Skin - Follow-up  Symptom course: worsening  Currently using: Urea 40% cream.  Affected locations: right elbow and left elbow  Signs / symptoms: dryness and scaling  Severity: mild to moderate    Mass - Initial  Affected locations: abdomen  Duration: few months.  Signs / symptoms: rough (dark; wants to make sure it's okay)  Severity: mild  Timing: constant  Aggravated by: nothing  Relieving factors/Treatments tried: nothing      Review of Systems   Constitutional: Negative for fever and chills.   Respiratory: Negative for shortness of breath.    Skin: Positive for dry skin. Negative for itching.   Hematologic/Lymphatic: Does not bruise/bleed easily.        Objective:    Physical Exam   Constitutional: She appears well-developed and well-nourished. No distress.   HENT:   Mouth/Throat: Lips normal.    Eyes: Lids are normal.  No conjunctival no injection.   Neurological: She is alert and oriented to person, place, and time. She is not disoriented.   Psychiatric: She has a normal mood and affect.   Skin:   Areas Examined (abnormalities noted in diagram):   Head / Face Inspection Performed  Neck Inspection Performed  Abdomen Inspection Performed  RUE Inspected  LUE Inspection Performed                   Diagram Legend     Erythematous scaling macule/papule c/w actinic keratosis       Vascular papule c/w angioma      Pigmented verrucoid papule/plaque c/w seborrheic keratosis      Yellow umbilicated papule c/w sebaceous hyperplasia      Irregularly shaped tan macule c/w lentigo     1-2 mm smooth white papules consistent with Milia      Movable subcutaneous cyst with punctum c/w epidermal inclusion cyst      Subcutaneous movable cyst c/w pilar cyst      Firm pink to brown papule c/w dermatofibroma      Pedunculated fleshy papule(s) c/w skin tag(s)      Evenly  pigmented macule c/w junctional nevus     Mildly variegated pigmented, slightly irregular-bordered macule c/w mildly atypical nevus      Flesh colored to evenly pigmented papule c/w intradermal nevus       Pink pearly papule/plaque c/w basal cell carcinoma      Erythematous hyperkeratotic cursted plaque c/w SCC      Surgical scar with no sign of skin cancer recurrence      Open and closed comedones      Inflammatory papules and pustules      Verrucoid papule consistent consistent with wart     Erythematous eczematous patches and plaques     Dystrophic onycholytic nail with subungual debris c/w onychomycosis     Umbilicated papule    Erythematous-base heme-crusted tan verrucoid plaque consistent with inflamed seborrheic keratosis     Erythematous Silvery Scaling Plaque c/w Psoriasis     See annotation      Assessment / Plan:        Psoriasis vulgaris  -     augmented betamethasone dipropionate (DIPROLENE-AF) 0.05 % cream; Apply to elbows BID prn scaling.  Dispense: 50 g; Refill: 2    EIC (epidermal inclusion cyst)  This is a benign cyst of the hair follicle. Reassurance provided. No treatment is necessary unless it is symptomatic.     Rosacea  Reassurance was given to the patient. Continue current treatment: azelaic acid 15% + ivermectin 1% + metronidazole 1% cream. Apply to face daily.      Follow up in about 2 months (around 10/17/2020), or if symptoms worsen or fail to improve.

## 2020-09-29 ENCOUNTER — PATIENT MESSAGE (OUTPATIENT)
Dept: OTHER | Facility: OTHER | Age: 70
End: 2020-09-29

## 2020-10-05 ENCOUNTER — TELEPHONE (OUTPATIENT)
Dept: PRIMARY CARE CLINIC | Facility: CLINIC | Age: 70
End: 2020-10-05

## 2020-10-05 DIAGNOSIS — E78.00 TYPE 2 DIABETES MELLITUS WITH HYPERCHOLESTEROLEMIA: Primary | ICD-10-CM

## 2020-10-05 DIAGNOSIS — Z79.899 ENCOUNTER FOR LONG-TERM (CURRENT) USE OF OTHER MEDICATIONS: ICD-10-CM

## 2020-10-05 DIAGNOSIS — E03.9 HYPOTHYROIDISM, UNSPECIFIED TYPE: Primary | ICD-10-CM

## 2020-10-05 DIAGNOSIS — E11.69 TYPE 2 DIABETES MELLITUS WITH HYPERCHOLESTEROLEMIA: Primary | ICD-10-CM

## 2020-10-05 NOTE — TELEPHONE ENCOUNTER
----- Message from April Landers sent at 10/5/2020  1:03 PM CDT -----  Regarding: blood work  Contact: [phone]  Needs Advice    Reason for call:      Pt sates that she is calling due to she knows that she has to get her thyroids check in October but no one has called her to schedule the appt.     Communication Preference: [phone]    Additional Information:       Please call pt to schedule an appt.

## 2020-10-12 ENCOUNTER — LAB VISIT (OUTPATIENT)
Dept: LAB | Facility: HOSPITAL | Age: 70
End: 2020-10-12
Attending: FAMILY MEDICINE
Payer: MEDICARE

## 2020-10-12 DIAGNOSIS — E78.00 TYPE 2 DIABETES MELLITUS WITH HYPERCHOLESTEROLEMIA: ICD-10-CM

## 2020-10-12 DIAGNOSIS — Z79.899 ENCOUNTER FOR LONG-TERM (CURRENT) USE OF OTHER MEDICATIONS: ICD-10-CM

## 2020-10-12 DIAGNOSIS — E03.9 HYPOTHYROIDISM, UNSPECIFIED TYPE: ICD-10-CM

## 2020-10-12 DIAGNOSIS — E11.69 TYPE 2 DIABETES MELLITUS WITH HYPERCHOLESTEROLEMIA: ICD-10-CM

## 2020-10-12 LAB
ALBUMIN SERPL BCP-MCNC: 3.7 G/DL (ref 3.5–5.2)
ALP SERPL-CCNC: 50 U/L (ref 55–135)
ALT SERPL W/O P-5'-P-CCNC: 22 U/L (ref 10–44)
ANION GAP SERPL CALC-SCNC: 7 MMOL/L (ref 8–16)
AST SERPL-CCNC: 18 U/L (ref 10–40)
BASOPHILS # BLD AUTO: 0.04 K/UL (ref 0–0.2)
BASOPHILS NFR BLD: 0.6 % (ref 0–1.9)
BILIRUB SERPL-MCNC: 0.5 MG/DL (ref 0.1–1)
BUN SERPL-MCNC: 12 MG/DL (ref 8–23)
CALCIUM SERPL-MCNC: 9.4 MG/DL (ref 8.7–10.5)
CHLORIDE SERPL-SCNC: 104 MMOL/L (ref 95–110)
CHOLEST SERPL-MCNC: 110 MG/DL (ref 120–199)
CHOLEST/HDLC SERPL: 2.4 {RATIO} (ref 2–5)
CO2 SERPL-SCNC: 28 MMOL/L (ref 23–29)
CREAT SERPL-MCNC: 0.8 MG/DL (ref 0.5–1.4)
DIFFERENTIAL METHOD: ABNORMAL
EOSINOPHIL # BLD AUTO: 0.4 K/UL (ref 0–0.5)
EOSINOPHIL NFR BLD: 5.7 % (ref 0–8)
ERYTHROCYTE [DISTWIDTH] IN BLOOD BY AUTOMATED COUNT: 12.9 % (ref 11.5–14.5)
EST. GFR  (AFRICAN AMERICAN): >60 ML/MIN/1.73 M^2
EST. GFR  (NON AFRICAN AMERICAN): >60 ML/MIN/1.73 M^2
ESTIMATED AVG GLUCOSE: 131 MG/DL (ref 68–131)
GLUCOSE SERPL-MCNC: 118 MG/DL (ref 70–110)
HBA1C MFR BLD HPLC: 6.2 % (ref 4–5.6)
HCT VFR BLD AUTO: 42.9 % (ref 37–48.5)
HDLC SERPL-MCNC: 46 MG/DL (ref 40–75)
HDLC SERPL: 41.8 % (ref 20–50)
HGB BLD-MCNC: 13.1 G/DL (ref 12–16)
IMM GRANULOCYTES # BLD AUTO: 0.02 K/UL (ref 0–0.04)
IMM GRANULOCYTES NFR BLD AUTO: 0.3 % (ref 0–0.5)
LDLC SERPL CALC-MCNC: 42.2 MG/DL (ref 63–159)
LYMPHOCYTES # BLD AUTO: 2.1 K/UL (ref 1–4.8)
LYMPHOCYTES NFR BLD: 33.3 % (ref 18–48)
MCH RBC QN AUTO: 30.6 PG (ref 27–31)
MCHC RBC AUTO-ENTMCNC: 30.5 G/DL (ref 32–36)
MCV RBC AUTO: 100 FL (ref 82–98)
MONOCYTES # BLD AUTO: 0.6 K/UL (ref 0.3–1)
MONOCYTES NFR BLD: 10.1 % (ref 4–15)
NEUTROPHILS # BLD AUTO: 3.2 K/UL (ref 1.8–7.7)
NEUTROPHILS NFR BLD: 50 % (ref 38–73)
NONHDLC SERPL-MCNC: 64 MG/DL
NRBC BLD-RTO: 0 /100 WBC
PLATELET # BLD AUTO: 243 K/UL (ref 150–350)
PMV BLD AUTO: 11.3 FL (ref 9.2–12.9)
POTASSIUM SERPL-SCNC: 4.2 MMOL/L (ref 3.5–5.1)
PROT SERPL-MCNC: 6.5 G/DL (ref 6–8.4)
RBC # BLD AUTO: 4.28 M/UL (ref 4–5.4)
SODIUM SERPL-SCNC: 139 MMOL/L (ref 136–145)
T4 FREE SERPL-MCNC: 0.98 NG/DL (ref 0.71–1.51)
TRIGL SERPL-MCNC: 109 MG/DL (ref 30–150)
TSH SERPL DL<=0.005 MIU/L-ACNC: 14.69 UIU/ML (ref 0.4–4)
WBC # BLD AUTO: 6.33 K/UL (ref 3.9–12.7)

## 2020-10-12 PROCEDURE — 84439 ASSAY OF FREE THYROXINE: CPT

## 2020-10-12 PROCEDURE — 85025 COMPLETE CBC W/AUTO DIFF WBC: CPT

## 2020-10-12 PROCEDURE — 84443 ASSAY THYROID STIM HORMONE: CPT

## 2020-10-12 PROCEDURE — 83036 HEMOGLOBIN GLYCOSYLATED A1C: CPT

## 2020-10-12 PROCEDURE — 80053 COMPREHEN METABOLIC PANEL: CPT

## 2020-10-12 PROCEDURE — 80061 LIPID PANEL: CPT

## 2020-10-12 PROCEDURE — 36415 COLL VENOUS BLD VENIPUNCTURE: CPT | Mod: PO

## 2020-10-13 ENCOUNTER — PATIENT MESSAGE (OUTPATIENT)
Dept: PRIMARY CARE CLINIC | Facility: CLINIC | Age: 70
End: 2020-10-13

## 2020-10-13 DIAGNOSIS — E03.9 ACQUIRED HYPOTHYROIDISM: ICD-10-CM

## 2020-10-14 ENCOUNTER — PATIENT MESSAGE (OUTPATIENT)
Dept: PRIMARY CARE CLINIC | Facility: CLINIC | Age: 70
End: 2020-10-14

## 2020-10-14 RX ORDER — LEVOTHYROXINE SODIUM 137 UG/1
125 TABLET ORAL
Qty: 30 TABLET | Refills: 3 | Status: SHIPPED | OUTPATIENT
Start: 2020-10-14 | End: 2020-11-09

## 2020-10-26 ENCOUNTER — PATIENT MESSAGE (OUTPATIENT)
Dept: PRIMARY CARE CLINIC | Facility: CLINIC | Age: 70
End: 2020-10-26

## 2020-11-05 DIAGNOSIS — E11.69 TYPE 2 DIABETES MELLITUS WITH HYPERCHOLESTEROLEMIA: ICD-10-CM

## 2020-11-05 DIAGNOSIS — I48.19 PERSISTENT ATRIAL FIBRILLATION: ICD-10-CM

## 2020-11-05 DIAGNOSIS — E78.00 TYPE 2 DIABETES MELLITUS WITH HYPERCHOLESTEROLEMIA: ICD-10-CM

## 2020-11-05 DIAGNOSIS — E66.09 CLASS 1 OBESITY DUE TO EXCESS CALORIES WITH SERIOUS COMORBIDITY AND BODY MASS INDEX (BMI) OF 32.0 TO 32.9 IN ADULT: ICD-10-CM

## 2020-11-05 DIAGNOSIS — E03.2 HYPOTHYROIDISM DUE TO NON-MEDICATION EXOGENOUS SUBSTANCES: ICD-10-CM

## 2020-11-06 ENCOUNTER — LAB VISIT (OUTPATIENT)
Dept: LAB | Facility: HOSPITAL | Age: 70
End: 2020-11-06
Attending: FAMILY MEDICINE
Payer: MEDICARE

## 2020-11-06 DIAGNOSIS — E03.9 ACQUIRED HYPOTHYROIDISM: ICD-10-CM

## 2020-11-06 LAB
T4 FREE SERPL-MCNC: 1.08 NG/DL (ref 0.71–1.51)
TSH SERPL DL<=0.005 MIU/L-ACNC: 4.64 UIU/ML (ref 0.4–4)

## 2020-11-06 PROCEDURE — 36415 COLL VENOUS BLD VENIPUNCTURE: CPT | Mod: PO

## 2020-11-06 PROCEDURE — 84443 ASSAY THYROID STIM HORMONE: CPT

## 2020-11-06 PROCEDURE — 84439 ASSAY OF FREE THYROXINE: CPT

## 2020-11-09 ENCOUNTER — OFFICE VISIT (OUTPATIENT)
Dept: PRIMARY CARE CLINIC | Facility: CLINIC | Age: 70
End: 2020-11-09
Payer: MEDICARE

## 2020-11-09 VITALS
WEIGHT: 193.56 LBS | BODY MASS INDEX: 31.11 KG/M2 | HEIGHT: 66 IN | DIASTOLIC BLOOD PRESSURE: 80 MMHG | HEART RATE: 47 BPM | OXYGEN SATURATION: 99 % | SYSTOLIC BLOOD PRESSURE: 122 MMHG

## 2020-11-09 DIAGNOSIS — M75.02 ADHESIVE CAPSULITIS OF LEFT SHOULDER: ICD-10-CM

## 2020-11-09 DIAGNOSIS — E11.69 TYPE 2 DIABETES MELLITUS WITH HYPERCHOLESTEROLEMIA: ICD-10-CM

## 2020-11-09 DIAGNOSIS — E78.00 TYPE 2 DIABETES MELLITUS WITH HYPERCHOLESTEROLEMIA: ICD-10-CM

## 2020-11-09 DIAGNOSIS — E03.9 ACQUIRED HYPOTHYROIDISM: ICD-10-CM

## 2020-11-09 DIAGNOSIS — E03.9 HYPOTHYROIDISM, UNSPECIFIED TYPE: Primary | ICD-10-CM

## 2020-11-09 DIAGNOSIS — Z12.31 SCREENING MAMMOGRAM FOR HIGH-RISK PATIENT: ICD-10-CM

## 2020-11-09 LAB
BILIRUB SERPL-MCNC: NORMAL MG/DL
BLOOD URINE, POC: NORMAL
CLARITY, POC UA: CLEAR
COLOR, POC UA: YELLOW
GLUCOSE UR QL STRIP: NORMAL
KETONES UR QL STRIP: NORMAL
LEUKOCYTE ESTERASE URINE, POC: NORMAL
NITRITE, POC UA: NORMAL
PH, POC UA: 5
PROTEIN, POC: NORMAL
SPECIFIC GRAVITY, POC UA: 1.01
UROBILINOGEN, POC UA: NORMAL

## 2020-11-09 PROCEDURE — 99999 PR PBB SHADOW E&M-EST. PATIENT-LVL V: ICD-10-PCS | Mod: PBBFAC,,, | Performed by: FAMILY MEDICINE

## 2020-11-09 PROCEDURE — 99214 OFFICE O/P EST MOD 30 MIN: CPT | Mod: S$PBB,,, | Performed by: FAMILY MEDICINE

## 2020-11-09 PROCEDURE — 99214 PR OFFICE/OUTPT VISIT, EST, LEVL IV, 30-39 MIN: ICD-10-PCS | Mod: S$PBB,,, | Performed by: FAMILY MEDICINE

## 2020-11-09 PROCEDURE — 81002 URINALYSIS NONAUTO W/O SCOPE: CPT | Mod: PBBFAC,PN | Performed by: FAMILY MEDICINE

## 2020-11-09 PROCEDURE — 99215 OFFICE O/P EST HI 40 MIN: CPT | Mod: PBBFAC,PN | Performed by: FAMILY MEDICINE

## 2020-11-09 PROCEDURE — 99999 PR PBB SHADOW E&M-EST. PATIENT-LVL V: CPT | Mod: PBBFAC,,, | Performed by: FAMILY MEDICINE

## 2020-11-09 RX ORDER — LEVOTHYROXINE SODIUM 150 UG/1
150 TABLET ORAL
Qty: 30 TABLET | Refills: 6 | Status: SHIPPED | OUTPATIENT
Start: 2020-11-09 | End: 2021-01-07 | Stop reason: SDUPTHER

## 2020-11-09 NOTE — PROGRESS NOTES
Subjective:       Patient ID: Bebe Valdez is a 70 y.o. female.    Chief Complaint: Annual Exam    70n yo with prior history of hypothyroidism returns for follow up.  Last TSH has improved   10/12/2020 09:52  TSH: 14.694 (H)  Free T4: 0.98    10/12/2020 09:53  Hemoglobin A1C External: 6.2 (H)  Estimated Avg Glucose: 131    11/6/2020 11:16  TSH: 4.642 (H)  Free T4: 1.08    She has been hesitant in past to increase dose of Synthroid since this may have contributed to prior onset of A Fib.  However, she has recently noted increased fatigue and weight gain  She also presents for recurrent pain involving left shoulder with movement, prior history of adhesive capsulitis.  She denies any history of trauma    Review of Systems   Constitutional: Positive for activity change and unexpected weight change.   Endocrine: Negative for cold intolerance and heat intolerance.   Musculoskeletal: Positive for arthralgias. Negative for joint swelling.         Objective:      Physical Exam  Constitutional:       Appearance: Normal appearance.   Neck:      Musculoskeletal: Normal range of motion.      Vascular: No carotid bruit.   Cardiovascular:      Rate and Rhythm: Normal rate and regular rhythm.      Pulses: Normal pulses.      Heart sounds: No murmur.   Pulmonary:      Effort: Pulmonary effort is normal.      Breath sounds: Normal breath sounds. No wheezing or rales.   Musculoskeletal:      Right lower leg: No edema.      Left lower leg: No edema.      Comments: Left shoulder exam shows no tenderness to palpation  Decreased abduction to about 70-80 degrees, same with int/ext rotation   Lymphadenopathy:      Cervical: No cervical adenopathy.   Skin:     Capillary Refill: Capillary refill takes less than 2 seconds.   Neurological:      General: No focal deficit present.      Mental Status: She is alert.      Deep Tendon Reflexes: Reflexes normal.         Assessment:       1.  Hypothyroid  2.  A Fib  3.  Type II DM  4.  Adhesive  capsulitis   Plan:       1.  Gradual increase Synthroid with repeat TSH in 6 weeks  2.  Consult PT  3.  Continue other medications

## 2020-11-30 DIAGNOSIS — E78.00 TYPE 2 DIABETES MELLITUS WITH HYPERCHOLESTEROLEMIA: ICD-10-CM

## 2020-11-30 DIAGNOSIS — I48.19 PERSISTENT ATRIAL FIBRILLATION: ICD-10-CM

## 2020-11-30 DIAGNOSIS — E11.69 TYPE 2 DIABETES MELLITUS WITH HYPERCHOLESTEROLEMIA: ICD-10-CM

## 2020-11-30 DIAGNOSIS — E66.09 CLASS 1 OBESITY DUE TO EXCESS CALORIES WITH SERIOUS COMORBIDITY AND BODY MASS INDEX (BMI) OF 32.0 TO 32.9 IN ADULT: ICD-10-CM

## 2020-11-30 DIAGNOSIS — E03.2 HYPOTHYROIDISM DUE TO NON-MEDICATION EXOGENOUS SUBSTANCES: ICD-10-CM

## 2020-12-02 ENCOUNTER — CLINICAL SUPPORT (OUTPATIENT)
Dept: REHABILITATION | Facility: HOSPITAL | Age: 70
End: 2020-12-02
Attending: FAMILY MEDICINE
Payer: MEDICARE

## 2020-12-02 DIAGNOSIS — M25.612 IMPAIRED RANGE OF MOTION OF LEFT SHOULDER: ICD-10-CM

## 2020-12-02 DIAGNOSIS — R29.898 DECREASED STRENGTH OF UPPER EXTREMITY: ICD-10-CM

## 2020-12-02 DIAGNOSIS — M75.02 ADHESIVE CAPSULITIS OF LEFT SHOULDER: ICD-10-CM

## 2020-12-02 DIAGNOSIS — R29.3 POSTURAL IMBALANCE: ICD-10-CM

## 2020-12-02 PROCEDURE — 97162 PT EVAL MOD COMPLEX 30 MIN: CPT | Mod: PO

## 2020-12-02 NOTE — PLAN OF CARE
"[facility]  Physical Therapy Initial Evaluation    Name: Bebe Valdez  Clinic Number: [id number]    Therapy Diagnosis:   Encounter Diagnoses   Name Primary?    Adhesive capsulitis of left shoulder     Decreased strength of upper extremity     Postural imbalance     Impaired range of motion of left shoulder      Physician: Anil Nelson MD    Physician Orders: PT Eval and Treat   Medical Diagnosis from Referral: Adhesive capsulitis of left shoulder  Evaluation Date: 12/2/2020  Authorization Period Expiration: 11/09/2021  Plan of Care Expiration: 2/10/2021  Visit # / Visits authorized: 1/1    Time In: 147 PM  Time Out: 222 PM    Total Billable Time: 35 minutes    Precautions: Diabetes and blood thinners    Subjective   Date of onset: chronic   History of current condition - Bebe reports: the doctor said it was adhesive capsulitis in the left shoulder. This has been going on over 2 years ago. She came here to PT and it helped a lot. She has been trying to do some of the exercises they had previously given her. No shoulder surgeries or shoulder injections. She denies numbness and tingling into the arms/hands. She participates in yoga, and she also works out with a  3x/week. A couple months ago, she started hearing a "popping" in the left shoulder.     Medical History:   Past Medical History:   Diagnosis Date    Allergy     Anticoagulant long-term use     Hyperlipidemia     Hypertension associated with diabetes     Hypothyroid     Obesity     Rosacea        Surgical History:   Bebe Valdez  has a past surgical history that includes Colonoscopy (7/6/07); Cholecystectomy; Hysteroscopy; Dilation and curettage of uterus; Colonoscopy (N/A, 6/6/2017); Treatment of cardiac arrhythmia (N/A, 9/26/2019); and Treatment of cardiac arrhythmia (N/A, 1/9/2020).    Medications:   Bebe has a current medication list which includes the following prescription(s): apixaban, ascorbic acid " "(vitamin c), augmented betamethasone dipropionate, cinnamon bark, diclofenac-misoprostol  mg-mcg, dronedarone, fish oil-omega-3 fatty acids, levothyroxine, metformin, metoprolol succinate, mv,ca,min/iron/fa/guarana/caff, OPW TEST CLAIM - DO NOT FILL, rosuvastatin, urea, vitamin d, metformin, rosuvastatin, and rosuvastatin, and the following Facility-Administered Medications: sodium chloride 0.9% and sodium chloride 0.9%.    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 She is mostly seated at her job and does a lot of computer work.  Prior Level of Function: This all started two years ago. She was in the [place] [place].The doctor said this could have triggered it but anything could have triggered it.  Current Level of Function: She says she is not limited in anything. She feels that sometimes there is a "popping" sound.  She feels the pain at night sometimes when she is going to sleep.    Pain:  Current 0/10, worst 3/10, best 0/10   Location: left shoulder   Description: Throbbing  Aggravating Factors: turning towards the arm, reaching behind/backwards   Easing Factors: advil,  CBD oil    Pts goals:"to take the pain away and figure out what the popping is"    Objective     Observation: enters clinic independently     Posture: FHP, rounded shoulder posture      RANGE OF MOTION SHOULDER  AROM Right Left Comment Normal   Shoulder Flexion: seated  degrees "popping" on left 180 deg   Shoulder Abduction:seated 170 degrees 140 degrees Tightness on the left 180 deg   Shoulder ER: supine with shoulder 90 deg abd WFL 73 degrees Pain left 90 deg   Shoulder IR: WFL WFL  90 deg     L shoulder PROM: WNL but " painful    MMT   Right Left Comment   Shoulder flexion: 5/5 4/5 Pain L    Shoulder Abduction: 4+/5 4/5 Pain L    Shoulder ER: 4+/5 4/5 Pain L    Shoulder IR: 5/5 4+/5    Lower Trap: 4/5 4-/5    Middle Trap: 4/5 4-/5 Pain L     Special Tests    - Neers: left positive  - Barber-Chava: left positive  - Drop Arm: left negative  - Full/Empty Can: left negative  - Speeds: left negative   - Scapular Assistance test: left positive   - Apprehension/Relocation: left negative    Palpation: TTP at L bicipital groove, L upper trap, L anterior deltoid     Sensation: grossly intact to light touch      Joint Mobility: limited with posterior and inferior GH glides      TREATMENT     Home Exercises and Patient Education Provided    Education provided:   - PT role and POC    Assessment   Bebe is a 70 y.o. female referred to outpatient Physical Therapy with a medical diagnosis of Adhesive capsulitis of left shoulder. Pt presents with decreased left shoulder ROM, decreased UE and periscapular strength, impaired left glenohumeral joint mobility, and poor postural control and awareness.    Pt prognosis is Good.   Pt will benefit from skilled outpatient Physical Therapy to address the deficits stated above and in the chart below, provide pt/family education, and to maximize pt's level of independence.     Plan of care discussed with patient: Yes  Pt's spiritual, cultural and educational needs considered and patient is agreeable to the plan of care and goals as stated below:     Anticipated Barriers for therapy: none anticipated     Medical Necessity is demonstrated by the following  History  Co-morbidities and personal factors that may impact the plan of care Co-morbidities:   PMH list:   Allergy     Anticoagulant long-term use     Hyperlipidemia     Hypertension associated with diabetes     Hypothyroid     Obesity     Rosacea        HTN, blood thinners, diabetes , obesity     Personal Factors:   no deficits     moderate    Examination  Body Structures and Functions, activity limitations and participation restrictions that may impact the plan of care Body Regions:   upper extremities    Body Systems:    gross symmetry  ROM  strength  gross coordinated movement  motor control  motor learning    Participation Restrictions:   Limited in reaching behind back, pain when she is going to sleep    Activity limitations:   Learning and applying knowledge  no deficits    General Tasks and Commands  no deficits    Communication  no deficits    Mobility  lifting and carrying objects    Self care  no deficits    Domestic Life  no deficits    Interactions/Relationships  no deficits    Life Areas  no deficits    Community and Social Life  no deficits         moderate   Clinical Presentation evolving clinical presentation with changing clinical characteristics moderate   Decision Making/ Complexity Score: moderate     Goals:    Short Term Goals:  5 weeks  1. Pt will increase L shoulder elevation to at least 160 degrees in order to show improved functional mobility.  2. Pt will improve UE and periscapular MMTs by 1/2 grade in all planes to improve strength.   3. Pt will be independent and consistent with issued HEP in order to show carryover between therapy sessions.  4. Pt. to demonstrate proper cervical and scapula retraction requiring min. to no verbal cues from PT        Long Term Goals: 10  weeks  1. Pt will report decreased shoulder pain to 1/10 in order to improve symptom management.   2. Pt will be able to reach behind her to get something from the back seat without pain in order to improve functional mobility.  3. Pt will increase shoulder periscapular/ UE strength by 1 ms grade in order to increase tolerance to functional activities and ADLs.  4. Pt will be independent with updated HEP to maintain gains following discharge with therapy.  5. Pt to show full L shoulder AROM to match R shoulder AROM to improve overall movement patterns.  6. Pt  to be independent in postural control and seated/standing posture overall.             Plan   Plan of care Certification: 12/2/2020 to 2/10/2021.    Outpatient Physical Therapy 2 times weekly for 10 weeks to include the following interventions: Manual Therapy, Moist Heat/ Ice, Neuromuscular Re-ed, Patient Education, Self Care, Therapeutic Activites and Therapeutic Exercise.     Edie Allred, PT

## 2020-12-03 ENCOUNTER — PATIENT MESSAGE (OUTPATIENT)
Dept: PRIMARY CARE CLINIC | Facility: CLINIC | Age: 70
End: 2020-12-03

## 2020-12-07 ENCOUNTER — CLINICAL SUPPORT (OUTPATIENT)
Dept: REHABILITATION | Facility: HOSPITAL | Age: 70
End: 2020-12-07
Attending: FAMILY MEDICINE
Payer: MEDICARE

## 2020-12-07 DIAGNOSIS — M25.612 IMPAIRED RANGE OF MOTION OF LEFT SHOULDER: ICD-10-CM

## 2020-12-07 DIAGNOSIS — R29.898 DECREASED STRENGTH OF UPPER EXTREMITY: ICD-10-CM

## 2020-12-07 DIAGNOSIS — R29.3 POSTURAL IMBALANCE: ICD-10-CM

## 2020-12-07 PROCEDURE — 97140 MANUAL THERAPY 1/> REGIONS: CPT | Mod: PO

## 2020-12-07 PROCEDURE — 97110 THERAPEUTIC EXERCISES: CPT | Mod: PO

## 2020-12-07 NOTE — PROGRESS NOTES
Physical Therapy Daily Treatment Note     Name: Bebe Valdez  Clinic Number: [id number]    Therapy Diagnosis:   Encounter Diagnoses   Name Primary?    Decreased strength of upper extremity     Postural imbalance     Impaired range of motion of left shoulder      Physician: Anil Nelson MD    Visit Date: 12/7/2020  Physician Orders: PT Eval and Treat   Medical Diagnosis from Referral: Adhesive capsulitis of left shoulder  Evaluation Date: 12/2/2020  Authorization Period Expiration: 11/09/2021  Plan of Care Expiration: 2/10/2021  Visit # / Visits authorized: 2/12     Time In: 1245 PM  Time Out: 130 PM     Total Billable Time: 45 minutes     Precautions: Diabetes and blood thinners    Subjective     Pt reports: Feels good today with low levels of pain.  She was compliant with home exercise program.  Response to previous treatment: better  Functional change: no chage    Pain: 0/10  Location: left shoulder      Objective     Bebe received therapeutic exercises to develop strength and ROM for 30 minutes including:    Supine shoulder flexion with dowel - pronated  x10, supinated  x10   SL ER - x20   SL abduction - x20   Serratus punches (35degrees incline) - 2# x20  Wall slides with towel bilaterally - 3x10   Standing rows - 3x10 OTB  Standing ext - 3x10 YTB    Bebe received the following manual therapy techniques: Joint mobilizations, STM were applied to the: L shoulder for 10 minutes, including:  GHJ Posterior mobs Graded II-III      Home Exercises Provided and Patient Education Provided     Education provided:   - HEP, POC, answered pt questions    Written Home Exercises Provided: yes.  Exercises were reviewed and Bebe was able to demonstrate them prior to the end of the session.  Bebe demonstrated good  understanding of the education provided.     See EMR under Patient Instructions for exercises provided 12/7/2020.    Assessment     Pt tolerated initiation of treatment well today. She arrived  with no pain and she also presents with decent shoulder mobility not consistent with a the normal capsular pattern of adhesive capsulitis; however, she does lack full shoulder overhead motion and is stiff into internal rotation. Treatment today focused on pain-free movement and strengthening with excellent tolerance.       Bebe is progressing well towards her goals.   Pt prognosis is Good.     Pt will continue to benefit from skilled outpatient physical therapy to address the deficits listed in the problem list box on initial evaluation, provide pt/family education and to maximize pt's level of independence in the home and community environment.     Pt's spiritual, cultural and educational needs considered and pt agreeable to plan of care and goals.    Anticipated barriers to physical therapy: previous therapy for similar issue    Goals:     Short Term Goals:  5 weeks  1. Pt will increase L shoulder elevation to at least 160 degrees in order to show improved functional mobility.  2. Pt will improve UE and periscapular MMTs by 1/2 grade in all planes to improve strength.   3. Pt will be independent and consistent with issued HEP in order to show carryover between therapy sessions.  4. Pt. to demonstrate proper cervical and scapula retraction requiring min. to no verbal cues from PT        Long Term Goals: 10  weeks  1. Pt will report decreased shoulder pain to 1/10 in order to improve symptom management.   2. Pt will be able to reach behind her to get something from the back seat without pain in order to improve functional mobility.  3. Pt will increase shoulder periscapular/ UE strength by 1 ms grade in order to increase tolerance to functional activities and ADLs.  4. Pt will be independent with updated HEP to maintain gains following discharge with therapy.  5. Pt to show full L shoulder AROM to match R shoulder AROM to improve overall movement patterns.  6. Pt to be independent in postural control and  seated/standing posture overall.     Plan     Plan of care Certification: 12/2/2020 to 2/10/2021.    Progress mobility and strength as tolerated.     Stepan Kevin, PT

## 2020-12-09 ENCOUNTER — LAB VISIT (OUTPATIENT)
Dept: LAB | Facility: HOSPITAL | Age: 70
End: 2020-12-09
Attending: FAMILY MEDICINE
Payer: MEDICARE

## 2020-12-09 DIAGNOSIS — E03.9 ACQUIRED HYPOTHYROIDISM: ICD-10-CM

## 2020-12-09 LAB — TSH SERPL DL<=0.005 MIU/L-ACNC: 3.21 UIU/ML (ref 0.4–4)

## 2020-12-09 PROCEDURE — 84443 ASSAY THYROID STIM HORMONE: CPT

## 2020-12-09 PROCEDURE — 36415 COLL VENOUS BLD VENIPUNCTURE: CPT | Mod: PO

## 2020-12-11 ENCOUNTER — HOSPITAL ENCOUNTER (OUTPATIENT)
Dept: RADIOLOGY | Facility: HOSPITAL | Age: 70
Discharge: HOME | End: 2020-12-11
Attending: FAMILY MEDICINE
Payer: MEDICARE

## 2020-12-11 ENCOUNTER — CLINICAL SUPPORT (OUTPATIENT)
Dept: REHABILITATION | Facility: HOSPITAL | Age: 70
End: 2020-12-11
Attending: FAMILY MEDICINE
Payer: MEDICARE

## 2020-12-11 ENCOUNTER — PATIENT MESSAGE (OUTPATIENT)
Dept: OTHER | Facility: OTHER | Age: 70
End: 2020-12-11

## 2020-12-11 DIAGNOSIS — Z12.31 SCREENING MAMMOGRAM FOR HIGH-RISK PATIENT: ICD-10-CM

## 2020-12-11 DIAGNOSIS — M25.612 IMPAIRED RANGE OF MOTION OF LEFT SHOULDER: ICD-10-CM

## 2020-12-11 DIAGNOSIS — R29.898 DECREASED STRENGTH OF UPPER EXTREMITY: ICD-10-CM

## 2020-12-11 DIAGNOSIS — R29.3 POSTURAL IMBALANCE: ICD-10-CM

## 2020-12-11 PROCEDURE — 77067 SCR MAMMO BI INCL CAD: CPT | Mod: 26,,, | Performed by: RADIOLOGY

## 2020-12-11 PROCEDURE — 77067 MAMMO DIGITAL SCREENING BILAT WITH TOMO: ICD-10-PCS | Mod: 26,,, | Performed by: RADIOLOGY

## 2020-12-11 PROCEDURE — 77063 BREAST TOMOSYNTHESIS BI: CPT | Mod: 26,,, | Performed by: RADIOLOGY

## 2020-12-11 PROCEDURE — 97110 THERAPEUTIC EXERCISES: CPT | Mod: PO

## 2020-12-11 PROCEDURE — 77063 MAMMO DIGITAL SCREENING BILAT WITH TOMO: ICD-10-PCS | Mod: 26,,, | Performed by: RADIOLOGY

## 2020-12-11 PROCEDURE — 77067 SCR MAMMO BI INCL CAD: CPT | Mod: TC,PO

## 2020-12-11 NOTE — PROGRESS NOTES
"  Physical Therapy Daily Treatment Note     Name: Bebe Valdez  Clinic Number: [id number]    Therapy Diagnosis:   Encounter Diagnoses   Name Primary?    Decreased strength of upper extremity     Postural imbalance     Impaired range of motion of left shoulder      Physician: Anil Nelson MD    Visit Date: 12/11/2020  Physician Orders: PT Eval and Treat   Medical Diagnosis from Referral: Adhesive capsulitis of left shoulder  Evaluation Date: 12/2/2020  Authorization Period Expiration: 11/09/2021  Plan of Care Expiration: 2/10/2021  Visit # / Visits authorized: 3/12     Time In: 1245 PM  Time Out: 125 PM     Total Billable Time: 45 minutes     Precautions: Diabetes and blood thinners    Subjective     Pt reports: She has continued to have low levels of pain and improved function overall. She notes that she turned awkwardly in bad 2 days ago and "felt a pop" and experienced small amounts of pain from this. Denies swelling, redness, or warmth after the incident, but soreness remains.  She was compliant with home exercise program.  Response to previous treatment: better  Functional change: no chage    Pain: 0/10  Location: left shoulder      Objective     Bebe received therapeutic exercises to develop strength and ROM for 40 minutes including:    Supine shoulder flexion with dowel - pronated  x10, supinated  x10   SL ER - x20 1#  SL abduction - x20 1#  Prone Rows - 2x12 4#  Serratus punches (35degrees incline) - 4# x20  Wall slides with towel bilaterally - 3x10   Standing rows - 3x10 GTB  Standing ext - 3x10 GTB  Standing ER,IR - GTB 2x10  Wall pushups - 2x10     Bebe received the following manual therapy techniques: Joint mobilizations, STM were applied to the: L shoulder for 00 minutes, including:NOT PERFORMED  GHJ Posterior mobs Graded II-III      Home Exercises Provided and Patient Education Provided     Education provided:   - HEP, POC, answered pt questions    Written Home Exercises Provided: " yes.  Exercises were reviewed and Bebe was able to demonstrate them prior to the end of the session.  Bebe demonstrated good  understanding of the education provided.     See EMR under Patient Instructions for exercises provided 12/7/2020.    Assessment     Pt demonstrates near full ROM today with very minimal pain, so manual was not indicated today. She is lacking a very small amount of shoulder flexion which can be addressed via there-ex. Focused on slow progression of strengthening today adding prone rows, standing IR/ER, and wall pushups to program. Mild soreness during standing ER reported, but no soreness was present.       Bebe is progressing well towards her goals.   Pt prognosis is Good.     Pt will continue to benefit from skilled outpatient physical therapy to address the deficits listed in the problem list box on initial evaluation, provide pt/family education and to maximize pt's level of independence in the home and community environment.     Pt's spiritual, cultural and educational needs considered and pt agreeable to plan of care and goals.    Anticipated barriers to physical therapy: previous therapy for similar issue    Goals:     Short Term Goals:  5 weeks  1. Pt will increase L shoulder elevation to at least 160 degrees in order to show improved functional mobility.  2. Pt will improve UE and periscapular MMTs by 1/2 grade in all planes to improve strength.   3. Pt will be independent and consistent with issued HEP in order to show carryover between therapy sessions.  4. Pt. to demonstrate proper cervical and scapula retraction requiring min. to no verbal cues from PT        Long Term Goals: 10  weeks  1. Pt will report decreased shoulder pain to 1/10 in order to improve symptom management.   2. Pt will be able to reach behind her to get something from the back seat without pain in order to improve functional mobility.  3. Pt will increase shoulder periscapular/ UE strength by 1 ms grade  in order to increase tolerance to functional activities and ADLs.  4. Pt will be independent with updated HEP to maintain gains following discharge with therapy.  5. Pt to show full L shoulder AROM to match R shoulder AROM to improve overall movement patterns.  6. Pt to be independent in postural control and seated/standing posture overall.     Plan     Plan of care Certification: 12/2/2020 to 2/10/2021.    Slow progression of shoulder strength     Stepan Kevin, PT

## 2020-12-14 ENCOUNTER — TELEPHONE (OUTPATIENT)
Dept: PRIMARY CARE CLINIC | Facility: CLINIC | Age: 70
End: 2020-12-14

## 2020-12-15 NOTE — PROGRESS NOTES
Physical Therapy Daily Treatment Note     Name: Bebe Valdez  Clinic Number: [id number]    Therapy Diagnosis:   Encounter Diagnoses   Name Primary?    Decreased strength of upper extremity     Postural imbalance     Impaired range of motion of left shoulder      Physician: Anil Nelson MD    Visit Date: 12/16/2020  Physician Orders: PT Eval and Treat   Medical Diagnosis from Referral: Adhesive capsulitis of left shoulder  Evaluation Date: 12/2/2020  Authorization Period Expiration: 11/09/2021  Plan of Care Expiration: 2/10/2021  Visit # / Visits authorized: 3/12      Time In: 10:41 am (pt arrived late)  Time Out: 11:14 am      Total Billable Time: 33 minutes     Precautions: Diabetes and blood thinners    Subjective     Pt reports: she is feeling good with no complaints of pain today  She was compliant with home exercise program.  Response to previous treatment: better  Functional change: no chage    Pain: 0/10  Location: left shoulder      Objective     Bebe received therapeutic exercises to develop strength and ROM for 33 minutes including:    Pulleys scaption 3min  Supine shoulder flexion with dowel - pronated  2x10, supinated  2x10   SL ER - x20 1#  SL abduction - x20 1#   Prone Rows - 2x12 4#  Serratus punches with 4# dowel x20  Wall slides with towel bilaterally - 3x10   Standing rows - 3x10 GTB  Standing ext - 3x10 GTB  Standing ER,IR - GTB 2x10  Wall pushups - 2x10     Bebe received the following manual therapy techniques: Joint mobilizations, STM were applied to the: L shoulder for 00 minutes, including:NOT PERFORMED  GHJ Posterior mobs Graded II-III      Home Exercises Provided and Patient Education Provided     Education provided:   - HEP, POC, answered pt questions    Written Home Exercises Provided: yes.  Exercises were reviewed and Bebe was able to demonstrate them prior to the end of the session.  Bebe demonstrated good  understanding of the education provided.     See EMR  under Patient Instructions for exercises provided 12/7/2020.    Assessment     Patient presents to treatment with no complaints of pain, but mild discomfort during exercises at end range of motion. Exercises tolerated as noted with no adverse effects. Will continue to progress with strength and ROM as tolerated next session.     Bebe is progressing well towards her goals.   Pt prognosis is Good.     Pt will continue to benefit from skilled outpatient physical therapy to address the deficits listed in the problem list box on initial evaluation, provide pt/family education and to maximize pt's level of independence in the home and community environment.     Pt's spiritual, cultural and educational needs considered and pt agreeable to plan of care and goals.    Anticipated barriers to physical therapy: previous therapy for similar issue    Goals:     Short Term Goals:  5 weeks  1. Pt will increase L shoulder elevation to at least 160 degrees in order to show improved functional mobility.  2. Pt will improve UE and periscapular MMTs by 1/2 grade in all planes to improve strength.   3. Pt will be independent and consistent with issued HEP in order to show carryover between therapy sessions.  4. Pt. to demonstrate proper cervical and scapula retraction requiring min. to no verbal cues from PT        Long Term Goals: 10  weeks  1. Pt will report decreased shoulder pain to 1/10 in order to improve symptom management.   2. Pt will be able to reach behind her to get something from the back seat without pain in order to improve functional mobility.  3. Pt will increase shoulder periscapular/ UE strength by 1 ms grade in order to increase tolerance to functional activities and ADLs.  4. Pt will be independent with updated HEP to maintain gains following discharge with therapy.  5. Pt to show full L shoulder AROM to match R shoulder AROM to improve overall movement patterns.  6. Pt to be independent in postural control and  seated/standing posture overall.     Plan     Plan of care Certification: 12/2/2020 to 2/10/2021.    Slow progression of shoulder strength     Zoë Moya, PTA

## 2020-12-16 ENCOUNTER — CLINICAL SUPPORT (OUTPATIENT)
Dept: REHABILITATION | Facility: HOSPITAL | Age: 70
End: 2020-12-16
Attending: FAMILY MEDICINE
Payer: MEDICARE

## 2020-12-16 DIAGNOSIS — R29.3 POSTURAL IMBALANCE: ICD-10-CM

## 2020-12-16 DIAGNOSIS — R29.898 DECREASED STRENGTH OF UPPER EXTREMITY: ICD-10-CM

## 2020-12-16 DIAGNOSIS — M25.612 IMPAIRED RANGE OF MOTION OF LEFT SHOULDER: ICD-10-CM

## 2020-12-16 PROCEDURE — 97110 THERAPEUTIC EXERCISES: CPT | Mod: PO,CQ

## 2020-12-28 NOTE — PROGRESS NOTES
Ms. Valdez is a patient of Dr. Sanchez and was last seen in clinic 6/4/2020.      Subjective:   Patient ID:  Bebe Valdez is a 70 y.o. female who presents for follow-up of Atrial Fibrillation  .     HPI:    Ms. Valdez is a 70 y.o. female with AF, hypothyroid, DM, cardiomyopathy here for follow up.    Background:    Referring Cardiologist: Tami De Leon MD  Primary Care Physician: Anil Nelson MD    Ms. Valdez has a history of diabetes mellitus, hypothyroidism on synthroid and recent onset of persistent atrial fibrillation. She was exercising with a  in July 2019 and pulse was noted to be elevated and irregular. She saw Dr. Nelson on 8/26/2019 and was diagnosed with atrial fibrillation. She was started on metoprolol and aspirin. TSH was checked and was undetectable. Her synthroid dosage was reduced. She was then assessed by Dr. De Leon who started her on eliquis and referred her here for further evaluation. A transthoracic echocardiogram was performed noting a LVEF of 45% with moderate left atrial enlargement and mildly depressed RV function. She reports no obvious symptoms related to AF.    Available ECGs in Epic which note sinus rhythm with normal intervals until an ECG dated 8/26/2019 which shows atrial fibrillation with an average ventricular rate of 87 bpm and a single PVC.  Her [id number] score is 4 and indefinite anticoagulation is recommended. Since her EF is depressed I recommend rhythm control strategy. Her depressed EF very well may be from atrial fibrillation and/or hyperthyroidism. Recommend DORINA/DCCV.      Would recommend anti-arrhythmic therapy to help reduce risk of recurrence and allow time for her EF to recover. She has no symptoms of heart failure and her EF is 45%. Options include dofetilide versus sotalol, both would need hospital admission for initiation. She desires to try just a cardioversion without drug therapy unless it recurs. DORINA/DCCV. Follow-up after.    9/26/2019: Underwent  successful DORINA/DCCV. EF on DORINA was 45%.     10/25/2019: She is one month s/p cardioversion and back in atrial fibrillation. Rate is controlled. She experienced no symptom improvement in sinus rhythm. However, EF is 45%. We discussed that this could be from the AF or from her period of hyperthyroidism. She is still hyperthyroid and is going to decrease her synthroid again per PCP (has a follow up soon). It would increase the likelihood of maintaining sinus rhythm after cardioversion if her thyroid levels are normal. We discussed anti-arrhythmic options, which include tikosyn or sotalol which both require hospital stays. She does not want to proceed with that route. It might be possible to use amio after her DCCV, after her thyroid has normalized and her synthroid dose is appropriate, until her follow up echo. If her EF normalizes, could switch to 1C or Multaq.     12/2019: She remains in AF, minimal symptoms although in the presence of a cardiomyopathy. Plan was to proceed with rhythm control once she is euthyroid, and evaluate any symptom improvement or increase in LV function.     1/9/2020: successful DCCV. Multaq initiated. Felt better afterward. Thyroid function now normal. Will repeat echo in 3 months to observe for improvement in LV function in SR.    6/4/2020: She is now 5 months s/p cardioversion and initiation of Multaq. Echo yesterday shows normalization of LV function (40%->55%). EKG with acceptable intervals. She is feeling well. Remains on eliquis for CVA prophylaxis.    Update (12/29/2020):    Today she says she has been feeling well. Walks frequently. Ms. Valdez reports no chest pain with exertion or at rest, palpitations, SOB, SON, dizziness, or syncope.    She is currently taking eliquis 5mg BID for stroke prophylaxis and denies significant bleeding episodes. She is currently being treated with multaq 400mg BID for rhythm control and metoprolol succinate 50mg daily for HR control.  Kidney function is  stable, with a creatinine of 0.8 on 10/12/2020.    I have personally reviewed the patient's EKG today, which shows sinus rhythm with PVCs at 75bpm. QRS is 90. QTc is 469. P waves easily discernible on rhythm strip.    Relevant Cardiac Test Results:    Recent Cardiac Tests:    TTE (6/3/2020):  · Normal left ventricular systolic function. The estimated ejection fraction is 55%.  · Normal LV diastolic function.  · No wall motion abnormalities.  · Normal right ventricular systolic function.  · Mild left atrial enlargement.  · Mild tricuspid regurgitation.  · The estimated PA systolic pressure is 24 mmHg.  · Normal central venous pressure (3 mmHg).    Current Outpatient Medications   Medication Sig    apixaban (ELIQUIS) 5 mg Tab Take 1 tablet (5 mg total) by mouth 2 (two) times a day.    ascorbic acid (VITAMIN C) 100 MG tablet Take 100 mg by mouth once daily.    augmented betamethasone dipropionate (DIPROLENE-AF) 0.05 % cream Apply to elbows BID prn scaling.    CINNAMON BARK (CINNAMON ORAL) Take by mouth once daily. Pt taking 2 pills daily.    dronedarone (MULTAQ) 400 mg Tab Take 1 tablet (400 mg total) by mouth 2 (two) times daily with meals.    fish oil-omega-3 fatty acids 300-1,000 mg capsule Take 2 g by mouth once daily.    levothyroxine (SYNTHROID) 150 MCG tablet Take 1 tablet (150 mcg total) by mouth before breakfast.    metFORMIN (GLUCOPHAGE-XR) 500 MG ER 24hr tablet TAKE 2 TABLETS(1000 MG) BY MOUTH TWICE DAILY WITH MEALS    metoprolol succinate (TOPROL-XL) 50 MG 24 hr tablet TAKE 1 TABLET BY MOUTH EVERY DAY    MV,CA,MIN/IRON/FA/GUARANA/CAFF (ONE-A-DAY WOMEN'S ACTIVE ORAL) Take by mouth.    rosuvastatin (CRESTOR) 10 MG tablet TAKE 1 TABLET(10 MG) BY MOUTH EVERY DAY    vitamin D (VITAMIN D3) 1000 units Tab Take 1,000 Units by mouth once daily.    diclofenac-misoprostol  mg-mcg (ARTHROTEC 50)  mg-mcg per tablet Take 1 tablet by mouth 2 (two) times daily as needed (pain and inflammation).  "Take with food.    OPW TEST CLAIM - DO NOT FILL OPW test claim. Do not fill.    urea (CARMOL) 40 % Crea AAA of elbow qday-BID (Patient not taking: Reported on 12/29/2020)     No current facility-administered medications for this visit.      Facility-Administered Medications Ordered in Other Visits   Medication    0.9%  NaCl infusion    sodium chloride 0.9% flush 5 mL       Review of Systems   Constitution: Negative for malaise/fatigue.   Cardiovascular: Negative for chest pain, dyspnea on exertion, irregular heartbeat, leg swelling and palpitations.   Respiratory: Negative for shortness of breath.    Hematologic/Lymphatic: Negative for bleeding problem.   Skin: Negative for rash.   Musculoskeletal: Negative for myalgias.   Gastrointestinal: Negative for hematemesis, hematochezia and nausea.   Genitourinary: Negative for hematuria.   Neurological: Negative for light-headedness.   Psychiatric/Behavioral: Negative for altered mental status.   Allergic/Immunologic: Negative for persistent infections.     Objective:        /68   Pulse 75   Ht 5' 6" (1.676 m)   Wt 85.2 kg (187 lb 13.3 oz)   LMP 07/18/2001   BMI 30.32 kg/m²     Physical Exam   Constitutional: She is oriented to person, place, and time. She appears well-developed and well-nourished.   HENT:   Head: Normocephalic.   Nose: Nose normal.   Eyes: Pupils are equal, round, and reactive to light.   Cardiovascular: Normal rate and regular rhythm. Frequent extrasystoles are present.   Pulmonary/Chest: Breath sounds normal. No respiratory distress.   Abdominal: Normal appearance.   Musculoskeletal: Normal range of motion.         General: No edema.   Neurological: She is alert and oriented to person, place, and time.   Skin: Skin is warm and dry. No erythema.   Psychiatric: She has a normal mood and affect. Her speech is normal and behavior is normal.   Nursing note and vitals reviewed.    Lab Results   Component Value Date     10/12/2020    K 4.2 " 10/12/2020    MG 2.3 04/08/2005    BUN 12 10/12/2020    CREATININE 0.8 10/12/2020    ALT 22 10/12/2020    AST 18 10/12/2020    HGB 13.1 10/12/2020    HCT 42.9 10/12/2020    TSH 3.213 12/09/2020    LDLCALC 42.2 (L) 10/12/2020       Recent Labs   Lab 09/19/19  1415 01/06/20  1028   INR 1.1 1.1         Assessment:     1. Persistent atrial fibrillation    2. Chronic anticoagulation    3. History of cardioversion    4. PVC (premature ventricular contraction)      Plan:     In summary, Ms. Valdez is a 70 y.o. female with AF, hypothyroid, DM, cardiomyopathy here for follow up.  Doing well from a rhythm standpoint, remains in SR on multaq. EF recovered in SR. We discussed that she was a candidate for ablation given her hx of arrhythmia-induced CM.  She is not interested in ablation unless absolutely necessary. She strongly prefers to remain on multaq at this time. Frequent PVCs on EKG. Will obtain PVC burden with Holter.    24hr Holter  Continue current medications.  RTC 6 mo, sooner if needed.    *A copy of this note has been sent to Dr. Sanchez*    Follow up in about 6 months (around 6/29/2021).    ------------------------------------------------------------------    CHRISTA Soares, NP-C  Cardiac Electrophysiology

## 2020-12-29 ENCOUNTER — HOSPITAL ENCOUNTER (OUTPATIENT)
Dept: CARDIOLOGY | Facility: CLINIC | Age: 70
Discharge: HOME | End: 2020-12-29
Payer: MEDICARE

## 2020-12-29 ENCOUNTER — OFFICE VISIT (OUTPATIENT)
Dept: ELECTROPHYSIOLOGY | Facility: CLINIC | Age: 70
End: 2020-12-29
Payer: MEDICARE

## 2020-12-29 VITALS
BODY MASS INDEX: 30.18 KG/M2 | WEIGHT: 187.81 LBS | HEIGHT: 66 IN | HEART RATE: 75 BPM | SYSTOLIC BLOOD PRESSURE: 116 MMHG | DIASTOLIC BLOOD PRESSURE: 68 MMHG

## 2020-12-29 DIAGNOSIS — E11.69 TYPE 2 DIABETES MELLITUS WITH HYPERCHOLESTEROLEMIA: ICD-10-CM

## 2020-12-29 DIAGNOSIS — I48.0 PAF (PAROXYSMAL ATRIAL FIBRILLATION): ICD-10-CM

## 2020-12-29 DIAGNOSIS — I49.3 PVC (PREMATURE VENTRICULAR CONTRACTION): ICD-10-CM

## 2020-12-29 DIAGNOSIS — E78.00 TYPE 2 DIABETES MELLITUS WITH HYPERCHOLESTEROLEMIA: ICD-10-CM

## 2020-12-29 DIAGNOSIS — I48.19 PERSISTENT ATRIAL FIBRILLATION: ICD-10-CM

## 2020-12-29 DIAGNOSIS — Z79.01 CHRONIC ANTICOAGULATION: ICD-10-CM

## 2020-12-29 DIAGNOSIS — E03.2 HYPOTHYROIDISM DUE TO NON-MEDICATION EXOGENOUS SUBSTANCES: ICD-10-CM

## 2020-12-29 DIAGNOSIS — E66.09 CLASS 1 OBESITY DUE TO EXCESS CALORIES WITH SERIOUS COMORBIDITY AND BODY MASS INDEX (BMI) OF 32.0 TO 32.9 IN ADULT: ICD-10-CM

## 2020-12-29 DIAGNOSIS — I48.19 PERSISTENT ATRIAL FIBRILLATION: Primary | ICD-10-CM

## 2020-12-29 DIAGNOSIS — Z92.89 HISTORY OF CARDIOVERSION: ICD-10-CM

## 2020-12-29 PROCEDURE — 99214 OFFICE O/P EST MOD 30 MIN: CPT | Mod: S$PBB,,, | Performed by: NURSE PRACTITIONER

## 2020-12-29 PROCEDURE — 93010 RHYTHM STRIP: ICD-10-PCS | Mod: S$PBB,,, | Performed by: INTERNAL MEDICINE

## 2020-12-29 PROCEDURE — 99214 OFFICE O/P EST MOD 30 MIN: CPT | Mod: PBBFAC,25 | Performed by: NURSE PRACTITIONER

## 2020-12-29 PROCEDURE — 93005 ELECTROCARDIOGRAM TRACING: CPT | Mod: PBBFAC | Performed by: INTERNAL MEDICINE

## 2020-12-29 PROCEDURE — 93010 ELECTROCARDIOGRAM REPORT: CPT | Mod: S$PBB,,, | Performed by: INTERNAL MEDICINE

## 2020-12-29 PROCEDURE — 99999 PR PBB SHADOW E&M-EST. PATIENT-LVL IV: CPT | Mod: PBBFAC,,, | Performed by: NURSE PRACTITIONER

## 2020-12-29 PROCEDURE — 99214 PR OFFICE/OUTPT VISIT, EST, LEVL IV, 30-39 MIN: ICD-10-PCS | Mod: S$PBB,,, | Performed by: NURSE PRACTITIONER

## 2020-12-29 PROCEDURE — 99999 PR PBB SHADOW E&M-EST. PATIENT-LVL IV: ICD-10-PCS | Mod: PBBFAC,,, | Performed by: NURSE PRACTITIONER

## 2020-12-30 ENCOUNTER — PATIENT MESSAGE (OUTPATIENT)
Dept: ELECTROPHYSIOLOGY | Facility: CLINIC | Age: 70
End: 2020-12-30

## 2020-12-30 ENCOUNTER — CLINICAL SUPPORT (OUTPATIENT)
Dept: REHABILITATION | Facility: HOSPITAL | Age: 70
End: 2020-12-30
Attending: FAMILY MEDICINE
Payer: MEDICARE

## 2020-12-30 DIAGNOSIS — E11.69 TYPE 2 DIABETES MELLITUS WITH HYPERCHOLESTEROLEMIA: ICD-10-CM

## 2020-12-30 DIAGNOSIS — E78.00 TYPE 2 DIABETES MELLITUS WITH HYPERCHOLESTEROLEMIA: ICD-10-CM

## 2020-12-30 DIAGNOSIS — R29.898 DECREASED STRENGTH OF UPPER EXTREMITY: ICD-10-CM

## 2020-12-30 DIAGNOSIS — E66.09 CLASS 1 OBESITY DUE TO EXCESS CALORIES WITH SERIOUS COMORBIDITY AND BODY MASS INDEX (BMI) OF 32.0 TO 32.9 IN ADULT: ICD-10-CM

## 2020-12-30 DIAGNOSIS — E03.2 HYPOTHYROIDISM DUE TO NON-MEDICATION EXOGENOUS SUBSTANCES: ICD-10-CM

## 2020-12-30 DIAGNOSIS — I48.19 PERSISTENT ATRIAL FIBRILLATION: ICD-10-CM

## 2020-12-30 DIAGNOSIS — R29.3 POSTURAL IMBALANCE: ICD-10-CM

## 2020-12-30 DIAGNOSIS — M25.612 IMPAIRED RANGE OF MOTION OF LEFT SHOULDER: ICD-10-CM

## 2020-12-30 PROCEDURE — 97110 THERAPEUTIC EXERCISES: CPT | Mod: PO,CQ

## 2020-12-30 NOTE — TELEPHONE ENCOUNTER
"Zoë,   Please advise. Would you be able to change it to "history of persistent AF"? Patient is upset.  Thanks  "

## 2020-12-30 NOTE — PROGRESS NOTES
"  Physical Therapy Daily Treatment Note     Name: Bebe Valdez  Clinic Number: [id number]    Therapy Diagnosis:   Encounter Diagnoses   Name Primary?    Decreased strength of upper extremity     Postural imbalance     Impaired range of motion of left shoulder      Physician: Anil Nelson MD    Visit Date: 12/30/2020  Physician Orders: PT Eval and Treat   Medical Diagnosis from Referral: Adhesive capsulitis of left shoulder  Evaluation Date: 12/2/2020  Authorization Period Expiration: 11/09/2021  Plan of Care Expiration: 2/10/2021  Visit # / Visits authorized: 4 /12      Time In: 01:00 pm   Time Out: 01:40 pm      Total Billable Time: 40 minutes     Precautions: Diabetes and blood thinners    Subjective     Pt reports: "it's ok"  She was compliant with home exercise program.  Response to previous treatment: better  Functional change: no chage    Pain: 0/10  Location: left shoulder      Objective     Bebe received therapeutic exercises to develop strength and ROM for 40 minutes including:    UBE: 3 fwd / 3 back   Pulleys scaption 3 minutes   Supine shoulder flexion with dowel - pronated  2 x 10, supinated  2 x 10   SL ER - x 20 1#  SL abduction - x 20 1#   Prone Rows - 2 x 12 4#  Serratus punches with 4# dowel x 20  Wall slides with towel bilaterally - 3 x 10   Standing rows - 3 x 10 GTB  Standing ext - 3 x 10 GTB  Standing ER,IR - GTB 2 x 10  Wall pushups - 2 x 10     Bebe received the following manual therapy techniques: Joint mobilizations, STM were applied to the: L shoulder for 00 minutes, including:  NOT PERFORMED  GHJ Posterior mobs Graded II-III      Home Exercises Provided and Patient Education Provided     Education provided:   - HEP, POC, answered pt questions    Written Home Exercises Provided: yes.  Exercises were reviewed and Bebe was able to demonstrate them prior to the end of the session.  Bebe demonstrated good  understanding of the education provided.     See EMR under Patient " Instructions for exercises provided 12/7/2020.    Assessment     Patient tolerated therapy session well, no increase in pain or adverse symptoms.  Will continue to progress with strength and ROM as tolerated next session.     Bebe is progressing well towards her goals.   Pt prognosis is Good.     Pt will continue to benefit from skilled outpatient physical therapy to address the deficits listed in the problem list box on initial evaluation, provide pt/family education and to maximize pt's level of independence in the home and community environment.     Pt's spiritual, cultural and educational needs considered and pt agreeable to plan of care and goals.    Anticipated barriers to physical therapy: previous therapy for similar issue    Goals:     Short Term Goals:  5 weeks  1. Pt will increase L shoulder elevation to at least 160 degrees in order to show improved functional mobility.  2. Pt will improve UE and periscapular MMTs by 1/2 grade in all planes to improve strength.   3. Pt will be independent and consistent with issued HEP in order to show carryover between therapy sessions.  4. Pt. to demonstrate proper cervical and scapula retraction requiring min. to no verbal cues from PT        Long Term Goals: 10  weeks  1. Pt will report decreased shoulder pain to 1/10 in order to improve symptom management.   2. Pt will be able to reach behind her to get something from the back seat without pain in order to improve functional mobility.  3. Pt will increase shoulder periscapular/ UE strength by 1 ms grade in order to increase tolerance to functional activities and ADLs.  4. Pt will be independent with updated HEP to maintain gains following discharge with therapy.  5. Pt to show full L shoulder AROM to match R shoulder AROM to improve overall movement patterns.  6. Pt to be independent in postural control and seated/standing posture overall.     Plan     Plan of care Certification: 12/2/2020 to 2/10/2021.    Slow  progression of shoulder strength     Tabatha Santacruz, PTA

## 2021-01-04 ENCOUNTER — CLINICAL SUPPORT (OUTPATIENT)
Dept: REHABILITATION | Facility: HOSPITAL | Age: 71
End: 2021-01-04
Attending: FAMILY MEDICINE
Payer: MEDICARE

## 2021-01-04 DIAGNOSIS — R29.3 POSTURAL IMBALANCE: ICD-10-CM

## 2021-01-04 DIAGNOSIS — R29.898 DECREASED STRENGTH OF UPPER EXTREMITY: ICD-10-CM

## 2021-01-04 DIAGNOSIS — M25.612 IMPAIRED RANGE OF MOTION OF LEFT SHOULDER: ICD-10-CM

## 2021-01-04 PROCEDURE — 97110 THERAPEUTIC EXERCISES: CPT | Mod: PO,CQ

## 2021-01-05 DIAGNOSIS — E03.9 ACQUIRED HYPOTHYROIDISM: ICD-10-CM

## 2021-01-05 RX ORDER — LEVOTHYROXINE SODIUM 150 UG/1
150 TABLET ORAL
Qty: 30 TABLET | Refills: 6 | Status: CANCELLED | OUTPATIENT
Start: 2021-01-05

## 2021-01-06 ENCOUNTER — CLINICAL SUPPORT (OUTPATIENT)
Dept: REHABILITATION | Facility: HOSPITAL | Age: 71
End: 2021-01-06
Attending: FAMILY MEDICINE
Payer: MEDICARE

## 2021-01-06 DIAGNOSIS — R29.898 DECREASED STRENGTH OF UPPER EXTREMITY: Primary | ICD-10-CM

## 2021-01-06 DIAGNOSIS — R29.3 POSTURAL IMBALANCE: ICD-10-CM

## 2021-01-06 DIAGNOSIS — M25.612 IMPAIRED RANGE OF MOTION OF LEFT SHOULDER: ICD-10-CM

## 2021-01-06 PROCEDURE — 97110 THERAPEUTIC EXERCISES: CPT | Mod: PO

## 2021-01-07 DIAGNOSIS — E03.9 ACQUIRED HYPOTHYROIDISM: ICD-10-CM

## 2021-01-07 RX ORDER — LEVOTHYROXINE SODIUM 150 UG/1
150 TABLET ORAL
Qty: 30 TABLET | Refills: 0 | Status: SHIPPED | OUTPATIENT
Start: 2021-01-07 | End: 2021-02-02

## 2021-01-11 ENCOUNTER — CLINICAL SUPPORT (OUTPATIENT)
Dept: REHABILITATION | Facility: HOSPITAL | Age: 71
End: 2021-01-11
Attending: FAMILY MEDICINE
Payer: MEDICARE

## 2021-01-11 DIAGNOSIS — R29.898 DECREASED STRENGTH OF UPPER EXTREMITY: Primary | ICD-10-CM

## 2021-01-11 DIAGNOSIS — R29.3 POSTURAL IMBALANCE: ICD-10-CM

## 2021-01-11 DIAGNOSIS — M25.612 IMPAIRED RANGE OF MOTION OF LEFT SHOULDER: ICD-10-CM

## 2021-01-11 PROCEDURE — 97110 THERAPEUTIC EXERCISES: CPT | Mod: PO

## 2021-01-14 ENCOUNTER — CLINICAL SUPPORT (OUTPATIENT)
Dept: CARDIOLOGY | Facility: HOSPITAL | Age: 71
End: 2021-01-14
Attending: NURSE PRACTITIONER
Payer: MEDICARE

## 2021-01-14 DIAGNOSIS — I49.3 PVC (PREMATURE VENTRICULAR CONTRACTION): ICD-10-CM

## 2021-01-14 PROCEDURE — 93227 XTRNL ECG REC<48 HR R&I: CPT | Mod: ,,, | Performed by: INTERNAL MEDICINE

## 2021-01-14 PROCEDURE — 93226 XTRNL ECG REC<48 HR SCAN A/R: CPT

## 2021-01-14 PROCEDURE — 93227 HOLTER MONITOR - 24 HOUR (CUPID ONLY): ICD-10-PCS | Mod: ,,, | Performed by: INTERNAL MEDICINE

## 2021-01-19 ENCOUNTER — CLINICAL SUPPORT (OUTPATIENT)
Dept: REHABILITATION | Facility: HOSPITAL | Age: 71
End: 2021-01-19
Attending: FAMILY MEDICINE
Payer: MEDICARE

## 2021-01-19 DIAGNOSIS — R29.3 POSTURAL IMBALANCE: ICD-10-CM

## 2021-01-19 DIAGNOSIS — M25.612 IMPAIRED RANGE OF MOTION OF LEFT SHOULDER: ICD-10-CM

## 2021-01-19 DIAGNOSIS — R29.898 DECREASED STRENGTH OF UPPER EXTREMITY: Primary | ICD-10-CM

## 2021-01-19 PROCEDURE — 97110 THERAPEUTIC EXERCISES: CPT | Mod: PO

## 2021-01-20 ENCOUNTER — TELEPHONE (OUTPATIENT)
Dept: ELECTROPHYSIOLOGY | Facility: CLINIC | Age: 71
End: 2021-01-20

## 2021-01-20 LAB
OHS CV EVENT MONITOR DAY: 0
OHS CV HOLTER LENGTH DECIMAL HOURS: 24
OHS CV HOLTER LENGTH HOURS: 24
OHS CV HOLTER LENGTH MINUTES: 0

## 2021-01-25 ENCOUNTER — CLINICAL SUPPORT (OUTPATIENT)
Dept: REHABILITATION | Facility: HOSPITAL | Age: 71
End: 2021-01-25
Attending: FAMILY MEDICINE
Payer: MEDICARE

## 2021-01-25 DIAGNOSIS — R29.898 DECREASED STRENGTH OF UPPER EXTREMITY: ICD-10-CM

## 2021-01-25 DIAGNOSIS — R29.3 POSTURAL IMBALANCE: ICD-10-CM

## 2021-01-25 DIAGNOSIS — M25.612 IMPAIRED RANGE OF MOTION OF LEFT SHOULDER: ICD-10-CM

## 2021-01-25 PROCEDURE — 97140 MANUAL THERAPY 1/> REGIONS: CPT | Mod: PO,CQ

## 2021-01-25 PROCEDURE — 97110 THERAPEUTIC EXERCISES: CPT | Mod: PO,CQ

## 2021-01-27 ENCOUNTER — CLINICAL SUPPORT (OUTPATIENT)
Dept: REHABILITATION | Facility: HOSPITAL | Age: 71
End: 2021-01-27
Attending: FAMILY MEDICINE
Payer: MEDICARE

## 2021-01-27 DIAGNOSIS — R29.898 DECREASED STRENGTH OF UPPER EXTREMITY: Primary | ICD-10-CM

## 2021-01-27 DIAGNOSIS — R29.3 POSTURAL IMBALANCE: ICD-10-CM

## 2021-01-27 DIAGNOSIS — M25.612 IMPAIRED RANGE OF MOTION OF LEFT SHOULDER: ICD-10-CM

## 2021-01-27 PROCEDURE — 97110 THERAPEUTIC EXERCISES: CPT | Mod: PO

## 2021-02-02 ENCOUNTER — OFFICE VISIT (OUTPATIENT)
Dept: PRIMARY CARE CLINIC | Facility: CLINIC | Age: 71
End: 2021-02-02
Payer: MEDICARE

## 2021-02-02 VITALS
WEIGHT: 191.38 LBS | OXYGEN SATURATION: 99 % | RESPIRATION RATE: 18 BRPM | DIASTOLIC BLOOD PRESSURE: 67 MMHG | HEIGHT: 66 IN | SYSTOLIC BLOOD PRESSURE: 119 MMHG | BODY MASS INDEX: 30.76 KG/M2 | TEMPERATURE: 98 F | HEART RATE: 57 BPM

## 2021-02-02 DIAGNOSIS — I70.0 AORTIC ATHEROSCLEROSIS: ICD-10-CM

## 2021-02-02 DIAGNOSIS — N89.8 VAGINAL DISCHARGE: ICD-10-CM

## 2021-02-02 DIAGNOSIS — E66.9 CLASS 1 OBESITY WITH SERIOUS COMORBIDITY AND BODY MASS INDEX (BMI) OF 30.0 TO 30.9 IN ADULT, UNSPECIFIED OBESITY TYPE: ICD-10-CM

## 2021-02-02 DIAGNOSIS — I50.22 CHRONIC SYSTOLIC HEART FAILURE: ICD-10-CM

## 2021-02-02 DIAGNOSIS — E03.9 ACQUIRED HYPOTHYROIDISM: ICD-10-CM

## 2021-02-02 DIAGNOSIS — M75.02 ADHESIVE CAPSULITIS OF LEFT SHOULDER: ICD-10-CM

## 2021-02-02 DIAGNOSIS — E78.00 TYPE 2 DIABETES MELLITUS WITH HYPERCHOLESTEROLEMIA: Primary | ICD-10-CM

## 2021-02-02 DIAGNOSIS — E11.69 TYPE 2 DIABETES MELLITUS WITH HYPERCHOLESTEROLEMIA: Primary | ICD-10-CM

## 2021-02-02 PROBLEM — D75.839 THROMBOCYTOSIS: Status: RESOLVED | Noted: 2019-01-30 | Resolved: 2021-02-02

## 2021-02-02 PROBLEM — R79.89 LOW SERUM THYROID STIMULATING HORMONE (TSH): Status: RESOLVED | Noted: 2019-12-04 | Resolved: 2021-02-02

## 2021-02-02 PROCEDURE — 99214 OFFICE O/P EST MOD 30 MIN: CPT | Mod: S$PBB,,, | Performed by: INTERNAL MEDICINE

## 2021-02-02 PROCEDURE — 99214 PR OFFICE/OUTPT VISIT, EST, LEVL IV, 30-39 MIN: ICD-10-PCS | Mod: S$PBB,,, | Performed by: INTERNAL MEDICINE

## 2021-02-02 PROCEDURE — 99999 PR PBB SHADOW E&M-EST. PATIENT-LVL V: CPT | Mod: PBBFAC,,, | Performed by: INTERNAL MEDICINE

## 2021-02-02 PROCEDURE — 99215 OFFICE O/P EST HI 40 MIN: CPT | Mod: PBBFAC,PN | Performed by: INTERNAL MEDICINE

## 2021-02-02 PROCEDURE — 99999 PR PBB SHADOW E&M-EST. PATIENT-LVL V: ICD-10-PCS | Mod: PBBFAC,,, | Performed by: INTERNAL MEDICINE

## 2021-02-02 RX ORDER — LEVOTHYROXINE SODIUM 150 UG/1
150 TABLET ORAL
Qty: 30 TABLET | Refills: 1 | Status: SHIPPED | OUTPATIENT
Start: 2021-02-02 | End: 2021-06-29

## 2021-02-02 RX ORDER — METRONIDAZOLE 7.5 MG/G
CREAM TOPICAL 2 TIMES DAILY
COMMUNITY
End: 2021-12-20 | Stop reason: SDUPTHER

## 2021-02-02 RX ORDER — METRONIDAZOLE 500 MG/1
500 TABLET ORAL EVERY 12 HOURS
Qty: 14 TABLET | Refills: 0 | Status: SHIPPED | OUTPATIENT
Start: 2021-02-02 | End: 2021-02-09

## 2021-02-03 ENCOUNTER — TELEPHONE (OUTPATIENT)
Dept: DERMATOLOGY | Facility: CLINIC | Age: 71
End: 2021-02-03

## 2021-02-03 ENCOUNTER — CLINICAL SUPPORT (OUTPATIENT)
Dept: REHABILITATION | Facility: HOSPITAL | Age: 71
End: 2021-02-03
Attending: FAMILY MEDICINE
Payer: MEDICARE

## 2021-02-03 ENCOUNTER — TELEPHONE (OUTPATIENT)
Dept: PRIMARY CARE CLINIC | Facility: CLINIC | Age: 71
End: 2021-02-03

## 2021-02-03 DIAGNOSIS — L30.9 DERMATITIS: Primary | ICD-10-CM

## 2021-02-03 DIAGNOSIS — R29.898 DECREASED STRENGTH OF UPPER EXTREMITY: Primary | ICD-10-CM

## 2021-02-03 DIAGNOSIS — M25.612 IMPAIRED RANGE OF MOTION OF LEFT SHOULDER: ICD-10-CM

## 2021-02-03 DIAGNOSIS — R29.3 POSTURAL IMBALANCE: ICD-10-CM

## 2021-02-03 PROCEDURE — 97110 THERAPEUTIC EXERCISES: CPT | Mod: PO

## 2021-02-03 PROCEDURE — 97140 MANUAL THERAPY 1/> REGIONS: CPT | Mod: PO

## 2021-02-03 RX ORDER — TRIAMCINOLONE ACETONIDE 0.25 MG/G
CREAM TOPICAL 2 TIMES DAILY
Qty: 15 G | Refills: 0 | Status: SHIPPED | OUTPATIENT
Start: 2021-02-03 | End: 2021-12-28

## 2021-02-05 ENCOUNTER — CLINICAL SUPPORT (OUTPATIENT)
Dept: REHABILITATION | Facility: HOSPITAL | Age: 71
End: 2021-02-05
Attending: FAMILY MEDICINE
Payer: MEDICARE

## 2021-02-05 DIAGNOSIS — R29.3 POSTURAL IMBALANCE: ICD-10-CM

## 2021-02-05 DIAGNOSIS — R29.898 DECREASED STRENGTH OF UPPER EXTREMITY: Primary | ICD-10-CM

## 2021-02-05 DIAGNOSIS — M25.612 IMPAIRED RANGE OF MOTION OF LEFT SHOULDER: ICD-10-CM

## 2021-02-05 PROCEDURE — 97110 THERAPEUTIC EXERCISES: CPT | Mod: PO

## 2021-02-08 ENCOUNTER — CLINICAL SUPPORT (OUTPATIENT)
Dept: REHABILITATION | Facility: HOSPITAL | Age: 71
End: 2021-02-08
Payer: MEDICARE

## 2021-02-08 DIAGNOSIS — R29.3 POSTURAL IMBALANCE: ICD-10-CM

## 2021-02-08 DIAGNOSIS — M25.612 IMPAIRED RANGE OF MOTION OF LEFT SHOULDER: ICD-10-CM

## 2021-02-08 DIAGNOSIS — R29.898 DECREASED STRENGTH OF UPPER EXTREMITY: Primary | ICD-10-CM

## 2021-02-08 PROCEDURE — 97110 THERAPEUTIC EXERCISES: CPT | Mod: PO

## 2021-02-10 ENCOUNTER — CLINICAL SUPPORT (OUTPATIENT)
Dept: REHABILITATION | Facility: HOSPITAL | Age: 71
End: 2021-02-10
Attending: FAMILY MEDICINE
Payer: MEDICARE

## 2021-02-10 DIAGNOSIS — R29.898 DECREASED STRENGTH OF UPPER EXTREMITY: Primary | ICD-10-CM

## 2021-02-10 DIAGNOSIS — M25.612 IMPAIRED RANGE OF MOTION OF LEFT SHOULDER: ICD-10-CM

## 2021-02-10 DIAGNOSIS — R29.3 POSTURAL IMBALANCE: ICD-10-CM

## 2021-02-10 PROCEDURE — 97110 THERAPEUTIC EXERCISES: CPT | Mod: PO

## 2021-02-15 ENCOUNTER — OFFICE VISIT (OUTPATIENT)
Dept: DERMATOLOGY | Facility: CLINIC | Age: 71
End: 2021-02-15
Payer: MEDICARE

## 2021-02-15 DIAGNOSIS — L30.9 HAND DERMATITIS: ICD-10-CM

## 2021-02-15 DIAGNOSIS — L71.9 ROSACEA: ICD-10-CM

## 2021-02-15 DIAGNOSIS — L23.9 ALLERGIC DERMATITIS: Primary | ICD-10-CM

## 2021-02-15 PROCEDURE — 99214 OFFICE O/P EST MOD 30 MIN: CPT | Mod: S$PBB,,, | Performed by: DERMATOLOGY

## 2021-02-15 PROCEDURE — 99999 PR PBB SHADOW E&M-EST. PATIENT-LVL III: ICD-10-PCS | Mod: PBBFAC,,, | Performed by: DERMATOLOGY

## 2021-02-15 PROCEDURE — 99213 OFFICE O/P EST LOW 20 MIN: CPT | Mod: PBBFAC | Performed by: DERMATOLOGY

## 2021-02-15 PROCEDURE — 99999 PR PBB SHADOW E&M-EST. PATIENT-LVL III: CPT | Mod: PBBFAC,,, | Performed by: DERMATOLOGY

## 2021-02-15 PROCEDURE — 99214 PR OFFICE/OUTPT VISIT, EST, LEVL IV, 30-39 MIN: ICD-10-PCS | Mod: S$PBB,,, | Performed by: DERMATOLOGY

## 2021-02-15 RX ORDER — PIMECROLIMUS 10 MG/G
CREAM TOPICAL 2 TIMES DAILY
Qty: 100 G | Refills: 2 | Status: SHIPPED | OUTPATIENT
Start: 2021-02-15 | End: 2021-07-16 | Stop reason: SDUPTHER

## 2021-04-07 ENCOUNTER — LAB VISIT (OUTPATIENT)
Dept: LAB | Facility: HOSPITAL | Age: 71
End: 2021-04-07
Attending: INTERNAL MEDICINE
Payer: MEDICARE

## 2021-04-07 DIAGNOSIS — E11.69 TYPE 2 DIABETES MELLITUS WITH HYPERCHOLESTEROLEMIA: ICD-10-CM

## 2021-04-07 DIAGNOSIS — E78.00 TYPE 2 DIABETES MELLITUS WITH HYPERCHOLESTEROLEMIA: ICD-10-CM

## 2021-04-07 PROCEDURE — 36415 COLL VENOUS BLD VENIPUNCTURE: CPT | Mod: PO | Performed by: INTERNAL MEDICINE

## 2021-04-07 PROCEDURE — 80053 COMPREHEN METABOLIC PANEL: CPT | Performed by: INTERNAL MEDICINE

## 2021-04-07 PROCEDURE — 83036 HEMOGLOBIN GLYCOSYLATED A1C: CPT | Performed by: INTERNAL MEDICINE

## 2021-04-08 LAB
ALBUMIN SERPL BCP-MCNC: 4 G/DL (ref 3.5–5.2)
ALP SERPL-CCNC: 47 U/L (ref 55–135)
ALT SERPL W/O P-5'-P-CCNC: 16 U/L (ref 10–44)
ANION GAP SERPL CALC-SCNC: 9 MMOL/L (ref 8–16)
AST SERPL-CCNC: 18 U/L (ref 10–40)
BILIRUB SERPL-MCNC: 0.7 MG/DL (ref 0.1–1)
BUN SERPL-MCNC: 9 MG/DL (ref 8–23)
CALCIUM SERPL-MCNC: 10 MG/DL (ref 8.7–10.5)
CHLORIDE SERPL-SCNC: 102 MMOL/L (ref 95–110)
CO2 SERPL-SCNC: 28 MMOL/L (ref 23–29)
CREAT SERPL-MCNC: 0.8 MG/DL (ref 0.5–1.4)
EST. GFR  (AFRICAN AMERICAN): >60 ML/MIN/1.73 M^2
EST. GFR  (NON AFRICAN AMERICAN): >60 ML/MIN/1.73 M^2
ESTIMATED AVG GLUCOSE: 137 MG/DL (ref 68–131)
GLUCOSE SERPL-MCNC: 129 MG/DL (ref 70–110)
HBA1C MFR BLD: 6.4 % (ref 4–5.6)
POTASSIUM SERPL-SCNC: 4.4 MMOL/L (ref 3.5–5.1)
PROT SERPL-MCNC: 6.8 G/DL (ref 6–8.4)
SODIUM SERPL-SCNC: 139 MMOL/L (ref 136–145)

## 2021-04-14 ENCOUNTER — OFFICE VISIT (OUTPATIENT)
Dept: PRIMARY CARE CLINIC | Facility: CLINIC | Age: 71
End: 2021-04-14
Payer: MEDICARE

## 2021-04-14 VITALS
TEMPERATURE: 98 F | DIASTOLIC BLOOD PRESSURE: 70 MMHG | WEIGHT: 194.88 LBS | HEIGHT: 66 IN | BODY MASS INDEX: 31.32 KG/M2 | HEART RATE: 62 BPM | SYSTOLIC BLOOD PRESSURE: 124 MMHG | OXYGEN SATURATION: 97 % | RESPIRATION RATE: 19 BRPM

## 2021-04-14 DIAGNOSIS — N89.8 VAGINAL LESION: ICD-10-CM

## 2021-04-14 DIAGNOSIS — E78.00 TYPE 2 DIABETES MELLITUS WITH HYPERCHOLESTEROLEMIA: ICD-10-CM

## 2021-04-14 DIAGNOSIS — E03.9 ACQUIRED HYPOTHYROIDISM: Primary | ICD-10-CM

## 2021-04-14 DIAGNOSIS — N89.8 VAGINAL DISCHARGE: ICD-10-CM

## 2021-04-14 DIAGNOSIS — I48.19 PERSISTENT ATRIAL FIBRILLATION: ICD-10-CM

## 2021-04-14 DIAGNOSIS — I50.22 CHRONIC SYSTOLIC HEART FAILURE: ICD-10-CM

## 2021-04-14 DIAGNOSIS — E66.09 CLASS 1 OBESITY DUE TO EXCESS CALORIES WITH SERIOUS COMORBIDITY AND BODY MASS INDEX (BMI) OF 31.0 TO 31.9 IN ADULT: ICD-10-CM

## 2021-04-14 DIAGNOSIS — E11.69 TYPE 2 DIABETES MELLITUS WITH HYPERCHOLESTEROLEMIA: ICD-10-CM

## 2021-04-14 PROCEDURE — 99215 OFFICE O/P EST HI 40 MIN: CPT | Mod: PBBFAC,PN | Performed by: INTERNAL MEDICINE

## 2021-04-14 PROCEDURE — 99999 PR PBB SHADOW E&M-EST. PATIENT-LVL V: ICD-10-PCS | Mod: PBBFAC,,, | Performed by: INTERNAL MEDICINE

## 2021-04-14 PROCEDURE — 99999 PR PBB SHADOW E&M-EST. PATIENT-LVL V: CPT | Mod: PBBFAC,,, | Performed by: INTERNAL MEDICINE

## 2021-04-14 PROCEDURE — 99214 OFFICE O/P EST MOD 30 MIN: CPT | Mod: S$PBB,,, | Performed by: INTERNAL MEDICINE

## 2021-04-14 PROCEDURE — 99214 PR OFFICE/OUTPT VISIT, EST, LEVL IV, 30-39 MIN: ICD-10-PCS | Mod: S$PBB,,, | Performed by: INTERNAL MEDICINE

## 2021-04-21 ENCOUNTER — OFFICE VISIT (OUTPATIENT)
Dept: OBSTETRICS AND GYNECOLOGY | Facility: CLINIC | Age: 71
End: 2021-04-21
Payer: MEDICARE

## 2021-04-21 VITALS
SYSTOLIC BLOOD PRESSURE: 124 MMHG | BODY MASS INDEX: 31.28 KG/M2 | DIASTOLIC BLOOD PRESSURE: 72 MMHG | WEIGHT: 193.81 LBS

## 2021-04-21 DIAGNOSIS — N89.8 VAGINAL DISCHARGE: ICD-10-CM

## 2021-04-21 DIAGNOSIS — N89.8 VAGINAL LESION: ICD-10-CM

## 2021-04-21 PROCEDURE — 99213 PR OFFICE/OUTPT VISIT, EST, LEVL III, 20-29 MIN: ICD-10-PCS | Mod: S$PBB,,, | Performed by: NURSE PRACTITIONER

## 2021-04-21 PROCEDURE — 99214 OFFICE O/P EST MOD 30 MIN: CPT | Mod: PBBFAC,PN | Performed by: NURSE PRACTITIONER

## 2021-04-21 PROCEDURE — 87661 TRICHOMONAS VAGINALIS AMPLIF: CPT | Mod: 59 | Performed by: NURSE PRACTITIONER

## 2021-04-21 PROCEDURE — 99999 PR PBB SHADOW E&M-EST. PATIENT-LVL IV: ICD-10-PCS | Mod: PBBFAC,,, | Performed by: NURSE PRACTITIONER

## 2021-04-21 PROCEDURE — 87481 CANDIDA DNA AMP PROBE: CPT | Mod: 59 | Performed by: NURSE PRACTITIONER

## 2021-04-21 PROCEDURE — 99999 PR PBB SHADOW E&M-EST. PATIENT-LVL IV: CPT | Mod: PBBFAC,,, | Performed by: NURSE PRACTITIONER

## 2021-04-21 PROCEDURE — 99213 OFFICE O/P EST LOW 20 MIN: CPT | Mod: S$PBB,,, | Performed by: NURSE PRACTITIONER

## 2021-04-22 LAB
BACTERIAL VAGINOSIS DNA: NEGATIVE
CANDIDA GLABRATA DNA: NEGATIVE
CANDIDA KRUSEI DNA: NEGATIVE
CANDIDA RRNA VAG QL PROBE: NEGATIVE
T VAGINALIS RRNA GENITAL QL PROBE: NEGATIVE

## 2021-05-10 ENCOUNTER — LAB VISIT (OUTPATIENT)
Dept: LAB | Facility: HOSPITAL | Age: 71
End: 2021-05-10
Attending: INTERNAL MEDICINE
Payer: MEDICARE

## 2021-05-10 DIAGNOSIS — E03.9 ACQUIRED HYPOTHYROIDISM: ICD-10-CM

## 2021-05-10 LAB
T4 FREE SERPL-MCNC: 1.21 NG/DL (ref 0.71–1.51)
TSH SERPL DL<=0.005 MIU/L-ACNC: 1.98 UIU/ML (ref 0.4–4)

## 2021-05-10 PROCEDURE — 84439 ASSAY OF FREE THYROXINE: CPT | Performed by: INTERNAL MEDICINE

## 2021-05-10 PROCEDURE — 84443 ASSAY THYROID STIM HORMONE: CPT | Performed by: INTERNAL MEDICINE

## 2021-05-10 PROCEDURE — 36415 COLL VENOUS BLD VENIPUNCTURE: CPT | Mod: PO | Performed by: INTERNAL MEDICINE

## 2021-06-03 ENCOUNTER — OFFICE VISIT (OUTPATIENT)
Dept: ELECTROPHYSIOLOGY | Facility: CLINIC | Age: 71
End: 2021-06-03
Payer: MEDICARE

## 2021-06-03 ENCOUNTER — PATIENT MESSAGE (OUTPATIENT)
Dept: ELECTROPHYSIOLOGY | Facility: CLINIC | Age: 71
End: 2021-06-03

## 2021-06-03 ENCOUNTER — HOSPITAL ENCOUNTER (OUTPATIENT)
Dept: CARDIOLOGY | Facility: CLINIC | Age: 71
Discharge: HOME | End: 2021-06-03
Payer: MEDICARE

## 2021-06-03 VITALS
SYSTOLIC BLOOD PRESSURE: 104 MMHG | HEART RATE: 61 BPM | DIASTOLIC BLOOD PRESSURE: 55 MMHG | WEIGHT: 196 LBS | BODY MASS INDEX: 31.5 KG/M2 | HEIGHT: 66 IN

## 2021-06-03 DIAGNOSIS — Z79.01 CHRONIC ANTICOAGULATION: ICD-10-CM

## 2021-06-03 DIAGNOSIS — Z92.89 HISTORY OF CARDIOVERSION: ICD-10-CM

## 2021-06-03 DIAGNOSIS — I48.19 PERSISTENT ATRIAL FIBRILLATION: Primary | ICD-10-CM

## 2021-06-03 DIAGNOSIS — E66.9 OBESITY (BMI 30.0-34.9): ICD-10-CM

## 2021-06-03 DIAGNOSIS — I48.0 PAF (PAROXYSMAL ATRIAL FIBRILLATION): ICD-10-CM

## 2021-06-03 PROCEDURE — 99214 PR OFFICE/OUTPT VISIT, EST, LEVL IV, 30-39 MIN: ICD-10-PCS | Mod: S$PBB,,, | Performed by: NURSE PRACTITIONER

## 2021-06-03 PROCEDURE — 99999 PR PBB SHADOW E&M-EST. PATIENT-LVL IV: CPT | Mod: PBBFAC,,, | Performed by: NURSE PRACTITIONER

## 2021-06-03 PROCEDURE — 99214 OFFICE O/P EST MOD 30 MIN: CPT | Mod: PBBFAC,25 | Performed by: NURSE PRACTITIONER

## 2021-06-03 PROCEDURE — 93010 ELECTROCARDIOGRAM REPORT: CPT | Mod: S$PBB,,, | Performed by: INTERNAL MEDICINE

## 2021-06-03 PROCEDURE — 93010 RHYTHM STRIP: ICD-10-PCS | Mod: S$PBB,,, | Performed by: INTERNAL MEDICINE

## 2021-06-03 PROCEDURE — 99214 OFFICE O/P EST MOD 30 MIN: CPT | Mod: S$PBB,,, | Performed by: NURSE PRACTITIONER

## 2021-06-03 PROCEDURE — 93005 ELECTROCARDIOGRAM TRACING: CPT | Mod: PBBFAC | Performed by: INTERNAL MEDICINE

## 2021-06-03 PROCEDURE — 99999 PR PBB SHADOW E&M-EST. PATIENT-LVL IV: ICD-10-PCS | Mod: PBBFAC,,, | Performed by: NURSE PRACTITIONER

## 2021-06-05 ENCOUNTER — PATIENT MESSAGE (OUTPATIENT)
Dept: ELECTROPHYSIOLOGY | Facility: CLINIC | Age: 71
End: 2021-06-05

## 2021-06-15 ENCOUNTER — HOSPITAL ENCOUNTER (OUTPATIENT)
Dept: CARDIOLOGY | Facility: HOSPITAL | Age: 71
Discharge: HOME | End: 2021-06-15
Attending: NURSE PRACTITIONER
Payer: MEDICARE

## 2021-06-15 VITALS
BODY MASS INDEX: 31.02 KG/M2 | SYSTOLIC BLOOD PRESSURE: 120 MMHG | HEIGHT: 66 IN | WEIGHT: 193 LBS | HEART RATE: 60 BPM | DIASTOLIC BLOOD PRESSURE: 76 MMHG

## 2021-06-15 DIAGNOSIS — I48.19 PERSISTENT ATRIAL FIBRILLATION: ICD-10-CM

## 2021-06-15 LAB
ASCENDING AORTA: 3.75 CM
AV INDEX (PROSTH): 0.46
AV MEAN GRADIENT: 5 MMHG
AV PEAK GRADIENT: 10 MMHG
AV VALVE AREA: 1.62 CM2
AV VELOCITY RATIO: 0.48
BSA FOR ECHO PROCEDURE: 2.02 M2
CV ECHO LV RWT: 0.35 CM
DOP CALC AO PEAK VEL: 1.62 M/S
DOP CALC AO VTI: 37.88 CM
DOP CALC LVOT AREA: 3.5 CM2
DOP CALC LVOT DIAMETER: 2.12 CM
DOP CALC LVOT PEAK VEL: 0.77 M/S
DOP CALC LVOT STROKE VOLUME: 61.28 CM3
DOP CALCLVOT PEAK VEL VTI: 17.37 CM
E WAVE DECELERATION TIME: 211.19 MSEC
E/A RATIO: 1.29
E/E' RATIO: 8.47 M/S
ECHO LV POSTERIOR WALL: 0.92 CM (ref 0.6–1.1)
EJECTION FRACTION: 55 %
FRACTIONAL SHORTENING: 31 % (ref 28–44)
INTERVENTRICULAR SEPTUM: 0.97 CM (ref 0.6–1.1)
LA MAJOR: 6.27 CM
LA MINOR: 6.08 CM
LA WIDTH: 4.29 CM
LEFT ATRIUM SIZE: 4.67 CM
LEFT ATRIUM VOLUME INDEX MOD: 42.6 ML/M2
LEFT ATRIUM VOLUME INDEX: 53.4 ML/M2
LEFT ATRIUM VOLUME MOD: 84 CM3
LEFT ATRIUM VOLUME: 105.13 CM3
LEFT INTERNAL DIMENSION IN SYSTOLE: 3.63 CM (ref 2.1–4)
LEFT VENTRICLE DIASTOLIC VOLUME INDEX: 66.91 ML/M2
LEFT VENTRICLE DIASTOLIC VOLUME: 131.82 ML
LEFT VENTRICLE MASS INDEX: 93 G/M2
LEFT VENTRICLE SYSTOLIC VOLUME INDEX: 28.2 ML/M2
LEFT VENTRICLE SYSTOLIC VOLUME: 55.52 ML
LEFT VENTRICULAR INTERNAL DIMENSION IN DIASTOLE: 5.24 CM (ref 3.5–6)
LEFT VENTRICULAR MASS: 182.47 G
LV LATERAL E/E' RATIO: 7.2 M/S
LV SEPTAL E/E' RATIO: 10.29 M/S
MV A" WAVE DURATION": 13.32 MSEC
MV PEAK A VEL: 0.56 M/S
MV PEAK E VEL: 0.72 M/S
MV STENOSIS PRESSURE HALF TIME: 61.24 MS
MV VALVE AREA P 1/2 METHOD: 3.59 CM2
PULM VEIN S/D RATIO: 0.94
PV PEAK D VEL: 0.49 M/S
PV PEAK S VEL: 0.46 M/S
RA MAJOR: 5.55 CM
RA PRESSURE: 3 MMHG
RA WIDTH: 2.44 CM
RIGHT ATRIAL AREA: 16.6 CM2
RIGHT VENTRICULAR END-DIASTOLIC DIMENSION: 2.43 CM
SINUS: 3.17 CM
STJ: 2.98 CM
TDI LATERAL: 0.1 M/S
TDI SEPTAL: 0.07 M/S
TDI: 0.09 M/S
TRICUSPID ANNULAR PLANE SYSTOLIC EXCURSION: 1.56 CM

## 2021-06-15 PROCEDURE — 93306 ECHO (CUPID ONLY): ICD-10-PCS | Mod: 26,,, | Performed by: INTERNAL MEDICINE

## 2021-06-15 PROCEDURE — 93306 TTE W/DOPPLER COMPLETE: CPT

## 2021-06-15 PROCEDURE — 93306 TTE W/DOPPLER COMPLETE: CPT | Mod: 26,,, | Performed by: INTERNAL MEDICINE

## 2021-06-18 ENCOUNTER — PES CALL (OUTPATIENT)
Dept: ADMINISTRATIVE | Facility: CLINIC | Age: 71
End: 2021-06-18

## 2021-07-16 DIAGNOSIS — L23.9 ALLERGIC DERMATITIS: ICD-10-CM

## 2021-07-21 RX ORDER — PIMECROLIMUS 10 MG/G
CREAM TOPICAL 2 TIMES DAILY
Qty: 100 G | Refills: 2 | Status: SHIPPED | OUTPATIENT
Start: 2021-07-21 | End: 2021-12-28

## 2021-08-05 DIAGNOSIS — I48.91 ATRIAL FIBRILLATION, UNSPECIFIED TYPE: ICD-10-CM

## 2021-08-05 RX ORDER — METOPROLOL SUCCINATE 50 MG/1
50 TABLET, EXTENDED RELEASE ORAL DAILY
Qty: 90 TABLET | Refills: 0 | Status: SHIPPED | OUTPATIENT
Start: 2021-08-05 | End: 2021-11-08 | Stop reason: SDUPTHER

## 2021-08-27 ENCOUNTER — TELEPHONE (OUTPATIENT)
Dept: DERMATOLOGY | Facility: CLINIC | Age: 71
End: 2021-08-27

## 2021-09-09 ENCOUNTER — TELEPHONE (OUTPATIENT)
Dept: PRIMARY CARE CLINIC | Facility: CLINIC | Age: 71
End: 2021-09-09

## 2021-09-09 DIAGNOSIS — Z12.31 SCREENING MAMMOGRAM FOR HIGH-RISK PATIENT: Primary | ICD-10-CM

## 2021-10-04 ENCOUNTER — OFFICE VISIT (OUTPATIENT)
Dept: DERMATOLOGY | Facility: CLINIC | Age: 71
End: 2021-10-04
Payer: MEDICARE

## 2021-10-04 DIAGNOSIS — L98.9 DISEASE OF SKIN AND SUBCUTANEOUS TISSUE: Primary | ICD-10-CM

## 2021-10-04 PROCEDURE — 11104 PUNCH BX SKIN SINGLE LESION: CPT | Mod: PBBFAC,PN | Performed by: DERMATOLOGY

## 2021-10-04 PROCEDURE — 88305 TISSUE EXAM BY PATHOLOGIST: CPT | Mod: 26,,, | Performed by: PATHOLOGY

## 2021-10-04 PROCEDURE — 99213 OFFICE O/P EST LOW 20 MIN: CPT | Mod: PBBFAC,PN | Performed by: DERMATOLOGY

## 2021-10-04 PROCEDURE — 88312 SPECIAL STAINS GROUP 1: CPT | Performed by: PATHOLOGY

## 2021-10-04 PROCEDURE — 88305 TISSUE EXAM BY PATHOLOGIST: ICD-10-PCS | Mod: 26,,, | Performed by: PATHOLOGY

## 2021-10-04 PROCEDURE — 99214 OFFICE O/P EST MOD 30 MIN: CPT | Mod: 25,S$PBB,, | Performed by: DERMATOLOGY

## 2021-10-04 PROCEDURE — 88305 TISSUE EXAM BY PATHOLOGIST: CPT | Performed by: PATHOLOGY

## 2021-10-04 PROCEDURE — 99214 PR OFFICE/OUTPT VISIT, EST, LEVL IV, 30-39 MIN: ICD-10-PCS | Mod: 25,S$PBB,, | Performed by: DERMATOLOGY

## 2021-10-04 PROCEDURE — 11104 PR PUNCH BIOPSY, SKIN, SINGLE LESION: ICD-10-PCS | Mod: S$PBB,,, | Performed by: DERMATOLOGY

## 2021-10-04 PROCEDURE — 88312 PR  SPECIAL STAINS,GROUP I: ICD-10-PCS | Mod: 26,,, | Performed by: PATHOLOGY

## 2021-10-04 PROCEDURE — 99999 PR PBB SHADOW E&M-EST. PATIENT-LVL III: ICD-10-PCS | Mod: PBBFAC,,, | Performed by: DERMATOLOGY

## 2021-10-04 PROCEDURE — 11104 PUNCH BX SKIN SINGLE LESION: CPT | Mod: S$PBB,,, | Performed by: DERMATOLOGY

## 2021-10-04 PROCEDURE — 99999 PR PBB SHADOW E&M-EST. PATIENT-LVL III: CPT | Mod: PBBFAC,,, | Performed by: DERMATOLOGY

## 2021-10-04 PROCEDURE — 88312 SPECIAL STAINS GROUP 1: CPT | Mod: 26,,, | Performed by: PATHOLOGY

## 2021-10-04 RX ORDER — FLUTICASONE PROPIONATE 0.05 MG/G
OINTMENT TOPICAL 2 TIMES DAILY
Qty: 60 G | Refills: 1 | Status: SHIPPED | OUTPATIENT
Start: 2021-10-04 | End: 2021-12-28

## 2021-10-08 ENCOUNTER — TELEPHONE (OUTPATIENT)
Dept: DERMATOLOGY | Facility: CLINIC | Age: 71
End: 2021-10-08

## 2021-10-11 LAB
FINAL PATHOLOGIC DIAGNOSIS: NORMAL
GROSS: NORMAL
Lab: NORMAL
MICROSCOPIC EXAM: NORMAL

## 2021-10-14 ENCOUNTER — TELEPHONE (OUTPATIENT)
Dept: DERMATOLOGY | Facility: CLINIC | Age: 71
End: 2021-10-14

## 2021-10-15 ENCOUNTER — TELEPHONE (OUTPATIENT)
Dept: DERMATOLOGY | Facility: CLINIC | Age: 71
End: 2021-10-15

## 2021-10-18 ENCOUNTER — OFFICE VISIT (OUTPATIENT)
Dept: DERMATOLOGY | Facility: CLINIC | Age: 71
End: 2021-10-18
Payer: MEDICARE

## 2021-10-18 DIAGNOSIS — L23.9 ALLERGIC DERMATITIS: Primary | ICD-10-CM

## 2021-10-18 PROCEDURE — 99999 PR PBB SHADOW E&M-EST. PATIENT-LVL III: ICD-10-PCS | Mod: PBBFAC,,, | Performed by: DERMATOLOGY

## 2021-10-18 PROCEDURE — 99999 PR PBB SHADOW E&M-EST. PATIENT-LVL III: CPT | Mod: PBBFAC,,, | Performed by: DERMATOLOGY

## 2021-10-18 PROCEDURE — 99214 PR OFFICE/OUTPT VISIT, EST, LEVL IV, 30-39 MIN: ICD-10-PCS | Mod: S$PBB,,, | Performed by: DERMATOLOGY

## 2021-10-18 PROCEDURE — 99214 OFFICE O/P EST MOD 30 MIN: CPT | Mod: S$PBB,,, | Performed by: DERMATOLOGY

## 2021-10-18 PROCEDURE — 99213 OFFICE O/P EST LOW 20 MIN: CPT | Mod: PBBFAC,PN | Performed by: DERMATOLOGY

## 2021-10-18 RX ORDER — TRIAMCINOLONE ACETONIDE 1 MG/G
OINTMENT TOPICAL
Qty: 80 G | Refills: 3 | Status: SHIPPED | OUTPATIENT
Start: 2021-10-18 | End: 2021-12-28

## 2021-10-26 ENCOUNTER — TELEPHONE (OUTPATIENT)
Dept: ELECTROPHYSIOLOGY | Facility: CLINIC | Age: 71
End: 2021-10-26
Payer: MEDICARE

## 2021-10-27 ENCOUNTER — PATIENT MESSAGE (OUTPATIENT)
Dept: ELECTROPHYSIOLOGY | Facility: CLINIC | Age: 71
End: 2021-10-27
Payer: MEDICARE

## 2021-10-27 ENCOUNTER — TELEPHONE (OUTPATIENT)
Dept: PRIMARY CARE CLINIC | Facility: CLINIC | Age: 71
End: 2021-10-27
Payer: MEDICARE

## 2021-10-27 ENCOUNTER — TELEPHONE (OUTPATIENT)
Dept: ELECTROPHYSIOLOGY | Facility: CLINIC | Age: 71
End: 2021-10-27
Payer: MEDICARE

## 2021-11-01 ENCOUNTER — TELEPHONE (OUTPATIENT)
Dept: ELECTROPHYSIOLOGY | Facility: CLINIC | Age: 71
End: 2021-11-01
Payer: MEDICARE

## 2021-11-08 ENCOUNTER — PATIENT MESSAGE (OUTPATIENT)
Dept: PRIMARY CARE CLINIC | Facility: CLINIC | Age: 71
End: 2021-11-08
Payer: MEDICARE

## 2021-11-08 DIAGNOSIS — I48.91 ATRIAL FIBRILLATION, UNSPECIFIED TYPE: ICD-10-CM

## 2021-11-08 RX ORDER — METOPROLOL SUCCINATE 50 MG/1
50 TABLET, EXTENDED RELEASE ORAL DAILY
Qty: 90 TABLET | Refills: 0 | Status: SHIPPED | OUTPATIENT
Start: 2021-11-08 | End: 2021-12-28 | Stop reason: SDUPTHER

## 2021-12-20 ENCOUNTER — LAB VISIT (OUTPATIENT)
Dept: LAB | Facility: HOSPITAL | Age: 71
End: 2021-12-20
Attending: DERMATOLOGY
Payer: MEDICARE

## 2021-12-20 ENCOUNTER — OFFICE VISIT (OUTPATIENT)
Dept: DERMATOLOGY | Facility: CLINIC | Age: 71
End: 2021-12-20
Payer: MEDICARE

## 2021-12-20 DIAGNOSIS — L23.9 ALLERGIC DERMATITIS: ICD-10-CM

## 2021-12-20 DIAGNOSIS — L71.9 ROSACEA: Primary | ICD-10-CM

## 2021-12-20 DIAGNOSIS — R21 RASH: ICD-10-CM

## 2021-12-20 PROCEDURE — 99213 OFFICE O/P EST LOW 20 MIN: CPT | Mod: PBBFAC,PN | Performed by: DERMATOLOGY

## 2021-12-20 PROCEDURE — 99999 PR PBB SHADOW E&M-EST. PATIENT-LVL III: CPT | Mod: PBBFAC,,, | Performed by: DERMATOLOGY

## 2021-12-20 PROCEDURE — 86038 ANTINUCLEAR ANTIBODIES: CPT | Performed by: DERMATOLOGY

## 2021-12-20 PROCEDURE — 99214 OFFICE O/P EST MOD 30 MIN: CPT | Mod: S$PBB,,, | Performed by: DERMATOLOGY

## 2021-12-20 PROCEDURE — 99999 PR PBB SHADOW E&M-EST. PATIENT-LVL III: ICD-10-PCS | Mod: PBBFAC,,, | Performed by: DERMATOLOGY

## 2021-12-20 PROCEDURE — 36415 COLL VENOUS BLD VENIPUNCTURE: CPT | Mod: PN | Performed by: DERMATOLOGY

## 2021-12-20 PROCEDURE — 99214 PR OFFICE/OUTPT VISIT, EST, LEVL IV, 30-39 MIN: ICD-10-PCS | Mod: S$PBB,,, | Performed by: DERMATOLOGY

## 2021-12-20 RX ORDER — METRONIDAZOLE 7.5 MG/G
CREAM TOPICAL 2 TIMES DAILY
Qty: 90 G | Refills: 2 | Status: SHIPPED | OUTPATIENT
Start: 2021-12-20 | End: 2022-05-03

## 2021-12-21 LAB — ANA SER QL IF: NORMAL

## 2021-12-28 ENCOUNTER — OFFICE VISIT (OUTPATIENT)
Dept: PRIMARY CARE CLINIC | Facility: CLINIC | Age: 71
End: 2021-12-28
Payer: MEDICARE

## 2021-12-28 VITALS
DIASTOLIC BLOOD PRESSURE: 78 MMHG | TEMPERATURE: 98 F | BODY MASS INDEX: 32.13 KG/M2 | HEART RATE: 58 BPM | HEIGHT: 66 IN | OXYGEN SATURATION: 97 % | SYSTOLIC BLOOD PRESSURE: 120 MMHG | RESPIRATION RATE: 16 BRPM | WEIGHT: 199.94 LBS

## 2021-12-28 DIAGNOSIS — E78.5 HYPERLIPIDEMIA, UNSPECIFIED HYPERLIPIDEMIA TYPE: ICD-10-CM

## 2021-12-28 DIAGNOSIS — E03.9 HYPOTHYROIDISM, UNSPECIFIED TYPE: ICD-10-CM

## 2021-12-28 DIAGNOSIS — E66.09 CLASS 1 OBESITY DUE TO EXCESS CALORIES WITH SERIOUS COMORBIDITY AND BODY MASS INDEX (BMI) OF 31.0 TO 31.9 IN ADULT: ICD-10-CM

## 2021-12-28 DIAGNOSIS — I50.22 CHRONIC SYSTOLIC HEART FAILURE: ICD-10-CM

## 2021-12-28 DIAGNOSIS — E11.69 TYPE 2 DIABETES MELLITUS WITH HYPERCHOLESTEROLEMIA: Primary | ICD-10-CM

## 2021-12-28 DIAGNOSIS — E78.00 TYPE 2 DIABETES MELLITUS WITH HYPERCHOLESTEROLEMIA: Primary | ICD-10-CM

## 2021-12-28 DIAGNOSIS — I70.0 AORTIC ATHEROSCLEROSIS: ICD-10-CM

## 2021-12-28 DIAGNOSIS — I48.91 ATRIAL FIBRILLATION, UNSPECIFIED TYPE: ICD-10-CM

## 2021-12-28 PROCEDURE — 99214 OFFICE O/P EST MOD 30 MIN: CPT | Mod: S$PBB,,, | Performed by: INTERNAL MEDICINE

## 2021-12-28 PROCEDURE — 99999 PR PBB SHADOW E&M-EST. PATIENT-LVL III: ICD-10-PCS | Mod: PBBFAC,,, | Performed by: INTERNAL MEDICINE

## 2021-12-28 PROCEDURE — 99213 OFFICE O/P EST LOW 20 MIN: CPT | Mod: PBBFAC,PN | Performed by: INTERNAL MEDICINE

## 2021-12-28 PROCEDURE — 99214 PR OFFICE/OUTPT VISIT, EST, LEVL IV, 30-39 MIN: ICD-10-PCS | Mod: S$PBB,,, | Performed by: INTERNAL MEDICINE

## 2021-12-28 PROCEDURE — 99999 PR PBB SHADOW E&M-EST. PATIENT-LVL III: CPT | Mod: PBBFAC,,, | Performed by: INTERNAL MEDICINE

## 2021-12-28 RX ORDER — METOPROLOL SUCCINATE 50 MG/1
50 TABLET, EXTENDED RELEASE ORAL DAILY
Qty: 90 TABLET | Refills: 1 | Status: SHIPPED | OUTPATIENT
Start: 2021-12-28 | End: 2022-02-10

## 2021-12-28 RX ORDER — ROSUVASTATIN CALCIUM 10 MG/1
10 TABLET, COATED ORAL DAILY
Qty: 90 TABLET | Refills: 0 | Status: SHIPPED | OUTPATIENT
Start: 2021-12-28 | End: 2022-06-06

## 2021-12-28 RX ORDER — METFORMIN HYDROCHLORIDE 500 MG/1
1000 TABLET, EXTENDED RELEASE ORAL 2 TIMES DAILY WITH MEALS
Qty: 360 TABLET | Refills: 1 | Status: SHIPPED | OUTPATIENT
Start: 2021-12-28 | End: 2022-06-28 | Stop reason: SDUPTHER

## 2021-12-30 DIAGNOSIS — E03.9 ACQUIRED HYPOTHYROIDISM: ICD-10-CM

## 2021-12-30 RX ORDER — LEVOTHYROXINE SODIUM 150 UG/1
TABLET ORAL
Qty: 90 TABLET | Refills: 3 | OUTPATIENT
Start: 2021-12-30

## 2021-12-30 RX ORDER — LEVOTHYROXINE SODIUM 150 MCG
150 TABLET ORAL
Qty: 90 TABLET | Refills: 1 | Status: SHIPPED | OUTPATIENT
Start: 2021-12-30 | End: 2022-06-27

## 2022-01-05 ENCOUNTER — HOSPITAL ENCOUNTER (OUTPATIENT)
Dept: RADIOLOGY | Facility: HOSPITAL | Age: 72
Discharge: HOME | End: 2022-01-05
Attending: INTERNAL MEDICINE
Payer: MEDICARE

## 2022-01-05 DIAGNOSIS — I49.8 OTHER SPECIFIED CARDIAC ARRHYTHMIAS: Primary | ICD-10-CM

## 2022-01-05 DIAGNOSIS — Z12.31 SCREENING MAMMOGRAM FOR HIGH-RISK PATIENT: ICD-10-CM

## 2022-01-05 PROCEDURE — 77063 MAMMO DIGITAL SCREENING BILAT WITH TOMO: ICD-10-PCS | Mod: 26,,, | Performed by: RADIOLOGY

## 2022-01-05 PROCEDURE — 77067 MAMMO DIGITAL SCREENING BILAT WITH TOMO: ICD-10-PCS | Mod: 26,,, | Performed by: RADIOLOGY

## 2022-01-05 PROCEDURE — 77067 SCR MAMMO BI INCL CAD: CPT | Mod: TC,PN

## 2022-01-05 PROCEDURE — 77067 SCR MAMMO BI INCL CAD: CPT | Mod: 26,,, | Performed by: RADIOLOGY

## 2022-01-05 PROCEDURE — 77063 BREAST TOMOSYNTHESIS BI: CPT | Mod: 26,,, | Performed by: RADIOLOGY

## 2022-01-05 PROCEDURE — 77063 BREAST TOMOSYNTHESIS BI: CPT | Mod: TC,PN

## 2022-01-06 NOTE — PROGRESS NOTES
I sent pt a my chart message -  I reviewed your Mammogram -   The breasts have scattered areas of fibroglandular density. There is no evidence of suspicious masses, microcalcifications or architectural distortion.  Impression:   No mammographic evidence of malignancy.  Routine screening mammogram in 1 year is recommended.    Dr. ROBERT

## 2022-01-10 NOTE — PROGRESS NOTES
Subjective:    Patient ID:  Bebe Valdez is a 71 y.o. female who presents for evaluation of No chief complaint on file.    Referring Cardiologist: Tami De Leon MD  Primary Care Physician: Anil Nelson MD    HPI  Prior Hx:  I had the pleasure of seeing Mrs. Valdez in our electrophysiology clinic in consultation for her atrial arrhythmia. As you are aware she is a pleasant 71 year-old woman with diabetes mellitus, hypothyroidism on synthroid and recent onset of persistent atrial fibrillation. She was exercising with a  in July 2019 and pulse was noted to be elevated and irregular. She saw Dr. Nelson on 8/26/2019 and was diagnosed with atrial fibrillation. She was started on metoprolol and aspirin. TSH was checked and was undetectable. Her synthroid dosage was reduced. She was then assessed by Dr. De Leon who started her on eliquis and referred her here for further evaluation. A transthoracic echocardiogram was performed noting a LVEF of 45% with moderate left atrial enlargement and mildly depressed RV function. She reported no obvious symptoms related to AF. At our initial visit in 2018 we discussed trial of restoration of sinus rhythm to see if her mild systolic cardiomyopathy could be due to AF.    9/26/2019: Underwent successful DORINA/DCCV. EF on DORINA was 45%.      10/25/2019: She is one month s/p cardioversion and back in atrial fibrillation. Rate is controlled. She experienced no symptom improvement in sinus rhythm. However, EF is 45%. We discussed that this could be from the AF or from her period of hyperthyroidism. She is still hyperthyroid and is going to decrease her synthroid again per PCP (has a follow up soon). It would increase the likelihood of maintaining sinus rhythm after cardioversion if her thyroid levels are normal. We discussed anti-arrhythmic options, which include tikosyn or sotalol which both require hospital stays. She does not want to proceed with that route. It might be possible to  use amio after her DCCV, after her thyroid has normalized and her synthroid dose is appropriate, until her follow up echo. If her EF normalizes, could switch to 1C or Multaq.      12/2019: She remained in AF, minimal symptoms although in the presence of a cardiomyopathy. Plan was to proceed with rhythm control once she was euthyroid, and evaluate any symptom improvement or increase in LV function.      1/9/2020: successful DCCV. Multaq initiated. Felt better afterward. Thyroid function now normal. Will repeat echo in 3 months to observe for improvement in LV function in SR.     6/4/2020: She is now 5 months s/p cardioversion and initiation of Multaq. Echo yesterday shows normalization of LV function (40%->55%). EKG with acceptable intervals. She is feeling well. Remains on eliquis for CVA prophylaxis.     12/29/2020: Doing well from a rhythm standpoint, remains in SR on multaq. EF recovered in SR. We discussed that she was a candidate for ablation given her hx of arrhythmia-induced CM. She was not interested in ablation unless absolutely necessary. She strongly prefers to remain on multaq at this time. Frequent PVCs on EKG.      6/3/2021: Holter 1/2021: PVC 10%. No AF.    Interim Hx:  Mrs. Valdez returns for follow-up. She recently developed a rash. Metoprolol was held to see if it could be from that however rash did not improve. She resumed metoprolol. She has done well on multaq. She reports no bleeding issues with eliquis.    My interpretation of today's in clinic ECG is sinus rhythm with a PVC.      Review of Systems   Constitutional: Negative for fever and malaise/fatigue.   HENT: Negative for congestion and sore throat.    Eyes: Negative for blurred vision and visual disturbance.   Cardiovascular: Negative for chest pain, dyspnea on exertion, irregular heartbeat, leg swelling, near-syncope, orthopnea, palpitations and paroxysmal nocturnal dyspnea.   Respiratory: Negative for cough and shortness of breath.     Hematologic/Lymphatic: Negative for bleeding problem. Does not bruise/bleed easily.   Skin: Negative.    Musculoskeletal: Negative.    Gastrointestinal: Negative for bloating, abdominal pain, hematochezia and melena.   Neurological: Negative for dizziness and focal weakness.        Objective:    Physical Exam  Vitals reviewed.   Constitutional:       General: She is not in acute distress.     Appearance: She is well-developed. She is not diaphoretic.   HENT:      Head: Normocephalic and atraumatic.   Eyes:      General:         Right eye: No discharge.      Conjunctiva/sclera: Conjunctivae normal.   Neck:      Vascular: No JVD.   Cardiovascular:      Rate and Rhythm: Normal rate. Rhythm irregularly irregular.   Pulmonary:      Effort: Pulmonary effort is normal. No respiratory distress.      Breath sounds: Normal breath sounds. No wheezing or rales.   Abdominal:      General: Bowel sounds are normal. There is no distension.      Palpations: Abdomen is soft.      Tenderness: There is no abdominal tenderness. There is no rebound.   Musculoskeletal:      Cervical back: Neck supple.   Skin:     General: Skin is warm and dry.   Neurological:      Mental Status: She is alert and oriented to person, place, and time.   Psychiatric:         Behavior: Behavior normal.         Thought Content: Thought content normal.           Assessment:       1. Persistent atrial fibrillation    2. Chronic systolic heart failure, in setting of synthroid mediated hyperthyroidism and persistent atrial fibrillation    3. Type 2 diabetes mellitus with hypercholesterolemia    4. Class 1 obesity due to excess calories with serious comorbidity and body mass index (BMI) of 32.0 to 32.9 in adult         Plan:       In summary, Mrs. Valdez is a pleasant 71 year-old woman with diabetes mellitus, hypothyroidism on synthroid and persistent AF that caused a mild cardiomyopathy. She is maintaining sinus rhythm on multaq and EF has recovered. PVI has been  discussed. She prefers to continue multaq as long as it is working. Continue multaq, metoprolol, and eliquis. RTC in 6 months, sooner if needed. Needs updated CBC.      Thank you for allowing me to participate in the care of this patient. Please do not hesitate to call me with any questions or concerns.    Eduardo Sanchez MD, PhD  Cardiac Electrophysiology

## 2022-01-11 ENCOUNTER — OFFICE VISIT (OUTPATIENT)
Dept: ELECTROPHYSIOLOGY | Facility: CLINIC | Age: 72
End: 2022-01-11
Payer: MEDICARE

## 2022-01-11 ENCOUNTER — HOSPITAL ENCOUNTER (OUTPATIENT)
Dept: CARDIOLOGY | Facility: CLINIC | Age: 72
Discharge: HOME | End: 2022-01-11
Payer: MEDICARE

## 2022-01-11 VITALS
SYSTOLIC BLOOD PRESSURE: 138 MMHG | WEIGHT: 197.31 LBS | HEART RATE: 63 BPM | BODY MASS INDEX: 31.71 KG/M2 | HEIGHT: 66 IN | DIASTOLIC BLOOD PRESSURE: 67 MMHG

## 2022-01-11 DIAGNOSIS — E78.00 TYPE 2 DIABETES MELLITUS WITH HYPERCHOLESTEROLEMIA: ICD-10-CM

## 2022-01-11 DIAGNOSIS — I50.22 CHRONIC SYSTOLIC HEART FAILURE: ICD-10-CM

## 2022-01-11 DIAGNOSIS — I48.19 PERSISTENT ATRIAL FIBRILLATION: Primary | ICD-10-CM

## 2022-01-11 DIAGNOSIS — I49.8 OTHER SPECIFIED CARDIAC ARRHYTHMIAS: ICD-10-CM

## 2022-01-11 DIAGNOSIS — E66.09 CLASS 1 OBESITY DUE TO EXCESS CALORIES WITH SERIOUS COMORBIDITY AND BODY MASS INDEX (BMI) OF 32.0 TO 32.9 IN ADULT: ICD-10-CM

## 2022-01-11 DIAGNOSIS — E11.69 TYPE 2 DIABETES MELLITUS WITH HYPERCHOLESTEROLEMIA: ICD-10-CM

## 2022-01-11 PROCEDURE — 93010 RHYTHM STRIP: ICD-10-PCS | Mod: S$PBB,,, | Performed by: INTERNAL MEDICINE

## 2022-01-11 PROCEDURE — 93005 ELECTROCARDIOGRAM TRACING: CPT | Mod: PBBFAC | Performed by: INTERNAL MEDICINE

## 2022-01-11 PROCEDURE — 99214 PR OFFICE/OUTPT VISIT, EST, LEVL IV, 30-39 MIN: ICD-10-PCS | Mod: S$PBB,,, | Performed by: INTERNAL MEDICINE

## 2022-01-11 PROCEDURE — 99999 PR PBB SHADOW E&M-EST. PATIENT-LVL III: ICD-10-PCS | Mod: PBBFAC,,, | Performed by: INTERNAL MEDICINE

## 2022-01-11 PROCEDURE — 99214 OFFICE O/P EST MOD 30 MIN: CPT | Mod: S$PBB,,, | Performed by: INTERNAL MEDICINE

## 2022-01-11 PROCEDURE — 93010 ELECTROCARDIOGRAM REPORT: CPT | Mod: S$PBB,,, | Performed by: INTERNAL MEDICINE

## 2022-01-11 PROCEDURE — 99213 OFFICE O/P EST LOW 20 MIN: CPT | Mod: PBBFAC | Performed by: INTERNAL MEDICINE

## 2022-01-11 PROCEDURE — 99999 PR PBB SHADOW E&M-EST. PATIENT-LVL III: CPT | Mod: PBBFAC,,, | Performed by: INTERNAL MEDICINE

## 2022-01-11 RX ORDER — DRONEDARONE 400 MG/1
TABLET, FILM COATED ORAL
Qty: 60 TABLET | Refills: 11 | Status: SHIPPED | OUTPATIENT
Start: 2022-01-11 | End: 2023-01-05

## 2022-01-20 ENCOUNTER — PES CALL (OUTPATIENT)
Dept: ADMINISTRATIVE | Facility: CLINIC | Age: 72
End: 2022-01-20
Payer: MEDICARE

## 2022-01-28 DIAGNOSIS — E11.9 DIABETES MELLITUS WITHOUT COMPLICATION: Primary | ICD-10-CM

## 2022-02-01 ENCOUNTER — CLINICAL SUPPORT (OUTPATIENT)
Dept: DIABETES | Facility: CLINIC | Age: 72
End: 2022-02-01
Payer: MEDICARE

## 2022-02-01 DIAGNOSIS — E11.9 DIABETES MELLITUS WITHOUT COMPLICATION: ICD-10-CM

## 2022-02-01 PROCEDURE — G0108 DIAB MANAGE TRN  PER INDIV: HCPCS | Mod: PBBFAC,PO | Performed by: DIETITIAN, REGISTERED

## 2022-02-01 NOTE — PROGRESS NOTES
Diabetes Care Specialist Progress Note  Author: Marilu Melgar RD, CDE  Date: 2/1/2022    Program Intake  Reason for Diabetes Program Visit:: Initial Diabetes Assessment  Current diabetes risk level:: low  In the last 12 months, have you:: none    Lab Results   Component Value Date    HGBA1C 6.3 (H) 01/05/2022       Clinical    Problem Review  Reviewed Problem List with Patient: yes  Active comorbidities affecting diabetes self-care.: no  Reviewed health maintenance: yes    Clinical Assessment  Current Diabetes Treatment: Oral Medication  Have you ever experienced hypoglycemia (low blood sugar)?: no  Have you ever experienced hyperglycemia (high blood sugar)?: no    Medication Information  How do you obtain your medications?: Patient drives  How many days a week do you miss your medications?: Never  Do you sometimes have difficulty refilling your medications?: No  Medication adherence impacting ability to self-manage diabetes?: No    Labs  Do you have regular lab work to monitor your medications?: Yes    Nutritional Status  Diet: Regular (3 meals daily; may have a snack; drinks water, juice, diet coke, iced tea w/ lemon, almond milk)  Change in appetite?: No  Dentation:: Intact  Recent Changes in Weight: No Recent Weight Change  Current nutritional status an area of need that is impacting patient's ability to self-manage diabetes?: No    Additional Social History    Support  Does anyone support you with your diabetes care?: no  Who takes you to your medical appointments?: self    Access to Mass Media & Technology  Does the patient have access to any of the following devices or technologies?: Smart phone  Media or technology needs impacting ability to self-manage diabetes?: No    Cognitive/Behavioral Health  Alert and Oriented: Yes  Difficulty Thinking: No  Requires Prompting: No  Requires assistance for routine expression?: No  Cognitive or behavioral barriers impacting ability to self-manage diabetes?:  No    Culture/Islam  Culture or Yarsani beliefs that may impact ability to access healthcare: No    Communication  Language preference: English  Hearing Problems: No  Vision Problems: No  Communication needs impacting ability to self-manage diabetes?: No    Health Literacy  Preferred Learning Method: Face to Face  How often do you need to have someone help you read instructions, pamphlets, or written material from your doctor or pharmacy?: Never  Health literacy needs impacting ability to self-manage diabetes?: No      Diabetes Self-Management Skills Assessment    Diabetes Disease Process/Treatment Options  Patient/caregiver able to state what happens when someone has diabetes.: somewhat  Patient/caregiver knows what type of diabetes they have.: yes  Diabetes Type : Type II  Patient/caregiver able to identify at least three signs and symptoms of diabetes.: no  Patient able to identify at least three risk factors for diabetes.: no  Diabetes Disease Process/Treatment Options: Skills Assessment Completed: Yes  Assessment indicates:: Instruction Needed  Area of need?: Yes     Reviewed diabetes disease process and treatment options    Nutrition/Healthy Eating  Challenges to healthy eating:: eating out, going to parties  Method of carbohydrate measurement:: no method  Patient can identify foods that impact blood sugar.: no (see comments)  Nutrition/Healthy Eating Skills Assessment Completed:: Yes  Assessment indicates:: Instruction Needed  Area of need?: Yes     Reviewed Calorie/CHO recommendations, reading food labels and addt'l resources to assist w/ calorie/cho counting; plate method reviewed; alternatives to sugary beverages reviewed; provided local meal delivery services to assist w/ meal planning and to help limit intake of FF/restaurants    Physical Activity/Exercise  Patient's daily activity level:: moderately active  Patient formally exercises outside of work.: yes  Exercise Type: other (see comments)  ( 3 days a week and Yoga 2 days a week)  Patient can identify forms of physical activity.: yes  Stated forms of physical activity:: any movement performed by muscles that uses energy  Patient can identify reasons why exercise/physical activity is important in diabetes management.: yes  Identified reasons:: lowers blood glucose, blood pressure, and cholesterol  Physical Activity/Exercise Skills Assessment Completed: : Yes  Assessment indicates:: Adequate understanding  Area of need?: No    Medications  Patient is able to describe current diabetes management routine.: yes  Diabetes management routine:: oral medications  Patient is able to identify current diabetes medications, dosages, and appropriate timing of medications.: yes  Patient understands the purpose of the medications taken for diabetes.: yes  Patient reports problems or concerns with current medication regimen.: no  Medication Skills Assessment Completed:: Yes  Assessment indicates:: Adequate understanding  Area of need?: No    Home Blood Glucose Monitoring  Patient states that blood sugar is checked at home daily.: no  Reasons for not monitoring:: other (see comments) (Does not wish to SMBG at this time)  Home Blood Glucose Monitoring Skills Assessment Completed: : Yes  Assessment indicates:: Adequate understanding  Area of need?: No    Acute Complications  Patient is able to identify types of acute complications: No  Acute Complications Skills Assessment Completed: : Yes  Assessment indicates:: Instruction Needed  Area of need?: Yes     Reviewed s/s and treatment of hyper/hypoglycemia    Chronic Complications  Patient can identify major chronic complications of diabetes.: no  Patient can identify ways to prevent or delay diabetes complications.: no  Patient is aware that having diabetes increases risk of heart disease?: No  Patient is taking statin?: Yes  Chronic Complications Skills Assessment Completed: : Yes  Assessment indicates::  Instruction Needed  Area of need?: Yes     Reviewed standards of care    Psychosocial/Coping  Patient can identify ways of coping with chronic disease.: no (see comments)  Psychosocial/Coping Skills Assessment Completed: : Yes  Assessment indicates:: Instruction Needed  Area of need?: Yes     Reviewed ways of coping with stress    Assessment Summary and Plan    Based on today's diabetes care assessment, the following areas of need were identified:      Social 2/1/2022   Access to Mass Media/Tech No   Cognitive/Behavioral Health No   Culture/Shinto No   Communication No   Health Literacy No        Clinical 2/1/2022   Medication Adherence No   Nutritional Status No        Diabetes Self-Management Skills 2/1/2022   Diabetes Disease Process/Treatment Options Yes   Nutrition/Healthy Eating Yes   Physical Activity/Exercise No   Medication No   Home Blood Glucose Monitoring No   Acute Complications Yes   Chronic Complications Yes   Psychosocial/Coping Yes          Today's interventions were provided through individual discussion, instruction, and written materials were provided.      Patient verbalized understanding of instruction and written materials.  Pt was able to return back demonstration of instructions today. Patient understood key points, needs reinforcement and further instruction.     Diabetes Self-Management Care Plan:    Today's Diabetes Self-Management Care Plan was developed with Bebe's input. Bebe has agreed to work toward the following goal(s) to improve his/her overall diabetes control.      Care Plan: Diabetes Management   Updates made since 1/2/2022 12:00 AM      Problem: Healthy Eating       Long-Range Goal: Reduce intake of fruit juices    Start Date: 2/1/2022   Expected End Date: 11/1/2022   Priority: High   Barriers: No Barriers Identified            Task: Recommended replacing beverages containing high sugar content with noncaloric/sugar free options and/or water. Completed 2/1/2022                 Follow Up Plan     Follow up in about 9 months (around 11/1/2022).    Today's care plan and follow up schedule was discussed with patient.  Bebe verbalized understanding of the care plan, goals, and agrees to follow up plan.        The patient was encouraged to communicate with his/her health care provider/physician and care team regarding his/her condition(s) and treatment.  I provided the patient with my contact information today and encouraged to contact me via phone or [facility] Patient Portal as needed.     Length of Visit   Total Time: 60 Minutes

## 2022-02-17 ENCOUNTER — PES CALL (OUTPATIENT)
Dept: ADMINISTRATIVE | Facility: CLINIC | Age: 72
End: 2022-02-17
Payer: MEDICARE

## 2022-03-18 ENCOUNTER — PES CALL (OUTPATIENT)
Dept: ADMINISTRATIVE | Facility: CLINIC | Age: 72
End: 2022-03-18
Payer: MEDICARE

## 2022-03-22 ENCOUNTER — TELEPHONE (OUTPATIENT)
Dept: DERMATOLOGY | Facility: CLINIC | Age: 72
End: 2022-03-22
Payer: MEDICARE

## 2022-03-22 NOTE — TELEPHONE ENCOUNTER
----- Message from April Morales sent at 3/22/2022  4:36 PM CDT -----      Patient is scheduled first appt available for 5/3, placed on wait list for earlier appt  Is in need of the cream compound script that Dr. Montes De Oca has prescribed for patient in the past.  It is sent to a [facility] in [place]  She has seen other dermatology doctors but see's Dr. Montes De Oca for Rosacea specifically    Please reach out to patient at your earliest convenience @ # [phone]

## 2022-04-05 DIAGNOSIS — Z71.89 COMPLEX CARE COORDINATION: ICD-10-CM

## 2022-04-25 ENCOUNTER — PES CALL (OUTPATIENT)
Dept: ADMINISTRATIVE | Facility: CLINIC | Age: 72
End: 2022-04-25
Payer: MEDICARE

## 2022-05-02 ENCOUNTER — PATIENT OUTREACH (OUTPATIENT)
Dept: ADMINISTRATIVE | Facility: OTHER | Age: 72
End: 2022-05-02
Payer: MEDICARE

## 2022-05-03 ENCOUNTER — OFFICE VISIT (OUTPATIENT)
Dept: DERMATOLOGY | Facility: CLINIC | Age: 72
End: 2022-05-03
Payer: MEDICARE

## 2022-05-03 DIAGNOSIS — L40.0 PSORIASIS VULGARIS: ICD-10-CM

## 2022-05-03 DIAGNOSIS — L71.9 ROSACEA: Primary | ICD-10-CM

## 2022-05-03 PROCEDURE — 99214 PR OFFICE/OUTPT VISIT, EST, LEVL IV, 30-39 MIN: ICD-10-PCS | Mod: S$GLB,,, | Performed by: DERMATOLOGY

## 2022-05-03 PROCEDURE — 99214 OFFICE O/P EST MOD 30 MIN: CPT | Mod: S$GLB,,, | Performed by: DERMATOLOGY

## 2022-05-03 RX ORDER — BETAMETHASONE DIPROPIONATE 0.5 MG/G
CREAM TOPICAL
Qty: 50 G | Refills: 2 | Status: SHIPPED | OUTPATIENT
Start: 2022-05-03 | End: 2023-06-29 | Stop reason: SDUPTHER

## 2022-05-03 NOTE — PATIENT INSTRUCTIONS
Your prescription has been sent to the compounding pharmacy [facility].  They are located in [place] at [address] just before the [address].  If you have any questions, please call the pharmacy at [phone].  Website: [email or web address]

## 2022-05-03 NOTE — PROGRESS NOTES
Patient Information  Name: Bebe Valdez  : 1950  MRN: [id number]     Referring Physician:  No ref. provider found   Primary Care Physician:  Amber Dorsey MD   Date of Visit: 2022      Subjective:     History of Present lllness:    Bebe Valdez is a 71 y.o. female who presents with a chief complaint of redness of face.    Diagnosis: Rosacea  Location: face  Signs/Symptoms: redness  Symptom course: improving  Current treatment: compounded azelaic acid 15% + ivermectin 1% + metronidazole 1% cream    Patient was last seen: 2020.  Prior notes by myself reviewed.   Clinical documentation obtained by nursing staff reviewed.    Review of Systems   HENT: Negative for headaches.    Eyes: Negative for itching, eye watering, eye irritation and eyelid inflammation.   Gastrointestinal: Negative for nausea, vomiting, diarrhea and Sensitivity to oral antibiotics.   Skin: Positive for daily sunscreen use and activity-related sunscreen use. Negative for recent sunburn.   Neurological: Negative for headaches.       Objective:   Physical Exam   Constitutional: She appears well-developed and well-nourished. No distress.   Neurological: She is alert and oriented to person, place, and time. She is not disoriented.   Psychiatric: She has a normal mood and affect.   Skin:   Areas Examined (abnormalities noted in diagram):   Head / Face Inspection Performed                 Diagram Legend     Erythematous scaling macule/papule c/w actinic keratosis       Vascular papule c/w angioma      Pigmented verrucoid papule/plaque c/w seborrheic keratosis      Yellow umbilicated papule c/w sebaceous hyperplasia      Irregularly shaped tan macule c/w lentigo     1-2 mm smooth white papules consistent with Milia      Movable subcutaneous cyst with punctum c/w epidermal inclusion cyst      Subcutaneous movable cyst c/w pilar cyst      Firm pink to brown papule c/w dermatofibroma      Pedunculated fleshy papule(s) c/w skin tag(s)       Evenly pigmented macule c/w junctional nevus     Mildly variegated pigmented, slightly irregular-bordered macule c/w mildly atypical nevus      Flesh colored to evenly pigmented papule c/w intradermal nevus       Pink pearly papule/plaque c/w basal cell carcinoma      Erythematous hyperkeratotic cursted plaque c/w SCC      Surgical scar with no sign of skin cancer recurrence      Open and closed comedones      Inflammatory papules and pustules      Verrucoid papule consistent consistent with wart     Erythematous eczematous patches and plaques     Dystrophic onycholytic nail with subungual debris c/w onychomycosis     Umbilicated papule    Erythematous-base heme-crusted tan verrucoid plaque consistent with inflamed seborrheic keratosis     Erythematous Silvery Scaling Plaque c/w Psoriasis     See annotation    No images are attached to the encounter or orders placed in the encounter.      [] Data reviewed  [] Prior external notes reviewed  [] Independent review of test  [] Management discussed with another provider  [] Independent historian    Assessment / Plan:        Rosacea   - stable and chronic  Rosacea is a chronic condition without a definitive cure.  There are several well-known triggers, such as exercise, temperature extremes, alcohol, and spicy foods, that should be avoided to prevent a rosacea flare.  Use gentle, ceramide-containing products (such as CeraVe Moisturizing Cream or La Roche-Posay Toleriane Double Repair Face Moisturizer) to repair the skin barrier and to minimize irritation. Avoid harsh soaps, exfoliants, and scrubs.    -     metroNIDAZOLE (NORITATE) 1 % cream; Compound azelaic acid 15% + ivermectin 1% + metronidazole 1% cream. Apply to face once or twice daily.  Dispense: 30 g; Refill: 5    Psoriasis vulgaris   - stable and chronic  -     augmented betamethasone dipropionate (DIPROLENE-AF) 0.05 % cream; Apply to elbows BID prn scaling.  Dispense: 50 g; Refill: 2  Side effects from the  overuse of topical steroids include thinning of skin, easy tearing/bruising of skin, stretch marks, spider veins, etc. Use the topical steroid no more than 2 days per week if used long-term and/or take breaks from the topical steroid, especially if any of the above side effects are noticed.    Follow up in about 1 year (around 5/3/2023) for follow up, or sooner if symptoms worsening or not improving.      Lashaun Montes De Oca MD, FAAD  [facility]

## 2022-05-14 ENCOUNTER — OFFICE VISIT (OUTPATIENT)
Dept: URGENT CARE | Facility: CLINIC | Age: 72
End: 2022-05-14
Payer: MEDICARE

## 2022-05-14 VITALS
DIASTOLIC BLOOD PRESSURE: 54 MMHG | TEMPERATURE: 99 F | BODY MASS INDEX: 31.18 KG/M2 | RESPIRATION RATE: 17 BRPM | HEIGHT: 66 IN | HEART RATE: 75 BPM | OXYGEN SATURATION: 95 % | SYSTOLIC BLOOD PRESSURE: 94 MMHG | WEIGHT: 194 LBS

## 2022-05-14 DIAGNOSIS — U07.1 COVID: ICD-10-CM

## 2022-05-14 DIAGNOSIS — U07.1 COVID-19 VIRUS INFECTION: Primary | ICD-10-CM

## 2022-05-14 DIAGNOSIS — U07.1 COVID-19 VIRUS DETECTED: ICD-10-CM

## 2022-05-14 LAB
CTP QC/QA: YES
SARS-COV-2 RDRP RESP QL NAA+PROBE: POSITIVE

## 2022-05-14 PROCEDURE — U0002 COVID-19 LAB TEST NON-CDC: HCPCS | Mod: QW,CR,S$GLB, | Performed by: PHYSICIAN ASSISTANT

## 2022-05-14 PROCEDURE — 99213 OFFICE O/P EST LOW 20 MIN: CPT | Mod: CR,S$GLB,, | Performed by: PHYSICIAN ASSISTANT

## 2022-05-14 PROCEDURE — U0002: ICD-10-PCS | Mod: QW,CR,S$GLB, | Performed by: PHYSICIAN ASSISTANT

## 2022-05-14 PROCEDURE — 99213 PR OFFICE/OUTPT VISIT, EST, LEVL III, 20-29 MIN: ICD-10-PCS | Mod: CR,S$GLB,, | Performed by: PHYSICIAN ASSISTANT

## 2022-05-14 NOTE — PROGRESS NOTES
"Subjective:       Patient ID: Bebe Valdez is a 71 y.o. female.    Vitals:  height is 5' 6" (1.676 m) and weight is 88 kg (194 lb). Her oral temperature is 98.6 °F (37 °C). Her blood pressure is 94/54 (abnormal) and her pulse is 75. Her respiration is 17 and oxygen saturation is 95%.     Chief Complaint: Cough, Sinus Problem, Sore Throat, and Fatigue    Pt presents with sinus congestion with scratchy/sore throat, fatigue, and cough x 3 days. Pt states cough is mild. No dyspnea. No fever.  Pt states covid exposure a week ago.  Pt is covid vaccinated plus 1 booster.    Cough  This is a new problem. The current episode started in the past 7 days. The problem occurs hourly. The cough is non-productive. Associated symptoms include nasal congestion, postnasal drip and a sore throat. Pertinent negatives include no chest pain, chills, ear congestion, ear pain, eye redness, fever, headaches, heartburn, hemoptysis, myalgias, rash, rhinorrhea, shortness of breath, sweats, weight loss or wheezing. Nothing aggravates the symptoms. Treatments tried: allegra. The treatment provided mild relief. Her past medical history is significant for pneumonia. There is no history of bronchitis.   Sinus Problem  Associated symptoms include congestion, coughing and a sore throat. Pertinent negatives include no chills, diaphoresis, ear pain, headaches, neck pain or shortness of breath.   Sore Throat   Associated symptoms include congestion and coughing. Pertinent negatives include no abdominal pain, diarrhea, ear pain, headaches, neck pain, shortness of breath or vomiting.   Fatigue  Associated symptoms include congestion, coughing, fatigue and a sore throat. Pertinent negatives include no abdominal pain, chest pain, chills, diaphoresis, fever, headaches, myalgias, nausea, neck pain, numbness, rash or vomiting.     Past Medical History:   Diagnosis Date    Allergy     Anticoagulant long-term use     Hyperlipidemia     Hypertension " associated with diabetes     Hypothyroid     Obesity     Rosacea    a fib  DM  Systolic HF        Constitution: Positive for fatigue. Negative for chills, sweating, fever and unexpected weight change.   HENT: Positive for congestion, postnasal drip and sore throat. Negative for ear pain.    Neck: Negative for neck pain and neck stiffness.   Cardiovascular: Negative for chest pain, leg swelling, palpitations and sob on exertion.   Eyes: Negative for eye itching, eye pain, eye redness and photophobia.   Respiratory: Positive for cough. Negative for bloody sputum, shortness of breath and wheezing.    Gastrointestinal: Negative for abdominal pain, nausea, vomiting, diarrhea and heartburn.   Genitourinary: Negative for dysuria, frequency and urgency.   Musculoskeletal: Negative for pain and muscle ache.   Skin: Negative for rash.   Neurological: Negative for dizziness, light-headedness, headaches, numbness and tingling.       Objective:      Physical Exam   Constitutional: She is oriented to person, place, and time. She appears well-developed. She is cooperative.  Non-toxic appearance. She does not appear ill. No distress.      Comments:Nontoxic appearing in nad.     HENT:   Head: Normocephalic and atraumatic.   Ears:   Right Ear: Hearing, tympanic membrane, external ear and ear canal normal.   Left Ear: Hearing, tympanic membrane, external ear and ear canal normal.   Nose: Nose normal. No mucosal edema, rhinorrhea or nasal deformity. No epistaxis. Right sinus exhibits no maxillary sinus tenderness and no frontal sinus tenderness. Left sinus exhibits no maxillary sinus tenderness and no frontal sinus tenderness.   Mouth/Throat: Uvula is midline, oropharynx is clear and moist and mucous membranes are normal. No trismus in the jaw. Normal dentition. No uvula swelling. No oropharyngeal exudate, posterior oropharyngeal edema or posterior oropharyngeal erythema.   Eyes: Conjunctivae and lids are normal. No scleral icterus.    Neck: Trachea normal and phonation normal. Neck supple. No edema present. No erythema present. No neck rigidity present.   Cardiovascular: Normal rate, regular rhythm, normal heart sounds and normal pulses.   Pulmonary/Chest: Effort normal and breath sounds normal. No accessory muscle usage or stridor. No tachypnea and no bradypnea. No respiratory distress. She has no decreased breath sounds. She has no wheezes. She has no rhonchi. She has no rales.   Lungs ctab. No resp distress.          Comments: Lungs ctab. No resp distress.     Abdominal: Normal appearance.   Musculoskeletal: Normal range of motion.         General: No deformity. Normal range of motion.   Lymphadenopathy:        Head (right side): No submandibular adenopathy present.        Head (left side): No submandibular adenopathy present.     She has no cervical adenopathy.   Neurological: She is alert and oriented to person, place, and time. She exhibits normal muscle tone. Coordination normal.   Skin: Skin is warm, dry, intact, not diaphoretic and not pale.   Psychiatric: Her speech is normal and behavior is normal. Judgment and thought content normal.   Nursing note and vitals reviewed.          Results for orders placed or performed in visit on 05/14/22   POCT COVID-19 Rapid Screening   Result Value Ref Range    POC Rapid COVID Positive (A) Negative     Acceptable Yes        6  covid risk score  Referral to MAb placed, pt educated on tx options.   - counseled patient and answered questions in regards to COVID-19 testing and diagnosis and quarantine. Discussed home care and follow up/ER precautions.       Assessment:       1. COVID-19 virus infection    2. COVID          Plan:         COVID-19 virus infection  -     POCT COVID-19 Rapid Screening  -     Ambulatory referral/consult to [facility]    COVID      Patient Instructions     You have tested positive for COVID-19 today.      ISOLATION    If you tested positive and do not have  symptoms, you must isolate for 5 days starting on the day of the positive test.     If you tested positive and have symptoms, you must isolate for 5 days starting on the day of the first symptoms,  not the day of the positive test.    This is the most important part: both the CDC and the [facility] emphasize that you do not test out of isolation.    After 5 days, if your symptoms have improved and you have not had fever on day 5, you can return to the community on day 6- NO TESTING REQUIRED! You must continue to wear mask on days 6-10.    In fact, we do not retest if you were positive in the last 90 days.    After your 5 days of isolation are completed, the CDC recommends strict mask use for the first 5 days that you come out of isolation.           - Rest.    - Drink plenty of fluids.  - Viral upper respiratory infections typically run their course in 10-14 days.     - Tylenol or Ibuprofen as directed as needed for fever/pain. Avoid tylenol if you have a history of liver disease. Do not take ibuprofen if you have a history of GI bleeding, kidney disease, or if you take blood thinners.     - You can take over-the-counter claritin, zyrtec, allegra, or xyzal as directed. These are antihistamines that can help with runny nose, nasal congestion, sneezing, and helps to dry up post-nasal drip, which usually causes sore throat and cough.   - If you do NOT have high blood pressure, you may use a decongestant form (D)  of this medication (ie. Claritin- D, zyrtec-D, allegra-D) or if you do not take the D form, you can take sudafed (pseudoephedrine) over the counter, which is a decongestant. Do NOT take two decongestant (D) medications at the same time (such as mucinex-D and claritin-D or plain sudafed and claritin D)    - You can use Flonase (fluticasone) nasal spray as directed for sinus congestion and postnasal drip. This is a steroid nasal spray that works locally over time to decrease the inflammation in your nose/sinuses and  help with allergic symptoms. This is not an quick- relief spray like afrin, but it works well if used daily.  Discontinue if you develop nose bleed  - use nasal saline prior to Flonase.  - Use Ocean Spray Nasal Saline 1-3 puffs each nostril every 2-3 hours then blow out onto tissue. This is to irrigate the nasal passage way to clear the sinus openings. Use until sinus problem resolved.    - you can take plain Mucinex (guaifenesin) 1200 mg twice a day to help loosen mucous.     -warm salt water gargles can help with sore throat    - warm tea with honey can help with cough. Honey is a natural cough suppressant.    - Dextromethorphan (DM) is a cough suppressant over the counter (ie. mucinex DM, robitussin, delsym; dayquil/nyquil has DM as well.)      - Follow up with your PCP or specialty clinic as directed in the next 1-2 weeks if not improved or as needed.  You can call [phone] to schedule an appointment with the appropriate provider.      - Go to the ER if you develop new or worsening symptoms.     - You must understand that you have received an Urgent Care treatment only and that you may be released before all of your medical problems are known or treated.   - You, the patient, will arrange for follow up care as instructed.   - If your condition worsens or fails to improve we recommend that you receive another evaluation at the ER immediately or contact your PCP to discuss your concerns or return here.

## 2022-05-14 NOTE — PATIENT INSTRUCTIONS
You have tested positive for COVID-19 today.      ISOLATION    If you tested positive and do not have symptoms, you must isolate for 5 days starting on the day of the positive test.     If you tested positive and have symptoms, you must isolate for 5 days starting on the day of the first symptoms,  not the day of the positive test.    This is the most important part: both the CDC and the [facility] emphasize that you do not test out of isolation.    After 5 days, if your symptoms have improved and you have not had fever on day 5, you can return to the community on day 6- NO TESTING REQUIRED! You must continue to wear mask on days 6-10.    In fact, we do not retest if you were positive in the last 90 days.    After your 5 days of isolation are completed, the CDC recommends strict mask use for the first 5 days that you come out of isolation.           - Rest.    - Drink plenty of fluids.  - Viral upper respiratory infections typically run their course in 10-14 days.     - Tylenol or Ibuprofen as directed as needed for fever/pain. Avoid tylenol if you have a history of liver disease. Do not take ibuprofen if you have a history of GI bleeding, kidney disease, or if you take blood thinners.     - You can take over-the-counter claritin, zyrtec, allegra, or xyzal as directed. These are antihistamines that can help with runny nose, nasal congestion, sneezing, and helps to dry up post-nasal drip, which usually causes sore throat and cough.   - If you do NOT have high blood pressure, you may use a decongestant form (D)  of this medication (ie. Claritin- D, zyrtec-D, allegra-D) or if you do not take the D form, you can take sudafed (pseudoephedrine) over the counter, which is a decongestant. Do NOT take two decongestant (D) medications at the same time (such as mucinex-D and claritin-D or plain sudafed and claritin D)    - You can use Flonase (fluticasone) nasal spray as directed for sinus congestion and postnasal drip. This is a  steroid nasal spray that works locally over time to decrease the inflammation in your nose/sinuses and help with allergic symptoms. This is not an quick- relief spray like afrin, but it works well if used daily.  Discontinue if you develop nose bleed  - use nasal saline prior to Flonase.  - Use Ocean Spray Nasal Saline 1-3 puffs each nostril every 2-3 hours then blow out onto tissue. This is to irrigate the nasal passage way to clear the sinus openings. Use until sinus problem resolved.    - you can take plain Mucinex (guaifenesin) 1200 mg twice a day to help loosen mucous.     -warm salt water gargles can help with sore throat    - warm tea with honey can help with cough. Honey is a natural cough suppressant.    - Dextromethorphan (DM) is a cough suppressant over the counter (ie. mucinex DM, robitussin, delsym; dayquil/nyquil has DM as well.)      - Follow up with your PCP or specialty clinic as directed in the next 1-2 weeks if not improved or as needed.  You can call [phone] to schedule an appointment with the appropriate provider.      - Go to the ER if you develop new or worsening symptoms.     - You must understand that you have received an Urgent Care treatment only and that you may be released before all of your medical problems are known or treated.   - You, the patient, will arrange for follow up care as instructed.   - If your condition worsens or fails to improve we recommend that you receive another evaluation at the ER immediately or contact your PCP to discuss your concerns or return here.

## 2022-05-15 ENCOUNTER — NURSE TRIAGE (OUTPATIENT)
Dept: ADMINISTRATIVE | Facility: CLINIC | Age: 72
End: 2022-05-15
Payer: MEDICARE

## 2022-05-15 NOTE — TELEPHONE ENCOUNTER
"Pt responded to [facility] text message. Pt reports that she has sore throat, denies fever, and SOB. Pt also has questions regarding EAU infusion. Phone number to infusion center given to pt. Advised to be seen by provider within 24 hours. Home care advise given as well.    Reason for Disposition   Diabetes mellitus or weak immune system (e.g., HIV positive, cancer chemo, splenectomy, organ transplant, chronic steroids)    Additional Information   Negative: SEVERE difficulty breathing (e.g., struggling for each breath, speaks in single words, stridor)   Negative: Sounds like a life-threatening emergency to the triager   Negative: [1] Drooling or spitting out saliva (because can't swallow) AND [2] normal breathing   Negative: Unable to open mouth completely   Negative: [1] Difficulty breathing AND [2] not severe   Negative: Fever > 104 F (40 C)   Negative: [1] Refuses to drink anything AND [2] for > 12 hours   Negative: [1] Drinking very little AND [2] dehydration suspected (e.g., no urine > 12 hours, very dry mouth, very lightheaded)   Negative: Patient sounds very sick or weak to the triager   Negative: SEVERE (e.g., excruciating) throat pain   Negative: [1] Pus on tonsils (back of throat) AND [2]  fever AND [3] swollen neck lymph nodes ("glands")   Negative: [1] Rash AND [2] widespread (especially chest and abdomen)   Negative: Earache also present   Negative: Fever present > 3 days (72 hours)    Protocols used: SORE THROAT-A-AH      "

## 2022-05-16 ENCOUNTER — TELEPHONE (OUTPATIENT)
Dept: ELECTROPHYSIOLOGY | Facility: CLINIC | Age: 72
End: 2022-05-16
Payer: MEDICARE

## 2022-05-16 NOTE — TELEPHONE ENCOUNTER
----- Message from Shelly Lopez MA sent at 5/16/2022  1:39 PM CDT -----  Contact: Self/[phone]    ----- Message -----  From: Ilsa Yang  Sent: 5/16/2022   1:38 PM CDT  To: Daniel GAONA Staff    Pt said that she is calling in regards to needing to speak with the nurse , pt stated that her pharmacy told her that her insurance is not covering her dronedarone (MULTAQ) 400 mg Tab and she is completely out of her medication. Please advise

## 2022-05-16 NOTE — TELEPHONE ENCOUNTER
Spoke with patient and advised that a PA was completed for her Multaq and approval was received. ( PA # [id number]) Patient advised that I confirmed with the pharmacy that her prescription went through and will be filled at a cost of $200 for a 90 day supply. Patient verbalized that the priced has increased, however is able to afford the prescription at this time. Instructed to notify the office if ever the medication should become unaffordable. Patient verbalized understanding.

## 2022-05-17 ENCOUNTER — INFUSION (OUTPATIENT)
Dept: INFECTIOUS DISEASES | Facility: HOSPITAL | Age: 72
End: 2022-05-17
Attending: INTERNAL MEDICINE
Payer: MEDICARE

## 2022-05-17 VITALS
OXYGEN SATURATION: 95 % | DIASTOLIC BLOOD PRESSURE: 61 MMHG | HEART RATE: 55 BPM | SYSTOLIC BLOOD PRESSURE: 109 MMHG | TEMPERATURE: 98 F | RESPIRATION RATE: 20 BRPM

## 2022-05-17 DIAGNOSIS — U07.1 COVID-19: Primary | ICD-10-CM

## 2022-05-17 PROCEDURE — M0222 HC IV INJECTION, BEBTELOVIMAB, INCL POST ADMIN MONIT: HCPCS | Performed by: INTERNAL MEDICINE

## 2022-05-17 PROCEDURE — 63600175 PHARM REV CODE 636 W HCPCS: Performed by: INTERNAL MEDICINE

## 2022-05-17 RX ORDER — ACETAMINOPHEN 325 MG/1
650 TABLET ORAL
Status: ACTIVE | OUTPATIENT
Start: 2022-05-17 | End: 2022-05-18

## 2022-05-17 RX ORDER — ALBUTEROL SULFATE 90 UG/1
2 AEROSOL, METERED RESPIRATORY (INHALATION)
Status: ACTIVE | OUTPATIENT
Start: 2022-05-17 | End: 2022-05-20

## 2022-05-17 RX ORDER — BEBTELOVIMAB 87.5 MG/ML
175 INJECTION, SOLUTION INTRAVENOUS
Status: COMPLETED | OUTPATIENT
Start: 2022-05-17 | End: 2022-05-17

## 2022-05-17 RX ORDER — ONDANSETRON 4 MG/1
4 TABLET, ORALLY DISINTEGRATING ORAL
Status: ACTIVE | OUTPATIENT
Start: 2022-05-17 | End: 2022-05-18

## 2022-05-17 RX ORDER — EPINEPHRINE 0.3 MG/.3ML
0.3 INJECTION SUBCUTANEOUS
Status: ACTIVE | OUTPATIENT
Start: 2022-05-17 | End: 2022-05-20

## 2022-05-17 RX ORDER — DIPHENHYDRAMINE HYDROCHLORIDE 50 MG/ML
25 INJECTION INTRAMUSCULAR; INTRAVENOUS
Status: ACTIVE | OUTPATIENT
Start: 2022-05-17 | End: 2022-05-18

## 2022-05-17 RX ADMIN — BEBTELOVIMAB 175 MG: 87.5 INJECTION, SOLUTION INTRAVENOUS at 02:05

## 2022-05-17 NOTE — PROGRESS NOTES
Bebtelovimab IV Injection administered. Pt tolerated injection well. No S/S of injection reaction noted at present time. One hour observation started.

## 2022-05-17 NOTE — PROGRESS NOTES
Patient remains with no signs of complications noted. Patient received Bebtelovimab IV Injection according to FDA recommendations and [facility] SOP without complications noted and left with mask in place. Drug fact sheet provided. Pt discharged home. Ambulatory. Remains AAox3. No distress noted. RR even and unlabored.

## 2022-05-17 NOTE — PROGRESS NOTES
Patient arrives for Bebtelovimab IV Injection. Ambulatory. Pt AAox3. No distress noted. RR even and unlabored.     Symptoms and onset date:  Sore throat, congestion, diarrhea     Tested COVID + on 05/14/22

## 2022-05-19 ENCOUNTER — PES CALL (OUTPATIENT)
Dept: ADMINISTRATIVE | Facility: CLINIC | Age: 72
End: 2022-05-19
Payer: MEDICARE

## 2022-06-06 ENCOUNTER — PES CALL (OUTPATIENT)
Dept: ADMINISTRATIVE | Facility: CLINIC | Age: 72
End: 2022-06-06
Payer: MEDICARE

## 2022-06-23 ENCOUNTER — PES CALL (OUTPATIENT)
Dept: ADMINISTRATIVE | Facility: CLINIC | Age: 72
End: 2022-06-23
Payer: MEDICARE

## 2022-06-26 NOTE — PROGRESS NOTES
[facility] Note    Chief Complaint      Chief Complaint   Patient presents with    Follow-up       History of Present Illness      Bebe Valdez is a 71 y.o. F with A fib, Chronic Sys HF, Hypothyroidism, DM - II, HLD, Aortic Atherosclerosis, Obesity, and Left shoulder adhesive capsulitis, Last visit - 12/28/21    Paroxysmal a fib -s/p cardioversion 9/26/19 and in '20 . Fu by EP, Dr. Sanchez. Pt on long term anticoagulation with Eliquis 5 mg BID, Toprol 50 mg/d, and Multaq 400 mg BID.  Has fu in Aug.      H/o Chronic Sys CHF - Improved. Prev EF 40% per Echo 1/9/20 and now 55% per echo 6/15/21 Pt on Multaq 400 mgB ID and Toprol 50 mg/d.  Echo - 6/15/21 - EF - 55%; mod LAE      Hypothyroidism -  Pt on Brand synthroid 150 mcg/d. Controlled in May.      DM - II -Pt on Metformin  mg 2 tabs BID.  No evidence of microalbuminuria. Ha1c was controlled at 6.3-1/5/22. Continue current regimen.  no evidence of microalbuminuria      HLD - Pt on Crestor 10 mg QHS. Well controlled in Oct. 2020.      Aortic atherosclerosis - Pt on Crestor 10 mg QHS.     Obesity - BMI - 32.02 - Up 4 lb since Apr 2021- Pt on low carb/low chol diet. She does yoga x 2/wk and works out with a  3 times/wk. She plans to cut back on sweets. She had recent visitors.      Left shoulder adhesive capsulitis - Pt in PTx.      Ptosis - Pt was offered Sx by [facility] but defers for now. Not affecting her vision at present.     h/o COVID -19 - 5/14/22 sp infusion 5/17/22.    Note- Pt had a friend who recently was dx with Endometrial Ca and would like a referral to GYN for a Gyn exam for reassurance.      HCM - Flu - 10/14/21;  Tdap - 9/3/15;  PCV 13 - 10/10/16;  PVX 23 - 10/16/17;  Shingrix -8/26/19 and 11/5/19 ;  Zostavax - 7/30/13; COVID -19 Vaccine (Pfizer)  #1 - 2/202/1 ; #2 - 3/13/21; and # 3 - 10/14/21; MGM - 1/5/22-repeat 1 yr - sched for Seth;  DEXA - 7/7/20 -neg ;  PAP - no longer gets; Hep C Screen - 10/3/16 - neg;   C-scope -6/6/17 - repeat 10 yrs ;  Prev PCP - Dr. Nelson; EP - Dr Sanchez; Prev Cards - Dr. De Leon; Derm - Dr. Lan; Ophtho - Dr. Louie; Retina Sp - Dr. Yepez; Derm - Dr. Younger     Patient Care Team:  Amber Dorsey MD as PCP - General (Internal Medicine)  Brigitte Marvin RD as Dietitian (Nutrition)  Tobi Louie MD (Ophthalmology)  Katherine Schafer MA as Care Coordinator     Health Maintenance:  Immunization History   Administered Date(s) Administered    COVID-19, MRNA, LN-S, PF (Pfizer) (Purple Cap) 02/20/2021, 03/13/2021, 10/14/2021    Influenza 1950, 10/05/2015    Influenza (FLUAD) - Quadrivalent - Adjuvanted - PF *Preferred* (65+) 10/14/2021    Influenza - High Dose - PF (65 years and older) 10/10/2016, 09/19/2017, 09/26/2018, 09/12/2019    Influenza - Quadrivalent - High Dose - PF (65 years and older) 09/21/2020    Influenza A (H1N1) 2009 Monovalent - IM 01/18/2010    Pneumococcal Conjugate - 13 Valent 10/10/2016    Pneumococcal Polysaccharide - 23 Valent 10/16/2017    Tdap 09/03/2015    Zoster 07/30/2013    Zoster Recombinant 08/26/2019, 11/05/2019      Health Maintenance   Topic Date Due    Foot Exam  11/09/2021    Hemoglobin A1c  07/05/2022    Mammogram  01/05/2023    Lipid Panel  01/05/2023    Eye Exam  02/14/2023    High Dose Statin  06/28/2023    DEXA Scan  07/07/2023    TETANUS VACCINE  09/03/2025    Hepatitis C Screening  Completed        Past Medical History:  Past Medical History:   Diagnosis Date    Allergy     Anticoagulant long-term use     Hyperlipidemia     Hypertension associated with diabetes     Hypothyroid     Obesity     Rosacea        Past Surgical History:   has a past surgical history that includes Colonoscopy (7/6/07); Cholecystectomy; Hysteroscopy; Dilation and curettage of uterus; Colonoscopy (N/A, 6/6/2017); Treatment of cardiac arrhythmia (N/A, 9/26/2019); and Treatment of cardiac arrhythmia (N/A, 1/9/2020).    Family History:  family  "history includes Allergies in her mother; Atrial fibrillation in her father; Breast cancer (age of onset: 42) in her mother; Cancer in her maternal grandfather; Diabetes in her father, maternal aunt, and maternal grandmother; Heart disease in her father; Hyperlipidemia in her father; Hypertension in her father; Hypothyroidism in her father; No Known Problems in her brother and sister; Stroke in her father.     Social History:  Social History     Tobacco Use    Smoking status: Never Smoker    Smokeless tobacco: Never Used   Substance Use Topics    Alcohol use: Yes     Alcohol/week: 2.0 standard drinks     Types: 2 Glasses of wine per week     Comment: weekends     Drug use: Never       Review of Systems   Constitutional: Negative for chills, diaphoresis and fever.   HENT: Negative for hearing loss.    Eyes: Negative for visual disturbance.   Respiratory: Negative for chest tightness and shortness of breath.    Cardiovascular: Negative for chest pain and palpitations.   Gastrointestinal: Positive for diarrhea. Negative for abdominal pain, constipation, nausea and vomiting.        Off and on.  "It's not really bad".    Endocrine: Negative for cold intolerance and heat intolerance.   Genitourinary: Negative for dysuria and frequency.   Musculoskeletal: Negative for arthralgias and myalgias.   Neurological: Negative for dizziness and headaches.   Psychiatric/Behavioral: Negative for dysphoric mood. The patient is not nervous/anxious.         Medications:    Current Outpatient Medications:     ascorbic acid, vitamin C, (VITAMIN C) 100 MG tablet, Take 100 mg by mouth once daily., Disp: , Rfl:     augmented betamethasone dipropionate (DIPROLENE-AF) 0.05 % cream, Apply to elbows BID prn scaling., Disp: 50 g, Rfl: 2    CINNAMON BARK (CINNAMON ORAL), Take by mouth once daily. Pt taking 2 pills daily., Disp: , Rfl:     dronedarone (MULTAQ) 400 mg Tab, TAKE 1 TABLET(400 MG) BY MOUTH TWICE DAILY WITH MEALS, Disp: 60 " "tablet, Rfl: 11    ELIQUIS 5 mg Tab, TAKE 1 TABLET(5 MG) BY MOUTH TWICE DAILY, Disp: 60 tablet, Rfl: 6    fish oil-omega-3 fatty acids 300-1,000 mg capsule, Take 2 g by mouth once daily., Disp: , Rfl:     metroNIDAZOLE (NORITATE) 1 % cream, Compound azelaic acid 15% + ivermectin 1% + metronidazole 1% cream. Apply to face once or twice daily., Disp: 30 g, Rfl: 5    MV,CA,MIN/IRON/FA/GUARANA/CAFF (ONE-A-DAY WOMEN'S ACTIVE ORAL), Take by mouth., Disp: , Rfl:     rosuvastatin (CRESTOR) 10 MG tablet, TAKE 1 TABLET(10 MG) BY MOUTH EVERY DAY, Disp: 90 tablet, Rfl: 1    SYNTHROID 150 mcg tablet, TAKE 1 TABLET(150 MCG) BY MOUTH BEFORE BREAKFAST, Disp: 90 tablet, Rfl: 0    vitamin D (VITAMIN D3) 1000 units Tab, Take 1,000 Units by mouth once daily., Disp: , Rfl:     metFORMIN (GLUCOPHAGE-XR) 500 MG ER 24hr tablet, Take 2 tablets (1,000 mg total) by mouth 2 (two) times daily with meals., Disp: 360 tablet, Rfl: 1    metoprolol succinate (TOPROL-XL) 50 MG 24 hr tablet, Take 1 tablet (50 mg total) by mouth once daily., Disp: 90 tablet, Rfl: 1    OPW TEST CLAIM - DO NOT FILL, OPW test claim. Do not fill. (Patient not taking: Reported on 6/28/2022), Disp: 30 each, Rfl: 0  No current facility-administered medications for this visit.    Facility-Administered Medications Ordered in Other Visits:     0.9%  NaCl infusion, , Intravenous, Continuous, Steffi Christian NP, New Bag at 09/26/19 0913    sodium chloride 0.9% flush 5 mL, 5 mL, Intravenous, PRN, Steffi Christian NP     Allergies:  Review of patient's allergies indicates:   Allergen Reactions    Levaquin [levofloxacin] Hives    Tetracycline Hives       Physical Exam      Vital Signs  Temp: 98 °F (36.7 °C)  Pulse: 60  Resp: 18  SpO2: 98 %  BP: 124/80  BP Location: Right arm  Patient Position: Sitting  Pain Score: 0-No pain  Height and Weight  Height: 5' 6" (167.6 cm)  Weight: 90 kg (198 lb 6.6 oz)  BSA (Calculated - sq m): 2.05 sq meters  BMI (Calculated): " 32  Weight in (lb) to have BMI = 25: 154.6      Patient Position: Sitting      Physical Exam  Vitals reviewed.   Constitutional:       General: She is not in acute distress.     Appearance: Normal appearance. She is not ill-appearing, toxic-appearing or diaphoretic.   HENT:      Head: Normocephalic and atraumatic.      Right Ear: Tympanic membrane normal.      Left Ear: Tympanic membrane normal.   Eyes:      Extraocular Movements: Extraocular movements intact.      Conjunctiva/sclera: Conjunctivae normal.      Pupils: Pupils are equal, round, and reactive to light.   Neck:      Vascular: No carotid bruit.   Cardiovascular:      Rate and Rhythm: Normal rate and regular rhythm.      Pulses: Normal pulses.      Heart sounds: Normal heart sounds.   Pulmonary:      Effort: No respiratory distress.      Breath sounds: Normal breath sounds. No wheezing.   Abdominal:      General: Bowel sounds are normal. There is no distension.      Palpations: Abdomen is soft.      Tenderness: There is no abdominal tenderness. There is no guarding or rebound.   Musculoskeletal:         General: Normal range of motion.   Skin:     General: Skin is warm and dry.   Neurological:      General: No focal deficit present.      Mental Status: She is alert and oriented to person, place, and time.   Psychiatric:         Mood and Affect: Mood normal.         Behavior: Behavior normal.          Laboratory:  CBC:  Recent Labs   Lab 04/29/20  1058 10/12/20  0953 01/11/22  1544   WBC 7.36 6.33 6.59   RBC 4.38 4.28 4.43   Hemoglobin 13.5 13.1 13.5   Hematocrit 43.4 42.9 42.6   Platelets 239 243 249   MCV 99 H 100 H 96   MCH 30.8 30.6 30.5   MCHC 31.1 L 30.5 L 31.7 L       CMP:  Recent Labs   Lab 10/12/20  0952 04/07/21  1216 01/05/22  1215   Glucose 118 H 129 H 122 H   Calcium 9.4 10.0 10.2   Albumin 3.7 4.0 3.7   Total Protein 6.5 6.8 6.6   Sodium 139 139 139   Potassium 4.2 4.4 4.4   CO2 28 28 28   Chloride 104 102 104   BUN 12 9 10   Creatinine 0.8  0.8 0.8   Alkaline Phosphatase 50 L 47 L 52 L   ALT 22 16 19   AST 18 18 19   Total Bilirubin 0.5 0.7 0.9       URINALYSIS:  Recent Labs   Lab 02/21/20  1210 06/30/20  1435 11/09/20  1418   Color, UA Yellow Yellow Yellow   Clarity, UA  --   --  Clear   Specific Gravity, UA 1.015 1.010  --    Spec Grav UA  --   --  1.015   pH, UA 6.0 6.0 5   Protein, UA 2+ A Negative  --    Bacteria Rare  --   --    Nitrite, UA Negative Negative neg   Leukocytes, UA 2+ A Negative  --    Urobilinogen, UA  --   --  normal   Hyaline Casts, UA 0  --   --         LIPIDS:  Recent Labs   Lab 04/29/20  1058 06/04/20  1122 10/12/20  0952 11/06/20  1116 12/09/20  1119 05/10/21  1302 01/05/22  1215   TSH 29.458 H   < > 14.694 H 4.642 H 3.213 1.978  --    HDL 40  --  46  --   --   --  48   Cholesterol 96 L  --  110 L  --   --   --  105 L   Triglycerides 80  --  109  --   --   --  94   LDL Cholesterol 40.0 L  --  42.2 L  --   --   --  38.2 L   HDL/Cholesterol Ratio 41.7  --  41.8  --   --   --  45.7   Non-HDL Cholesterol 56  --  64  --   --   --  57   Total Cholesterol/HDL Ratio 2.4  --  2.4  --   --   --  2.2    < > = values in this interval not displayed.       TSH:  Recent Labs   Lab 11/06/20  1116 12/09/20  1119 05/10/21  1302   TSH 4.642 H 3.213 1.978       A1C:  Recent Labs   Lab 08/19/19  1208 10/22/19  1105 04/29/20  1058 10/12/20  0953 04/07/21  1216 01/05/22  1215   Hemoglobin A1C 6.6 H 6.5 H 6.2 H 6.2 H 6.4 H 6.3 H       Urine Microalbumin/Cr:  Recent Labs   Lab 06/30/20  1435 04/07/21  1317 01/05/22  1223   Microalb/Creat Ratio 8.3 17.1 21.9       Assessment/Plan     Bebe Valdez is a 71 y.o.female with:    Atrial fibrillation, unspecified type  -     metoprolol succinate (TOPROL-XL) 50 MG 24 hr tablet; Take 1 tablet (50 mg total) by mouth once daily.  Dispense: 90 tablet; Refill: 1  - Stable.  Cont current regimen.    Type 2 diabetes mellitus with hypercholesterolemia  -     metFORMIN (GLUCOPHAGE-XR) 500 MG ER 24hr tablet; Take 2  "tablets (1,000 mg total) by mouth 2 (two) times daily with meals.  Dispense: 360 tablet; Refill: 1  -     Comprehensive Metabolic Panel; Future; Expected date: 06/28/2022  -     Hemoglobin A1C; Future; Expected date: 06/28/2022  - Controlled.  Cont current.     Hypothyroidism, unspecified type  -     TSH; Future; Expected date: 06/28/2022  -     T4, Free; Future; Expected date: 06/28/2022  - Stable.  Cont current regimen. Repeat TFT"s.     Aortic atherosclerosis  - Stable.  Cont current regimen.    Class 1 obesity due to excess calories with serious comorbidity and body mass index (BMI) of 32.0 to 32.9 in adult  - Rec diet and exercise as discussed for wt loss.      Encounter for gynecological examination  -     Ambulatory referral/consult to Obstetrics / Gynecology; Future; Expected date: 07/05/2022        Chronic conditions status updated as per HPI.  Other than changes above, cont current medications and maintain follow up with specialists.  Follow up in about 6 months (around 12/28/2022) for fu chronic issues or sooner if needed.      Amber Dorsey MD  [facility]                "

## 2022-06-28 ENCOUNTER — OFFICE VISIT (OUTPATIENT)
Dept: PRIMARY CARE CLINIC | Facility: CLINIC | Age: 72
End: 2022-06-28
Payer: MEDICARE

## 2022-06-28 ENCOUNTER — LAB VISIT (OUTPATIENT)
Dept: LAB | Facility: HOSPITAL | Age: 72
End: 2022-06-28
Attending: INTERNAL MEDICINE
Payer: MEDICARE

## 2022-06-28 VITALS
HEART RATE: 60 BPM | RESPIRATION RATE: 18 BRPM | HEIGHT: 66 IN | DIASTOLIC BLOOD PRESSURE: 80 MMHG | WEIGHT: 198.44 LBS | BODY MASS INDEX: 31.89 KG/M2 | OXYGEN SATURATION: 98 % | SYSTOLIC BLOOD PRESSURE: 124 MMHG | TEMPERATURE: 98 F

## 2022-06-28 DIAGNOSIS — Z01.419 ENCOUNTER FOR GYNECOLOGICAL EXAMINATION: ICD-10-CM

## 2022-06-28 DIAGNOSIS — E78.00 TYPE 2 DIABETES MELLITUS WITH HYPERCHOLESTEROLEMIA: ICD-10-CM

## 2022-06-28 DIAGNOSIS — E11.69 TYPE 2 DIABETES MELLITUS WITH HYPERCHOLESTEROLEMIA: ICD-10-CM

## 2022-06-28 DIAGNOSIS — I48.91 ATRIAL FIBRILLATION, UNSPECIFIED TYPE: Primary | ICD-10-CM

## 2022-06-28 DIAGNOSIS — E03.9 HYPOTHYROIDISM, UNSPECIFIED TYPE: ICD-10-CM

## 2022-06-28 DIAGNOSIS — E66.09 CLASS 1 OBESITY DUE TO EXCESS CALORIES WITH SERIOUS COMORBIDITY AND BODY MASS INDEX (BMI) OF 32.0 TO 32.9 IN ADULT: ICD-10-CM

## 2022-06-28 DIAGNOSIS — I70.0 AORTIC ATHEROSCLEROSIS: ICD-10-CM

## 2022-06-28 LAB
ALBUMIN SERPL BCP-MCNC: 3.7 G/DL (ref 3.5–5.2)
ALP SERPL-CCNC: 44 U/L (ref 55–135)
ALT SERPL W/O P-5'-P-CCNC: 18 U/L (ref 10–44)
ANION GAP SERPL CALC-SCNC: 9 MMOL/L (ref 8–16)
AST SERPL-CCNC: 19 U/L (ref 10–40)
BILIRUB SERPL-MCNC: 0.6 MG/DL (ref 0.1–1)
BUN SERPL-MCNC: 11 MG/DL (ref 8–23)
CALCIUM SERPL-MCNC: 10.2 MG/DL (ref 8.7–10.5)
CHLORIDE SERPL-SCNC: 107 MMOL/L (ref 95–110)
CO2 SERPL-SCNC: 25 MMOL/L (ref 23–29)
CREAT SERPL-MCNC: 0.7 MG/DL (ref 0.5–1.4)
EST. GFR  (AFRICAN AMERICAN): >60 ML/MIN/1.73 M^2
EST. GFR  (NON AFRICAN AMERICAN): >60 ML/MIN/1.73 M^2
ESTIMATED AVG GLUCOSE: 134 MG/DL (ref 68–131)
GLUCOSE SERPL-MCNC: 90 MG/DL (ref 70–110)
HBA1C MFR BLD: 6.3 % (ref 4–5.6)
POTASSIUM SERPL-SCNC: 4.6 MMOL/L (ref 3.5–5.1)
PROT SERPL-MCNC: 6.5 G/DL (ref 6–8.4)
SODIUM SERPL-SCNC: 141 MMOL/L (ref 136–145)
T4 FREE SERPL-MCNC: 0.93 NG/DL (ref 0.71–1.51)
TSH SERPL DL<=0.005 MIU/L-ACNC: 7.77 UIU/ML (ref 0.4–4)

## 2022-06-28 PROCEDURE — 99214 OFFICE O/P EST MOD 30 MIN: CPT | Mod: S$PBB,,, | Performed by: INTERNAL MEDICINE

## 2022-06-28 PROCEDURE — 99214 OFFICE O/P EST MOD 30 MIN: CPT | Mod: PBBFAC,PN | Performed by: INTERNAL MEDICINE

## 2022-06-28 PROCEDURE — 84439 ASSAY OF FREE THYROXINE: CPT | Performed by: INTERNAL MEDICINE

## 2022-06-28 PROCEDURE — 99999 PR PBB SHADOW E&M-EST. PATIENT-LVL IV: CPT | Mod: PBBFAC,,, | Performed by: INTERNAL MEDICINE

## 2022-06-28 PROCEDURE — 99214 PR OFFICE/OUTPT VISIT, EST, LEVL IV, 30-39 MIN: ICD-10-PCS | Mod: S$PBB,,, | Performed by: INTERNAL MEDICINE

## 2022-06-28 PROCEDURE — 99999 PR PBB SHADOW E&M-EST. PATIENT-LVL IV: ICD-10-PCS | Mod: PBBFAC,,, | Performed by: INTERNAL MEDICINE

## 2022-06-28 PROCEDURE — 83036 HEMOGLOBIN GLYCOSYLATED A1C: CPT | Performed by: INTERNAL MEDICINE

## 2022-06-28 PROCEDURE — 36415 COLL VENOUS BLD VENIPUNCTURE: CPT | Mod: PN | Performed by: INTERNAL MEDICINE

## 2022-06-28 PROCEDURE — 80053 COMPREHEN METABOLIC PANEL: CPT | Performed by: INTERNAL MEDICINE

## 2022-06-28 PROCEDURE — 84443 ASSAY THYROID STIM HORMONE: CPT | Performed by: INTERNAL MEDICINE

## 2022-06-28 RX ORDER — METOPROLOL SUCCINATE 50 MG/1
50 TABLET, EXTENDED RELEASE ORAL DAILY
Qty: 90 TABLET | Refills: 1 | Status: SHIPPED | OUTPATIENT
Start: 2022-06-28 | End: 2022-12-13 | Stop reason: SDUPTHER

## 2022-06-28 RX ORDER — METFORMIN HYDROCHLORIDE 500 MG/1
1000 TABLET, EXTENDED RELEASE ORAL 2 TIMES DAILY WITH MEALS
Qty: 360 TABLET | Refills: 1 | Status: SHIPPED | OUTPATIENT
Start: 2022-06-28 | End: 2022-09-07

## 2022-07-04 DIAGNOSIS — E03.9 HYPOTHYROIDISM, UNSPECIFIED TYPE: Primary | ICD-10-CM

## 2022-07-05 ENCOUNTER — TELEPHONE (OUTPATIENT)
Dept: PRIMARY CARE CLINIC | Facility: CLINIC | Age: 72
End: 2022-07-05
Payer: MEDICARE

## 2022-07-05 NOTE — PROGRESS NOTES
Please inform pt -   I reviewed your labs.  Your TSH is elevated at 7.769 and your free T4 Is normal on Brand synthroid 150 mcg/d. Have you  missed any doses and if so how often?  If not,  I would like to increase your regimen to 175 mcg/d and repeat the Thyroid functions in 6 wks. Your kidney and liver functions look good.  Your Ha1c was stable at 6.3.     Dr. ROBERT

## 2022-07-05 NOTE — TELEPHONE ENCOUNTER
----- Message from Amber Dorsey MD sent at 7/4/2022  9:53 PM CDT -----  Please inform pt -   I reviewed your labs.  Your TSH is elevated at 7.769 and your free T4 Is normal on Brand synthroid 150 mcg/d. Have you  missed any doses and if so how often?  If not,  I would like to increase your regimen to 175 mcg/d and repeat the Thyroid functions in 6 wks. Your kidney and liver functions look good.  Your Ha1c was stable at 6.3.     Dr. ROBERT

## 2022-07-13 ENCOUNTER — TELEPHONE (OUTPATIENT)
Dept: PRIMARY CARE CLINIC | Facility: CLINIC | Age: 72
End: 2022-07-13
Payer: MEDICARE

## 2022-07-13 ENCOUNTER — PATIENT MESSAGE (OUTPATIENT)
Dept: PRIMARY CARE CLINIC | Facility: CLINIC | Age: 72
End: 2022-07-13
Payer: MEDICARE

## 2022-07-13 RX ORDER — LEVOTHYROXINE SODIUM 175 UG/1
175 TABLET ORAL
Qty: 90 TABLET | Refills: 0 | Status: SHIPPED | OUTPATIENT
Start: 2022-07-13 | End: 2022-09-01 | Stop reason: SDUPTHER

## 2022-07-13 NOTE — TELEPHONE ENCOUNTER
----- Message from Rufina Bell sent at 7/12/2022  4:56 PM CDT -----  Contact: Pt [phone]  Patient is returning a phone call.  Who left a message for the patient: Rosette Ortiz MA   Does patient know what this is regarding:  Test Results   Would you like a call back, or a response through your [facility] portal?:   call back   Comments:

## 2022-07-21 PROBLEM — R29.898 DECREASED STRENGTH OF UPPER EXTREMITY: Status: RESOLVED | Noted: 2020-12-02 | Resolved: 2022-07-21

## 2022-07-21 PROBLEM — U07.1 COVID: Status: RESOLVED | Noted: 2022-05-14 | Resolved: 2022-07-21

## 2022-07-21 PROBLEM — M25.612 IMPAIRED RANGE OF MOTION OF LEFT SHOULDER: Status: RESOLVED | Noted: 2020-12-02 | Resolved: 2022-07-21

## 2022-07-21 PROBLEM — N89.8 VAGINAL LESION: Status: RESOLVED | Noted: 2021-04-14 | Resolved: 2022-07-21

## 2022-07-21 PROBLEM — Z86.16 PERSONAL HISTORY OF COVID-19: Status: ACTIVE | Noted: 2022-07-21

## 2022-07-21 PROBLEM — R29.3 POSTURAL IMBALANCE: Status: RESOLVED | Noted: 2020-12-02 | Resolved: 2022-07-21

## 2022-07-21 PROBLEM — M25.619 DECREASED RANGE OF MOTION (ROM) OF SHOULDER: Status: RESOLVED | Noted: 2019-05-15 | Resolved: 2022-07-21

## 2022-07-26 ENCOUNTER — OFFICE VISIT (OUTPATIENT)
Dept: INTERNAL MEDICINE | Facility: CLINIC | Age: 72
End: 2022-07-26
Payer: MEDICARE

## 2022-07-26 ENCOUNTER — APPOINTMENT (OUTPATIENT)
Dept: RADIOLOGY | Facility: CLINIC | Age: 72
End: 2022-07-26
Attending: NURSE PRACTITIONER
Payer: MEDICARE

## 2022-07-26 VITALS
BODY MASS INDEX: 31.66 KG/M2 | HEIGHT: 66 IN | SYSTOLIC BLOOD PRESSURE: 130 MMHG | RESPIRATION RATE: 15 BRPM | DIASTOLIC BLOOD PRESSURE: 80 MMHG | OXYGEN SATURATION: 98 % | HEART RATE: 60 BPM | WEIGHT: 197 LBS

## 2022-07-26 DIAGNOSIS — I50.22 CHRONIC SYSTOLIC HEART FAILURE: ICD-10-CM

## 2022-07-26 DIAGNOSIS — E78.00 TYPE 2 DIABETES MELLITUS WITH HYPERCHOLESTEROLEMIA: ICD-10-CM

## 2022-07-26 DIAGNOSIS — Z79.01 CHRONIC ANTICOAGULATION: ICD-10-CM

## 2022-07-26 DIAGNOSIS — E03.9 HYPOTHYROIDISM, UNSPECIFIED TYPE: ICD-10-CM

## 2022-07-26 DIAGNOSIS — M85.89 OTHER SPECIFIED DISORDERS OF BONE DENSITY AND STRUCTURE, MULTIPLE SITES: ICD-10-CM

## 2022-07-26 DIAGNOSIS — Z00.00 ENCOUNTER FOR PREVENTIVE HEALTH EXAMINATION: Primary | ICD-10-CM

## 2022-07-26 DIAGNOSIS — Z13.820 SCREENING FOR OSTEOPOROSIS: ICD-10-CM

## 2022-07-26 DIAGNOSIS — R29.890 LOSS OF HEIGHT: ICD-10-CM

## 2022-07-26 DIAGNOSIS — E11.59 OBESITY, DIABETES, AND HYPERTENSION SYNDROME: ICD-10-CM

## 2022-07-26 DIAGNOSIS — E66.09 CLASS 1 OBESITY DUE TO EXCESS CALORIES WITH SERIOUS COMORBIDITY AND BODY MASS INDEX (BMI) OF 31.0 TO 31.9 IN ADULT: ICD-10-CM

## 2022-07-26 DIAGNOSIS — Z86.16 PERSONAL HISTORY OF COVID-19: ICD-10-CM

## 2022-07-26 DIAGNOSIS — E66.9 OBESITY, DIABETES, AND HYPERTENSION SYNDROME: ICD-10-CM

## 2022-07-26 DIAGNOSIS — I48.19 PERSISTENT ATRIAL FIBRILLATION: ICD-10-CM

## 2022-07-26 DIAGNOSIS — E11.69 OBESITY, DIABETES, AND HYPERTENSION SYNDROME: ICD-10-CM

## 2022-07-26 DIAGNOSIS — Z92.89 HISTORY OF CARDIOVERSION: ICD-10-CM

## 2022-07-26 DIAGNOSIS — I15.2 OBESITY, DIABETES, AND HYPERTENSION SYNDROME: ICD-10-CM

## 2022-07-26 DIAGNOSIS — I70.0 AORTIC ATHEROSCLEROSIS: ICD-10-CM

## 2022-07-26 DIAGNOSIS — E11.69 TYPE 2 DIABETES MELLITUS WITH HYPERCHOLESTEROLEMIA: ICD-10-CM

## 2022-07-26 PROCEDURE — 77080 DXA BONE DENSITY AXIAL: CPT | Mod: 26,,, | Performed by: INTERNAL MEDICINE

## 2022-07-26 PROCEDURE — 77080 DXA BONE DENSITY AXIAL: CPT | Mod: TC,PO

## 2022-07-26 PROCEDURE — 99215 OFFICE O/P EST HI 40 MIN: CPT | Mod: PBBFAC,PO,25 | Performed by: NURSE PRACTITIONER

## 2022-07-26 PROCEDURE — G0439 PR MEDICARE ANNUAL WELLNESS SUBSEQUENT VISIT: ICD-10-PCS | Mod: ,,, | Performed by: NURSE PRACTITIONER

## 2022-07-26 PROCEDURE — 77080 DEXA BONE DENSITY SPINE HIP: ICD-10-PCS | Mod: 26,,, | Performed by: INTERNAL MEDICINE

## 2022-07-26 PROCEDURE — 99999 PR PBB SHADOW E&M-EST. PATIENT-LVL V: ICD-10-PCS | Mod: PBBFAC,,, | Performed by: NURSE PRACTITIONER

## 2022-07-26 PROCEDURE — G0439 PPPS, SUBSEQ VISIT: HCPCS | Mod: ,,, | Performed by: NURSE PRACTITIONER

## 2022-07-26 PROCEDURE — 99999 PR PBB SHADOW E&M-EST. PATIENT-LVL V: CPT | Mod: PBBFAC,,, | Performed by: NURSE PRACTITIONER

## 2022-07-26 NOTE — PATIENT INSTRUCTIONS
Counseling and Referral of Other Preventative  (Italic type indicates deductible and co-insurance are waived)    Patient Name: Bebe Valdez  Today's Date: 7/26/2022    Health Maintenance         Date Due Completion Date    COVID-19 Vaccine (4 - Booster for Pfizer series) 08/02/2022  10/14/2021    Influenza Vaccine (1) 09/01/2022   10/14/2021    Hemoglobin A1c 12/28/2022 6/28/2022    Diabetes Urine Screening 01/05/2023 1/5/2022    Mammogram 01/05/2023 1/5/2022    Lipid Panel 01/05/2023 1/5/2022    Eye Exam 02/14/2023 2/14/2022    Foot Exam 06/28/2023 6/28/2022 (Done)    DEXA Scan due   7/7/2020    High Dose Statin 07/26/2023 7/26/2022    TETANUS VACCINE 09/03/2025   9/3/2015    Colorectal Cancer Screening    Gradual weight loss    06/06/2027 6/6/2017              Orders Placed This Encounter   Procedures    DXA Bone Density Spine And Hip     The following information is provided to all patients.  This information is to help you find resources for any of the problems found today that may be affecting your health:                Living healthy guide: www.[facility].[place].gov      Understanding Diabetes: www.diabetes.org      Eating healthy: www.cdc.gov/healthyweight      CDC home safety checklist: www.cdc.gov/steadi/patient.html      Agency on Aging: www.goea.[place].[facility]      Alcoholics anonymous (AA): www.aa.org      Physical Activity: www.sruthi.nih.gov/yt5axro      Tobacco use: www.quitwithusla.org

## 2022-07-26 NOTE — PROGRESS NOTES
"Bebe Valdez presented for a  Medicare AWV and comprehensive Health Risk Assessment today. The following components were reviewed and updated:    · Medical history  · Family History  · Social history  · Allergies and Current Medications  · Health Risk Assessment  · Health Maintenance  · Care Team     ** See Completed Assessments for Annual Wellness Visit within the encounter summary.**       The following assessments were completed:  · Living Situation  · CAGE  · Depression Screening  · Timed Get Up and Go  · Whisper Test  · Cognitive Function Screening  · Nutrition Screening  · ADL Screening  · PAQ Screening    Vitals:    07/26/22 1402   BP: 130/80   Pulse: 60   Resp: 15   SpO2: 98%   Weight: 89.4 kg (197 lb)   Height: 5' 6" (1.676 m)     Body mass index is 31.8 kg/m².  Physical Exam  Vitals and nursing note reviewed.   Constitutional:       Appearance: She is well-developed.   HENT:      Head: Normocephalic.   Cardiovascular:      Rate and Rhythm: Normal rate and regular rhythm.      Heart sounds: Normal heart sounds.   Pulmonary:      Effort: Pulmonary effort is normal.   Abdominal:      General: Bowel sounds are normal.      Palpations: Abdomen is soft. There is no mass.   Musculoskeletal:         General: Normal range of motion.   Neurological:      Mental Status: She is alert and oriented to person, place, and time.      Motor: No abnormal muscle tone.   Psychiatric:         Mood and Affect: Mood normal.         Behavior: Behavior normal.         Thought Content: Thought content normal.         Judgment: Judgment normal.           Lab Results   Component Value Date    HGBA1C 6.3 (H) 06/28/2022    CHOL 105 (L) 01/05/2022    HDL 48 01/05/2022    LDLCALC 38.2 (L) 01/05/2022    TRIG 94 01/05/2022    CHOLHDL 45.7 01/05/2022      Results for orders placed in visit on 07/07/20    DXA Bone Density Spine And Hip    Narrative  EXAMINATION:  DEXA BONE DENSITY SPINE HIP    CLINICAL HISTORY:  Other specified disorders of " bone density and structure, other site.  68 y/o  y/o Female with no history of fractures.  She had menopausal symptoms at 49 y/o.  She is taking 2,000 units of Vit D supplements.  She has a history of Diabetes.  She exercises 3 times a week and does not smoke.    TECHNIQUE:  DXA Specification: Reonomy [id number]    Bone Mineral Density scanning was performed over the hip and lumbar spine. Review of the images confirms satisfactory positioning and technique.    COMPARISON:  Comparison study done on 10/17/2016.    Lumbar spine:  BMD 1.147 g/cm2 and T-score 0.9.    Total Hip:        BMD 0.958 g/cm2 and T-score 0.1.    FINDINGS:  Lumbar spine (L1-L4):   BMD is 1.227 g/cm2, T-score is 1.6, and Z-score is 3.7.    Total hip:                    BMD is 0.933 g/cm2, T-score is -0.1, and Z-score is 1.4.    Femoral neck:              BMD is 0.748 g/cm2, T-score is -0.9, and Z-score is 0.8.    There is a 8.3% risk of a major osteoporotic fracture and a 0.7% risk of hip fracture in the next 10 years (FRAX).    Compared with previous DXA, BMD at the lumbar spine has increased by 7%, and the BMD at the total hip has remained stable.    Impression  1. Elevated BMD of the lumbar spine.  Elevated BMD may be associated with elevated BMI.  RECOMMENDATIONS of [facility]:    1. Recommend daily calcium intake 8333-9080 mg, dietary sources preferred; Vitamin D 8916-2620 IU daily.  2. Adequate exercise and fall precautions.  3. With significant increase in lumbar spine BMD would consider x-ray of the thoracolumbar spine to evaluate for vertebral fracture as the cause of increase in bone density.  4. Repeat BMD in 2-4 years based on findings of thoracolumbar spine x-rays.  EXPLANATION OF RESULTS:    The T-score compares these results to the average bone density of a 20 year-old of the same gender. The Z-score compares this result to the average bone density to people of the same age,  gender, and race. The amounts indicate the number of standard deviations above or below the mean.    * Osteoporosis is generally defined as having a T-score between less than or equal to -2.5.    * Osteopenia is generally defined as having a T-score between -1.0 and -2.5.    * The normal range is generally defined as having a t-score greater than or equal to -1.0.    * Calculated FRAX scores for fracture risk prediction may not be accurate in the setting of certain clinical factors such as pharmacologic therapy for osteoporosis, prior fragility fractures, high dose glucocorticoid use etc.      Electronically signed by: Jackie Curiel MD  Date:    07/15/2020  Time:    08:46    Results for orders placed during the hospital encounter of 09/12/12    Mammo Digital Screening Bilateral With CAD    Narrative  The present examination has been compared to prior imaging studies dated  09/12/2011.    BILATERAL MAMMOGRAM FINDINGS:  There are scattered fibroglandular densities.    No significant abnormalities are seen.    These images  were processed to produce digital images analyzed for potential  abnormalities.    IMPRESSION:    There is no mammographic evidence of malignancy.    Mammogram in 1 year is recommended.    ACR BI-RADS Category 1: Negative      SANJAY JOSEPH, KERRY  09/12/2012                Diagnoses and health risks identified today and associated recommendations/orders:    1. Aortic atherosclerosis  Stable- followed by arrhythmia/cardiology    2. Chronic anticoagulation  Stable- followed by arrhythmia/cardiology      3. Chronic systolic heart failure, in setting of synthroid mediated hyperthyroidism and persistent atrial fibrillation  Stable- followed by arrhythmia/cardiology    4. History of cardioversion  Stable- followed by arrhythmia/cardiology    5. Hypothyroidism, unspecified type  Stable- followed by PCP    6. Obesity, diabetes, and hypertension syndrome - HTN disputed by pt  Stable- followed by  PCP    7. Persistent atrial fibrillation  Stable- followed by arrhythmia/cardiology    8. Type 2 diabetes mellitus with hypercholesterolemia  Stable- followed by PCP    9. Personal history of COVID-19 on 5/14/2022  Stable- followed by PCP    10. Class 1 obesity due to excess calories with serious comorbidity and body mass index (BMI) of 31.0 to 31.9 in adult  Chronic. Followed by PCP.   Centers for Disease Control and Prevention (CDC)  weight recommendations for current BMI & ideal BMI range discussed with patient.  Recommended  gradual weight loss,  diabetic diet , no added salt diet structured, regular exercise every day.      11. Screening for osteoporosis  Stable- followed by PCP  - DXA Bone Density Spine And Hip; Future    12. Loss of height  Stable- followed by PCP  - DXA Bone Density Spine And Hip; Future    13. Other specified disorders of bone density and structure, multiple sites   Stable- followed by PCP  - DXA Bone Density Spine And Hip; Future    14. Encounter for preventive health examination  Here for Health Risk Assessment/Annual Wellness Visit.  Health maintenance reviewed and updated. Follow up in one year.         Provided Bebe with a 5-10 year written screening schedule and personal prevention plan. Recommendations were developed using the USPSTF age appropriate recommendations. Education, counseling, and referrals were provided as needed. After Visit Summary printed and given to patient which includes a list of additional screenings\tests needed. Voiced concerns over increased thyroid RX dose & H/O  afib with thyroid med- deferred to discuss with her PCP. No home BS monitoring.On eliquis-denies bleeding. DXA ordered.     Follow up in about 1 year (around 7/26/2023) for HRA.    Ángela Berg NP I offered to discuss advanced care planning, including how to pick a person who would make decisions for you if you were unable to make them for yourself, called a health care power of ,  and what kind of decisions you might make such as use of life sustaining treatments such as ventilators and tube feeding when faced with a life limiting illness recorded on a living will that they will need to know. (How you want to be cared for as you near the end of your natural life)     X  Patient has advanced directives written and agrees to provide copies to the institution.

## 2022-07-27 ENCOUNTER — PATIENT MESSAGE (OUTPATIENT)
Dept: PRIMARY CARE CLINIC | Facility: CLINIC | Age: 72
End: 2022-07-27
Payer: MEDICARE

## 2022-07-28 NOTE — TELEPHONE ENCOUNTER
Thanks for the update.  We will monitor closely and as long as we don't cause you to be hyperthyroidism (too much medication) we should be good.    Dr. ROBERT

## 2022-07-28 NOTE — TELEPHONE ENCOUNTER
I think at this point it is more of a personal decision as I am unaware of when the new vaccine will be coming out and be available.     Dr. ROBERT

## 2022-08-05 NOTE — PROGRESS NOTES
Ms. Valdez is a patient of Dr. Sanchez and was last seen in clinic 1/11/2022.      Subjective:   Patient ID:  Bebe Valdez is a 71 y.o. female who presents for follow-up of Atrial Fibrillation  .     HPI:    Ms. Valdez is a 71 y.o. female with AF, hypothyroid, DM, cardiomyopathy here for follow up.    Background:    Referring Cardiologist: Tami De Leon MD  Primary Care Physician: Anil Nelson MD    Mrs. Valdez has a history of diabetes mellitus, hypothyroidism on synthroid and recent onset of persistent atrial fibrillation. She was exercising with a  in July 2019 and pulse was noted to be elevated and irregular. She saw Dr. Nelson on 8/26/2019 and was diagnosed with atrial fibrillation. She was started on metoprolol and aspirin. TSH was checked and was undetectable. Her synthroid dosage was reduced. She was then assessed by Dr. De Leon who started her on eliquis and referred her here for further evaluation. A transthoracic echocardiogram was performed noting a LVEF of 45% with moderate left atrial enlargement and mildly depressed RV function. She reported no obvious symptoms related to AF. At our initial visit in 2018 we discussed trial of restoration of sinus rhythm to see if her mild systolic cardiomyopathy could be due to AF.    9/26/2019: Underwent successful DORINA/DCCV. EF on DORINA was 45%.      10/25/2019: She is one month s/p cardioversion and back in atrial fibrillation. Rate is controlled. She experienced no symptom improvement in sinus rhythm. However, EF is 45%. We discussed that this could be from the AF or from her period of hyperthyroidism. She is still hyperthyroid and is going to decrease her synthroid again per PCP (has a follow up soon). It would increase the likelihood of maintaining sinus rhythm after cardioversion if her thyroid levels are normal. We discussed anti-arrhythmic options, which include tikosyn or sotalol which both require hospital stays. She does not want to proceed with that  route. It might be possible to use amio after her DCCV, after her thyroid has normalized and her synthroid dose is appropriate, until her follow up echo. If her EF normalizes, could switch to 1C or Multaq.      12/2019: She remained in AF, minimal symptoms although in the presence of a cardiomyopathy. Plan was to proceed with rhythm control once she was euthyroid, and evaluate any symptom improvement or increase in LV function.      1/9/2020: successful DCCV. Multaq initiated. Felt better afterward. Thyroid function now normal. Will repeat echo in 3 months to observe for improvement in LV function in SR.     6/4/2020: She is now 5 months s/p cardioversion and initiation of Multaq. Echo yesterday shows normalization of LV function (40%->55%). EKG with acceptable intervals. She is feeling well. Remains on eliquis for CVA prophylaxis.     12/29/2020: Doing well from a rhythm standpoint, remains in SR on multaq. EF recovered in SR. We discussed that she was a candidate for ablation given her hx of arrhythmia-induced CM. She was not interested in ablation unless absolutely necessary. She strongly prefers to remain on multaq at this time. Frequent PVCs on EKG.      6/3/2021: Holter 1/2021: PVC 10%. No AF.    1/11/2022: Pt with diabetes mellitus, hypothyroidism on synthroid and persistent AF that caused a mild cardiomyopathy. She is maintaining sinus rhythm on multaq and EF has recovered. PVI has been discussed. She prefers to continue multaq as long as it is working. Continue multaq, metoprolol, and eliquis. RTC in 6 months, sooner if needed. Needs updated CBC.    Update (08/08/2022):    Today she says she has been feeling well. No new cardiac complaints. Ms. Valdez reports no chest pain with exertion or at rest, palpitations, SOB, SON, dizziness, or syncope.  She has been working out with a  on [employer] and [employer]. No limitations in activity tolerance.    She is currently taking eliquis 5mg BID for stroke  prophylaxis and denies significant bleeding episodes. She is currently being treated with multaq 400mg BID for rhythm control and metoprolol succinate 50mg daily for HR control.  Kidney function is stable, with a creatinine of 0.7 on 6/28/2022.    I have personally reviewed the patient's EKG today, which shows sinus rhythm with 1st deg AVB at 58bpm. TX interval is 220. QRS is 88. QT is 438.    Relevant Cardiac Test Results:    2D Echo (6/15/2021):  · The left ventricle is normal in size with normal systolic function. The estimated ejection fraction is 55%.  · Normal left ventricular diastolic function.  · Normal right ventricular size with normal right ventricular systolic function.  · Normal central venous pressure (3 mmHg).  · Moderate left atrial enlargement.    Current Outpatient Medications   Medication Sig    ascorbic acid, vitamin C, (VITAMIN C) 100 MG tablet Take 100 mg by mouth once daily.    augmented betamethasone dipropionate (DIPROLENE-AF) 0.05 % cream Apply to elbows BID prn scaling.    CINNAMON BARK (CINNAMON ORAL) Take by mouth once daily. Pt taking 2 pills daily.    dronedarone (MULTAQ) 400 mg Tab TAKE 1 TABLET(400 MG) BY MOUTH TWICE DAILY WITH MEALS    ELIQUIS 5 mg Tab TAKE 1 TABLET(5 MG) BY MOUTH TWICE DAILY    fish oil-omega-3 fatty acids 300-1,000 mg capsule Take 2 g by mouth once daily.    metFORMIN (GLUCOPHAGE-XR) 500 MG ER 24hr tablet Take 2 tablets (1,000 mg total) by mouth 2 (two) times daily with meals.    metoprolol succinate (TOPROL-XL) 50 MG 24 hr tablet Take 1 tablet (50 mg total) by mouth once daily.    metroNIDAZOLE (NORITATE) 1 % cream Compound azelaic acid 15% + ivermectin 1% + metronidazole 1% cream. Apply to face once or twice daily.    MV,CA,MIN/IRON/FA/GUARANA/CAFF (ONE-A-DAY WOMEN'S ACTIVE ORAL) Take by mouth.    OPW TEST CLAIM - DO NOT FILL OPW test claim. Do not fill.    rosuvastatin (CRESTOR) 10 MG tablet TAKE 1 TABLET(10 MG) BY MOUTH EVERY DAY    SYNTHROID 175  "mcg tablet Take 1 tablet (175 mcg total) by mouth before breakfast.    vitamin D (VITAMIN D3) 1000 units Tab Take 1,000 Units by mouth once daily.     No current facility-administered medications for this visit.     Facility-Administered Medications Ordered in Other Visits   Medication    0.9%  NaCl infusion    sodium chloride 0.9% flush 5 mL       Review of Systems   Constitutional: Negative for malaise/fatigue.   Cardiovascular: Negative for chest pain, dyspnea on exertion, irregular heartbeat, leg swelling and palpitations.   Respiratory: Negative for shortness of breath.    Hematologic/Lymphatic: Negative for bleeding problem.   Skin: Negative for rash.   Musculoskeletal: Negative for myalgias.   Gastrointestinal: Negative for hematemesis, hematochezia and nausea.   Genitourinary: Negative for hematuria.   Neurological: Negative for light-headedness.   Psychiatric/Behavioral: Negative for altered mental status.   Allergic/Immunologic: Negative for persistent infections.     Objective:        BP (!) 126/58   Pulse (!) 58   Ht 5' 6" (1.676 m)   Wt 88.9 kg (195 lb 15.8 oz)   LMP 07/18/2001   BMI 31.63 kg/m²     Physical Exam  Vitals and nursing note reviewed.   Constitutional:       Appearance: Normal appearance. She is well-developed.   HENT:      Head: Normocephalic.      Nose: Nose normal.   Eyes:      Pupils: Pupils are equal, round, and reactive to light.   Cardiovascular:      Rate and Rhythm: Normal rate and regular rhythm.   Pulmonary:      Effort: No respiratory distress.      Breath sounds: Normal breath sounds.   Musculoskeletal:         General: Normal range of motion.   Skin:     General: Skin is warm and dry.      Findings: No erythema.   Neurological:      Mental Status: She is alert and oriented to person, place, and time.   Psychiatric:         Speech: Speech normal.         Behavior: Behavior normal.       Lab Results   Component Value Date     06/28/2022    K 4.6 06/28/2022    MG " 2.3 04/08/2005    BUN 11 06/28/2022    CREATININE 0.7 06/28/2022    ALT 18 06/28/2022    AST 19 06/28/2022    HGB 13.5 01/11/2022    HCT 42.6 01/11/2022    TSH 7.769 (H) 06/28/2022    LDLCALC 38.2 (L) 01/05/2022       Recent Labs   Lab 09/19/19  1415 01/06/20  1028   INR 1.1 1.1         Assessment:     1. History of atrial fibrillation    2. Obesity, diabetes, and hypertension syndrome - HTN disputed by pt    3. Chronic anticoagulation    4. History of cardioversion      Plan:     In summary, Ms. Valdez is a 71 y.o. female with AF, hypothyroid, DM, cardiomyopathy here for follow up.  She is doing well from a rhythm standpoint, with no symptomatic or documented AF on multaq. EKG with stable intervals.  CHADSVASc 4 on eliquis. She has elected to continue with anti-arrhythmic therapy as long as it is effective and defer PVI for now.    Continue current regimen  RTC 6 mo, sooner if needed    *A copy of this note has been sent to Dr. Sanchez*    Follow up in about 6 months (around 2/8/2023).    ------------------------------------------------------------------    CHRISTA Soares, NP-C  Cardiac Electrophysiology

## 2022-08-08 ENCOUNTER — OFFICE VISIT (OUTPATIENT)
Dept: ELECTROPHYSIOLOGY | Facility: CLINIC | Age: 72
End: 2022-08-08
Payer: MEDICARE

## 2022-08-08 ENCOUNTER — HOSPITAL ENCOUNTER (OUTPATIENT)
Dept: CARDIOLOGY | Facility: CLINIC | Age: 72
Discharge: HOME | End: 2022-08-08
Payer: MEDICARE

## 2022-08-08 VITALS
WEIGHT: 196 LBS | HEIGHT: 66 IN | SYSTOLIC BLOOD PRESSURE: 126 MMHG | BODY MASS INDEX: 31.5 KG/M2 | DIASTOLIC BLOOD PRESSURE: 58 MMHG | HEART RATE: 58 BPM

## 2022-08-08 DIAGNOSIS — Z79.01 CHRONIC ANTICOAGULATION: ICD-10-CM

## 2022-08-08 DIAGNOSIS — E66.9 OBESITY, DIABETES, AND HYPERTENSION SYNDROME: ICD-10-CM

## 2022-08-08 DIAGNOSIS — Z92.89 HISTORY OF CARDIOVERSION: ICD-10-CM

## 2022-08-08 DIAGNOSIS — I15.2 OBESITY, DIABETES, AND HYPERTENSION SYNDROME: ICD-10-CM

## 2022-08-08 DIAGNOSIS — E11.59 OBESITY, DIABETES, AND HYPERTENSION SYNDROME: ICD-10-CM

## 2022-08-08 DIAGNOSIS — I48.19 PERSISTENT ATRIAL FIBRILLATION: Primary | ICD-10-CM

## 2022-08-08 DIAGNOSIS — I49.8 OTHER SPECIFIED CARDIAC ARRHYTHMIAS: ICD-10-CM

## 2022-08-08 DIAGNOSIS — E11.69 OBESITY, DIABETES, AND HYPERTENSION SYNDROME: ICD-10-CM

## 2022-08-08 PROCEDURE — 99999 PR PBB SHADOW E&M-EST. PATIENT-LVL IV: CPT | Mod: PBBFAC,,, | Performed by: NURSE PRACTITIONER

## 2022-08-08 PROCEDURE — 93010 RHYTHM STRIP: ICD-10-PCS | Mod: S$PBB,,, | Performed by: INTERNAL MEDICINE

## 2022-08-08 PROCEDURE — 93005 ELECTROCARDIOGRAM TRACING: CPT | Mod: PBBFAC | Performed by: INTERNAL MEDICINE

## 2022-08-08 PROCEDURE — 99214 PR OFFICE/OUTPT VISIT, EST, LEVL IV, 30-39 MIN: ICD-10-PCS | Mod: S$PBB,,, | Performed by: NURSE PRACTITIONER

## 2022-08-08 PROCEDURE — 99214 OFFICE O/P EST MOD 30 MIN: CPT | Mod: PBBFAC | Performed by: NURSE PRACTITIONER

## 2022-08-08 PROCEDURE — 93010 ELECTROCARDIOGRAM REPORT: CPT | Mod: S$PBB,,, | Performed by: INTERNAL MEDICINE

## 2022-08-08 PROCEDURE — 99999 PR PBB SHADOW E&M-EST. PATIENT-LVL IV: ICD-10-PCS | Mod: PBBFAC,,, | Performed by: NURSE PRACTITIONER

## 2022-08-08 PROCEDURE — 99214 OFFICE O/P EST MOD 30 MIN: CPT | Mod: S$PBB,,, | Performed by: NURSE PRACTITIONER

## 2022-08-09 NOTE — PROGRESS NOTES
I sent pt a my chart message -  I reviewed your DEXA/Bone density - No evidence of Osteopenia or Osteoporosis.  Can repeat in 3-5 yrs    Dr. ROBERT

## 2022-08-16 ENCOUNTER — TELEPHONE (OUTPATIENT)
Dept: DERMATOLOGY | Facility: CLINIC | Age: 72
End: 2022-08-16
Payer: MEDICARE

## 2022-08-16 NOTE — TELEPHONE ENCOUNTER
----- Message from April Sinclair MA sent at 8/16/2022 12:01 PM CDT -----  Patient stated she has a rash under her chin possible jyotsna. Wants to scheduled an appointment first available wasn't until 11/7. Please contact patient for sooner appointment.      Confirmed Contact below:  Contact Name:Bebe Valdez  Phone Number: [phone]

## 2022-08-31 ENCOUNTER — LAB VISIT (OUTPATIENT)
Dept: LAB | Facility: HOSPITAL | Age: 72
End: 2022-08-31
Attending: INTERNAL MEDICINE
Payer: MEDICARE

## 2022-08-31 DIAGNOSIS — E03.9 HYPOTHYROIDISM, UNSPECIFIED TYPE: ICD-10-CM

## 2022-08-31 LAB
T4 FREE SERPL-MCNC: 1.18 NG/DL (ref 0.71–1.51)
TSH SERPL DL<=0.005 MIU/L-ACNC: 1.22 UIU/ML (ref 0.4–4)

## 2022-08-31 PROCEDURE — 36415 COLL VENOUS BLD VENIPUNCTURE: CPT | Mod: PO | Performed by: INTERNAL MEDICINE

## 2022-08-31 PROCEDURE — 84443 ASSAY THYROID STIM HORMONE: CPT | Performed by: INTERNAL MEDICINE

## 2022-08-31 PROCEDURE — 84439 ASSAY OF FREE THYROXINE: CPT | Performed by: INTERNAL MEDICINE

## 2022-09-01 DIAGNOSIS — E03.9 HYPOTHYROIDISM, UNSPECIFIED TYPE: Primary | ICD-10-CM

## 2022-09-01 RX ORDER — LEVOTHYROXINE SODIUM 175 UG/1
175 TABLET ORAL
Qty: 90 TABLET | Refills: 2 | Status: SHIPPED | OUTPATIENT
Start: 2022-09-01 | End: 2023-07-15

## 2022-09-01 NOTE — PROGRESS NOTES
I sent pt a my chart message -  I reviewed your thyroid functions which were normal.  Continue your current regimen.     Dr. ROBERT

## 2022-09-21 ENCOUNTER — OFFICE VISIT (OUTPATIENT)
Dept: OBSTETRICS AND GYNECOLOGY | Facility: CLINIC | Age: 72
End: 2022-09-21
Payer: MEDICARE

## 2022-09-21 VITALS
SYSTOLIC BLOOD PRESSURE: 116 MMHG | HEIGHT: 66 IN | BODY MASS INDEX: 31.76 KG/M2 | WEIGHT: 197.63 LBS | DIASTOLIC BLOOD PRESSURE: 74 MMHG

## 2022-09-21 DIAGNOSIS — Z01.419 ENCOUNTER FOR GYNECOLOGICAL EXAMINATION: ICD-10-CM

## 2022-09-21 PROCEDURE — 99999 PR PBB SHADOW E&M-EST. PATIENT-LVL III: CPT | Mod: PBBFAC,,, | Performed by: OBSTETRICS & GYNECOLOGY

## 2022-09-21 PROCEDURE — 99213 OFFICE O/P EST LOW 20 MIN: CPT | Mod: PBBFAC,PN | Performed by: OBSTETRICS & GYNECOLOGY

## 2022-09-21 PROCEDURE — G0101 PR CA SCREEN;PELVIC/BREAST EXAM: ICD-10-PCS | Mod: S$PBB,,, | Performed by: OBSTETRICS & GYNECOLOGY

## 2022-09-21 PROCEDURE — 99999 PR PBB SHADOW E&M-EST. PATIENT-LVL III: ICD-10-PCS | Mod: PBBFAC,,, | Performed by: OBSTETRICS & GYNECOLOGY

## 2022-09-21 PROCEDURE — G0101 CA SCREEN;PELVIC/BREAST EXAM: HCPCS | Mod: S$PBB,,, | Performed by: OBSTETRICS & GYNECOLOGY

## 2022-09-21 NOTE — PROGRESS NOTES
History & Physical  Gynecology      SUBJECTIVE:     Chief Complaint: Gynecologic Exam       History of Present Illness:    Bebe Valdez is a 72 y.o. female   for annual routine Pap and checkup. Patient's last menstrual period was 2001..  She has no unusual complaints.      Not having any postmenopausal bleeding  denies break through bleeding.   denies vaginal itching or irritation.  denies vaginal discharge.    She is not sexually active     History of abnormal pap: No  Last Pap: 2015, theresa  Last MMG: Yes - 2022  Last Colonoscopy:  5/10/2017        Review of patient's allergies indicates:   Allergen Reactions    Levaquin [levofloxacin] Hives    Tetracycline Hives       Past Medical History:   Diagnosis Date    Allergy     Anticoagulant long-term use     Hyperlipidemia     Hypertension associated with diabetes     Hypothyroid     Obesity     Rosacea      Past Surgical History:   Procedure Laterality Date    CHOLECYSTECTOMY      COLONOSCOPY  07    COLONOSCOPY N/A 2017    Procedure: COLONOSCOPY;  Surgeon: Tre Alvarez MD;  Location: [facility] ENDO ([address] [place] [address]);  Service: Endoscopy;  Laterality: N/A;    DILATION AND CURETTAGE OF UTERUS      HYSTEROSCOPY      TREATMENT OF CARDIAC ARRHYTHMIA N/A 2019    Procedure: CARDIOVERSION;  Surgeon: Eduardo Sanchez MD;  Location: [facility] EP LAB;  Service: Cardiology;  Laterality: N/A;  AF, DORINA, DCCV, MAC, CT, 3 Prep    TREATMENT OF CARDIAC ARRHYTHMIA N/A 2020    Procedure: CARDIOVERSION;  Surgeon: Eduardo Sanchez MD;  Location: [facility] EP LAB;  Service: Cardiology;  Laterality: N/A;  AF, DORINA, DCCV, MAC, CT, 3 Prep     OB History          0    Para   0    Term   0       0    AB   0    Living   0         SAB   0    IAB   0    Ectopic   0    Multiple   0    Live Births   0               Family History   Problem Relation Age of Onset    Breast cancer Mother 42    Allergies Mother     Cancer Maternal Grandfather         lung CA, ?Stomach CA     Heart disease Father     Stroke Father     Atrial fibrillation Father     Hypertension Father     Hypothyroidism Father     Hyperlipidemia Father     Diabetes Father     No Known Problems Sister     No Known Problems Brother     Diabetes Maternal Grandmother     Diabetes Maternal Aunt     Ovarian cancer Neg Hx     Melanoma Neg Hx     Angioedema Neg Hx     Asthma Neg Hx     Atopy Neg Hx     Eczema Neg Hx     Immunodeficiency Neg Hx     Rhinitis Neg Hx     Urticaria Neg Hx     Heart attack Neg Hx     Heart failure Neg Hx      Social History     Tobacco Use    Smoking status: Never    Smokeless tobacco: Never   Substance Use Topics    Alcohol use: Yes     Alcohol/week: 2.0 standard drinks     Types: 2 Glasses of wine per week     Comment: weekends     Drug use: Never       Current Outpatient Medications   Medication Sig    ascorbic acid, vitamin C, (VITAMIN C) 100 MG tablet Take 100 mg by mouth once daily.    augmented betamethasone dipropionate (DIPROLENE-AF) 0.05 % cream Apply to elbows BID prn scaling.    CINNAMON BARK (CINNAMON ORAL) Take by mouth once daily. Pt taking 2 pills daily.    dronedarone (MULTAQ) 400 mg Tab TAKE 1 TABLET(400 MG) BY MOUTH TWICE DAILY WITH MEALS    ELIQUIS 5 mg Tab TAKE 1 TABLET(5 MG) BY MOUTH TWICE DAILY    fish oil-omega-3 fatty acids 300-1,000 mg capsule Take 2 g by mouth once daily.    metFORMIN (GLUCOPHAGE-XR) 500 MG ER 24hr tablet TAKE 2 TABLETS(1000 MG) BY MOUTH TWICE DAILY WITH MEALS    metoprolol succinate (TOPROL-XL) 50 MG 24 hr tablet Take 1 tablet (50 mg total) by mouth once daily.    metroNIDAZOLE (NORITATE) 1 % cream Compound azelaic acid 15% + ivermectin 1% + metronidazole 1% cream. Apply to face once or twice daily.    MV,CA,MIN/IRON/FA/GUARANA/CAFF (ONE-A-DAY WOMEN'S ACTIVE ORAL) Take by mouth.    OPW TEST CLAIM - DO NOT FILL OPW test claim. Do not fill.    rosuvastatin (CRESTOR) 10 MG tablet TAKE 1 TABLET(10 MG) BY MOUTH EVERY DAY    SYNTHROID 175 mcg tablet Take 1 tablet  (175 mcg total) by mouth before breakfast.    vitamin D (VITAMIN D3) 1000 units Tab Take 1,000 Units by mouth once daily.     No current facility-administered medications for this visit.     Facility-Administered Medications Ordered in Other Visits   Medication    0.9%  NaCl infusion    sodium chloride 0.9% flush 5 mL         Review of Systems:  Review of Systems   Constitutional:  Negative for activity change, appetite change, fatigue, fever and unexpected weight change.   Respiratory:  Negative for cough and shortness of breath.    Cardiovascular:  Negative for chest pain and leg swelling.   Gastrointestinal:  Negative for abdominal pain, blood in stool, constipation, diarrhea, nausea and vomiting.   Endocrine: Negative for diabetes, hair loss and hot flashes.   Genitourinary:  Negative for pelvic pain, postcoital bleeding and postmenopausal bleeding.   Musculoskeletal:  Negative for back pain.   Integumentary:  Negative for hair changes, breast mass, nipple discharge and breast skin changes.   Psychiatric/Behavioral:  Negative for sleep disturbance. The patient is not nervous/anxious.    Breast: Negative for mass, mastodynia, nipple discharge and skin changes     OBJECTIVE:     Physical Exam:  Physical Exam  Constitutional:       Appearance: She is well-developed.   HENT:      Head: Normocephalic and atraumatic.   Eyes:      General: No scleral icterus.        Right eye: No discharge.         Left eye: No discharge.      Conjunctiva/sclera: Conjunctivae normal.   Pulmonary:      Effort: Pulmonary effort is normal.      Breath sounds: No stridor.   Chest:      Chest wall: No mass or tenderness.   Breasts:     Breasts are symmetrical.      Right: No inverted nipple, mass, nipple discharge, skin change or tenderness.      Left: No inverted nipple, mass, nipple discharge, skin change or tenderness.   Abdominal:      General: There is no distension.      Palpations: Abdomen is soft.      Tenderness: There is no  abdominal tenderness.   Genitourinary:     Labia:         Right: No rash, tenderness, lesion or injury.         Left: No rash, tenderness, lesion or injury.       Vagina: Normal.      Cervix: No cervical motion tenderness, discharge or friability.      Adnexa:         Right: No mass, tenderness or fullness.          Left: No mass, tenderness or fullness.        Comments: Normal external genitalia.  Normal hair distribution.  Urethral meatus normal. No cervical lesions or masses.  No vaginal bleeding noted.  No adnexal or uterine tenderness.  No palpable adnexal masses.  Musculoskeletal:         General: Normal range of motion.   Skin:     General: Skin is warm and dry.   Neurological:      Mental Status: She is alert and oriented to person, place, and time.   Psychiatric:         Behavior: Behavior normal.         Thought Content: Thought content normal.         Judgment: Judgment normal.         ASSESSMENT:       ICD-10-CM ICD-9-CM    1. Encounter for gynecological examination  Z01.419 V72.31 Ambulatory referral/consult to Obstetrics / Gynecology             Plan:      Bebe was seen today for gynecologic exam.    Diagnoses and all orders for this visit:    Encounter for gynecological examination  - Pap smears no longer necessary due to age and no abnormal pap smears  - MMG and Cscope up to date  -     Ambulatory referral/consult to Obstetrics / Gynecology      No orders of the defined types were placed in this encounter.      Follow up in about 1 year (around 9/21/2023) for annual.     Counseling time: 15 minutes    Alison Pagan

## 2022-12-02 ENCOUNTER — PATIENT OUTREACH (OUTPATIENT)
Dept: ADMINISTRATIVE | Facility: HOSPITAL | Age: 72
End: 2022-12-02
Payer: MEDICARE

## 2022-12-12 NOTE — PROGRESS NOTES
[facility] Note    Chief Complaint      Chief Complaint   Patient presents with    Follow-up       History of Present Illness      Bebe Valdez is a 72 y.o. F with A fib, Chronic Sys HF, Hypothyroidism, DM - II, HLD, Aortic Atherosclerosis, Obesity, and Left shoulder adhesive capsulitis, Last visit - 6/28/22     Paroxysmal a fib -s/p cardioversion 9/26/19 and in '20 . Fu by EP, Dr. Sanchez. Pt on long term anticoagulation with Eliquis 5 mg BID, Toprol 50 mg/d, and Multaq 400 mg BID.  Has fu in Feb.       H/o Chronic Sys CHF - Improved. Prev EF 40% per Echo 1/9/20 and now 55% per echo 6/15/21 Pt on Multaq 400 mgB ID and Toprol 50 mg/d.  Echo - 6/15/21 - EF - 55%; mod LAE      Hypothyroidism -  Pt on Brand synthroid 175 mcg/d. Controlled in Aug.      DM - II -Pt on Metformin  mg 2 tabs BID.  No evidence of microalbuminuria. Ha1c was controlled at 6.3-in June. Continue current regimen.  no evidence of microalbuminuria      HLD - Pt on Crestor 10 mg QHS. Well controlled in Oct. 2020.      Aortic atherosclerosis - Pt on Crestor 10 mg QHS.     Obesity - BMI - 31.67 - Down 2 lb- Pt on low carb/low chol diet. She does yoga x 2/wk and works out with a  3 times/wk. She plans to cut back on sweets. She had recent visitors.      Left shoulder adhesive capsulitis - Pt in PTx.      Ptosis - Pt was offered Sx by [facility] but defers for now. Not affecting her vision at present.      h/o COVID -19 - 5/14/22 sp infusion 5/17/22.     HCM - Flu - 10/13/22 ;  Tdap - 9/3/15;  PCV 13 - 10/10/16;  PVX 23 - 10/16/17;  Shingrix -8/26/19 and 11/5/19 ;  Zostavax - 7/30/13; COVID -19 Vaccine (Pfizer)  #1 - 2/202/1 ; #2 - 3/13/21; # 3 - 10/14/21; # 4 8/4/22; # 5 ;  MGM - 1/5/22-repeat 1 yr - sched for Seth;  DEXA - 7/7/20 -neg ;  PAP - no longer gets; Hep C Screen - 10/3/16 - neg;  C-scope -6/6/17 - repeat 10 yrs ;  Prev PCP - Dr. Nelson; EP - Dr Sanchez; Prev Cards - Dr. De Leon; Derm - Dr. Lan; Ophtho -   Jacy; Retina Sp - Dr. Yepez; Derm - Dr. Younger    Patient Care Team:  Amber Dorsey MD as PCP - General (Internal Medicine)  Brigitte Marvin RD as Dietitian (Nutrition)  Tobi Louie MD (Ophthalmology)  Katherine Schafer MA as Care Coordinator     Health Maintenance:  Immunization History   Administered Date(s) Administered    COVID-19, MRNA, LN-S, PF (Pfizer) (Gray Cap) 08/04/2022    COVID-19, MRNA, LN-S, PF (Pfizer) (Purple Cap) 02/20/2021, 03/13/2021, 10/14/2021    Influenza 1950, 10/05/2015    Influenza (FLUAD) - Quadrivalent - Adjuvanted - PF *Preferred* (65+) 10/14/2021    Influenza - High Dose - PF (65 years and older) 10/10/2016, 09/19/2017, 09/26/2018, 09/12/2019    Influenza - Quadrivalent - High Dose - PF (65 years and older) 09/21/2020, 10/13/2022    Influenza A (H1N1) 2009 Monovalent - IM 01/18/2010    Pneumococcal Conjugate - 13 Valent 10/10/2016    Pneumococcal Polysaccharide - 23 Valent 10/16/2017    Tdap 09/03/2015    Zoster 07/30/2013    Zoster Recombinant 08/26/2019, 11/05/2019      Health Maintenance   Topic Date Due    Mammogram  01/05/2023    Hemoglobin A1c  12/28/2022    Lipid Panel  01/05/2023    Eye Exam  02/14/2023    Foot Exam  06/28/2023    High Dose Statin  12/13/2023    DEXA Scan  07/26/2025    TETANUS VACCINE  09/03/2025    Hepatitis C Screening  Completed        Past Medical History:  Past Medical History:   Diagnosis Date    Allergy     Anticoagulant long-term use     Hyperlipidemia     Hypertension associated with diabetes     Hypothyroid     Obesity     Rosacea        Past Surgical History:   has a past surgical history that includes Colonoscopy (7/6/07); Cholecystectomy; Hysteroscopy; Dilation and curettage of uterus; Colonoscopy (N/A, 6/6/2017); Treatment of cardiac arrhythmia (N/A, 9/26/2019); and Treatment of cardiac arrhythmia (N/A, 1/9/2020).    Family History:  family history includes Allergies in her mother; Atrial fibrillation in her father; Breast  cancer (age of onset: 42) in her mother; Cancer in her maternal grandfather; Diabetes in her father, maternal aunt, and maternal grandmother; Heart disease in her father; Hyperlipidemia in her father; Hypertension in her father; Hypothyroidism in her father; No Known Problems in her brother and sister; Stroke in her father.     Social History:  Social History     Tobacco Use    Smoking status: Never    Smokeless tobacco: Never   Substance Use Topics    Alcohol use: Yes     Alcohol/week: 2.0 standard drinks     Types: 2 Glasses of wine per week     Comment: weekends     Drug use: Never       Review of Systems   Constitutional:  Negative for chills, diaphoresis and fever.   HENT:  Negative for hearing loss.    Eyes:  Negative for visual disturbance.   Respiratory:  Negative for cough, chest tightness and shortness of breath.    Cardiovascular:  Negative for chest pain and palpitations.   Gastrointestinal:  Positive for diarrhea. Negative for abdominal pain, blood in stool, constipation, nausea and vomiting.   Endocrine: Negative for cold intolerance and heat intolerance.   Genitourinary:  Negative for dysuria and frequency.   Musculoskeletal:  Negative for arthralgias and myalgias.        Myalgias   Neurological:  Negative for dizziness, numbness and headaches.   Psychiatric/Behavioral:  Negative for dysphoric mood. The patient is not nervous/anxious.       Medications:    Current Outpatient Medications:     ascorbic acid, vitamin C, (VITAMIN C) 100 MG tablet, Take 100 mg by mouth once daily., Disp: , Rfl:     augmented betamethasone dipropionate (DIPROLENE-AF) 0.05 % cream, Apply to elbows BID prn scaling., Disp: 50 g, Rfl: 2    CINNAMON BARK (CINNAMON ORAL), Take by mouth once daily. Pt taking 2 pills daily., Disp: , Rfl:     dronedarone (MULTAQ) 400 mg Tab, TAKE 1 TABLET(400 MG) BY MOUTH TWICE DAILY WITH MEALS, Disp: 60 tablet, Rfl: 11    ELIQUIS 5 mg Tab, TAKE 1 TABLET(5 MG) BY MOUTH TWICE DAILY, Disp: 60 tablet,  "Rfl: 6    fish oil-omega-3 fatty acids 300-1,000 mg capsule, Take 2 g by mouth once daily., Disp: , Rfl:     metFORMIN (GLUCOPHAGE-XR) 500 MG ER 24hr tablet, TAKE 2 TABLETS(1000 MG) BY MOUTH TWICE DAILY WITH MEALS, Disp: 360 tablet, Rfl: 1    metroNIDAZOLE (NORITATE) 1 % cream, Compound azelaic acid 15% + ivermectin 1% + metronidazole 1% cream. Apply to face once or twice daily., Disp: 30 g, Rfl: 5    MV,CA,MIN/IRON/FA/GUARANA/CAFF (ONE-A-DAY WOMEN'S ACTIVE ORAL), Take by mouth., Disp: , Rfl:     rosuvastatin (CRESTOR) 10 MG tablet, TAKE 1 TABLET(10 MG) BY MOUTH EVERY DAY, Disp: 90 tablet, Rfl: 1    SYNTHROID 175 mcg tablet, Take 1 tablet (175 mcg total) by mouth before breakfast., Disp: 90 tablet, Rfl: 2    vitamin D (VITAMIN D3) 1000 units Tab, Take 1,000 Units by mouth once daily., Disp: , Rfl:     metoprolol succinate (TOPROL-XL) 50 MG 24 hr tablet, Take 1 tablet (50 mg total) by mouth once daily., Disp: 90 tablet, Rfl: 1  No current facility-administered medications for this visit.    Facility-Administered Medications Ordered in Other Visits:     0.9%  NaCl infusion, , Intravenous, Continuous, Steffi Christian NP, New Bag at 09/26/19 0913    sodium chloride 0.9% flush 5 mL, 5 mL, Intravenous, PRN, Steffi Christian NP     Allergies:  Review of patient's allergies indicates:   Allergen Reactions    Levaquin [levofloxacin] Hives    Tetracycline Hives       Physical Exam      Vital Signs  Temp: 97.8 °F (36.6 °C)  Temp src: Oral  Pulse: 62  Resp: 18  SpO2: 96 %  BP: 110/72  BP Location: Right arm  Patient Position: Sitting  Pain Score: 0-No pain  Height and Weight  Height: 5' 6" (167.6 cm)  Weight: 89 kg (196 lb 3.4 oz)  BSA (Calculated - sq m): 2.04 sq meters  BMI (Calculated): 31.7  Weight in (lb) to have BMI = 25: 154.6      Patient Position: Sitting      Physical Exam  Vitals reviewed.   Constitutional:       General: She is not in acute distress.     Appearance: Normal appearance. She is not " ill-appearing, toxic-appearing or diaphoretic.   HENT:      Head: Normocephalic and atraumatic.      Right Ear: Tympanic membrane normal.      Left Ear: Tympanic membrane normal.   Eyes:      Extraocular Movements: Extraocular movements intact.      Conjunctiva/sclera: Conjunctivae normal.      Pupils: Pupils are equal, round, and reactive to light.   Neck:      Vascular: No carotid bruit.   Cardiovascular:      Rate and Rhythm: Normal rate and regular rhythm.      Pulses: Normal pulses.      Heart sounds: Murmur heard.      Comments: Trace edema;  II/VI Sys Murmur  Pulmonary:      Effort: Pulmonary effort is normal. No respiratory distress.      Breath sounds: Normal breath sounds.   Abdominal:      General: Bowel sounds are normal. There is no distension.      Palpations: Abdomen is soft.      Tenderness: There is no abdominal tenderness. There is no guarding or rebound.   Musculoskeletal:      Cervical back: Neck supple. No tenderness.   Neurological:      General: No focal deficit present.      Mental Status: She is alert and oriented to person, place, and time.   Psychiatric:         Mood and Affect: Mood normal.         Behavior: Behavior normal.        Laboratory:  CBC:  Recent Labs   Lab 04/29/20  1058 10/12/20  0953 01/11/22  1544   WBC 7.36 6.33 6.59   RBC 4.38 4.28 4.43   Hemoglobin 13.5 13.1 13.5   Hematocrit 43.4 42.9 42.6   Platelets 239 243 249   MCV 99 H 100 H 96   MCH 30.8 30.6 30.5   MCHC 31.1 L 30.5 L 31.7 L       CMP:  Recent Labs   Lab 04/07/21  1216 01/05/22  1215 06/28/22  1445   Glucose 129 H 122 H 90   Calcium 10.0 10.2 10.2   Albumin 4.0 3.7 3.7   Total Protein 6.8 6.6 6.5   Sodium 139 139 141   Potassium 4.4 4.4 4.6   CO2 28 28 25   Chloride 102 104 107   BUN 9 10 11   Creatinine 0.8 0.8 0.7   Alkaline Phosphatase 47 L 52 L 44 L   ALT 16 19 18   AST 18 19 19   Total Bilirubin 0.7 0.9 0.6           URINALYSIS:  Recent Labs   Lab 02/21/20  1210 06/30/20  1435 11/09/20  1418   Color, UA  Yellow Yellow Yellow   Clarity, UA  --   --  Clear   Specific Gravity, UA 1.015 1.010  --    Spec Grav UA  --   --  1.015   pH, UA 6.0 6.0 5   Protein, UA 2+ A Negative  --    Bacteria Rare  --   --    Nitrite, UA Negative Negative neg   Leukocytes, UA 2+ A Negative  --    Urobilinogen, UA  --   --  normal   Hyaline Casts, UA 0  --   --         LIPIDS:  Recent Labs   Lab 04/29/20  1058 06/04/20  1122 10/12/20  0952 11/06/20  1116 05/10/21  1302 01/05/22  1215 06/28/22  1445 08/31/22  1305   TSH 29.458 H   < > 14.694 H   < > 1.978  --  7.769 H 1.224   HDL 40  --  46  --   --  48  --   --    Cholesterol 96 L  --  110 L  --   --  105 L  --   --    Triglycerides 80  --  109  --   --  94  --   --    LDL Cholesterol 40.0 L  --  42.2 L  --   --  38.2 L  --   --    HDL/Cholesterol Ratio 41.7  --  41.8  --   --  45.7  --   --    Non-HDL Cholesterol 56  --  64  --   --  57  --   --    Total Cholesterol/HDL Ratio 2.4  --  2.4  --   --  2.2  --   --     < > = values in this interval not displayed.       TSH:  Recent Labs   Lab 05/10/21  1302 06/28/22  1445 08/31/22  1305   TSH 1.978 7.769 H 1.224       A1C:  Recent Labs   Lab 04/29/20  1058 10/12/20  0953 04/07/21  1216 01/05/22  1215 06/28/22  1445   Hemoglobin A1C 6.2 H 6.2 H 6.4 H 6.3 H 6.3 H       Urine Microalbumin/Cr:  Recent Labs   Lab 06/30/20  1435 04/07/21  1317 01/05/22  1223   Microalb/Creat Ratio 8.3 17.1 21.9       Assessment/Plan     Bebe Valdez is a 72 y.o.female with:    Type 2 diabetes mellitus with hypercholesterolemia  -     Hemoglobin A1C; Future; Expected date: 12/13/2022  -     Comprehensive Metabolic Panel; Future; Expected date: 12/13/2022  -     Microalbumin/Creatinine Ratio, Urine  - Controlled.  Cont current.     Hypothyroidism, unspecified type  - Controlled.  Cont current regimen.    Hyperlipidemia, unspecified hyperlipidemia type  -     Lipid Panel; Future; Expected date: 12/13/2022  - Controlled.  Cont current.  Repeat FLP.     Atrial  fibrillation, unspecified type  -     metoprolol succinate (TOPROL-XL) 50 MG 24 hr tablet; Take 1 tablet (50 mg total) by mouth once daily.  Dispense: 90 tablet; Refill: 1  - Stable.  Cont current regimen.    Aortic atherosclerosis  - Stable.  Cont current regimen.    Class 1 obesity due to excess calories with serious comorbidity and body mass index (BMI) of 31.0 to 31.9 in adult  - Rec diet and exercise as discussed for wt loss.      Screening mammogram for high-risk patient  -     Mammo Digital Screening Bilat w/ Fernando; Future; Expected date: 01/06/2023      Chronic conditions status updated as per HPI.  Other than changes above, cont current medications and maintain follow up with specialists.   Follow up in about 6 months (around 6/13/2023) for fu chronic issues or sooner if needed.      Amber Dorsey MD  [facility]

## 2022-12-13 ENCOUNTER — OFFICE VISIT (OUTPATIENT)
Dept: PRIMARY CARE CLINIC | Facility: CLINIC | Age: 72
End: 2022-12-13
Payer: MEDICARE

## 2022-12-13 VITALS
HEIGHT: 66 IN | WEIGHT: 196.19 LBS | TEMPERATURE: 98 F | RESPIRATION RATE: 18 BRPM | OXYGEN SATURATION: 96 % | BODY MASS INDEX: 31.53 KG/M2 | SYSTOLIC BLOOD PRESSURE: 110 MMHG | HEART RATE: 62 BPM | DIASTOLIC BLOOD PRESSURE: 72 MMHG

## 2022-12-13 DIAGNOSIS — E11.69 TYPE 2 DIABETES MELLITUS WITH HYPERCHOLESTEROLEMIA: Primary | ICD-10-CM

## 2022-12-13 DIAGNOSIS — E66.09 CLASS 1 OBESITY DUE TO EXCESS CALORIES WITH SERIOUS COMORBIDITY AND BODY MASS INDEX (BMI) OF 31.0 TO 31.9 IN ADULT: ICD-10-CM

## 2022-12-13 DIAGNOSIS — I70.0 AORTIC ATHEROSCLEROSIS: ICD-10-CM

## 2022-12-13 DIAGNOSIS — E78.5 HYPERLIPIDEMIA, UNSPECIFIED HYPERLIPIDEMIA TYPE: ICD-10-CM

## 2022-12-13 DIAGNOSIS — I48.91 ATRIAL FIBRILLATION, UNSPECIFIED TYPE: ICD-10-CM

## 2022-12-13 DIAGNOSIS — E03.9 HYPOTHYROIDISM, UNSPECIFIED TYPE: ICD-10-CM

## 2022-12-13 DIAGNOSIS — Z12.31 SCREENING MAMMOGRAM FOR HIGH-RISK PATIENT: ICD-10-CM

## 2022-12-13 DIAGNOSIS — E78.00 TYPE 2 DIABETES MELLITUS WITH HYPERCHOLESTEROLEMIA: Primary | ICD-10-CM

## 2022-12-13 PROCEDURE — 99214 PR OFFICE/OUTPT VISIT, EST, LEVL IV, 30-39 MIN: ICD-10-PCS | Mod: S$PBB,,, | Performed by: INTERNAL MEDICINE

## 2022-12-13 PROCEDURE — 99999 PR PBB SHADOW E&M-EST. PATIENT-LVL IV: ICD-10-PCS | Mod: PBBFAC,,, | Performed by: INTERNAL MEDICINE

## 2022-12-13 PROCEDURE — 82570 ASSAY OF URINE CREATININE: CPT | Performed by: INTERNAL MEDICINE

## 2022-12-13 PROCEDURE — 99214 OFFICE O/P EST MOD 30 MIN: CPT | Mod: PBBFAC,PN | Performed by: INTERNAL MEDICINE

## 2022-12-13 PROCEDURE — 99214 OFFICE O/P EST MOD 30 MIN: CPT | Mod: S$PBB,,, | Performed by: INTERNAL MEDICINE

## 2022-12-13 PROCEDURE — 82043 UR ALBUMIN QUANTITATIVE: CPT | Performed by: INTERNAL MEDICINE

## 2022-12-13 PROCEDURE — 99999 PR PBB SHADOW E&M-EST. PATIENT-LVL IV: CPT | Mod: PBBFAC,,, | Performed by: INTERNAL MEDICINE

## 2022-12-13 RX ORDER — METOPROLOL SUCCINATE 50 MG/1
50 TABLET, EXTENDED RELEASE ORAL DAILY
Qty: 90 TABLET | Refills: 1 | Status: SHIPPED | OUTPATIENT
Start: 2022-12-13 | End: 2023-02-14 | Stop reason: SDUPTHER

## 2022-12-14 LAB
ALBUMIN/CREAT UR: 10.3 UG/MG (ref 0–30)
CREAT UR-MCNC: 146 MG/DL (ref 15–325)
MICROALBUMIN UR DL<=1MG/L-MCNC: 15 UG/ML

## 2022-12-14 NOTE — PROGRESS NOTES
I sent pt a my chart message -  Good news! I reviewed your labs and you had no evidence of microalbuminuria (small protein in your urine from your Diabetes).       Dr. ROBERT

## 2022-12-19 ENCOUNTER — LAB VISIT (OUTPATIENT)
Dept: LAB | Facility: HOSPITAL | Age: 72
End: 2022-12-19
Attending: INTERNAL MEDICINE
Payer: MEDICARE

## 2022-12-19 DIAGNOSIS — E78.5 HYPERLIPIDEMIA, UNSPECIFIED HYPERLIPIDEMIA TYPE: ICD-10-CM

## 2022-12-19 DIAGNOSIS — E11.69 TYPE 2 DIABETES MELLITUS WITH HYPERCHOLESTEROLEMIA: ICD-10-CM

## 2022-12-19 DIAGNOSIS — E78.00 TYPE 2 DIABETES MELLITUS WITH HYPERCHOLESTEROLEMIA: ICD-10-CM

## 2022-12-19 LAB
ALBUMIN SERPL BCP-MCNC: 3.5 G/DL (ref 3.5–5.2)
ALP SERPL-CCNC: 53 U/L (ref 55–135)
ALT SERPL W/O P-5'-P-CCNC: 29 U/L (ref 10–44)
ANION GAP SERPL CALC-SCNC: 8 MMOL/L (ref 8–16)
AST SERPL-CCNC: 19 U/L (ref 10–40)
BILIRUB SERPL-MCNC: 1.1 MG/DL (ref 0.1–1)
BUN SERPL-MCNC: 13 MG/DL (ref 8–23)
CALCIUM SERPL-MCNC: 9.7 MG/DL (ref 8.7–10.5)
CHLORIDE SERPL-SCNC: 105 MMOL/L (ref 95–110)
CHOLEST SERPL-MCNC: 96 MG/DL (ref 120–199)
CHOLEST/HDLC SERPL: 2.3 {RATIO} (ref 2–5)
CO2 SERPL-SCNC: 25 MMOL/L (ref 23–29)
CREAT SERPL-MCNC: 0.8 MG/DL (ref 0.5–1.4)
EST. GFR  (NO RACE VARIABLE): >60 ML/MIN/1.73 M^2
ESTIMATED AVG GLUCOSE: 137 MG/DL (ref 68–131)
GLUCOSE SERPL-MCNC: 105 MG/DL (ref 70–110)
HBA1C MFR BLD: 6.4 % (ref 4–5.6)
HDLC SERPL-MCNC: 41 MG/DL (ref 40–75)
HDLC SERPL: 42.7 % (ref 20–50)
LDLC SERPL CALC-MCNC: 38.6 MG/DL (ref 63–159)
NONHDLC SERPL-MCNC: 55 MG/DL
POTASSIUM SERPL-SCNC: 4.2 MMOL/L (ref 3.5–5.1)
PROT SERPL-MCNC: 6.1 G/DL (ref 6–8.4)
SODIUM SERPL-SCNC: 138 MMOL/L (ref 136–145)
TRIGL SERPL-MCNC: 82 MG/DL (ref 30–150)

## 2022-12-19 PROCEDURE — 80061 LIPID PANEL: CPT | Performed by: INTERNAL MEDICINE

## 2022-12-19 PROCEDURE — 83036 HEMOGLOBIN GLYCOSYLATED A1C: CPT | Performed by: INTERNAL MEDICINE

## 2022-12-19 PROCEDURE — 80053 COMPREHEN METABOLIC PANEL: CPT | Performed by: INTERNAL MEDICINE

## 2022-12-19 PROCEDURE — 36415 COLL VENOUS BLD VENIPUNCTURE: CPT | Mod: PO | Performed by: INTERNAL MEDICINE

## 2022-12-23 DIAGNOSIS — E80.6 HYPERBILIRUBINEMIA: Primary | ICD-10-CM

## 2022-12-23 NOTE — PROGRESS NOTES
I sent pt a my chart message -  I reviewed your labs. Your Cholesterol looked good.  Your kidney function and liver functions looked good.  Your Bilirubin level is a little high at 1.1.  This is usually benign & not worrisome. It is common & can elevate when you are dehydrated (as with fasting for lab work).  Sometimes this can be mildly elevated due to a genetic disorder called Gilbert's which does not require any intervention or treatment. Wecan repeat this level in 6 wks. Your Ha1c was controlled at 6.4.  No further recommendations at this time.    Dr. ROBERT

## 2023-01-27 ENCOUNTER — LAB VISIT (OUTPATIENT)
Dept: LAB | Facility: HOSPITAL | Age: 73
End: 2023-01-27
Attending: INTERNAL MEDICINE
Payer: MEDICARE

## 2023-01-27 DIAGNOSIS — E80.6 HYPERBILIRUBINEMIA: ICD-10-CM

## 2023-01-27 LAB
ALBUMIN SERPL BCP-MCNC: 3.7 G/DL (ref 3.5–5.2)
ALP SERPL-CCNC: 47 U/L (ref 55–135)
ALT SERPL W/O P-5'-P-CCNC: 31 U/L (ref 10–44)
AST SERPL-CCNC: 25 U/L (ref 10–40)
BILIRUB DIRECT SERPL-MCNC: 0.3 MG/DL (ref 0.1–0.3)
BILIRUB SERPL-MCNC: 0.7 MG/DL (ref 0.1–1)
PROT SERPL-MCNC: 6.5 G/DL (ref 6–8.4)

## 2023-01-27 PROCEDURE — 80076 HEPATIC FUNCTION PANEL: CPT | Performed by: INTERNAL MEDICINE

## 2023-01-27 PROCEDURE — 36415 COLL VENOUS BLD VENIPUNCTURE: CPT | Mod: PN | Performed by: INTERNAL MEDICINE

## 2023-01-30 NOTE — PROGRESS NOTES
I sent pt a my chart message -  I reviewed your labs.  Your bilirubin level was back to normal. No further interventions are necessary at this time.   Dr. ROBERT

## 2023-02-08 ENCOUNTER — HOSPITAL ENCOUNTER (OUTPATIENT)
Dept: RADIOLOGY | Facility: HOSPITAL | Age: 73
Discharge: HOME | End: 2023-02-08
Attending: INTERNAL MEDICINE
Payer: MEDICARE

## 2023-02-08 VITALS — HEIGHT: 66 IN | BODY MASS INDEX: 31.18 KG/M2 | WEIGHT: 194 LBS

## 2023-02-08 DIAGNOSIS — Z12.31 SCREENING MAMMOGRAM FOR HIGH-RISK PATIENT: ICD-10-CM

## 2023-02-08 PROCEDURE — 77063 MAMMO DIGITAL SCREENING BILAT WITH TOMO: ICD-10-PCS | Mod: 26,,, | Performed by: RADIOLOGY

## 2023-02-08 PROCEDURE — 77067 MAMMO DIGITAL SCREENING BILAT WITH TOMO: ICD-10-PCS | Mod: 26,,, | Performed by: RADIOLOGY

## 2023-02-08 PROCEDURE — 77067 SCR MAMMO BI INCL CAD: CPT | Mod: TC,PO

## 2023-02-08 PROCEDURE — 77067 SCR MAMMO BI INCL CAD: CPT | Mod: 26,,, | Performed by: RADIOLOGY

## 2023-02-08 PROCEDURE — 77063 BREAST TOMOSYNTHESIS BI: CPT | Mod: 26,,, | Performed by: RADIOLOGY

## 2023-02-14 ENCOUNTER — HOSPITAL ENCOUNTER (OUTPATIENT)
Dept: CARDIOLOGY | Facility: CLINIC | Age: 73
Discharge: HOME | End: 2023-02-14
Payer: MEDICARE

## 2023-02-14 ENCOUNTER — OFFICE VISIT (OUTPATIENT)
Dept: ELECTROPHYSIOLOGY | Facility: CLINIC | Age: 73
End: 2023-02-14
Payer: MEDICARE

## 2023-02-14 VITALS
SYSTOLIC BLOOD PRESSURE: 143 MMHG | BODY MASS INDEX: 31.64 KG/M2 | DIASTOLIC BLOOD PRESSURE: 68 MMHG | HEART RATE: 67 BPM | WEIGHT: 196.88 LBS | HEIGHT: 66 IN

## 2023-02-14 DIAGNOSIS — I50.22 CHRONIC SYSTOLIC HEART FAILURE: ICD-10-CM

## 2023-02-14 DIAGNOSIS — E03.2 HYPOTHYROIDISM DUE TO NON-MEDICATION EXOGENOUS SUBSTANCES: ICD-10-CM

## 2023-02-14 DIAGNOSIS — I70.0 AORTIC ATHEROSCLEROSIS: ICD-10-CM

## 2023-02-14 DIAGNOSIS — E11.69 TYPE 2 DIABETES MELLITUS WITH HYPERCHOLESTEROLEMIA: ICD-10-CM

## 2023-02-14 DIAGNOSIS — I48.91 ATRIAL FIBRILLATION, UNSPECIFIED TYPE: ICD-10-CM

## 2023-02-14 DIAGNOSIS — E66.09 CLASS 1 OBESITY DUE TO EXCESS CALORIES WITH SERIOUS COMORBIDITY AND BODY MASS INDEX (BMI) OF 32.0 TO 32.9 IN ADULT: ICD-10-CM

## 2023-02-14 DIAGNOSIS — I49.8 OTHER SPECIFIED CARDIAC ARRHYTHMIAS: ICD-10-CM

## 2023-02-14 DIAGNOSIS — I48.19 PERSISTENT ATRIAL FIBRILLATION: Primary | ICD-10-CM

## 2023-02-14 DIAGNOSIS — E78.00 TYPE 2 DIABETES MELLITUS WITH HYPERCHOLESTEROLEMIA: ICD-10-CM

## 2023-02-14 PROCEDURE — 99212 OFFICE O/P EST SF 10 MIN: CPT | Mod: PBBFAC | Performed by: INTERNAL MEDICINE

## 2023-02-14 PROCEDURE — 99213 OFFICE O/P EST LOW 20 MIN: CPT | Mod: S$PBB,,, | Performed by: INTERNAL MEDICINE

## 2023-02-14 PROCEDURE — 99999 PR PBB SHADOW E&M-EST. PATIENT-LVL II: ICD-10-PCS | Mod: PBBFAC,,, | Performed by: INTERNAL MEDICINE

## 2023-02-14 PROCEDURE — 99213 PR OFFICE/OUTPT VISIT, EST, LEVL III, 20-29 MIN: ICD-10-PCS | Mod: S$PBB,,, | Performed by: INTERNAL MEDICINE

## 2023-02-14 PROCEDURE — 93010 RHYTHM STRIP: ICD-10-PCS | Mod: S$PBB,,, | Performed by: INTERNAL MEDICINE

## 2023-02-14 PROCEDURE — 99999 PR PBB SHADOW E&M-EST. PATIENT-LVL II: CPT | Mod: PBBFAC,,, | Performed by: INTERNAL MEDICINE

## 2023-02-14 PROCEDURE — 93010 ELECTROCARDIOGRAM REPORT: CPT | Mod: S$PBB,,, | Performed by: INTERNAL MEDICINE

## 2023-02-14 PROCEDURE — 93005 ELECTROCARDIOGRAM TRACING: CPT | Mod: PBBFAC | Performed by: INTERNAL MEDICINE

## 2023-02-14 RX ORDER — METOPROLOL SUCCINATE 50 MG/1
50 TABLET, EXTENDED RELEASE ORAL DAILY
Qty: 90 TABLET | Refills: 3 | Status: SHIPPED | OUTPATIENT
Start: 2023-02-14

## 2023-02-14 NOTE — PROGRESS NOTES
Subjective:    Patient ID:  Bebe Valdez is a 72 y.o. female who presents for evaluation of Atrial Fibrillation    Referring Cardiologist: Tami De Leon MD  Primary Care Physician: Anil Nelson MD    HPIPrior Hx:  I had the pleasure of seeing Mrs. Valdez in our electrophysiology clinic in consultation for her atrial arrhythmia. As you are aware she is a pleasant 72 year-old woman with diabetes mellitus, hypothyroidism on synthroid and recent onset of persistent atrial fibrillation. She was exercising with a  in July 2019 and pulse was noted to be elevated and irregular. She saw Dr. Nelson on 8/26/2019 and was diagnosed with atrial fibrillation. She was started on metoprolol and aspirin. TSH was checked and was undetectable. Her synthroid dosage was reduced. She was then assessed by Dr. De Leon who started her on eliquis and referred her here for further evaluation. A transthoracic echocardiogram was performed noting a LVEF of 45% with moderate left atrial enlargement and mildly depressed RV function. She reported no obvious symptoms related to AF. At our initial visit in 2018 we discussed trial of restoration of sinus rhythm to see if her mild systolic cardiomyopathy could be due to AF.    9/26/2019: Underwent successful DORINA/DCCV. EF on DORINA was 45%.      10/25/2019: She is one month s/p cardioversion and back in atrial fibrillation. Rate is controlled. She experienced no symptom improvement in sinus rhythm. However, EF is 45%. We discussed that this could be from the AF or from her period of hyperthyroidism. She is still hyperthyroid and is going to decrease her synthroid again per PCP (has a follow up soon). It would increase the likelihood of maintaining sinus rhythm after cardioversion if her thyroid levels are normal. We discussed anti-arrhythmic options, which include tikosyn or sotalol which both require hospital stays. She does not want to proceed with that route. It might be possible to use amio  after her DCCV, after her thyroid has normalized and her synthroid dose is appropriate, until her follow up echo. If her EF normalizes, could switch to 1C or Multaq.      12/2019: She remained in AF, minimal symptoms although in the presence of a cardiomyopathy. Plan was to proceed with rhythm control once she was euthyroid, and evaluate any symptom improvement or increase in LV function.      1/9/2020: successful DCCV. Multaq initiated. Felt better afterward. Thyroid function now normal. Will repeat echo in 3 months to observe for improvement in LV function in SR.     6/4/2020: She is now 5 months s/p cardioversion and initiation of Multaq. Echo yesterday shows normalization of LV function (40%->55%). EKG with acceptable intervals. She is feeling well. Remains on eliquis for CVA prophylaxis.     12/29/2020: Doing well from a rhythm standpoint, remains in SR on multaq. EF recovered in SR. We discussed that she was a candidate for ablation given her hx of arrhythmia-induced CM. She was not interested in ablation unless absolutely necessary. She strongly prefers to remain on multaq at this time. Frequent PVCs on EKG.      6/3/2021: Holter 1/2021: PVC 10%. No AF.    1/2022: Mrs. Valdez returns for follow-up. She recently developed a rash. Metoprolol was held to see if it could be from that however rash did not improve. She resumed metoprolol. She has done well on multaq. She reports no bleeding issues with eliquis.    Interim Hx:  Mrs. Valdez returns for follow-up. No symptomatic AF on multaq. No nausea. No bleeding with eliquis. She works out with a .    My interpretation of today's in clinic ECG is sinus rhythm    Review of Systems   Constitutional: Negative for fever and malaise/fatigue.   HENT:  Negative for congestion and sore throat.    Eyes:  Negative for blurred vision and visual disturbance.   Cardiovascular:  Negative for chest pain, dyspnea on exertion, irregular heartbeat, leg swelling, near-syncope,  orthopnea, palpitations and paroxysmal nocturnal dyspnea.   Respiratory:  Negative for cough and shortness of breath.    Hematologic/Lymphatic: Negative for bleeding problem. Does not bruise/bleed easily.   Skin: Negative.    Musculoskeletal: Negative.    Gastrointestinal:  Negative for bloating, abdominal pain, hematochezia and melena.   Neurological:  Negative for dizziness and focal weakness.      Objective:    Physical Exam  Vitals reviewed.   Constitutional:       General: She is not in acute distress.     Appearance: She is well-developed. She is not diaphoretic.   HENT:      Head: Normocephalic and atraumatic.   Eyes:      General:         Right eye: No discharge.      Conjunctiva/sclera: Conjunctivae normal.   Neck:      Vascular: No JVD.   Cardiovascular:      Rate and Rhythm: Normal rate and regular rhythm.   Pulmonary:      Effort: Pulmonary effort is normal. No respiratory distress.      Breath sounds: Normal breath sounds. No wheezing or rales.   Abdominal:      General: Bowel sounds are normal. There is no distension.      Palpations: Abdomen is soft.      Tenderness: There is no abdominal tenderness. There is no rebound.   Musculoskeletal:      Cervical back: Neck supple.   Skin:     General: Skin is warm and dry.   Neurological:      Mental Status: She is alert and oriented to person, place, and time.   Psychiatric:         Behavior: Behavior normal.         Thought Content: Thought content normal.         Assessment:       1. Persistent atrial fibrillation    2. Chronic systolic heart failure, in setting of synthroid mediated hyperthyroidism and persistent atrial fibrillation    3. Aortic atherosclerosis    4. Type 2 diabetes mellitus with hypercholesterolemia    5. Hypothyroidism due to non-medication exogenous substances    6. Class 1 obesity due to excess calories with serious comorbidity and body mass index (BMI) of 32.0 to 32.9 in adult    7. Atrial fibrillation, unspecified type         Plan:        In summary, Mrs. Valdez is a pleasant 72 year-old woman with diabetes mellitus, hypothyroidism on synthroid and persistent AF that caused a mild cardiomyopathy. She is maintaining sinus rhythm on multaq and EF has recovered. PVI has been discussed. She prefers to continue multaq as long as it is working. Continue multaq, metoprolol, and eliquis. RTC in 6 months, sooner if needed. Needs updated CBC.      Thank you for allowing me to participate in the care of this patient. Please do not hesitate to call me with any questions or concerns.    Eduardo Sanchez MD, PhD  Cardiac Electrophysiology

## 2023-02-15 ENCOUNTER — TELEPHONE (OUTPATIENT)
Dept: PRIMARY CARE CLINIC | Facility: CLINIC | Age: 73
End: 2023-02-15
Payer: MEDICARE

## 2023-02-15 NOTE — TELEPHONE ENCOUNTER
----- Message from Mallory Muhammad sent at 2/15/2023 11:42 AM CST -----  Contact: [phone]  Pt is calling in regards to the results of her mammogram on 02/08. Please call to go over them.          Thank you

## 2023-02-19 NOTE — PROGRESS NOTES
I sent pt a my chart message -  I reviewed your Mammogram -   The breasts have scattered areas of fibroglandular density. There is no evidence of suspicious masses, microcalcifications or architectural distortion. No detrimental change.   No mammographic evidence of malignancy.  Routine screening mammogram in 1 year is recommended.  Dr. ROBERT

## 2023-06-08 ENCOUNTER — PES CALL (OUTPATIENT)
Dept: ADMINISTRATIVE | Facility: CLINIC | Age: 73
End: 2023-06-08
Payer: MEDICARE

## 2023-06-11 NOTE — PROGRESS NOTES
[facility] Note    Chief Complaint      Chief Complaint   Patient presents with    Follow-up       History of Present Illness      Bebe Valdez is a 72 y.o. F with A fib, Chronic Sys HF, Hypothyroidism, DM - II, HLD, Aortic Atherosclerosis, Obesity, and Left shoulder adhesive capsulitis, Last visit - 12/13/22     Paroxysmal a fib -s/p cardioversion 9/26/19 and in '20 . Fu by EP, Dr. Sanchez. Pt on long term anticoagulation with Eliquis 5 mg BID, Toprol 50 mg/d, and Multaq 400 mg BID.  Has fu in Aug.       H/o Chronic Sys CHF - Improved. Prev EF 40% per Echo 1/9/20 and now 55% per echo 6/15/21 Pt on Multaq 400 mgBID and Toprol 50 mg/d.  Echo - 6/15/21 - EF - 55%; mod LAE     Acute rhinitis - sx's started 2 days ago when came back from recent trip. Rec NS irrigation BID. Allegra. Alert Md if sx's worsen.      Hypothyroidism -  Pt on Brand synthroid 175 mcg/d. Controlled in Aug.      DM - II -Pt on Metformin  mg 2 tabs BID.  No evidence of microalbuminuria. Ha1c was controlled at 6.4-in Dec. Continue current regimen.  no evidence of microalbuminuria      HLD - Pt on Crestor 10 mg QHS. Well controlled in Dec.      Aortic atherosclerosis - Pt on Crestor 10 mg QHS.     Obesity - BMI - 32.56 - Down 2 lb- Pt on low carb/low chol diet. She does yoga x 2/wk and works out with a  3 times/wk.         Left shoulder adhesive capsulitis - Pt in PTx.      Ptosis - Pt was offered Sx by [facility] but defers for now. Not affecting her vision at present.      h/o COVID -19 - 5/14/22 sp infusion 5/17/22.     HCM - Flu - 10/13/22 ; Tdap - 9/3/15;  PCV 13 - 10/10/16;  PVX 23 - 10/16/17;  Shingrix -8/26/19 and 11/5/19 ;  Zostavax - 7/30/13; COVID -19 Vaccine (Pfizer)  #1 - 2/202/1 ; #2 - 3/13/21; # 3 - 10/14/21; # 4 8/4/22; # 5 ; MGM 2/8/23-repeat 1 yr;  DEXA - 7/26/22 -neg ;  PAP - no longer gets; Hep C Screen - 10/3/16 - neg;  C-scope -6/6/17 - repeat 10 yrs ;  Prev PCP - Dr. Nelson; EP - Dr Sanchez;  Prev Cards - Dr. De Leon; Derm - Dr. Lan; Ophtho - Dr. Louie; Retina Sp - Dr. Yepez; Derm - Dr. Younger     Patient Care Team:  Amber Dorsey MD as PCP - General (Internal Medicine)  Brigitte Marvin RD as Dietitian (Nutrition)  Tobi Louie MD (Ophthalmology)  Katherine Schafer MA as Care Coordinator     Health Maintenance:  Immunization History   Administered Date(s) Administered    COVID-19, MRNA, LN-S, PF (Pfizer) (Gray Cap) 08/04/2022    COVID-19, MRNA, LN-S, PF (Pfizer) (Purple Cap) 02/20/2021, 03/13/2021, 10/14/2021    COVID-19, mRNA, LNP-S, bivalent booster, PF (PFIZER OMICRON) 01/16/2023    Influenza 1950, 10/05/2015    Influenza (FLUAD) - Quadrivalent - Adjuvanted - PF *Preferred* (65+) 10/14/2021    Influenza - High Dose - PF (65 years and older) 10/10/2016, 09/19/2017, 09/26/2018, 09/12/2019, 10/13/2022    Influenza - Quadrivalent - High Dose - PF (65 years and older) 09/21/2020, 10/13/2022    Influenza A (H1N1) 2009 Monovalent - IM 01/18/2010    Pneumococcal Conjugate - 13 Valent 10/10/2016    Pneumococcal Polysaccharide - 23 Valent 10/16/2017    Tdap 09/03/2015    Zoster 07/30/2013    Zoster Recombinant 08/26/2019, 11/05/2019      Health Maintenance   Topic Date Due    Hemoglobin A1c  06/19/2023    Foot Exam  06/28/2023    Lipid Panel  12/19/2023    Mammogram  02/08/2024    Eye Exam  03/08/2024    High Dose Statin  06/13/2024    DEXA Scan  07/26/2025    TETANUS VACCINE  09/03/2025    Hepatitis C Screening  Completed        Past Medical History:  Past Medical History:   Diagnosis Date    Allergy     Anticoagulant long-term use     Hyperlipidemia     Hypothyroid     Obesity     Rosacea        Past Surgical History:   has a past surgical history that includes Colonoscopy (07/06/2007); Cholecystectomy; Hysteroscopy; Dilation and curettage of uterus; Colonoscopy (N/A, 06/06/2017); Treatment of cardiac arrhythmia (N/A, 09/26/2019); Treatment of cardiac arrhythmia (N/A, 01/09/2020); and  [place] tooth extraction.    Family History:  family history includes Allergies in her mother; Atrial fibrillation in her father; Breast cancer (age of onset: 42) in her mother; Cancer in her maternal grandfather; Diabetes in her father, maternal aunt, and maternal grandmother; Heart disease in her father; Hyperlipidemia in her father; Hypertension in her father; Hypothyroidism in her father; No Known Problems in her brother and sister; Stroke in her father.     Social History:  Social History     Tobacco Use    Smoking status: Never    Smokeless tobacco: Never   Substance Use Topics    Alcohol use: Yes     Alcohol/week: 2.0 standard drinks     Types: 2 Glasses of wine per week     Comment: weekends     Drug use: Never       Review of Systems   Constitutional:  Negative for chills, diaphoresis and fever.   HENT:  Positive for nasal congestion and postnasal drip. Negative for hearing loss.    Eyes:  Negative for visual disturbance.   Respiratory:  Negative for chest tightness and shortness of breath.    Cardiovascular:  Positive for leg swelling. Negative for chest pain and palpitations.   Gastrointestinal:  Positive for diarrhea. Negative for abdominal pain, blood in stool, constipation, nausea and vomiting.        Off and on in AM.    Genitourinary:  Positive for frequency and vaginal dryness. Negative for dysuria and vaginal discharge.        Vag itching at times. Not new.    Musculoskeletal:  Negative for arthralgias and myalgias.   Neurological:  Negative for dizziness, numbness and headaches.   Psychiatric/Behavioral:  Negative for dysphoric mood. The patient is not nervous/anxious.       Medications:    Current Outpatient Medications:     apixaban (ELIQUIS) 5 mg Tab, Take 1 tablet (5 mg total) by mouth 2 (two) times daily., Disp: 60 tablet, Rfl: 11    ascorbic acid, vitamin C, (VITAMIN C) 100 MG tablet, Take 100 mg by mouth once daily., Disp: , Rfl:     augmented betamethasone dipropionate (DIPROLENE-AF) 0.05 %  "cream, Apply to elbows BID prn scaling., Disp: 50 g, Rfl: 2    CINNAMON BARK (CINNAMON ORAL), Take by mouth once daily. Pt taking 2 pills daily., Disp: , Rfl:     dronedarone (MULTAQ) 400 mg Tab, TAKE 1 TABLET BY MOUTH TWICE DAILY WITH MEALS, Disp: 60 tablet, Rfl: 11    fish oil-omega-3 fatty acids 300-1,000 mg capsule, Take 2 g by mouth once daily., Disp: , Rfl:     metoprolol succinate (TOPROL-XL) 50 MG 24 hr tablet, Take 1 tablet (50 mg total) by mouth once daily., Disp: 90 tablet, Rfl: 3    metroNIDAZOLE (NORITATE) 1 % cream, Compound azelaic acid 15% + ivermectin 1% + metronidazole 1% cream. Apply to face once or twice daily., Disp: 30 g, Rfl: 5    MV,CA,MIN/IRON/FA/GUARANA/CAFF (ONE-A-DAY WOMEN'S ACTIVE ORAL), Take by mouth., Disp: , Rfl:     rosuvastatin (CRESTOR) 10 MG tablet, TAKE 1 TABLET(10 MG) BY MOUTH EVERY DAY, Disp: 90 tablet, Rfl: 3    SYNTHROID 175 mcg tablet, Take 1 tablet (175 mcg total) by mouth before breakfast., Disp: 90 tablet, Rfl: 2    vitamin D (VITAMIN D3) 1000 units Tab, Take 1,000 Units by mouth once daily., Disp: , Rfl:     metFORMIN (GLUCOPHAGE-XR) 500 MG ER 24hr tablet, Take 2 tablets (1,000 mg total) by mouth 2 (two) times daily with meals., Disp: 360 tablet, Rfl: 1  No current facility-administered medications for this visit.    Facility-Administered Medications Ordered in Other Visits:     0.9%  NaCl infusion, , Intravenous, Continuous, Steffi Christian NP, New Bag at 09/26/19 0913    sodium chloride 0.9% flush 5 mL, 5 mL, Intravenous, PRN, Steffi Christian NP     Allergies:  Review of patient's allergies indicates:   Allergen Reactions    Levaquin [levofloxacin] Hives    Tetracycline Hives       Physical Exam      Vital Signs  Temp: 97.5 °F (36.4 °C)  Temp Source: Oral  Pulse: (!) 56  Resp: 18  SpO2: 96 %  BP: 112/74  BP Location: Left arm  Patient Position: Sitting  Pain Score: 0-No pain  Height and Weight  Height: 5' 6" (167.6 cm)  Weight: 91.5 kg (201 lb 11.5 oz)  BSA " (Calculated - sq m): 2.06 sq meters  BMI (Calculated): 32.6  Weight in (lb) to have BMI = 25: 154.6      Patient Position: Sitting      Physical Exam  Vitals reviewed.   Constitutional:       General: She is not in acute distress.     Appearance: Normal appearance. She is not ill-appearing, toxic-appearing or diaphoretic.      Comments: Hoarseness.    HENT:      Head: Normocephalic and atraumatic.      Comments: No sinus TTP     Right Ear: Tympanic membrane normal.      Left Ear: Tympanic membrane normal.      Nose:      Comments: Nasal turbs dry; slightly inflamed. No obtruction     Mouth/Throat:      Mouth: Mucous membranes are moist.      Pharynx: No posterior oropharyngeal erythema.   Eyes:      Extraocular Movements: Extraocular movements intact.      Conjunctiva/sclera: Conjunctivae normal.      Pupils: Pupils are equal, round, and reactive to light.      Comments: Mehdi Ptosis   Neck:      Vascular: No carotid bruit.   Cardiovascular:      Rate and Rhythm: Normal rate and regular rhythm.      Pulses: Normal pulses.      Heart sounds: Normal heart sounds. No murmur heard.     Comments: Mehdi pedal edema.   Pulmonary:      Effort: No respiratory distress.      Breath sounds: Normal breath sounds. No wheezing.   Abdominal:      General: Bowel sounds are normal. There is no distension.      Palpations: Abdomen is soft.      Tenderness: There is no abdominal tenderness. There is no guarding or rebound.   Skin:     Comments: Spider veins BLE   Neurological:      General: No focal deficit present.      Mental Status: She is alert and oriented to person, place, and time.   Psychiatric:         Mood and Affect: Mood normal.         Behavior: Behavior normal.        Laboratory:  CBC:  Recent Labs   Lab 10/12/20  0953 01/11/22  1544   WBC 6.33 6.59   RBC 4.28 4.43   Hemoglobin 13.1 13.5   Hematocrit 42.9 42.6   Platelets 243 249    H 96   MCH 30.6 30.5   MCHC 30.5 L 31.7 L       CMP:  Recent Labs   Lab 06/28/22  1445  12/19/22  1242 01/27/23  1110   Glucose 90 105  --    Calcium 10.2 9.7  --    Albumin 3.7 3.5 3.7   Total Protein 6.5 6.1 6.5   Sodium 141 138  --    Potassium 4.6 4.2  --    CO2 25 25  --    Chloride 107 105  --    BUN 11 13  --    Creatinine 0.7 0.8  --    Alkaline Phosphatase 44 L 53 L 47 L   ALT 18 29 31   AST 19 19 25   Total Bilirubin 0.6 1.1 H 0.7           URINALYSIS:  Recent Labs   Lab 06/30/20  1435 11/09/20  1418   Color, UA Yellow Yellow   Clarity, UA  --  Clear   Specific [place], UA 1.010  --    Spec Grav UA  --  1.015   pH, UA 6.0 5   Protein, UA Negative  --    Nitrite, UA Negative neg   Leukocytes, UA Negative  --    Urobilinogen, UA  --  normal        LIPIDS:  Recent Labs   Lab 10/12/20  0952 11/06/20  1116 05/10/21  1302 01/05/22  1215 06/28/22  1445 08/31/22  1305 12/19/22  1242   TSH 14.694 H   < > 1.978  --  7.769 H 1.224  --    HDL 46  --   --  48  --   --  41   Cholesterol 110 L  --   --  105 L  --   --  96 L   Triglycerides 109  --   --  94  --   --  82   LDL Cholesterol 42.2 L  --   --  38.2 L  --   --  38.6 L   HDL/Cholesterol Ratio 41.8  --   --  45.7  --   --  42.7   Non-HDL Cholesterol 64  --   --  57  --   --  55   Total Cholesterol/HDL Ratio 2.4  --   --  2.2  --   --  2.3    < > = values in this interval not displayed.       TSH:  Recent Labs   Lab 05/10/21  1302 06/28/22  1445 08/31/22  1305   TSH 1.978 7.769 H 1.224       A1C:  Recent Labs   Lab 10/12/20  0953 04/07/21  1216 01/05/22  1215 06/28/22  1445 12/19/22  1242   Hemoglobin A1C 6.2 H 6.4 H 6.3 H 6.3 H 6.4 H       Urine Microalbumin/Cr:  Recent Labs   Lab 06/30/20  1435 04/07/21  1317 01/05/22  1223 12/13/22  1438   Microalb/Creat Ratio 8.3 17.1 21.9 10.3       Assessment/Plan     Bebe Valdez is a 72 y.o.female with:    Type 2 diabetes mellitus with hypercholesterolemia  -     metFORMIN (GLUCOPHAGE-XR) 500 MG ER 24hr tablet; Take 2 tablets (1,000 mg total) by mouth 2 (two) times daily with meals.  Dispense: 360 tablet;  Refill: 1  -     Hemoglobin A1C; Future; Expected date: 06/13/2023  -     Comprehensive Metabolic Panel; Future; Expected date: 06/13/2023  - Controlled.  Cont current.     Hypothyroidism, unspecified type  -     TSH; Future; Expected date: 06/13/2023  - Stable.  Cont current regimen.    Hyperlipidemia, unspecified hyperlipidemia type  -     Lipid Panel; Future; Expected date: 06/13/2023  - Controlled.  Cont current.     Atrial fibrillation, unspecified type  -     CBC Auto Differential; Future; Expected date: 06/13/2023  - Stable.  Cont current regimen.    Aortic atherosclerosis  - Stable.  Cont current regimen.    Class 1 obesity due to excess calories with serious comorbidity and body mass index (BMI) of 32.0 to 32.9 in adult    Venous insufficiency  - Rec elev BLE, low sodium diet, and compression stockings/socks prn.     Acute rhinitis  - Rec NS irrigation BID. Allegra prn.  Alert Md if sx's worsen.     Chronic conditions status updated as per HPI.  Other than changes above, cont current medications and maintain follow up with specialists.  Follow up in about 6 months (around 12/13/2023).      Amber Dorsey MD  [facility]

## 2023-06-12 ENCOUNTER — PATIENT OUTREACH (OUTPATIENT)
Dept: ADMINISTRATIVE | Facility: HOSPITAL | Age: 73
End: 2023-06-12
Payer: MEDICARE

## 2023-06-13 ENCOUNTER — OFFICE VISIT (OUTPATIENT)
Dept: PRIMARY CARE CLINIC | Facility: CLINIC | Age: 73
End: 2023-06-13
Payer: MEDICARE

## 2023-06-13 VITALS
HEART RATE: 56 BPM | WEIGHT: 201.75 LBS | TEMPERATURE: 98 F | OXYGEN SATURATION: 96 % | SYSTOLIC BLOOD PRESSURE: 112 MMHG | HEIGHT: 66 IN | DIASTOLIC BLOOD PRESSURE: 74 MMHG | RESPIRATION RATE: 18 BRPM | BODY MASS INDEX: 32.42 KG/M2

## 2023-06-13 DIAGNOSIS — I48.91 ATRIAL FIBRILLATION, UNSPECIFIED TYPE: ICD-10-CM

## 2023-06-13 DIAGNOSIS — E78.00 TYPE 2 DIABETES MELLITUS WITH HYPERCHOLESTEROLEMIA: Primary | ICD-10-CM

## 2023-06-13 DIAGNOSIS — J00 ACUTE RHINITIS: ICD-10-CM

## 2023-06-13 DIAGNOSIS — I70.0 AORTIC ATHEROSCLEROSIS: ICD-10-CM

## 2023-06-13 DIAGNOSIS — I87.2 VENOUS INSUFFICIENCY: ICD-10-CM

## 2023-06-13 DIAGNOSIS — E78.5 HYPERLIPIDEMIA, UNSPECIFIED HYPERLIPIDEMIA TYPE: ICD-10-CM

## 2023-06-13 DIAGNOSIS — E66.09 CLASS 1 OBESITY DUE TO EXCESS CALORIES WITH SERIOUS COMORBIDITY AND BODY MASS INDEX (BMI) OF 32.0 TO 32.9 IN ADULT: ICD-10-CM

## 2023-06-13 DIAGNOSIS — E11.69 TYPE 2 DIABETES MELLITUS WITH HYPERCHOLESTEROLEMIA: Primary | ICD-10-CM

## 2023-06-13 DIAGNOSIS — E03.9 HYPOTHYROIDISM, UNSPECIFIED TYPE: ICD-10-CM

## 2023-06-13 PROCEDURE — 99214 OFFICE O/P EST MOD 30 MIN: CPT | Mod: S$PBB,,, | Performed by: INTERNAL MEDICINE

## 2023-06-13 PROCEDURE — 99999 PR PBB SHADOW E&M-EST. PATIENT-LVL IV: CPT | Mod: PBBFAC,,, | Performed by: INTERNAL MEDICINE

## 2023-06-13 PROCEDURE — 99214 OFFICE O/P EST MOD 30 MIN: CPT | Mod: PBBFAC,PN | Performed by: INTERNAL MEDICINE

## 2023-06-13 PROCEDURE — 99999 PR PBB SHADOW E&M-EST. PATIENT-LVL IV: ICD-10-PCS | Mod: PBBFAC,,, | Performed by: INTERNAL MEDICINE

## 2023-06-13 PROCEDURE — 99214 PR OFFICE/OUTPT VISIT, EST, LEVL IV, 30-39 MIN: ICD-10-PCS | Mod: S$PBB,,, | Performed by: INTERNAL MEDICINE

## 2023-06-13 RX ORDER — METFORMIN HYDROCHLORIDE 500 MG/1
1000 TABLET, EXTENDED RELEASE ORAL 2 TIMES DAILY WITH MEALS
Qty: 360 TABLET | Refills: 1 | Status: SHIPPED | OUTPATIENT
Start: 2023-06-13 | End: 2023-12-20 | Stop reason: SDUPTHER

## 2023-06-21 ENCOUNTER — LAB VISIT (OUTPATIENT)
Dept: LAB | Facility: HOSPITAL | Age: 73
End: 2023-06-21
Attending: INTERNAL MEDICINE
Payer: MEDICARE

## 2023-06-21 DIAGNOSIS — E11.69 TYPE 2 DIABETES MELLITUS WITH HYPERCHOLESTEROLEMIA: ICD-10-CM

## 2023-06-21 DIAGNOSIS — E78.00 TYPE 2 DIABETES MELLITUS WITH HYPERCHOLESTEROLEMIA: ICD-10-CM

## 2023-06-21 DIAGNOSIS — I48.91 ATRIAL FIBRILLATION, UNSPECIFIED TYPE: ICD-10-CM

## 2023-06-21 DIAGNOSIS — E78.5 HYPERLIPIDEMIA, UNSPECIFIED HYPERLIPIDEMIA TYPE: ICD-10-CM

## 2023-06-21 DIAGNOSIS — E03.9 HYPOTHYROIDISM, UNSPECIFIED TYPE: ICD-10-CM

## 2023-06-21 LAB
ALBUMIN SERPL BCP-MCNC: 3.6 G/DL (ref 3.5–5.2)
ALP SERPL-CCNC: 54 U/L (ref 55–135)
ALT SERPL W/O P-5'-P-CCNC: 18 U/L (ref 10–44)
ANION GAP SERPL CALC-SCNC: 10 MMOL/L (ref 8–16)
AST SERPL-CCNC: 19 U/L (ref 10–40)
BASOPHILS # BLD AUTO: 0.04 K/UL (ref 0–0.2)
BASOPHILS NFR BLD: 0.6 % (ref 0–1.9)
BILIRUB SERPL-MCNC: 0.7 MG/DL (ref 0.1–1)
BUN SERPL-MCNC: 12 MG/DL (ref 8–23)
CALCIUM SERPL-MCNC: 10.1 MG/DL (ref 8.7–10.5)
CHLORIDE SERPL-SCNC: 107 MMOL/L (ref 95–110)
CHOLEST SERPL-MCNC: 92 MG/DL (ref 120–199)
CHOLEST/HDLC SERPL: 2.2 {RATIO} (ref 2–5)
CO2 SERPL-SCNC: 25 MMOL/L (ref 23–29)
CREAT SERPL-MCNC: 0.8 MG/DL (ref 0.5–1.4)
DIFFERENTIAL METHOD: ABNORMAL
EOSINOPHIL # BLD AUTO: 0.3 K/UL (ref 0–0.5)
EOSINOPHIL NFR BLD: 4.1 % (ref 0–8)
ERYTHROCYTE [DISTWIDTH] IN BLOOD BY AUTOMATED COUNT: 12.8 % (ref 11.5–14.5)
EST. GFR  (NO RACE VARIABLE): >60 ML/MIN/1.73 M^2
ESTIMATED AVG GLUCOSE: 134 MG/DL (ref 68–131)
GLUCOSE SERPL-MCNC: 121 MG/DL (ref 70–110)
HBA1C MFR BLD: 6.3 % (ref 4–5.6)
HCT VFR BLD AUTO: 42.6 % (ref 37–48.5)
HDLC SERPL-MCNC: 41 MG/DL (ref 40–75)
HDLC SERPL: 44.6 % (ref 20–50)
HGB BLD-MCNC: 13.4 G/DL (ref 12–16)
IMM GRANULOCYTES # BLD AUTO: 0.01 K/UL (ref 0–0.04)
IMM GRANULOCYTES NFR BLD AUTO: 0.2 % (ref 0–0.5)
LDLC SERPL CALC-MCNC: 32.4 MG/DL (ref 63–159)
LYMPHOCYTES # BLD AUTO: 1.8 K/UL (ref 1–4.8)
LYMPHOCYTES NFR BLD: 27.8 % (ref 18–48)
MCH RBC QN AUTO: 29.8 PG (ref 27–31)
MCHC RBC AUTO-ENTMCNC: 31.5 G/DL (ref 32–36)
MCV RBC AUTO: 95 FL (ref 82–98)
MONOCYTES # BLD AUTO: 0.8 K/UL (ref 0.3–1)
MONOCYTES NFR BLD: 12.3 % (ref 4–15)
NEUTROPHILS # BLD AUTO: 3.5 K/UL (ref 1.8–7.7)
NEUTROPHILS NFR BLD: 55 % (ref 38–73)
NONHDLC SERPL-MCNC: 51 MG/DL
NRBC BLD-RTO: 0 /100 WBC
PLATELET # BLD AUTO: 271 K/UL (ref 150–450)
PMV BLD AUTO: 11.2 FL (ref 9.2–12.9)
POTASSIUM SERPL-SCNC: 4.3 MMOL/L (ref 3.5–5.1)
PROT SERPL-MCNC: 6.5 G/DL (ref 6–8.4)
RBC # BLD AUTO: 4.5 M/UL (ref 4–5.4)
SODIUM SERPL-SCNC: 142 MMOL/L (ref 136–145)
TRIGL SERPL-MCNC: 93 MG/DL (ref 30–150)
TSH SERPL DL<=0.005 MIU/L-ACNC: 1.76 UIU/ML (ref 0.4–4)
WBC # BLD AUTO: 6.32 K/UL (ref 3.9–12.7)

## 2023-06-21 PROCEDURE — 85025 COMPLETE CBC W/AUTO DIFF WBC: CPT | Performed by: INTERNAL MEDICINE

## 2023-06-21 PROCEDURE — 84443 ASSAY THYROID STIM HORMONE: CPT | Performed by: INTERNAL MEDICINE

## 2023-06-21 PROCEDURE — 80061 LIPID PANEL: CPT | Performed by: INTERNAL MEDICINE

## 2023-06-21 PROCEDURE — 83036 HEMOGLOBIN GLYCOSYLATED A1C: CPT | Performed by: INTERNAL MEDICINE

## 2023-06-21 PROCEDURE — 80053 COMPREHEN METABOLIC PANEL: CPT | Performed by: INTERNAL MEDICINE

## 2023-06-21 PROCEDURE — 36415 COLL VENOUS BLD VENIPUNCTURE: CPT | Mod: PO | Performed by: INTERNAL MEDICINE

## 2023-06-22 ENCOUNTER — PATIENT MESSAGE (OUTPATIENT)
Dept: PRIMARY CARE CLINIC | Facility: CLINIC | Age: 73
End: 2023-06-22
Payer: MEDICARE

## 2023-06-22 ENCOUNTER — TELEPHONE (OUTPATIENT)
Dept: DERMATOLOGY | Facility: CLINIC | Age: 73
End: 2023-06-22
Payer: MEDICARE

## 2023-06-22 NOTE — TELEPHONE ENCOUNTER
----- Message from Cecile Pearson sent at 6/22/2023 11:22 AM CDT -----  Regarding: urgent refill request  Contact: pt  Urgent Refill request. Script renewal needed     metroNIDAZOLE (NORITATE) 1 % cream    [facility] [address] [place] [address]  [address] [facility] ([place], [place]) [address]  Phone: [phone] Fax: [phone]    Confirmed contact below:  Contact Name:eBbe Wilhelme  Phone Number: [phone]

## 2023-06-23 NOTE — PROGRESS NOTES
I sent pt a my chart message -  I reviewed your labs. Your Ha1c remains controlled at 6.3. Your thyroid functions are normal.  Your Cholesterol looked good.  Your kidney function and liver functions looked good.  You are not anemic. No further recommendations at this time.    Dr. ROBERT

## 2023-06-29 ENCOUNTER — OFFICE VISIT (OUTPATIENT)
Dept: DERMATOLOGY | Facility: CLINIC | Age: 73
End: 2023-06-29
Payer: MEDICARE

## 2023-06-29 DIAGNOSIS — L40.0 PSORIASIS VULGARIS: ICD-10-CM

## 2023-06-29 DIAGNOSIS — L71.9 ROSACEA: Primary | ICD-10-CM

## 2023-06-29 PROCEDURE — 99214 PR OFFICE/OUTPT VISIT, EST, LEVL IV, 30-39 MIN: ICD-10-PCS | Mod: S$GLB,,, | Performed by: DERMATOLOGY

## 2023-06-29 PROCEDURE — 99214 OFFICE O/P EST MOD 30 MIN: CPT | Mod: S$GLB,,, | Performed by: DERMATOLOGY

## 2023-06-29 RX ORDER — BETAMETHASONE DIPROPIONATE 0.5 MG/G
CREAM TOPICAL
Qty: 50 G | Refills: 2 | Status: SHIPPED | OUTPATIENT
Start: 2023-06-29 | End: 2024-03-05 | Stop reason: SDUPTHER

## 2023-06-29 NOTE — PROGRESS NOTES
Patient Information  Name: Bebe Valdez  : 1950  MRN: [id number]     Referring Physician:  No ref. provider found   Primary Care Physician:  Amber Dorsey MD   Date of Visit: 2023      Subjective:     History of Present lllness:    Bebe Valdez is a 72 y.o. female who presents with a chief complaint of rosacea and psoriasis.    Diagnosis: Rosacea  Location: cheeks  Signs/Symptoms: redness  Symptom course: improving  Current treatment: azelaic acid 15% + ivermectin 1% + metronidazole 1% cream    Diagnosis: psoriasis vulgaris  Location: elbows  Signs/Symptoms: crusted at times, left more than the right elbow  Symptom course: improving  Current treatment: diprolene     Patient was last seen: 5/3/2022.  Prior notes by myself reviewed.   Clinical documentation obtained by nursing staff reviewed.    Review of Systems   Constitutional:  Negative for fever.   HENT:  Negative for sore throat and headaches.    Eyes:  Negative for itching, eye watering, eye irritation and eyelid inflammation.   Gastrointestinal:  Negative for nausea, vomiting, diarrhea and Sensitivity to oral antibiotics.   Musculoskeletal:  Negative for arthralgias.   Skin:  Positive for itching, rash, daily sunscreen use and activity-related sunscreen use. Negative for recent sunburn.   Neurological:  Negative for headaches.     Objective:   Physical Exam   Constitutional: She appears well-developed and well-nourished. No distress.   Neurological: She is alert and oriented to person, place, and time. She is not disoriented.   Psychiatric: She has a normal mood and affect.   Skin:   Areas Examined (abnormalities noted in diagram):   Head / Face Inspection Performed  RUE Inspected  LUE Inspection Performed          Diagram Legend     Erythematous scaling macule/papule c/w actinic keratosis       Vascular papule c/w angioma      Pigmented verrucoid papule/plaque c/w seborrheic keratosis      Yellow umbilicated papule c/w sebaceous  hyperplasia      Irregularly shaped tan macule c/w lentigo     1-2 mm smooth white papules consistent with Milia      Movable subcutaneous cyst with punctum c/w epidermal inclusion cyst      Subcutaneous movable cyst c/w pilar cyst      Firm pink to brown papule c/w dermatofibroma      Pedunculated fleshy papule(s) c/w skin tag(s)      Evenly pigmented macule c/w junctional nevus     Mildly variegated pigmented, slightly irregular-bordered macule c/w mildly atypical nevus      Flesh colored to evenly pigmented papule c/w intradermal nevus       Pink pearly papule/plaque c/w basal cell carcinoma      Erythematous hyperkeratotic cursted plaque c/w SCC      Surgical scar with no sign of skin cancer recurrence      Open and closed comedones      Inflammatory papules and pustules      Verrucoid papule consistent consistent with wart     Erythematous eczematous patches and plaques     Dystrophic onycholytic nail with subungual debris c/w onychomycosis     Umbilicated papule    Erythematous-base heme-crusted tan verrucoid plaque consistent with inflamed seborrheic keratosis     Erythematous Silvery Scaling Plaque c/w Psoriasis     See annotation    No images are attached to the encounter or orders placed in the encounter.      [] Data reviewed  [] Prior external notes reviewed  [] Independent review of test  [] Management discussed with another provider  [] Independent historian    Assessment / Plan:        Rosacea  - chronic problem,  stable  Rosacea is a chronic condition without a definitive cure.  There are several well-known triggers, such as exercise, temperature extremes, alcohol, and spicy foods, that should be avoided to prevent a rosacea flare.  Use gentle, ceramide-containing products (such as CeraVe Moisturizing Cream or La Roche-Posay Toleriane Double Repair Face Moisturizer) to repair the skin barrier and to minimize irritation. Avoid harsh soaps, exfoliants, and scrubs.  -     metroNIDAZOLE (NORITATE) 1 %  cream; Compound azelaic acid 15% + ivermectin 1% + metronidazole 1% cream. Apply to face once or twice daily.  Dispense: 30 g; Refill: 5    Psoriasis vulgaris  - chronic problem, stable  -     augmented betamethasone dipropionate (DIPROLENE-AF) 0.05 % cream; Apply to elbows BID prn scaling.  Dispense: 50 g; Refill: 2  Side effects from the overuse of topical steroids include thinning of skin, easy tearing/bruising of skin, stretch marks, spider veins, etc. Use the topical steroid no more than 2 days per week if used long-term and/or take breaks from the topical steroid, especially if any of the above side effects are noticed.      Follow up in about 1 year (around 6/29/2024) for follow up, or sooner if symptoms worsening or not improving.      Lashaun Montes De Oca MD, FAAD  [facility]

## 2023-07-07 ENCOUNTER — PES CALL (OUTPATIENT)
Dept: ADMINISTRATIVE | Facility: CLINIC | Age: 73
End: 2023-07-07
Payer: MEDICARE

## 2023-07-14 DIAGNOSIS — E03.9 HYPOTHYROIDISM, UNSPECIFIED TYPE: ICD-10-CM

## 2023-07-15 RX ORDER — LEVOTHYROXINE SODIUM 175 UG/1
TABLET ORAL
Qty: 90 TABLET | Refills: 3 | Status: SHIPPED | OUTPATIENT
Start: 2023-07-15

## 2023-07-15 NOTE — TELEPHONE ENCOUNTER
No care due was identified.  [facility] Embedded Care Due Messages. Reference number: [id number].   7/14/2023 9:46:31 PM CDT

## 2023-07-15 NOTE — TELEPHONE ENCOUNTER
Refill Decision Note   Bebe Valdez  is requesting a refill authorization.  Brief Assessment and Rationale for Refill:  Approve     Medication Therapy Plan:       Medication Reconciliation Completed: No   Comments:     No Care Gaps recommended.     Note composed:12:42 PM 07/15/2023

## 2023-08-09 NOTE — PROGRESS NOTES
Ms. Valdez is a patient of Dr. Sanchez and was last seen in clinic 2/14/2023.      Subjective:   Patient ID:  Bebe Valdez is a 73 y.o. female who presents for follow up of Atrial Fibrillation  .     HPI:    Ms. Valdez is a 73 y.o. female with AF, hypothyroid, DM, cardiomyopathy here for follow up.    Background:    Referring Cardiologist: Tami De Leon MD  Primary Care Physician: Anil Nelson MD    Mrs. Valdez has a hx of diabetes mellitus, hypothyroidism on synthroid and recent onset of persistent atrial fibrillation. She was exercising with a  in July 2019 and pulse was noted to be elevated and irregular. She saw Dr. Nelson on 8/26/2019 and was diagnosed with atrial fibrillation. She was started on metoprolol and aspirin. TSH was checked and was undetectable. Her synthroid dosage was reduced. She was then assessed by Dr. De Leon who started her on eliquis and referred her here for further evaluation. A transthoracic echocardiogram was performed noting a LVEF of 45% with moderate left atrial enlargement and mildly depressed RV function. She reported no obvious symptoms related to AF. At our initial visit in 2018 we discussed trial of restoration of sinus rhythm to see if her mild systolic cardiomyopathy could be due to AF.    9/26/2019: Underwent successful DORINA/DCCV. EF on DORINA was 45%.      10/25/2019: She is one month s/p cardioversion and back in atrial fibrillation. Rate is controlled. She experienced no symptom improvement in sinus rhythm. However, EF is 45%. We discussed that this could be from the AF or from her period of hyperthyroidism. She is still hyperthyroid and is going to decrease her synthroid again per PCP (has a follow up soon). It would increase the likelihood of maintaining sinus rhythm after cardioversion if her thyroid levels are normal. We discussed anti-arrhythmic options, which include tikosyn or sotalol which both require hospital stays. She does not want to proceed with that route.  It might be possible to use amio after her DCCV, after her thyroid has normalized and her synthroid dose is appropriate, until her follow up echo. If her EF normalizes, could switch to 1C or Multaq.      12/2019: She remained in AF, minimal symptoms although in the presence of a cardiomyopathy. Plan was to proceed with rhythm control once she was euthyroid, and evaluate any symptom improvement or increase in LV function.      1/9/2020: successful DCCV. Multaq initiated. Felt better afterward. Thyroid function now normal. Will repeat echo in 3 months to observe for improvement in LV function in SR.     6/4/2020: She is now 5 months s/p cardioversion and initiation of Multaq. Echo yesterday shows normalization of LV function (40%->55%). EKG with acceptable intervals. She is feeling well. Remains on eliquis for CVA prophylaxis.     12/29/2020: Doing well from a rhythm standpoint, remains in SR on multaq. EF recovered in SR. We discussed that she was a candidate for ablation given her hx of arrhythmia-induced CM. She was not interested in ablation unless absolutely necessary. She strongly prefers to remain on multaq at this time. Frequent PVCs on EKG.      6/3/2021: Holter 1/2021: PVC 10%. No AF.    1/2022: Mrs. Valdez returns for follow-up. She recently developed a rash. Metoprolol was held to see if it could be from that however rash did not improve. She resumed metoprolol. She has done well on multaq. She reports no bleeding issues with eliquis.    2/2023: Mrs. Valdez returns for follow-up. No symptomatic AF on multaq. No nausea. No bleeding with eliquis. She works out with a .  ECG is sinus rhythm  In summary, Mrs. Valdez is a pleasant 72 year-old woman with diabetes mellitus, hypothyroidism on synthroid and persistent AF that caused a mild cardiomyopathy. She is maintaining sinus rhythm on multaq and EF has recovered. PVI has been discussed. She prefers to continue multaq as long as it is working. Continue multaq,  metoprolol, and eliquis. RTC in 6 months, sooner if needed. Needs updated CBC.    Update (08/14/2023):    Today she says she has been feeling well. Just came back from a [place] cruise to [place]. Did a lot of walking but also eating. Planning on getting back to her usual activities. No cardiac complaints. Just nightly leg cramps. Ms. Valdez reports no chest pain with exertion or at rest, palpitations, SOB, SON, dizziness, or syncope.    She is currently taking eliquis 5mg BID for stroke prophylaxis and denies significant bleeding episodes. She is currently being treated with multaq 400mg BID for rhythm control and toprol 50mg daily for HR control.  Kidney function is stable, with a creatinine of 0.8 on 6/21/2023.    I have personally reviewed the patient's EKG today, which shows sinus rhythm with 1st deg AVB at 69npm. SC interval is 226. QRS is 92. QTc is 456.    Relevant Cardiac Test Results:    2D Echo (6/2021):  The left ventricle is normal in size with normal systolic function. The estimated ejection fraction is 55%.  Normal left ventricular diastolic function.  Normal right ventricular size with normal right ventricular systolic function.  Normal central venous pressure (3 mmHg).  Moderate left atrial enlargement.    Current Outpatient Medications   Medication Sig    SYNTHROID 175 mcg tablet TAKE 1 TABLET(175 MCG) BY MOUTH BEFORE BREAKFAST    apixaban (ELIQUIS) 5 mg Tab Take 1 tablet (5 mg total) by mouth 2 (two) times daily.    ascorbic acid, vitamin C, (VITAMIN C) 100 MG tablet Take 100 mg by mouth once daily.    augmented betamethasone dipropionate (DIPROLENE-AF) 0.05 % cream Apply to elbows BID prn scaling.    CINNAMON BARK (CINNAMON ORAL) Take by mouth once daily. Pt taking 2 pills daily.    dronedarone (MULTAQ) 400 mg Tab TAKE 1 TABLET BY MOUTH TWICE DAILY WITH MEALS    fish oil-omega-3 fatty acids 300-1,000 mg capsule Take 2 g by mouth once daily.    metFORMIN (GLUCOPHAGE-XR) 500 MG ER 24hr tablet Take 2  "tablets (1,000 mg total) by mouth 2 (two) times daily with meals.    metoprolol succinate (TOPROL-XL) 50 MG 24 hr tablet Take 1 tablet (50 mg total) by mouth once daily.    metroNIDAZOLE (NORITATE) 1 % cream Compound azelaic acid 15% + ivermectin 1% + metronidazole 1% cream. Apply to face once or twice daily.    MV,CA,MIN/IRON/FA/GUARANA/CAFF (ONE-A-DAY WOMEN'S ACTIVE ORAL) Take by mouth.    rosuvastatin (CRESTOR) 10 MG tablet TAKE 1 TABLET(10 MG) BY MOUTH EVERY DAY    vitamin D (VITAMIN D3) 1000 units Tab Take 1,000 Units by mouth once daily.     No current facility-administered medications for this visit.     Facility-Administered Medications Ordered in Other Visits   Medication    0.9%  NaCl infusion    sodium chloride 0.9% flush 5 mL       Review of Systems   Constitutional: Negative for malaise/fatigue.   Cardiovascular:  Negative for chest pain, dyspnea on exertion, irregular heartbeat, leg swelling and palpitations.   Respiratory:  Negative for shortness of breath.    Hematologic/Lymphatic: Negative for bleeding problem.   Skin:  Negative for rash.   Musculoskeletal:  Negative for myalgias.   Gastrointestinal:  Negative for hematemesis, hematochezia and nausea.   Genitourinary:  Negative for hematuria.   Neurological:  Negative for light-headedness.   Psychiatric/Behavioral:  Negative for altered mental status.    Allergic/Immunologic: Negative for persistent infections.       Objective:          /73   Pulse 69   Ht 5' 6" (1.676 m)   Wt 90.8 kg (200 lb 2.8 oz)   LMP 07/18/2001   BMI 32.31 kg/m²     Physical Exam  Vitals and nursing note reviewed.   Constitutional:       Appearance: Normal appearance. She is well-developed.   HENT:      Head: Normocephalic.      Nose: Nose normal.   Eyes:      Pupils: Pupils are equal, round, and reactive to light.   Cardiovascular:      Rate and Rhythm: Normal rate and regular rhythm.   Pulmonary:      Effort: No respiratory distress.      Breath sounds: Normal " breath sounds.   Musculoskeletal:         General: Normal range of motion.   Skin:     General: Skin is warm and dry.      Findings: No erythema.   Neurological:      Mental Status: She is alert and oriented to person, place, and time.   Psychiatric:         Speech: Speech normal.         Behavior: Behavior normal.           Lab Results   Component Value Date     06/21/2023    K 4.3 06/21/2023    MG 2.3 04/08/2005    BUN 12 06/21/2023    CREATININE 0.8 06/21/2023    ALT 18 06/21/2023    AST 19 06/21/2023    HGB 13.4 06/21/2023    HCT 42.6 06/21/2023    TSH 1.764 06/21/2023    LDLCALC 32.4 (L) 06/21/2023             Assessment:     1. Persistent atrial fibrillation    2. Chronic anticoagulation    3. History of cardioversion    4. Visit for monitoring Multaq therapy      Plan:     In summary, Ms. Valdez is a 72 y.o. female with AF, hypothyroid, DM, cardiomyopathy here for follow up.  She continues to do well from a rhythm standpoint, maintaining sinus rhythm on multaq. ECG with stable intervals.  CHADSVASc 3 on eliquis. She is feeling well.    Continue current regimen  RTC 6 mo, sooner if needed    *A copy of this note has been sent to Dr. Sanchez*    Follow up in about 6 months (around 2/14/2024).    ------------------------------------------------------------------    CHRISTA Soares, NP-C  Cardiac Electrophysiology

## 2023-08-14 ENCOUNTER — HOSPITAL ENCOUNTER (OUTPATIENT)
Dept: CARDIOLOGY | Facility: CLINIC | Age: 73
Discharge: HOME | End: 2023-08-14
Payer: MEDICARE

## 2023-08-14 ENCOUNTER — OFFICE VISIT (OUTPATIENT)
Dept: ELECTROPHYSIOLOGY | Facility: CLINIC | Age: 73
End: 2023-08-14
Payer: MEDICARE

## 2023-08-14 VITALS
SYSTOLIC BLOOD PRESSURE: 116 MMHG | HEART RATE: 69 BPM | HEIGHT: 66 IN | WEIGHT: 200.19 LBS | BODY MASS INDEX: 32.17 KG/M2 | DIASTOLIC BLOOD PRESSURE: 73 MMHG

## 2023-08-14 DIAGNOSIS — I49.8 OTHER SPECIFIED CARDIAC ARRHYTHMIAS: ICD-10-CM

## 2023-08-14 DIAGNOSIS — Z79.899 VISIT FOR MONITORING MULTAQ THERAPY: ICD-10-CM

## 2023-08-14 DIAGNOSIS — Z92.89 HISTORY OF CARDIOVERSION: ICD-10-CM

## 2023-08-14 DIAGNOSIS — Z51.81 VISIT FOR MONITORING MULTAQ THERAPY: ICD-10-CM

## 2023-08-14 DIAGNOSIS — I48.19 PERSISTENT ATRIAL FIBRILLATION: Primary | ICD-10-CM

## 2023-08-14 DIAGNOSIS — Z79.01 CHRONIC ANTICOAGULATION: ICD-10-CM

## 2023-08-14 PROCEDURE — 93010 RHYTHM STRIP: ICD-10-PCS | Mod: S$PBB,,, | Performed by: INTERNAL MEDICINE

## 2023-08-14 PROCEDURE — 99213 OFFICE O/P EST LOW 20 MIN: CPT | Mod: PBBFAC | Performed by: NURSE PRACTITIONER

## 2023-08-14 PROCEDURE — 93010 ELECTROCARDIOGRAM REPORT: CPT | Mod: S$PBB,,, | Performed by: INTERNAL MEDICINE

## 2023-08-14 PROCEDURE — 93005 ELECTROCARDIOGRAM TRACING: CPT | Mod: PBBFAC | Performed by: INTERNAL MEDICINE

## 2023-08-14 PROCEDURE — 99214 PR OFFICE/OUTPT VISIT, EST, LEVL IV, 30-39 MIN: ICD-10-PCS | Mod: S$PBB,,, | Performed by: NURSE PRACTITIONER

## 2023-08-14 PROCEDURE — 99999 PR PBB SHADOW E&M-EST. PATIENT-LVL III: ICD-10-PCS | Mod: PBBFAC,,, | Performed by: NURSE PRACTITIONER

## 2023-08-14 PROCEDURE — 99214 OFFICE O/P EST MOD 30 MIN: CPT | Mod: S$PBB,,, | Performed by: NURSE PRACTITIONER

## 2023-08-14 PROCEDURE — 99999 PR PBB SHADOW E&M-EST. PATIENT-LVL III: CPT | Mod: PBBFAC,,, | Performed by: NURSE PRACTITIONER

## 2023-09-29 ENCOUNTER — OFFICE VISIT (OUTPATIENT)
Dept: OBSTETRICS AND GYNECOLOGY | Facility: CLINIC | Age: 73
End: 2023-09-29
Payer: MEDICARE

## 2023-09-29 VITALS
HEIGHT: 66 IN | WEIGHT: 199.94 LBS | DIASTOLIC BLOOD PRESSURE: 77 MMHG | SYSTOLIC BLOOD PRESSURE: 132 MMHG | BODY MASS INDEX: 32.13 KG/M2

## 2023-09-29 DIAGNOSIS — N90.89 VULVAR IRRITATION: ICD-10-CM

## 2023-09-29 DIAGNOSIS — Z01.419 ENCOUNTER FOR WELL WOMAN EXAM WITH ROUTINE GYNECOLOGICAL EXAM: Primary | ICD-10-CM

## 2023-09-29 PROCEDURE — 99213 OFFICE O/P EST LOW 20 MIN: CPT | Mod: PBBFAC,PN | Performed by: OBSTETRICS & GYNECOLOGY

## 2023-09-29 PROCEDURE — G0101 PR CA SCREEN;PELVIC/BREAST EXAM: ICD-10-PCS | Mod: S$PBB,GZ,, | Performed by: OBSTETRICS & GYNECOLOGY

## 2023-09-29 PROCEDURE — 99999 PR PBB SHADOW E&M-EST. PATIENT-LVL III: ICD-10-PCS | Mod: PBBFAC,,, | Performed by: OBSTETRICS & GYNECOLOGY

## 2023-09-29 PROCEDURE — 81514 NFCT DS BV&VAGINITIS DNA ALG: CPT | Performed by: OBSTETRICS & GYNECOLOGY

## 2023-09-29 PROCEDURE — 99999 PR PBB SHADOW E&M-EST. PATIENT-LVL III: CPT | Mod: PBBFAC,,, | Performed by: OBSTETRICS & GYNECOLOGY

## 2023-09-29 PROCEDURE — G0101 CA SCREEN;PELVIC/BREAST EXAM: HCPCS | Mod: S$PBB,GZ,, | Performed by: OBSTETRICS & GYNECOLOGY

## 2023-09-29 RX ORDER — BETAMETHASONE DIPROPIONATE 0.5 MG/G
CREAM TOPICAL 2 TIMES DAILY PRN
COMMUNITY
Start: 2023-06-29

## 2023-09-29 RX ORDER — MAGNESIUM 250 MG
TABLET ORAL ONCE
COMMUNITY

## 2023-09-29 RX ORDER — CLOBETASOL PROPIONATE 0.5 MG/G
OINTMENT TOPICAL 2 TIMES DAILY
Qty: 30 G | Refills: 2 | Status: SHIPPED | OUTPATIENT
Start: 2023-09-29

## 2023-09-29 RX ORDER — FEXOFENADINE HCL AND PSEUDOEPHEDRINE HCI 180; 240 MG/1; MG/1
1 TABLET, EXTENDED RELEASE ORAL DAILY
COMMUNITY
End: 2023-12-20

## 2023-09-29 NOTE — PROGRESS NOTES
History & Physical  Gynecology      SUBJECTIVE:     Chief Complaint: Gynecologic Exam         History of Present Illness:  Bebe Valdez is a 73 y.o. female   for annual routine Pap and checkup. Patient's last menstrual period was 2001..  Started having vaginal itching a few weeks ago.  More at night than during the day.  Having minor discharge that is yellow in color.  Reports that the vagina feels dry at times.  Since the itching started, has been using a vagisil vaginal wash.  Feels like it is helping slightly.  Has also had a vaginal odor with the itching.     Not having any postmenopausal bleeding  denies break through bleeding.   denies vaginal itching or irritation.  denies vaginal discharge.    She is not sexually active     History of abnormal pap: No  Last Pap: 2015, normal  Last MMG: Yes - 2023  Last Colonoscopy:  5/10/2017, repeat in 10 years.        Review of patient's allergies indicates:   Allergen Reactions    Levaquin [levofloxacin] Hives    Tetracycline Hives       Past Medical History:   Diagnosis Date    Allergy     Anticoagulant long-term use     Hyperlipidemia     Hypothyroid     Obesity     Rosacea      Past Surgical History:   Procedure Laterality Date    CHOLECYSTECTOMY      COLONOSCOPY  2007    COLONOSCOPY N/A 2017    Procedure: COLONOSCOPY;  Surgeon: Tre Alvaerz MD;  Location: [facility] ENDO ([address] [place] [address]);  Service: Endoscopy;  Laterality: N/A;    DILATION AND CURETTAGE OF UTERUS      HYSTEROSCOPY      TREATMENT OF CARDIAC ARRHYTHMIA N/A 2019    Procedure: CARDIOVERSION;  Surgeon: Eduardo Sanchez MD;  Location: [facility] EP LAB;  Service: Cardiology;  Laterality: N/A;  AF, DORINA, DCCV, MAC, [place], 3 Prep    TREATMENT OF CARDIAC ARRHYTHMIA N/A 2020    Procedure: CARDIOVERSION;  Surgeon: Eduardo Sanchez MD;  Location: [facility] EP LAB;  Service: Cardiology;  Laterality: N/A;  AF, DORINA, DCCV, MAC, NJ, 3 Prep    WISDOM TOOTH EXTRACTION       OB History           0    Para   0    Term   0       0    AB   0    Living   0         SAB   0    IAB   0    Ectopic   0    Multiple   0    Live Births   0               Family History   Problem Relation Age of Onset    Breast cancer Mother 42    Allergies Mother     Cancer Maternal Grandfather         lung CA, ?Stomach CA    Heart disease Father     Stroke Father     Atrial fibrillation Father     Hypertension Father     Hypothyroidism Father     Hyperlipidemia Father     Diabetes Father     No Known Problems Sister     No Known Problems Brother     Diabetes Maternal Grandmother     Diabetes Maternal Aunt     Ovarian cancer Neg Hx     Melanoma Neg Hx     Angioedema Neg Hx     Asthma Neg Hx     Atopy Neg Hx     Eczema Neg Hx     Immunodeficiency Neg Hx     Rhinitis Neg Hx     Urticaria Neg Hx     Heart attack Neg Hx     Heart failure Neg Hx      Social History     Tobacco Use    Smoking status: Never    Smokeless tobacco: Never   Substance Use Topics    Alcohol use: Yes     Alcohol/week: 2.0 standard drinks of alcohol     Types: 2 Glasses of wine per week     Comment: weekends     Drug use: Never       Current Outpatient Medications   Medication Sig    apixaban (ELIQUIS) 5 mg Tab Take 1 tablet (5 mg total) by mouth 2 (two) times daily.    ascorbic acid, vitamin C, (VITAMIN C) 100 MG tablet Take 100 mg by mouth once daily.    augmented betamethasone dipropionate (DIPROLENE-AF) 0.05 % cream Apply to elbows BID prn scaling.    betamethasone dipropionate 0.05 % cream Apply topically 2 (two) times daily as needed.    CINNAMON BARK (CINNAMON ORAL) Take by mouth once daily. Pt taking 2 pills daily.    dronedarone (MULTAQ) 400 mg Tab TAKE 1 TABLET BY MOUTH TWICE DAILY WITH MEALS    fish oil-omega-3 fatty acids 300-1,000 mg capsule Take 2 g by mouth once daily.    metFORMIN (GLUCOPHAGE-XR) 500 MG ER 24hr tablet Take 2 tablets (1,000 mg total) by mouth 2 (two) times daily with meals.    metoprolol succinate (TOPROL-XL) 50 MG 24 hr tablet  Take 1 tablet (50 mg total) by mouth once daily.    metroNIDAZOLE (NORITATE) 1 % cream Compound azelaic acid 15% + ivermectin 1% + metronidazole 1% cream. Apply to face once or twice daily.    MV,CA,MIN/IRON/FA/GUARANA/CAFF (ONE-A-DAY WOMEN'S ACTIVE ORAL) Take by mouth.    rosuvastatin (CRESTOR) 10 MG tablet TAKE 1 TABLET(10 MG) BY MOUTH EVERY DAY    SYNTHROID 175 mcg tablet TAKE 1 TABLET(175 MCG) BY MOUTH BEFORE BREAKFAST    vitamin D (VITAMIN D3) 1000 units Tab Take 1,000 Units by mouth once daily.    clobetasol 0.05% (TEMOVATE) 0.05 % Oint Apply topically 2 (two) times daily.    fexofenadine-pseudoephedrine (ALLEGRA-D 24) 180-240 mg per 24 hr tablet Take 1 tablet by mouth once daily.    magnesium 250 mg Tab Take by mouth once.     No current facility-administered medications for this visit.     Facility-Administered Medications Ordered in Other Visits   Medication    0.9%  NaCl infusion    sodium chloride 0.9% flush 5 mL         Review of Systems:  Review of Systems   Constitutional:  Negative for activity change, appetite change, fatigue, fever and unexpected weight change.   Respiratory:  Negative for cough and shortness of breath.    Cardiovascular:  Negative for chest pain and leg swelling.   Gastrointestinal:  Negative for abdominal pain, blood in stool, constipation, diarrhea, nausea and vomiting.   Endocrine: Negative for diabetes, hair loss and hot flashes.   Genitourinary:  Negative for pelvic pain, postcoital bleeding and postmenopausal bleeding.   Musculoskeletal:  Negative for back pain.   Integumentary:  Negative for hair changes, breast mass, nipple discharge and breast skin changes.   Psychiatric/Behavioral:  Negative for sleep disturbance. The patient is not nervous/anxious.    Breast: Negative for mass, mastodynia, nipple discharge and skin changes       OBJECTIVE:     Physical Exam:  Physical Exam  Constitutional:       Appearance: She is well-developed.   HENT:      Head: Normocephalic and  atraumatic.   Eyes:      General: No scleral icterus.        Right eye: No discharge.         Left eye: No discharge.      Conjunctiva/sclera: Conjunctivae normal.   Pulmonary:      Effort: Pulmonary effort is normal.      Breath sounds: No stridor.   Chest:      Chest wall: No mass or tenderness.   Breasts:     Breasts are symmetrical.      Right: No inverted nipple, mass, nipple discharge, skin change or tenderness.      Left: No inverted nipple, mass, nipple discharge, skin change or tenderness.   Abdominal:      General: There is no distension.      Palpations: Abdomen is soft.      Tenderness: There is no abdominal tenderness.   Genitourinary:     Labia:         Right: No rash, tenderness, lesion or injury.         Left: No rash, tenderness, lesion or injury.       Vagina: Normal.      Cervix: No cervical motion tenderness, discharge or friability.      Adnexa:         Right: No mass, tenderness or fullness.          Left: No mass, tenderness or fullness.        Comments: Normal external genitalia.  Evidence of vulvar irritation noted.  Normal hair distribution.  Urethral meatus normal. No cervical lesions or masses.  No vaginal bleeding noted.  No adnexal or uterine tenderness.  No palpable adnexal masses.  Musculoskeletal:         General: Normal range of motion.   Skin:     General: Skin is warm and dry.   Neurological:      Mental Status: She is alert and oriented to person, place, and time.   Psychiatric:         Behavior: Behavior normal.         Thought Content: Thought content normal.         Judgment: Judgment normal.           ASSESSMENT:       ICD-10-CM ICD-9-CM    1. Encounter for well woman exam with routine gynecological exam  Z01.419 V72.31       2. Vulvar irritation  N90.89 624.8 clobetasol 0.05% (TEMOVATE) 0.05 % Oint      Vaginosis Screen by DNA Probe               Plan:      Bebe was seen today for gynecologic exam.    Diagnoses and all orders for this visit:    Encounter for gynecological  examination  - Pap smears no longer necessary due to age and no abnormal pap smears  - MMG and Cscope up to date  -     Ambulatory referral/consult to Obstetrics / Gynecology    Vulvar irritation  - Irritation noted on vulva.  Will start on clobetasol.  Discussed how to use the cream  - Affirm done  -     clobetasol 0.05% (TEMOVATE) 0.05 % Oint; Apply topically 2 (two) times daily.  -     Vaginosis Screen by DNA Probe        Orders Placed This Encounter   Procedures    Vaginosis Screen by DNA Probe         No follow-ups on file.     Counseling time: 25 minutes    Alison Pagan

## 2023-10-11 ENCOUNTER — TELEPHONE (OUTPATIENT)
Dept: OBSTETRICS AND GYNECOLOGY | Facility: CLINIC | Age: 73
End: 2023-10-11
Payer: MEDICARE

## 2023-10-11 NOTE — TELEPHONE ENCOUNTER
Spoke to patient and regarding bleeding she noticed this morning, States that it is not heavy, however had bleeding a few weeks ago and was slightly more. Patient is going out of town later tomorrow and would like to be checked before leaving out of town and not returning until Monday 10/16. Scheduled appointment this week.

## 2023-10-11 NOTE — TELEPHONE ENCOUNTER
----- Message from Aissatou Vilchis sent at 10/11/2023 11:45 AM CDT -----  Regarding: appt access  Name of Caller: pt      When is the first available appointment? no access to schedule      Symptoms: vaginal bleeding      Best Call Back Number: [phone]        Additional Information: pt would like a call back to get an appt scheduled as soon as possible, please advise.

## 2023-10-12 ENCOUNTER — OFFICE VISIT (OUTPATIENT)
Dept: OBSTETRICS AND GYNECOLOGY | Facility: CLINIC | Age: 73
End: 2023-10-12
Payer: MEDICARE

## 2023-10-12 ENCOUNTER — HOSPITAL ENCOUNTER (OUTPATIENT)
Dept: RADIOLOGY | Facility: OTHER | Age: 73
Discharge: HOME | End: 2023-10-12
Attending: OBSTETRICS & GYNECOLOGY
Payer: MEDICARE

## 2023-10-12 VITALS
SYSTOLIC BLOOD PRESSURE: 142 MMHG | DIASTOLIC BLOOD PRESSURE: 89 MMHG | WEIGHT: 197.06 LBS | HEIGHT: 66 IN | BODY MASS INDEX: 31.67 KG/M2

## 2023-10-12 DIAGNOSIS — N93.9 VAGINAL BLEEDING: ICD-10-CM

## 2023-10-12 DIAGNOSIS — N93.9 VAGINAL BLEEDING: Primary | ICD-10-CM

## 2023-10-12 DIAGNOSIS — N90.89 VULVAR IRRITATION: ICD-10-CM

## 2023-10-12 PROCEDURE — 76856 US EXAM PELVIC COMPLETE: CPT | Mod: 26,,, | Performed by: STUDENT IN AN ORGANIZED HEALTH CARE EDUCATION/TRAINING PROGRAM

## 2023-10-12 PROCEDURE — 76830 TRANSVAGINAL US NON-OB: CPT | Mod: TC

## 2023-10-12 PROCEDURE — 99213 PR OFFICE/OUTPT VISIT, EST, LEVL III, 20-29 MIN: ICD-10-PCS | Mod: S$PBB,,, | Performed by: OBSTETRICS & GYNECOLOGY

## 2023-10-12 PROCEDURE — 99999 PR PBB SHADOW E&M-EST. PATIENT-LVL III: CPT | Mod: PBBFAC,,, | Performed by: OBSTETRICS & GYNECOLOGY

## 2023-10-12 PROCEDURE — 99213 OFFICE O/P EST LOW 20 MIN: CPT | Mod: S$PBB,,, | Performed by: OBSTETRICS & GYNECOLOGY

## 2023-10-12 PROCEDURE — 76856 US PELVIS COMP WITH TRANSVAG NON-OB (XPD): ICD-10-PCS | Mod: 26,,, | Performed by: STUDENT IN AN ORGANIZED HEALTH CARE EDUCATION/TRAINING PROGRAM

## 2023-10-12 PROCEDURE — 76830 US PELVIS COMP WITH TRANSVAG NON-OB (XPD): ICD-10-PCS | Mod: 26,,, | Performed by: STUDENT IN AN ORGANIZED HEALTH CARE EDUCATION/TRAINING PROGRAM

## 2023-10-12 PROCEDURE — 99213 OFFICE O/P EST LOW 20 MIN: CPT | Mod: PBBFAC,PN,25 | Performed by: OBSTETRICS & GYNECOLOGY

## 2023-10-12 PROCEDURE — 99999 PR PBB SHADOW E&M-EST. PATIENT-LVL III: ICD-10-PCS | Mod: PBBFAC,,, | Performed by: OBSTETRICS & GYNECOLOGY

## 2023-10-12 PROCEDURE — 76830 TRANSVAGINAL US NON-OB: CPT | Mod: 26,,, | Performed by: STUDENT IN AN ORGANIZED HEALTH CARE EDUCATION/TRAINING PROGRAM

## 2023-10-12 NOTE — ASSESSMENT & PLAN NOTE
Vulvar changes c/w lichen sclerosus. Recommend continue twice daily use of clobetasol for 4 weeks total, then daily for 4 weeks, then twice weekly for maintenance therapy.    RTC 3 months, if no improvement consider biopsy. Also consider adding twice weekly estrace cream (alternate days) for vulvar atrophy.    Patient was counseled today skin changes associated with LS including erythema, erosions, white plaques, labial archetectural changes and depigmentation.  The chronic nature of this vulvar disease (LS) was discussed. The need to avoid potential vulvar irritants, the pros and cons of steroid (Clobetasol) use for the vulva inflammation and the pros and cons of local vaginal estrogen in the treatment of atrophic vaginitis were discussed.  The need for close  F/U was emphasized. The potential of neoplastic change in unhealed areas and reason to do vulvoscopy and biopsies was discussed.   All the patient's questions were answered.

## 2023-10-12 NOTE — PROGRESS NOTES
Chief Complaint   Patient presents with     spotting    Vulvar Itch       HPI:   73 y.o.  here today for vaginal complaints. Yesterday noticed vaginal bleeding while using the restroom, light red blood that filled a pantiliner and then stopped. Denies pelvic pain, cramping, prior episodes of postmenopausal bleeding, dysuria, hematuria, frequent UTI, melena, hematochezia, history of hemorrhoids. She is not sexually active. Has a history of vulvar irritation and was prescribed clobetasol by Dr. Pagan two weeks ago, states this has helped with irritation somewhat but that she was scratching prior to noticing the blood. Itching is worse at nighttime. Wears pantiliner daily for leakage.     Labs / Significant Studies  Pap (): NILM  Colonoscopy (): Normal, repeat in 10 years  MMG (2023): BIRADS-1    Office Visit on 2023   Component Date Value Ref Range Status    Trichomonas vaginalis 2023 Negative  Negative Final    Candida sp 2023 Negative  Negative Final    Comment: Elena species group includes: Candida albicans, Candida tropicalis,  Candida parapsiolosis, Elena dubliniensis      Elena glabrata DNA 2023 Negative  Negative Final    Elena krusei DNA 2023 Negative  Negative Final    Bacterial vaginosis DNA 2023 Negative  Negative Final        Past Medical History:   Diagnosis Date    Allergy     Anticoagulant long-term use     Hyperlipidemia     Hypothyroid     Obesity     Rosacea      Past Surgical History:   Procedure Laterality Date    CHOLECYSTECTOMY      COLONOSCOPY  2007    COLONOSCOPY N/A 2017    Procedure: COLONOSCOPY;  Surgeon: Tre Alvarez MD;  Location: [facility] ENDO ([address] [place] [address]);  Service: Endoscopy;  Laterality: N/A;    DILATION AND CURETTAGE OF UTERUS      HYSTEROSCOPY      TREATMENT OF CARDIAC ARRHYTHMIA N/A 2019    Procedure: CARDIOVERSION;  Surgeon: Eduardo Sanchez MD;  Location: [facility] EP LAB;  Service: Cardiology;  Laterality:  N/A;  AF, DORINA, DCCV, MAC, NH, 3 Prep    TREATMENT OF CARDIAC ARRHYTHMIA N/A 01/09/2020    Procedure: CARDIOVERSION;  Surgeon: Eduardo Sanchez MD;  [facility];  Service: Cardiology;  Laterality: N/A;  AF, DORINA, DCCV, MAC, NH, 3 Prep    WISDOM TOOTH EXTRACTION         Current Outpatient Medications:     apixaban (ELIQUIS) 5 mg Tab, Take 1 tablet (5 mg total) by mouth 2 (two) times daily., Disp: 60 tablet, Rfl: 11    ascorbic acid, vitamin C, (VITAMIN C) 100 MG tablet, Take 100 mg by mouth once daily., Disp: , Rfl:     augmented betamethasone dipropionate (DIPROLENE-AF) 0.05 % cream, Apply to elbows BID prn scaling., Disp: 50 g, Rfl: 2    betamethasone dipropionate 0.05 % cream, Apply topically 2 (two) times daily as needed., Disp: , Rfl:     CINNAMON BARK (CINNAMON ORAL), Take by mouth once daily. Pt taking 2 pills daily., Disp: , Rfl:     clobetasol 0.05% (TEMOVATE) 0.05 % Oint, Apply topically 2 (two) times daily., Disp: 30 g, Rfl: 2    dronedarone (MULTAQ) 400 mg Tab, TAKE 1 TABLET BY MOUTH TWICE DAILY WITH MEALS, Disp: 60 tablet, Rfl: 11    fexofenadine-pseudoephedrine (ALLEGRA-D 24) 180-240 mg per 24 hr tablet, Take 1 tablet by mouth once daily., Disp: , Rfl:     fish oil-omega-3 fatty acids 300-1,000 mg capsule, Take 2 g by mouth once daily., Disp: , Rfl:     magnesium 250 mg Tab, Take by mouth once., Disp: , Rfl:     metFORMIN (GLUCOPHAGE-XR) 500 MG ER 24hr tablet, Take 2 tablets (1,000 mg total) by mouth 2 (two) times daily with meals., Disp: 360 tablet, Rfl: 1    metoprolol succinate (TOPROL-XL) 50 MG 24 hr tablet, Take 1 tablet (50 mg total) by mouth once daily., Disp: 90 tablet, Rfl: 3    metroNIDAZOLE (NORITATE) 1 % cream, Compound azelaic acid 15% + ivermectin 1% + metronidazole 1% cream. Apply to face once or twice daily., Disp: 30 g, Rfl: 5    MV,CA,MIN/IRON/FA/GUARANA/CAFF (ONE-A-DAY WOMEN'S ACTIVE ORAL), Take by mouth., Disp: , Rfl:     rosuvastatin (CRESTOR) 10 MG tablet, TAKE 1 TABLET(10  MG) BY MOUTH EVERY DAY, Disp: 90 tablet, Rfl: 3    SYNTHROID 175 mcg tablet, TAKE 1 TABLET(175 MCG) BY MOUTH BEFORE BREAKFAST, Disp: 90 tablet, Rfl: 3    vitamin D (VITAMIN D3) 1000 units Tab, Take 1,000 Units by mouth once daily., Disp: , Rfl:   No current facility-administered medications for this visit.    Facility-Administered Medications Ordered in Other Visits:     0.9%  NaCl infusion, , Intravenous, Continuous, Steffi Christian, NP, New Bag at 19 0913    sodium chloride 0.9% flush 5 mL, 5 mL, Intravenous, PRN, Steffi Christian, NP  Review of patient's allergies indicates:   Allergen Reactions    Levaquin [levofloxacin] Hives    Tetracycline Hives     OB History    Para Term  AB Living   0 0 0 0 0 0   SAB IAB Ectopic Multiple Live Births   0 0 0 0 0     Social History     Tobacco Use    Smoking status: Never    Smokeless tobacco: Never   Substance Use Topics    Alcohol use: Yes     Alcohol/week: 2.0 standard drinks of alcohol     Types: 2 Glasses of wine per week     Comment: weekends     Drug use: Never     Family History   Problem Relation Age of Onset    Breast cancer Mother 42    Allergies Mother     Cancer Maternal Grandfather         lung CA, ?Stomach CA    Heart disease Father     Stroke Father     Atrial fibrillation Father     Hypertension Father     Hypothyroidism Father     Hyperlipidemia Father     Diabetes Father     No Known Problems Sister     No Known Problems Brother     Diabetes Maternal Grandmother     Diabetes Maternal Aunt     Ovarian cancer Neg Hx     Melanoma Neg Hx     Angioedema Neg Hx     Asthma Neg Hx     Atopy Neg Hx     Eczema Neg Hx     Immunodeficiency Neg Hx     Rhinitis Neg Hx     Urticaria Neg Hx     Heart attack Neg Hx     Heart failure Neg Hx        Review of Systems   Negative except as in HPI      Physical Exam   Vitals:    10/12/23 0908   BP: (!) 142/89     Body mass index is 31.81 kg/m².    Physical Exam  Constitutional:       General: She  is not in acute distress.     Appearance: Normal appearance.   Genitourinary:      Vulva, bladder and urethral meatus normal.            No vaginal discharge, erythema or bleeding.      No vaginal prolapse present.     Moderate vaginal atrophy present.       Right Adnexa: not tender, not full and no mass present.     Left Adnexa: not tender, not full and no mass present.     No cervical motion tenderness or lesion.      Uterus is not tender.      No uterine mass detected.     Uterus is anteverted.      Bladder is not tender.       Pelvic exam was performed with patient in the lithotomy position.   HENT:      Head: Normocephalic and atraumatic.   Pulmonary:      Effort: Pulmonary effort is normal.   Abdominal:      General: Bowel sounds are normal. There is no distension.      Palpations: Abdomen is soft.      Tenderness: There is no abdominal tenderness. There is no guarding or rebound.   Musculoskeletal:         General: Normal range of motion.      Cervical back: Normal range of motion.   Neurological:      General: No focal deficit present.      Mental Status: She is alert and oriented to person, place, and time.   Skin:     General: Skin is warm and dry.   Psychiatric:         Mood and Affect: Mood normal.         Behavior: Behavior normal.         Thought Content: Thought content normal.        Labs reviewed: Affirm, Pap x2, MMG    ASSESSMENT:   Patient Active Problem List   Diagnosis    Hypothyroid    Class 1 obesity with serious comorbidity in adult    Type 2 diabetes mellitus with hypercholesterolemia    Persistent atrial fibrillation    Chronic systolic heart failure, in setting of synthroid mediated hyperthyroidism and persistent atrial fibrillation    History of cardioversion    Chronic anticoagulation    Aortic atherosclerosis    Personal history of COVID-19 on 5/14/2022    Hyperlipidemia    Vaginal bleeding    Vulvar irritation       PLAN:  Problem List Items Addressed This Visit          Renal/     Vaginal bleeding - Primary    Current Assessment & Plan     Suspect bleeding is from erosions and thinning of the vulva due to LS/itching, however will obtain TVUS to assess EMS and consider endometrial biopsy if thickened to r/o endometrial pathology. Will contact patient w/ results.          Relevant Orders    US Pelvis Comp with Transvag NON-OB (xpd    Vulvar irritation    Current Assessment & Plan     Vulvar changes c/w lichen sclerosus. Recommend continue twice daily use of clobetasol for 4 weeks total, then daily for 4 weeks, then twice weekly for maintenance therapy.    RTC 3 months, if no improvement consider biopsy. Also consider adding twice weekly estrace cream (alternate days) for vulvar atrophy.    Patient was counseled today skin changes associated with LS including erythema, erosions, white plaques, labial archetectural changes and depigmentation.  The chronic nature of this vulvar disease (LS) was discussed. The need to avoid potential vulvar irritants, the pros and cons of steroid (Clobetasol) use for the vulva inflammation and the pros and cons of local vaginal estrogen in the treatment of atrophic vaginitis were discussed.  The need for close  F/U was emphasized. The potential of neoplastic change in unhealed areas and reason to do vulvoscopy and biopsies was discussed.   All the patient's questions were answered.               Total time spent on this encounter was 20 minutes.  This includes preparing to see the patient;  obtaining/reviewing separately obtained history;  performing a medical exam and/or evaluation;   counseling/educating the patient;  ordering medications, tests, of procedures;   referring/communicating with other health care professionals;  EMR documentation;  interpreting/communicating results to the patient;  and/or care coordination.    Follow up in about 3 months (around 1/12/2024) for Medication Follow-up.       Louann Laughlin MD  Department of Obstetrics &  Gynecology  [facility]

## 2023-10-12 NOTE — ASSESSMENT & PLAN NOTE
Suspect bleeding is from erosions and thinning of the vulva due to LS/itching, however will obtain TVUS to assess EMS and consider endometrial biopsy if thickened to r/o endometrial pathology. Will contact patient w/ results.

## 2023-10-16 ENCOUNTER — OFFICE VISIT (OUTPATIENT)
Dept: URGENT CARE | Facility: CLINIC | Age: 73
End: 2023-10-16
Payer: MEDICARE

## 2023-10-16 VITALS
TEMPERATURE: 98 F | SYSTOLIC BLOOD PRESSURE: 144 MMHG | RESPIRATION RATE: 18 BRPM | OXYGEN SATURATION: 96 % | BODY MASS INDEX: 31.66 KG/M2 | DIASTOLIC BLOOD PRESSURE: 78 MMHG | WEIGHT: 197 LBS | HEIGHT: 66 IN | HEART RATE: 87 BPM

## 2023-10-16 DIAGNOSIS — T14.8XXA HEMATOMA: ICD-10-CM

## 2023-10-16 DIAGNOSIS — J02.9 SORE THROAT: ICD-10-CM

## 2023-10-16 DIAGNOSIS — J04.0 ACUTE LARYNGITIS: Primary | ICD-10-CM

## 2023-10-16 DIAGNOSIS — R49.0 HOARSENESS: ICD-10-CM

## 2023-10-16 LAB
CTP QC/QA: YES
CTP QC/QA: YES
MOLECULAR STREP A: NEGATIVE
SARS-COV-2 RDRP RESP QL NAA+PROBE: NEGATIVE

## 2023-10-16 PROCEDURE — 99213 PR OFFICE/OUTPT VISIT, EST, LEVL III, 20-29 MIN: ICD-10-PCS | Mod: S$GLB,,, | Performed by: NURSE PRACTITIONER

## 2023-10-16 PROCEDURE — 87651 POCT STREP A MOLECULAR: ICD-10-PCS | Mod: QW,S$GLB,, | Performed by: NURSE PRACTITIONER

## 2023-10-16 PROCEDURE — 87635 SARS-COV-2 COVID-19 AMP PRB: CPT | Mod: QW,S$GLB,, | Performed by: NURSE PRACTITIONER

## 2023-10-16 PROCEDURE — 87635: ICD-10-PCS | Mod: QW,S$GLB,, | Performed by: NURSE PRACTITIONER

## 2023-10-16 PROCEDURE — 87651 STREP A DNA AMP PROBE: CPT | Mod: QW,S$GLB,, | Performed by: NURSE PRACTITIONER

## 2023-10-16 PROCEDURE — 99213 OFFICE O/P EST LOW 20 MIN: CPT | Mod: S$GLB,,, | Performed by: NURSE PRACTITIONER

## 2023-10-16 NOTE — PATIENT INSTRUCTIONS
Vocal rest and fluids.  Follow up closely with ENT if symptoms persist.  Follow up with PCP.  Return here or go to the ER as needed for worsening condition.

## 2023-10-16 NOTE — PROGRESS NOTES
"Subjective:      Patient ID: Bebe Valdez is a 73 y.o. female.    Vitals:  height is 5' 6" (1.676 m) and weight is 89.4 kg (197 lb). Her oral temperature is 98 °F (36.7 °C). Her blood pressure is 144/78 (abnormal) and her pulse is 87. Her respiration is 18 and oxygen saturation is 96%.     Chief Complaint: Sore Throat    This is a 73 y.o. female who presents today with a chief complaint of  sore throat and a bruise on chest. Pt state she was screaming at the [employer] game and barely got her voice back. Pt also state she woke up with a bruise on her chest this morning.  She is a restless sleeper she notes but denies hitting anything.  She denies pain or tenderness to the area.  She is on eliquis.  Game was in [place], [place].      Sore Throat   This is a new problem. The current episode started in the past 7 days. The problem has been unchanged. Neither side of throat is experiencing more pain than the other. There has been no fever. The pain is at a severity of 3/10. The pain is mild. Associated symptoms include a hoarse voice. Pertinent negatives include no abdominal pain, congestion, coughing, diarrhea, drooling, ear discharge, ear pain, headaches, plugged ear sensation, neck pain, shortness of breath, stridor, swollen glands, trouble swallowing or vomiting. She has had no exposure to strep or mono. She has tried nothing for the symptoms. The treatment provided no relief.       Constitution: Negative for chills, fatigue and fever.   HENT:  Positive for nosebleeds, sore throat and voice change. Negative for ear pain, ear discharge, drooling, congestion, postnasal drip, sinus pain, sinus pressure and trouble swallowing.    Neck: Negative for neck pain.   Cardiovascular:  Negative for chest trauma and chest pain.   Respiratory:  Negative for cough, shortness of breath and stridor.    Gastrointestinal:  Negative for abdominal pain, vomiting and diarrhea.   Skin:  Positive for bruising.   Neurological:  Negative for " headaches.      Objective:     Physical Exam   Constitutional: She is oriented to person, place, and time. She appears well-developed. She is cooperative.  Non-toxic appearance. She does not appear ill. No distress.   HENT:   Head: Normocephalic and atraumatic.   Ears:   Right Ear: Hearing, tympanic membrane, external ear and ear canal normal.   Left Ear: Hearing, tympanic membrane, external ear and ear canal normal.   Nose: Nose normal. No mucosal edema, rhinorrhea or nasal deformity. No epistaxis. Right sinus exhibits no maxillary sinus tenderness and no frontal sinus tenderness. Left sinus exhibits no maxillary sinus tenderness and no frontal sinus tenderness.   Mouth/Throat: Uvula is midline and mucous membranes are normal. No trismus in the jaw. Normal dentition. No uvula swelling. Posterior oropharyngeal erythema present. No oropharyngeal exudate or posterior oropharyngeal edema.   Eyes: Conjunctivae and lids are normal. No scleral icterus.   Neck: Trachea normal and phonation normal. Neck supple. No edema present. No erythema present. No neck rigidity present.   Cardiovascular: Normal rate, regular rhythm, normal heart sounds and normal pulses.   Pulmonary/Chest: Effort normal and breath sounds normal. No respiratory distress. She has no decreased breath sounds. She has no rhonchi. Chest wall is not dull to percussion. She exhibits no mass, no tenderness, no bony tenderness, no laceration, no crepitus, no edema, no deformity, no swelling and no retraction.       Abdominal: Normal appearance.   Musculoskeletal: Normal range of motion.         General: No deformity. Normal range of motion.   Neurological: She is alert and oriented to person, place, and time. She exhibits normal muscle tone. Coordination normal.   Skin: Skin is warm, dry, intact, not diaphoretic and not pale.   Psychiatric: Her speech is normal and behavior is normal. Judgment and thought content normal.   Nursing note and vitals  reviewed.      Assessment:     1. Acute laryngitis    2. Sore throat    3. Hoarseness    4. Hematoma        Plan:     Results for orders placed or performed in visit on 10/16/23   POCT Strep A, Molecular   Result Value Ref Range    Molecular Strep A, POC Negative Negative     Acceptable Yes    POCT COVID-19 Rapid Screening   Result Value Ref Range    POC Rapid COVID Negative Negative     Acceptable Yes          Acute laryngitis    Sore throat  -     POCT Strep A, Molecular  -     POCT COVID-19 Rapid Screening    Hoarseness  -     POCT Strep A, Molecular  -     POCT COVID-19 Rapid Screening    Hematoma      Patient Instructions   Vocal rest and fluids.  Follow up closely with ENT if symptoms persist.  Follow up with PCP.  Return here or go to the ER as needed for worsening condition.

## 2023-10-17 ENCOUNTER — TELEPHONE (OUTPATIENT)
Dept: PRIMARY CARE CLINIC | Facility: CLINIC | Age: 73
End: 2023-10-17
Payer: MEDICARE

## 2023-10-17 DIAGNOSIS — I48.19 PERSISTENT ATRIAL FIBRILLATION: ICD-10-CM

## 2023-10-17 RX ORDER — DRONEDARONE 400 MG/1
TABLET, FILM COATED ORAL
Qty: 60 TABLET | Refills: 11 | Status: SHIPPED | OUTPATIENT
Start: 2023-10-17 | End: 2024-02-14

## 2023-10-17 NOTE — TELEPHONE ENCOUNTER
----- Message from Samantarenee Swanson sent at 10/17/2023  1:27 PM CDT -----  Contact: [phone]  Patient called, stated that she was seen yesterday 10/16/23 at the urgent care, she develop hematoma between the chest and the throat. (Patient was in a game on Saturday 10/14/23, and stated that she was screaming). Was told at the urgent care that she maybe strength her vocal cord,  patient was told to f/u with  ENT, but would like to get a call back for advise from pcp . Please call and advise. Thank you

## 2023-10-17 NOTE — TELEPHONE ENCOUNTER
I sw pt. Her throat/voice seems to be better today. She is more concerned about the bruise/hematoma that she has and would like to be eval. I scheduled her for tomorrow

## 2023-10-17 NOTE — PROGRESS NOTES
[facility] Note    Chief Complaint      Chief Complaint   Patient presents with    Follow-up    bruise on the mid chest      Bruise on the mid chest and has a lump x 3 days and states she woke up in the morning that way. No pain       History of Present Illness      Bebe Valdez is a 73 y.o. female who presents today for   Chief Complaint   Patient presents with    Follow-up    bruise on the mid chest      Bruise on the mid chest and has a lump x 3 days and states she woke up in the morning that way. No pain         Patient presents to clinic today for follow-up visit she was se in urgent care on 10/16/2023 for bruising to her throat.  She reports screaming and cheering for [employer] that day.  She denies any swelling of throat or compromise of airway.  She denies any chest pain and any shortness a breath.  She takes Eliquis 5 mg by mouth twice a day.         Review of Systems   HENT:  Positive for sore throat.    Skin:         + bruising to upper chest, below throat.   All 12 systems otherwise negative.       Family History:  family history includes Allergies in her mother; Atrial fibrillation in her father; Breast cancer (age of onset: 42) in her mother; Cancer in her maternal grandfather; Diabetes in her father, maternal aunt, and maternal grandmother; Heart disease in her father; Hyperlipidemia in her father; Hypertension in her father; Hypothyroidism in her father; No Known Problems in her brother and sister; Stroke in her father.   Family history was reviewed with patient.     Medications:  Outpatient Encounter Medications as of 10/18/2023   Medication Sig Dispense Refill    apixaban (ELIQUIS) 5 mg Tab Take 1 tablet (5 mg total) by mouth 2 (two) times daily. 60 tablet 11    ascorbic acid, vitamin C, (VITAMIN C) 100 MG tablet Take 100 mg by mouth once daily.      augmented betamethasone dipropionate (DIPROLENE-AF) 0.05 % cream Apply to elbows BID prn scaling. 50 g 2    betamethasone dipropionate  0.05 % cream Apply topically 2 (two) times daily as needed.      CINNAMON BARK (CINNAMON ORAL) Take by mouth once daily. Pt taking 2 pills daily.      clobetasol 0.05% (TEMOVATE) 0.05 % Oint Apply topically 2 (two) times daily. 30 g 2    fexofenadine-pseudoephedrine (ALLEGRA-D 24) 180-240 mg per 24 hr tablet Take 1 tablet by mouth once daily.      fish oil-omega-3 fatty acids 300-1,000 mg capsule Take 2 g by mouth once daily.      magnesium 250 mg Tab Take by mouth once.      metFORMIN (GLUCOPHAGE-XR) 500 MG ER 24hr tablet Take 2 tablets (1,000 mg total) by mouth 2 (two) times daily with meals. 360 tablet 1    metoprolol succinate (TOPROL-XL) 50 MG 24 hr tablet Take 1 tablet (50 mg total) by mouth once daily. 90 tablet 3    metroNIDAZOLE (NORITATE) 1 % cream Compound azelaic acid 15% + ivermectin 1% + metronidazole 1% cream. Apply to face once or twice daily. 30 g 5    MV,CA,MIN/IRON/FA/GUARANA/CAFF (ONE-A-DAY WOMEN'S ACTIVE ORAL) Take by mouth.      rosuvastatin (CRESTOR) 10 MG tablet TAKE 1 TABLET(10 MG) BY MOUTH EVERY DAY 90 tablet 3    SYNTHROID 175 mcg tablet TAKE 1 TABLET(175 MCG) BY MOUTH BEFORE BREAKFAST 90 tablet 3    vitamin D (VITAMIN D3) 1000 units Tab Take 1,000 Units by mouth once daily.      MULTAQ 400 mg Tab TAKE 1 TABLET BY MOUTH TWICE DAILY WITH MEALS 60 tablet 11    [DISCONTINUED] dronedarone (MULTAQ) 400 mg Tab TAKE 1 TABLET BY MOUTH TWICE DAILY WITH MEALS 60 tablet 11     Facility-Administered Encounter Medications as of 10/18/2023   Medication Dose Route Frequency Provider Last Rate Last Admin    0.9%  NaCl infusion   Intravenous Continuous Steffi Christian NP   New Bag at 09/26/19 0913    sodium chloride 0.9% flush 5 mL  5 mL Intravenous PRN Steffi Christian NP           Allergies:  Review of patient's allergies indicates:   Allergen Reactions    Levaquin [levofloxacin] Hives    Tetracycline Hives       Health Maintenance:  Health Maintenance   Topic Date Due    Foot Exam   "06/28/2023    Hemoglobin A1c  12/21/2023    Mammogram  02/08/2024    Eye Exam  03/08/2024    Lipid Panel  06/21/2024    High Dose Statin  10/18/2024    DEXA Scan  07/26/2025    TETANUS VACCINE  09/03/2025    Colorectal Cancer Screening  06/06/2027    Hepatitis C Screening  Completed    Shingles Vaccine  Completed     Health Maintenance Topics with due status: Not Due       Topic Last Completion Date    TETANUS VACCINE 09/03/2015    Colorectal Cancer Screening 06/06/2017    DEXA Scan 07/26/2022    Diabetes Urine Screening 12/13/2022    Mammogram 02/08/2023    Eye Exam 03/08/2023    Lipid Panel 06/21/2023    Hemoglobin A1c 06/21/2023    High Dose Statin 10/18/2023       Physical Exam      Vital Signs  Temp: 98.1 °F (36.7 °C)  Temp Source: Oral  Pulse: (!) 53  Resp: 18  SpO2: 96 %  BP: 132/72  BP Location: Right arm  Patient Position: Sitting  Pain Score: 0-No pain  Height and Weight  Height: 5' 6" (167.6 cm)  Weight: 90.5 kg (199 lb 8.3 oz)  BSA (Calculated - sq m): 2.05 sq meters  BMI (Calculated): 32.2  Weight in (lb) to have BMI = 25: 154.6]    Physical Exam  Vitals reviewed.   Constitutional:       Appearance: Normal appearance. She is normal weight.   HENT:      Head: Normocephalic and atraumatic.      Nose: Nose normal.      Mouth/Throat:      Mouth: Mucous membranes are moist.      Pharynx: Oropharynx is clear.   Eyes:      Extraocular Movements: Extraocular movements intact.      Conjunctiva/sclera: Conjunctivae normal.      Pupils: Pupils are equal, round, and reactive to light.   Cardiovascular:      Rate and Rhythm: Normal rate and regular rhythm.      Pulses: Normal pulses.      Heart sounds: Normal heart sounds.   Pulmonary:      Effort: Pulmonary effort is normal.      Breath sounds: Normal breath sounds.   Musculoskeletal:         General: Normal range of motion.      Cervical back: Normal range of motion and neck supple.   Skin:     General: Skin is warm and dry.      Capillary Refill: Capillary refill " takes less than 2 seconds.      Findings: Bruising present.      Comments: + ecchymosis to upper chest/below throat.   Neurological:      General: No focal deficit present.      Mental Status: She is alert and oriented to person, place, and time. Mental status is at baseline.   Psychiatric:         Mood and Affect: Mood normal.         Behavior: Behavior normal.         Thought Content: Thought content normal.         Judgment: Judgment normal.            Assessment/Plan     Bebe Valdez is a 73 y.o.female with:    There are no diagnoses linked to this encounter.    As above, continue current medications and maintain follow up with specialists.  Return to clinic as needed.    Greater than 50% of visit was spent face to face with patient.  All questions were answered to patient's satisfaction.          Karen L Spencer, NP-C [facility]

## 2023-10-18 ENCOUNTER — OFFICE VISIT (OUTPATIENT)
Dept: PRIMARY CARE CLINIC | Facility: CLINIC | Age: 73
End: 2023-10-18
Payer: MEDICARE

## 2023-10-18 VITALS
HEIGHT: 66 IN | SYSTOLIC BLOOD PRESSURE: 132 MMHG | BODY MASS INDEX: 32.06 KG/M2 | DIASTOLIC BLOOD PRESSURE: 72 MMHG | WEIGHT: 199.5 LBS | HEART RATE: 53 BPM | TEMPERATURE: 98 F | RESPIRATION RATE: 18 BRPM | OXYGEN SATURATION: 96 %

## 2023-10-18 DIAGNOSIS — R58 ECCHYMOSIS: Primary | ICD-10-CM

## 2023-10-18 PROCEDURE — 99999 PR PBB SHADOW E&M-EST. PATIENT-LVL V: CPT | Mod: PBBFAC,,, | Performed by: NURSE PRACTITIONER

## 2023-10-18 PROCEDURE — 99999 PR PBB SHADOW E&M-EST. PATIENT-LVL V: ICD-10-PCS | Mod: PBBFAC,,, | Performed by: NURSE PRACTITIONER

## 2023-10-18 PROCEDURE — 99213 PR OFFICE/OUTPT VISIT, EST, LEVL III, 20-29 MIN: ICD-10-PCS | Mod: S$PBB,,, | Performed by: NURSE PRACTITIONER

## 2023-10-18 PROCEDURE — 99213 OFFICE O/P EST LOW 20 MIN: CPT | Mod: S$PBB,,, | Performed by: NURSE PRACTITIONER

## 2023-10-18 PROCEDURE — 99215 OFFICE O/P EST HI 40 MIN: CPT | Mod: PBBFAC,PN | Performed by: NURSE PRACTITIONER

## 2023-11-10 DIAGNOSIS — I48.19 PERSISTENT ATRIAL FIBRILLATION: Primary | ICD-10-CM

## 2023-12-18 NOTE — PROGRESS NOTES
[facility] Note    Chief Complaint      Chief Complaint   Patient presents with    Follow-up       History of Present Illness      Bebe Valdez is a 73 y.o. F with A fib, Chronic Sys HF, Hypothyroidism, DM - II, HLD, Aortic Atherosclerosis, Obesity, and Left shoulder adhesive capsulitis, Last visit - 6/13/23    Paroxysmal a fib -s/p cardioversion 9/26/19 and in '20 . Fu by EP, Dr. Sanchez. Pt on long term anticoagulation with Eliquis 5 mg BID, Toprol 50 mg/d, and Multaq 400 mg BID.  Has fu in Aug.       H/o Chronic Sys CHF - Improved. Prev EF 40% per Echo 1/9/20 and now 55% per echo 6/15/21 Pt on Multaq 400 mgBID and Toprol 50 mg/d.  Echo - 6/15/21 - EF - 55%; mod LAE      Acute rhinitis - sx's started 2 days ago when came back from recent trip. Rec NS irrigation BID. Allegra. Alert Md if sx's worsen.      Hypothyroidism -  Pt on Brand synthroid 175 mcg/d. Controlled in June.      DM - II -Pt on Metformin  mg 2 tabs BID.  No evidence of microalbuminuria. Ha1c remains controlled at 6.3. Continue current regimen.       HLD - Pt on Crestor 10 mg QHS. Well controlled in June.      Aortic atherosclerosis - Pt on Crestor 10 mg QHS.     Obesity - BMI - 31.99 - Down 3 lb- Pt on low carb/low chol diet. She does yoga x 2/wk and works out with a  3 times/wk.         Left shoulder adhesive capsulitis - Pt in PTx.      Ptosis - Pt was offered Sx by [facility] but defers for now. Not affecting her vision at present.      H/o postmenopausal Vaginal bleeding -  Resolved. Pelvic U/S - neg.         HCM - Flu - 9/25/23; RSV - defers;  Tdap - 9/3/15;  PCV 13 - 10/10/16;  PVX 23 - 10/16/17;  Shingrix -8/26/19 and 11/5/19 ;  Zostavax - 7/30/13; COVID -19 Vaccine (Pfizer)  #1 - 2/202/1 ; #2 - 3/13/21; # 3 - 10/14/21; # 4 8/4/22; # 5 1/16/23; # 6 7/18/23; # 7 10/24/23; MGM 2/8/23-repeat 1 yr;  DEXA - 7/26/22 -neg ;  PAP - no longer gets; Hep C Screen - 10/3/16 - neg;  C-scope -6/6/17 - repeat 10 yrs ;   Prev PCP - Dr. Nelson; EP - Dr Sanchez; Prev Cards - Dr. De Leon; Derm - Dr. Lan; Ophtho - Dr. Louie; Retina Sp - Dr. Yepez; Derm - Dr. Younger    Patient Care Team:  Amber Dorsey MD as PCP - General (Internal Medicine)  Brigitte Marvin RD as Dietitian (Nutrition)  Tobi Louie MD (Ophthalmology)  Katherine Schafer MA as Care Coordinator     Health Maintenance:  Immunization History   Administered Date(s) Administered    COVID-19, MRNA, LN-S, PF (Pfizer) (Gray Cap) 08/04/2022    COVID-19, MRNA, LN-S, PF (Pfizer) (Purple Cap) 02/20/2021, 03/13/2021, 10/14/2021    COVID-19, mRNA, LNP-S, PF, duane-sucrose, 30 mcg/0.3 mL (Pfizer 2023 Ages 12+) 10/24/2023    COVID-19, mRNA, LNP-S, bivalent booster, PF (PFIZER OMICRON) 01/16/2023, 07/18/2023    Influenza 1950, 10/05/2015    Influenza (FLUAD) - Quadrivalent - Adjuvanted - PF *Preferred* (65+) 10/14/2021, 09/25/2023    Influenza - High Dose - PF (65 years and older) 10/10/2016, 09/19/2017, 09/26/2018, 09/12/2019, 10/13/2022    Influenza - Quadrivalent - High Dose - PF (65 years and older) 09/21/2020, 10/13/2022    Influenza A (H1N1) 2009 Monovalent - IM 01/18/2010    Pneumococcal Conjugate - 13 Valent 10/10/2016    Pneumococcal Polysaccharide - 23 Valent 10/16/2017    Tdap 09/03/2015    Zoster 07/30/2013    Zoster Recombinant 08/26/2019, 11/05/2019      Health Maintenance   Topic Date Due    Foot Exam  06/28/2023    Hemoglobin A1c  12/21/2023    Mammogram  02/08/2024    Eye Exam  03/08/2024    Lipid Panel  06/21/2024    High Dose Statin  12/20/2024    DEXA Scan  07/26/2025    TETANUS VACCINE  09/03/2025    Colorectal Cancer Screening  06/06/2027    Hepatitis C Screening  Completed    Shingles Vaccine  Completed        Past Medical History:  Past Medical History:   Diagnosis Date    Allergy     Anticoagulant long-term use     COVID-19     5/14/22    Hyperlipidemia     Hypothyroid     Obesity     Rosacea        Past Surgical History:   has a past  surgical history that includes Colonoscopy (07/06/2007); Cholecystectomy; Hysteroscopy; Dilation and curettage of uterus; Colonoscopy (N/A, 06/06/2017); Treatment of cardiac arrhythmia (N/A, 09/26/2019); Treatment of cardiac arrhythmia (N/A, 01/09/2020); and [place] tooth extraction.    Family History:  family history includes Allergies in her mother; Atrial fibrillation in her father; Breast cancer (age of onset: 42) in her mother; Cancer in her maternal grandfather; Diabetes in her father, maternal aunt, and maternal grandmother; Heart attack (age of onset: 90) in her maternal aunt; Heart disease in her father; Hyperlipidemia in her father; Hypertension in her father; Hypothyroidism in her father; No Known Problems in her brother and sister; Stroke in her father.     Social History:  Social History     Tobacco Use    Smoking status: Never    Smokeless tobacco: Never   Substance Use Topics    Alcohol use: Yes     Alcohol/week: 2.0 standard drinks of alcohol     Types: 2 Glasses of wine per week     Comment: weekends     Drug use: Never       Review of Systems   Constitutional:  Negative for chills, diaphoresis and fever.   HENT:  Negative for hearing loss.    Eyes:  Negative for visual disturbance.   Respiratory:  Negative for chest tightness and shortness of breath.    Cardiovascular:  Negative for chest pain and palpitations.   Gastrointestinal:  Positive for diarrhea. Negative for abdominal pain, blood in stool, constipation, nausea and vomiting.        On occasion - unsure if related to magnesium - will try every other day as this has helped with leg cramps   Endocrine: Negative for cold intolerance and heat intolerance.   Genitourinary:  Negative for bladder incontinence, dysuria, frequency and hematuria.   Musculoskeletal:  Negative for arthralgias and myalgias.   Neurological:  Negative for dizziness and headaches.   Psychiatric/Behavioral:  Negative for dysphoric mood. The patient is not nervous/anxious.          Medications:    Current Outpatient Medications:     apixaban (ELIQUIS) 5 mg Tab, Take 1 tablet (5 mg total) by mouth 2 (two) times daily., Disp: 60 tablet, Rfl: 11    ascorbic acid, vitamin C, (VITAMIN C) 100 MG tablet, Take 100 mg by mouth once daily., Disp: , Rfl:     augmented betamethasone dipropionate (DIPROLENE-AF) 0.05 % cream, Apply to elbows BID prn scaling., Disp: 50 g, Rfl: 2    betamethasone dipropionate 0.05 % cream, Apply topically 2 (two) times daily as needed., Disp: , Rfl:     CINNAMON BARK (CINNAMON ORAL), Take by mouth once daily. Pt taking 2 pills daily., Disp: , Rfl:     clobetasol 0.05% (TEMOVATE) 0.05 % Oint, Apply topically 2 (two) times daily., Disp: 30 g, Rfl: 2    fish oil-omega-3 fatty acids 300-1,000 mg capsule, Take 2 g by mouth once daily., Disp: , Rfl:     magnesium 250 mg Tab, Take by mouth once., Disp: , Rfl:     metoprolol succinate (TOPROL-XL) 50 MG 24 hr tablet, Take 1 tablet (50 mg total) by mouth once daily., Disp: 90 tablet, Rfl: 3    metroNIDAZOLE (NORITATE) 1 % cream, Compound azelaic acid 15% + ivermectin 1% + metronidazole 1% cream. Apply to face once or twice daily., Disp: 30 g, Rfl: 5    MULTAQ 400 mg Tab, TAKE 1 TABLET BY MOUTH TWICE DAILY WITH MEALS, Disp: 60 tablet, Rfl: 11    MV,CA,MIN/IRON/FA/GUARANA/CAFF (ONE-A-DAY WOMEN'S ACTIVE ORAL), Take by mouth., Disp: , Rfl:     rosuvastatin (CRESTOR) 10 MG tablet, TAKE 1 TABLET(10 MG) BY MOUTH EVERY DAY, Disp: 90 tablet, Rfl: 3    SYNTHROID 175 mcg tablet, TAKE 1 TABLET(175 MCG) BY MOUTH BEFORE BREAKFAST, Disp: 90 tablet, Rfl: 3    vitamin D (VITAMIN D3) 1000 units Tab, Take 1,000 Units by mouth once daily., Disp: , Rfl:     metFORMIN (GLUCOPHAGE-XR) 500 MG ER 24hr tablet, Take 2 tablets (1,000 mg total) by mouth 2 (two) times daily with meals., Disp: 360 tablet, Rfl: 1  No current facility-administered medications for this visit.    Facility-Administered Medications Ordered in Other Visits:     0.9%  NaCl infusion, ,  "Intravenous, Continuous, Steffi Christian NP, New Bag at 09/26/19 0913    sodium chloride 0.9% flush 5 mL, 5 mL, Intravenous, PRN, Steffi Christian, NP     Allergies:  Review of patient's allergies indicates:   Allergen Reactions    Levaquin [levofloxacin] Hives    Tetracycline Hives       Physical Exam      Vital Signs  Temp: 97.9 °F (36.6 °C)  Temp Source: Oral  Pulse: 63  Resp: 18  SpO2: 97 %  BP: 130/82  BP Location: Left arm  Patient Position: Sitting  Pain Score: 0-No pain  Height and Weight  Height: 5' 6" (167.6 cm)  Weight: 89.9 kg (198 lb 3.1 oz)  BSA (Calculated - sq m): 2.05 sq meters  BMI (Calculated): 32  Weight in (lb) to have BMI = 25: 154.6      Patient Position: Sitting      Physical Exam  Vitals reviewed.   Constitutional:       General: She is not in acute distress.     Appearance: Normal appearance. She is not ill-appearing, toxic-appearing or diaphoretic.   HENT:      Head: Normocephalic and atraumatic.      Right Ear: Tympanic membrane normal.      Left Ear: Tympanic membrane normal.      Mouth/Throat:      Mouth: Mucous membranes are moist.      Pharynx: No posterior oropharyngeal erythema.   Eyes:      Extraocular Movements: Extraocular movements intact.      Conjunctiva/sclera: Conjunctivae normal.      Pupils: Pupils are equal, round, and reactive to light.      Comments: Mehdi upper lid ptosis.    Neck:      Vascular: No carotid bruit.   Cardiovascular:      Rate and Rhythm: Normal rate and regular rhythm.      Pulses: Normal pulses.      Heart sounds: Normal heart sounds.   Pulmonary:      Effort: Pulmonary effort is normal. No respiratory distress.      Breath sounds: Normal breath sounds.   Abdominal:      General: Bowel sounds are normal. There is no distension.      Palpations: Abdomen is soft.      Tenderness: There is no abdominal tenderness. There is no guarding or rebound.   Musculoskeletal:      Cervical back: Neck supple. No tenderness.   Neurological:      General: No " focal deficit present.      Mental Status: She is alert and oriented to person, place, and time.   Psychiatric:         Mood and Affect: Mood normal.         Behavior: Behavior normal.          Laboratory:  CBC:  Recent Labs   Lab 01/11/22  1544 06/21/23  1058   WBC 6.59 6.32   RBC 4.43 4.50   Hemoglobin 13.5 13.4   Hematocrit 42.6 42.6   Platelets 249 271   MCV 96 95   MCH 30.5 29.8   MCHC 31.7 L 31.5 L       CMP:  Recent Labs   Lab 12/19/22  1242 01/27/23  1110 06/21/23  1058   Glucose 105  --  121 H   Calcium 9.7  --  10.1   Albumin 3.5 3.7 3.6   Total Protein 6.1 6.5 6.5   Sodium 138  --  142   Potassium 4.2  --  4.3   CO2 25  --  25   Chloride 105  --  107   BUN 13  --  12   Creatinine 0.8  --  0.8   Alkaline Phosphatase 53 L 47 L 54 L   ALT 29 31 18   AST 19 25 19   Total Bilirubin 1.1 H 0.7 0.7       LIPIDS:  Recent Labs   Lab 01/05/22  1215 06/28/22  1445 08/31/22  1305 12/19/22  1242 06/21/23  1058   TSH  --  7.769 H 1.224  --  1.764   HDL 48  --   --  41 41   Cholesterol 105 L  --   --  96 L 92 L   Triglycerides 94  --   --  82 93   LDL Cholesterol 38.2 L  --   --  38.6 L 32.4 L   HDL/Cholesterol Ratio 45.7  --   --  42.7 44.6   Non-HDL Cholesterol 57  --   --  55 51   Total Cholesterol/HDL Ratio 2.2  --   --  2.3 2.2       TSH:  Recent Labs   Lab 06/28/22  1445 08/31/22  1305 06/21/23  1058   TSH 7.769 H 1.224 1.764       A1C:  Recent Labs   Lab 04/07/21  1216 01/05/22  1215 06/28/22  1445 12/19/22  1242 06/21/23  1058   Hemoglobin A1C 6.4 H 6.3 H 6.3 H 6.4 H 6.3 H       Urine Microalbumin/Cr:  Recent Labs   Lab 04/07/21  1317 01/05/22  1223 12/13/22  1438   Microalb/Creat Ratio 17.1 21.9 10.3         Assessment/Plan     Bebe Valdez is a 73 y.o.female with:    Type 2 diabetes mellitus with hypercholesterolemia  -     metFORMIN (GLUCOPHAGE-XR) 500 MG ER 24hr tablet; Take 2 tablets (1,000 mg total) by mouth 2 (two) times daily with meals.  Dispense: 360 tablet; Refill: 1  -     Microalbumin/Creatinine  Ratio, Urine  -     Hemoglobin A1C; Future; Expected date: 12/20/2023  -     Comprehensive Metabolic Panel; Future; Expected date: 12/20/2023  - Controlled.  Cont current.     Hypothyroidism, unspecified type  -     TSH; Future; Expected date: 12/20/2023  - Controlled.  Cont current regimen.    Hyperlipidemia, unspecified hyperlipidemia type  - Controlled.  Cont current.     Atrial fibrillation, unspecified type  - Stable.  Cont current regimen.    Aortic atherosclerosis  - Stable.  Cont current regimen.    Class 1 obesity due to excess calories with serious comorbidity and body mass index (BMI) of 31.0 to 31.9 in adult   - Cont low carb, low chol diet and exercise  for wt loss.      Screening mammogram, encounter for  -     Mammo Digital Screening Bilat w/ Fernando; Future; Expected date: 02/09/2024         Chronic conditions status updated as per HPI.  Other than changes above, cont current medications and maintain follow up with specialists.   Follow up in about 6 months (around 6/20/2024) for fu chronic issues or sooner if needed.      Amber Dorsey MD  [facility]

## 2023-12-20 ENCOUNTER — LAB VISIT (OUTPATIENT)
Dept: LAB | Facility: HOSPITAL | Age: 73
End: 2023-12-20
Attending: INTERNAL MEDICINE
Payer: MEDICARE

## 2023-12-20 ENCOUNTER — OFFICE VISIT (OUTPATIENT)
Dept: PRIMARY CARE CLINIC | Facility: CLINIC | Age: 73
End: 2023-12-20
Payer: MEDICARE

## 2023-12-20 VITALS
RESPIRATION RATE: 18 BRPM | DIASTOLIC BLOOD PRESSURE: 82 MMHG | TEMPERATURE: 98 F | OXYGEN SATURATION: 97 % | HEIGHT: 66 IN | BODY MASS INDEX: 31.85 KG/M2 | WEIGHT: 198.19 LBS | SYSTOLIC BLOOD PRESSURE: 130 MMHG | HEART RATE: 63 BPM

## 2023-12-20 DIAGNOSIS — E66.09 CLASS 1 OBESITY DUE TO EXCESS CALORIES WITH SERIOUS COMORBIDITY AND BODY MASS INDEX (BMI) OF 31.0 TO 31.9 IN ADULT: ICD-10-CM

## 2023-12-20 DIAGNOSIS — E78.5 HYPERLIPIDEMIA, UNSPECIFIED HYPERLIPIDEMIA TYPE: ICD-10-CM

## 2023-12-20 DIAGNOSIS — E03.9 HYPOTHYROIDISM, UNSPECIFIED TYPE: ICD-10-CM

## 2023-12-20 DIAGNOSIS — E78.00 TYPE 2 DIABETES MELLITUS WITH HYPERCHOLESTEROLEMIA: ICD-10-CM

## 2023-12-20 DIAGNOSIS — E11.69 TYPE 2 DIABETES MELLITUS WITH HYPERCHOLESTEROLEMIA: ICD-10-CM

## 2023-12-20 DIAGNOSIS — I70.0 AORTIC ATHEROSCLEROSIS: ICD-10-CM

## 2023-12-20 DIAGNOSIS — I48.91 ATRIAL FIBRILLATION, UNSPECIFIED TYPE: ICD-10-CM

## 2023-12-20 DIAGNOSIS — E78.00 TYPE 2 DIABETES MELLITUS WITH HYPERCHOLESTEROLEMIA: Primary | ICD-10-CM

## 2023-12-20 DIAGNOSIS — E11.69 TYPE 2 DIABETES MELLITUS WITH HYPERCHOLESTEROLEMIA: Primary | ICD-10-CM

## 2023-12-20 DIAGNOSIS — Z12.31 SCREENING MAMMOGRAM, ENCOUNTER FOR: ICD-10-CM

## 2023-12-20 LAB
ALBUMIN SERPL BCP-MCNC: 3.7 G/DL (ref 3.5–5.2)
ALBUMIN/CREAT UR: NORMAL UG/MG (ref 0–30)
ALP SERPL-CCNC: 49 U/L (ref 55–135)
ALT SERPL W/O P-5'-P-CCNC: 17 U/L (ref 10–44)
ANION GAP SERPL CALC-SCNC: 8 MMOL/L (ref 8–16)
AST SERPL-CCNC: 19 U/L (ref 10–40)
BILIRUB SERPL-MCNC: 0.7 MG/DL (ref 0.1–1)
BUN SERPL-MCNC: 11 MG/DL (ref 8–23)
CALCIUM SERPL-MCNC: 10.1 MG/DL (ref 8.7–10.5)
CHLORIDE SERPL-SCNC: 106 MMOL/L (ref 95–110)
CO2 SERPL-SCNC: 24 MMOL/L (ref 23–29)
CREAT SERPL-MCNC: 0.8 MG/DL (ref 0.5–1.4)
CREAT UR-MCNC: 66 MG/DL (ref 15–325)
EST. GFR  (NO RACE VARIABLE): >60 ML/MIN/1.73 M^2
ESTIMATED AVG GLUCOSE: 134 MG/DL (ref 68–131)
GLUCOSE SERPL-MCNC: 97 MG/DL (ref 70–110)
HBA1C MFR BLD: 6.3 % (ref 4–5.6)
MICROALBUMIN UR DL<=1MG/L-MCNC: <5 UG/ML
POTASSIUM SERPL-SCNC: 4.1 MMOL/L (ref 3.5–5.1)
PROT SERPL-MCNC: 6.4 G/DL (ref 6–8.4)
SODIUM SERPL-SCNC: 138 MMOL/L (ref 136–145)
TSH SERPL DL<=0.005 MIU/L-ACNC: 3.45 UIU/ML (ref 0.4–4)

## 2023-12-20 PROCEDURE — 99999 PR PBB SHADOW E&M-EST. PATIENT-LVL V: ICD-10-PCS | Mod: PBBFAC,,, | Performed by: INTERNAL MEDICINE

## 2023-12-20 PROCEDURE — 82043 UR ALBUMIN QUANTITATIVE: CPT | Performed by: INTERNAL MEDICINE

## 2023-12-20 PROCEDURE — 99999 PR PBB SHADOW E&M-EST. PATIENT-LVL V: CPT | Mod: PBBFAC,,, | Performed by: INTERNAL MEDICINE

## 2023-12-20 PROCEDURE — 99215 OFFICE O/P EST HI 40 MIN: CPT | Mod: PBBFAC,PN | Performed by: INTERNAL MEDICINE

## 2023-12-20 PROCEDURE — 99214 OFFICE O/P EST MOD 30 MIN: CPT | Mod: S$PBB,,, | Performed by: INTERNAL MEDICINE

## 2023-12-20 PROCEDURE — 36415 COLL VENOUS BLD VENIPUNCTURE: CPT | Mod: PN | Performed by: INTERNAL MEDICINE

## 2023-12-20 PROCEDURE — 83036 HEMOGLOBIN GLYCOSYLATED A1C: CPT | Performed by: INTERNAL MEDICINE

## 2023-12-20 PROCEDURE — 80053 COMPREHEN METABOLIC PANEL: CPT | Performed by: INTERNAL MEDICINE

## 2023-12-20 PROCEDURE — 99214 PR OFFICE/OUTPT VISIT, EST, LEVL IV, 30-39 MIN: ICD-10-PCS | Mod: S$PBB,,, | Performed by: INTERNAL MEDICINE

## 2023-12-20 PROCEDURE — 84443 ASSAY THYROID STIM HORMONE: CPT | Performed by: INTERNAL MEDICINE

## 2023-12-20 RX ORDER — METFORMIN HYDROCHLORIDE 500 MG/1
1000 TABLET, EXTENDED RELEASE ORAL 2 TIMES DAILY WITH MEALS
Qty: 360 TABLET | Refills: 1 | Status: SHIPPED | OUTPATIENT
Start: 2023-12-20

## 2023-12-21 NOTE — PROGRESS NOTES
I sent pt a my chart message -  I reviewed your labs.  Your Ha1c was controlled at 6.3.  Your thyroid function was normal. Continue your current regimen.  Your kidney function and liver functions looked good.   No further recommendations at this time.    Dr. ROBERT

## 2023-12-22 NOTE — PROGRESS NOTES
[facility] Note    Chief Complaint      Chief Complaint   Patient presents with    Medicare AWV       History of Present Illness      Bebe Valdez is a 73 y.o. female who presents today for   Chief Complaint   Patient presents with    Medicare AW         Patient presents to clinic for her annual wellness visit required by Medicare.  She reports feeling well.  There are no complaints today.         Review of Systems   All 12 systems otherwise negative.       Family History:  family history includes Allergies in her mother; Atrial fibrillation in her father; Breast cancer (age of onset: 42) in her mother; Colon cancer in her maternal grandmother; Diabetes in her father, maternal aunt, and maternal grandmother; Heart attack (age of onset: 90) in her maternal aunt; Heart attacks under age 50 in her paternal grandfather; Heart disease in her father; Hyperlipidemia in her father; Hypertension in her father; Hypothyroidism in her father; Lung cancer in her maternal grandfather; No Known Problems in her brother, paternal grandmother, and sister; Stroke in her father.   Family history was reviewed with patient.     Medications:  Outpatient Encounter Medications as of 1/9/2024   Medication Sig Dispense Refill    apixaban (ELIQUIS) 5 mg Tab Take 1 tablet (5 mg total) by mouth 2 (two) times daily. 60 tablet 11    ascorbic acid, vitamin C, (VITAMIN C) 100 MG tablet Take 100 mg by mouth once daily.      augmented betamethasone dipropionate (DIPROLENE-AF) 0.05 % cream Apply to elbows BID prn scaling. 50 g 2    betamethasone dipropionate 0.05 % cream Apply topically 2 (two) times daily as needed.      CINNAMON BARK (CINNAMON ORAL) Take by mouth once daily. Pt taking 2 pills daily.      clobetasol 0.05% (TEMOVATE) 0.05 % Oint Apply topically 2 (two) times daily. 30 g 2    fish oil-omega-3 fatty acids 300-1,000 mg capsule Take 2 g by mouth once daily.      magnesium 250 mg Tab Take by mouth once.      metFORMIN  (GLUCOPHAGE-XR) 500 MG ER 24hr tablet Take 2 tablets (1,000 mg total) by mouth 2 (two) times daily with meals. 360 tablet 1    metoprolol succinate (TOPROL-XL) 50 MG 24 hr tablet Take 1 tablet (50 mg total) by mouth once daily. 90 tablet 3    metroNIDAZOLE (NORITATE) 1 % cream Compound azelaic acid 15% + ivermectin 1% + metronidazole 1% cream. Apply to face once or twice daily. 30 g 5    MULTAQ 400 mg Tab TAKE 1 TABLET BY MOUTH TWICE DAILY WITH MEALS 60 tablet 11    MV,CA,MIN/IRON/FA/GUARANA/CAFF (ONE-A-DAY WOMEN'S ACTIVE ORAL) Take by mouth.      rosuvastatin (CRESTOR) 10 MG tablet TAKE 1 TABLET(10 MG) BY MOUTH EVERY DAY 90 tablet 3    SYNTHROID 175 mcg tablet TAKE 1 TABLET(175 MCG) BY MOUTH BEFORE BREAKFAST 90 tablet 3    vitamin D (VITAMIN D3) 1000 units Tab Take 1,000 Units by mouth once daily.      [DISCONTINUED] fexofenadine-pseudoephedrine (ALLEGRA-D 24) 180-240 mg per 24 hr tablet Take 1 tablet by mouth once daily.      [DISCONTINUED] metFORMIN (GLUCOPHAGE-XR) 500 MG ER 24hr tablet Take 2 tablets (1,000 mg total) by mouth 2 (two) times daily with meals. 360 tablet 1     Facility-Administered Encounter Medications as of 1/9/2024   Medication Dose Route Frequency Provider Last Rate Last Admin    0.9%  NaCl infusion   Intravenous Continuous Steffi Christian NP   New Bag at 09/26/19 0913    sodium chloride 0.9% flush 5 mL  5 mL Intravenous PRN Steffi Christian NP         Review for Opioid Screening: Pt does not have Rx for Opioids    Review for Substance Use Disorders: Patient does not have a controlled substance abuse disorder.        Allergies:  Review of patient's allergies indicates:   Allergen Reactions    Levaquin [levofloxacin] Hives    Tetracycline Hives       Health Maintenance:  Health Maintenance   Topic Date Due    Foot Exam  06/28/2023    Mammogram  02/08/2024    Eye Exam  02/13/2024    Hemoglobin A1c  06/20/2024    Lipid Panel  06/21/2024    High Dose Statin  12/20/2024    DEXA Scan   "07/26/2025    TETANUS VACCINE  09/03/2025    Colorectal Cancer Screening  06/06/2027    Hepatitis C Screening  Completed    Shingles Vaccine  Completed     Health Maintenance Topics with due status: Not Due       Topic Last Completion Date    TETANUS VACCINE 09/03/2015    Colorectal Cancer Screening 06/06/2017    DEXA Scan 07/26/2022    Lipid Panel 06/21/2023    High Dose Statin 12/20/2023    Diabetes Urine Screening 12/20/2023    Hemoglobin A1c 12/20/2023       Physical Exam      Vital Signs  Temp: 97.4 °F (36.3 °C)  Temp Source: Oral  Pulse: (!) 55  Resp: 16  SpO2: 97 %  BP: 136/80  BP Location: Left arm  Patient Position: Sitting  Pain Score: 0-No pain  Height and Weight  Height: 5' 6" (167.6 cm)  Weight: 89.4 kg (197 lb 1.5 oz)  BSA (Calculated - sq m): 2.04 sq meters  BMI (Calculated): 31.8  Weight in (lb) to have BMI = 25: 154.6]    Physical Exam  Vitals reviewed.   Constitutional:       Appearance: Normal appearance. She is normal weight.   HENT:      Head: Normocephalic.      Nose: Nose normal.      Mouth/Throat:      Mouth: Mucous membranes are moist.      Pharynx: Oropharynx is clear.   Eyes:      Extraocular Movements: Extraocular movements intact.      Conjunctiva/sclera: Conjunctivae normal.      Pupils: Pupils are equal, round, and reactive to light.   Cardiovascular:      Rate and Rhythm: Normal rate and regular rhythm.      Pulses: Normal pulses.      Heart sounds: Normal heart sounds.   Pulmonary:      Effort: Pulmonary effort is normal.      Breath sounds: Normal breath sounds.   Musculoskeletal:      Cervical back: Normal range of motion and neck supple.   Skin:     General: Skin is warm and dry.      Capillary Refill: Capillary refill takes less than 2 seconds.   Neurological:      General: No focal deficit present.      Mental Status: She is alert and oriented to person, place, and time. Mental status is at baseline.   Psychiatric:         Mood and Affect: Mood normal.         Behavior: Behavior " normal.         Thought Content: Thought content normal.         Judgment: Judgment normal.            Assessment/Plan     Bebe Valdez is a 73 y.o.female with:    Encounter for preventive health examination    Type 2 diabetes mellitus with hypercholesterolemia    Chronic systolic heart failure    Atrial fibrillation, unspecified type    Aortic atherosclerosis        As above, continue current medications and maintain follow up with specialists.  Return to clinic as needed.    Greater than 50% of visit was spent face to face with patient.  All questions were answered to patient's satisfaction.           Samanta Charles, NP-C  [facility]    I offered to discuss advanced care planning, including how to pick a person who would make decisions for you if you were unable to make them for yourself, called a health care power of , and what kind of decisions you might make such as use of life sustaining treatments such as ventilators and tube feeding when faced with a life limiting illness recorded on a living will that they will need to know. (How you want to be cared for as you near the end of your natural life)     X Patient is interested in learning more about how to make advanced directives.  I provided them paperwork and offered to discuss this with them.  I offered to discuss advanced care planning, including how to pick a person who would make decisions for you if you were unable to make them for yourself, called a health care power of , and what kind of decisions you might make such as use of life sustaining treatments such as ventilators and tube feeding when faced with a life limiting illness recorded on a living will that they will need to know. (How you want to be cared for as you near the end of your natural life)     X Patient is interested in learning more about how to make advanced directives.  I provided them paperwork and offered to discuss this with them.

## 2024-01-09 ENCOUNTER — OFFICE VISIT (OUTPATIENT)
Dept: PRIMARY CARE CLINIC | Facility: CLINIC | Age: 74
End: 2024-01-09
Payer: MEDICARE

## 2024-01-09 VITALS
DIASTOLIC BLOOD PRESSURE: 80 MMHG | WEIGHT: 197.06 LBS | HEART RATE: 55 BPM | RESPIRATION RATE: 16 BRPM | SYSTOLIC BLOOD PRESSURE: 136 MMHG | TEMPERATURE: 97 F | HEIGHT: 66 IN | BODY MASS INDEX: 31.67 KG/M2 | OXYGEN SATURATION: 97 %

## 2024-01-09 DIAGNOSIS — I50.22 CHRONIC SYSTOLIC HEART FAILURE: ICD-10-CM

## 2024-01-09 DIAGNOSIS — E11.69 TYPE 2 DIABETES MELLITUS WITH HYPERCHOLESTEROLEMIA: ICD-10-CM

## 2024-01-09 DIAGNOSIS — I70.0 AORTIC ATHEROSCLEROSIS: ICD-10-CM

## 2024-01-09 DIAGNOSIS — Z00.00 ENCOUNTER FOR PREVENTIVE HEALTH EXAMINATION: Primary | ICD-10-CM

## 2024-01-09 DIAGNOSIS — E78.00 TYPE 2 DIABETES MELLITUS WITH HYPERCHOLESTEROLEMIA: ICD-10-CM

## 2024-01-09 DIAGNOSIS — I48.91 ATRIAL FIBRILLATION, UNSPECIFIED TYPE: ICD-10-CM

## 2024-01-09 PROCEDURE — G0439 PPPS, SUBSEQ VISIT: HCPCS | Mod: ,,, | Performed by: NURSE PRACTITIONER

## 2024-01-09 PROCEDURE — 99215 OFFICE O/P EST HI 40 MIN: CPT | Mod: PBBFAC,PN | Performed by: NURSE PRACTITIONER

## 2024-01-09 PROCEDURE — 99999 PR PBB SHADOW E&M-EST. PATIENT-LVL V: CPT | Mod: PBBFAC,,, | Performed by: NURSE PRACTITIONER

## 2024-01-09 NOTE — PATIENT INSTRUCTIONS
Counseling and Referral of Other Preventative  (Italic type indicates deductible and co-insurance are waived)    Patient Name: Bebe Valdez  Today's Date: 1/9/2024    Health Maintenance       Date Due Completion Date    RSV Vaccine (Age 60+ and Pregnant patients) (1 - 1-dose 60+ series) Never done ---    Foot Exam 06/28/2023 6/28/2022 (Done)    Override on 6/28/2022: Done (pt states per PCP at annual)    Override on 11/9/2020: Done    Override on 11/5/2019: Done    Override on 10/22/2018: Done    Mammogram 02/08/2024 2/8/2023    Eye Exam 02/13/2024 2/13/2023    Override on 8/15/2018: Done    Override on 8/16/2017: Done (Left eye pressure-21; right eye pressure 19.  Patient reports)    Hemoglobin A1c 06/20/2024 12/20/2023    Lipid Panel 06/21/2024 6/21/2023    High Dose Statin 12/20/2024 12/20/2023    Diabetes Urine Screening 12/20/2024 12/20/2023    DEXA Scan 07/26/2025 7/26/2022    TETANUS VACCINE 09/03/2025 9/3/2015    Colorectal Cancer Screening 06/06/2027 6/6/2017    Override on 7/6/2007: Done        No orders of the defined types were placed in this encounter.    The following information is provided to all patients.  This information is to help you find resources for any of the problems found today that may be affecting your health:                  Living healthy guide: www.[facility].[place].gov      Understanding Diabetes: www.diabetes.org      Eating healthy: www.cdc.gov/healthyweight      CDC home safety checklist: www.cdc.gov/steadi/patient.html      Agency on Aging: www.goea.[place].gov      Alcoholics anonymous (AA): www.aa.org      Physical Activity: www.sruthi.nih.gov/zc1esdm      Tobacco use: www.quitwithusla.org         Counseling and Referral of Other Preventative  (Italic type indicates deductible and co-insurance are waived)    Patient Name: Bebe Valdez  Today's Date: 1/9/2024    Health Maintenance       Date Due Completion Date    RSV Vaccine (Age 60+ and Pregnant patients) (1 - 1-dose 60+ series)  Never done ---    Foot Exam 06/28/2023 6/28/2022 (Done)    Override on 6/28/2022: Done (pt states per PCP at annual)    Override on 11/9/2020: Done    Override on 11/5/2019: Done    Override on 10/22/2018: Done    Mammogram 02/08/2024 2/8/2023    Eye Exam 02/13/2024 2/13/2023    Override on 8/15/2018: Done    Override on 8/16/2017: Done (Left eye pressure-21; right eye pressure 19.  Patient reports)    Hemoglobin A1c 06/20/2024 12/20/2023    Lipid Panel 06/21/2024 6/21/2023    High Dose Statin 12/20/2024 12/20/2023    Diabetes Urine Screening 12/20/2024 12/20/2023    DEXA Scan 07/26/2025 7/26/2022    TETANUS VACCINE 09/03/2025 9/3/2015    Colorectal Cancer Screening 06/06/2027 6/6/2017    Override on 7/6/2007: Done        No orders of the defined types were placed in this encounter.    The following information is provided to all patients.  This information is to help you find resources for any of the problems found today that may be affecting your health:                  Living healthy guide: www.[facility].[place].gov      Understanding Diabetes: www.diabetes.org      Eating healthy: www.cdc.gov/healthyweight      CDC home safety checklist: www.cdc.gov/steadi/patient.html      Agency on Aging: www.goea.[place].[facility]      Alcoholics anonymous (AA): www.aa.org      Physical Activity: www.sruthi.nih.gov/qg6vrtj      Tobacco use: www.quitwithusla.org

## 2024-02-06 ENCOUNTER — HOSPITAL ENCOUNTER (EMERGENCY)
Facility: HOSPITAL | Age: 74
Discharge: HOME | End: 2024-02-06
Attending: EMERGENCY MEDICINE
Payer: MEDICARE

## 2024-02-06 ENCOUNTER — TELEPHONE (OUTPATIENT)
Dept: OBSTETRICS AND GYNECOLOGY | Facility: CLINIC | Age: 74
End: 2024-02-06
Payer: MEDICARE

## 2024-02-06 VITALS
HEIGHT: 66 IN | WEIGHT: 197 LBS | OXYGEN SATURATION: 99 % | SYSTOLIC BLOOD PRESSURE: 144 MMHG | BODY MASS INDEX: 31.66 KG/M2 | HEART RATE: 70 BPM | DIASTOLIC BLOOD PRESSURE: 88 MMHG | RESPIRATION RATE: 16 BRPM | TEMPERATURE: 98 F

## 2024-02-06 DIAGNOSIS — N95.0 POSTMENOPAUSAL BLEEDING: Primary | ICD-10-CM

## 2024-02-06 LAB
ABO AND RH: NORMAL
ALBUMIN SERPL BCP-MCNC: 3.8 G/DL (ref 3.5–5.2)
ALP SERPL-CCNC: 57 U/L (ref 55–135)
ALT SERPL W/O P-5'-P-CCNC: 20 U/L (ref 10–44)
ANION GAP SERPL CALC-SCNC: 8 MMOL/L (ref 8–16)
AST SERPL-CCNC: 19 U/L (ref 10–40)
BACTERIA #/AREA URNS AUTO: ABNORMAL /HPF
BASOPHILS # BLD AUTO: 0.03 K/UL (ref 0–0.2)
BASOPHILS NFR BLD: 0.5 % (ref 0–1.9)
BILIRUB SERPL-MCNC: 1 MG/DL (ref 0.1–1)
BILIRUB UR QL STRIP: NEGATIVE
BLD GP AB SCN CELLS X3 SERPL QL: NORMAL
BUN SERPL-MCNC: 11 MG/DL (ref 8–23)
CALCIUM SERPL-MCNC: 10.3 MG/DL (ref 8.7–10.5)
CHLORIDE SERPL-SCNC: 108 MMOL/L (ref 95–110)
CLARITY UR REFRACT.AUTO: CLEAR
CO2 SERPL-SCNC: 25 MMOL/L (ref 23–29)
COLOR UR AUTO: YELLOW
CREAT SERPL-MCNC: 0.8 MG/DL (ref 0.5–1.4)
DIFFERENTIAL METHOD BLD: NORMAL
EOSINOPHIL # BLD AUTO: 0.2 K/UL (ref 0–0.5)
EOSINOPHIL NFR BLD: 2.9 % (ref 0–8)
ERYTHROCYTE [DISTWIDTH] IN BLOOD BY AUTOMATED COUNT: 13.3 % (ref 11.5–14.5)
EST. GFR  (NO RACE VARIABLE): >60 ML/MIN/1.73 M^2
GLUCOSE SERPL-MCNC: 118 MG/DL (ref 70–110)
GLUCOSE UR QL STRIP: NEGATIVE
HCT VFR BLD AUTO: 41.5 % (ref 37–48.5)
HCV AB SERPL QL IA: NORMAL
HGB BLD-MCNC: 13.3 G/DL (ref 12–16)
HGB UR QL STRIP: ABNORMAL
HIV 1+2 AB+HIV1 P24 AG SERPL QL IA: NORMAL
IMM GRANULOCYTES # BLD AUTO: 0.02 K/UL (ref 0–0.04)
IMM GRANULOCYTES NFR BLD AUTO: 0.3 % (ref 0–0.5)
KETONES UR QL STRIP: NEGATIVE
LEUKOCYTE ESTERASE UR QL STRIP: ABNORMAL
LYMPHOCYTES # BLD AUTO: 1.5 K/UL (ref 1–4.8)
LYMPHOCYTES NFR BLD: 22.3 % (ref 18–48)
MCH RBC QN AUTO: 29.5 PG (ref 27–31)
MCHC RBC AUTO-ENTMCNC: 32 G/DL (ref 32–36)
MCV RBC AUTO: 92 FL (ref 82–98)
MICROSCOPIC COMMENT: ABNORMAL
MONOCYTES # BLD AUTO: 0.6 K/UL (ref 0.3–1)
MONOCYTES NFR BLD: 9.2 % (ref 4–15)
NEUTROPHILS # BLD AUTO: 4.3 K/UL (ref 1.8–7.7)
NEUTROPHILS NFR BLD: 64.8 % (ref 38–73)
NITRITE UR QL STRIP: NEGATIVE
NRBC BLD-RTO: 0 /100 WBC
PH UR STRIP: 5 [PH] (ref 5–8)
PLATELET # BLD AUTO: 249 K/UL (ref 150–450)
PMV BLD AUTO: 10.6 FL (ref 9.2–12.9)
POTASSIUM SERPL-SCNC: 4.2 MMOL/L (ref 3.5–5.1)
PROT SERPL-MCNC: 6.9 G/DL (ref 6–8.4)
PROT UR QL STRIP: NEGATIVE
RBC # BLD AUTO: 4.51 M/UL (ref 4–5.4)
RBC #/AREA URNS AUTO: 26 /HPF (ref 0–4)
SODIUM SERPL-SCNC: 141 MMOL/L (ref 136–145)
SP GR UR STRIP: 1.02 (ref 1–1.03)
SPECIMEN OUTDATE: NORMAL
SQUAMOUS #/AREA URNS AUTO: 0 /HPF
TSH SERPL DL<=0.005 MIU/L-ACNC: 1.65 UIU/ML (ref 0.4–4)
URN SPEC COLLECT METH UR: ABNORMAL
WBC # BLD AUTO: 6.65 K/UL (ref 3.9–12.7)
WBC #/AREA URNS AUTO: 5 /HPF (ref 0–5)

## 2024-02-06 PROCEDURE — 85025 COMPLETE CBC W/AUTO DIFF WBC: CPT | Performed by: PHYSICIAN ASSISTANT

## 2024-02-06 PROCEDURE — 99283 EMERGENCY DEPT VISIT LOW MDM: CPT | Mod: 25

## 2024-02-06 PROCEDURE — 81001 URINALYSIS AUTO W/SCOPE: CPT | Performed by: PHYSICIAN ASSISTANT

## 2024-02-06 PROCEDURE — 86850 RBC ANTIBODY SCREEN: CPT | Performed by: PHYSICIAN ASSISTANT

## 2024-02-06 PROCEDURE — 86803 HEPATITIS C AB TEST: CPT | Performed by: PHYSICIAN ASSISTANT

## 2024-02-06 PROCEDURE — 87389 HIV-1 AG W/HIV-1&-2 AB AG IA: CPT | Performed by: PHYSICIAN ASSISTANT

## 2024-02-06 PROCEDURE — 80053 COMPREHEN METABOLIC PANEL: CPT | Performed by: PHYSICIAN ASSISTANT

## 2024-02-06 PROCEDURE — 84443 ASSAY THYROID STIM HORMONE: CPT | Performed by: PHYSICIAN ASSISTANT

## 2024-02-06 PROCEDURE — 86900 BLOOD TYPING SEROLOGIC ABO: CPT | Performed by: PHYSICIAN ASSISTANT

## 2024-02-06 PROCEDURE — 86901 BLOOD TYPING SEROLOGIC RH(D): CPT | Performed by: PHYSICIAN ASSISTANT

## 2024-02-06 NOTE — DISCHARGE INSTRUCTIONS
You were seen in the emergency department for vaginal bleeding.  Your blood work was normal today.  You will need to have a close follow-up with OBGYN for transvaginal ultrasound.  If you develop worsening of her bleeding please return to ED sooner.

## 2024-02-06 NOTE — ED PROVIDER NOTES
Encounter Date: 2/6/2024       History     Chief Complaint   Patient presents with    Vaginal Bleeding     Onset today      73-year-old female with past medical history of hypothyroidism, hyperlipidemia, atrial fibrillation (on Eliquis) who presents emergency department for vaginal bleeding.  States this morning she had a large episode of vaginal bleeding with no clotting.  To quantify the amount of bleeding.  Has a pad on states she did not soak through it.  Denies any abdominal pain, chest pain, shortness breath or dizziness.  States she did have some spotting in October and follow-up transvaginal ultrasound was normal.    The history is provided by the patient.     Review of patient's allergies indicates:   Allergen Reactions    Levaquin [levofloxacin] Hives    Tetracycline Hives     Past Medical History:   Diagnosis Date    Allergy     Anticoagulant long-term use     COVID-19     5/14/22    Hyperlipidemia     Hypothyroid     Obesity     Rosacea      Past Surgical History:   Procedure Laterality Date    CHOLECYSTECTOMY      COLONOSCOPY  07/06/2007    COLONOSCOPY N/A 06/06/2017    Procedure: COLONOSCOPY;  Surgeon: Tre Alvarez MD;  Location: [facility] ENDO ([address] [place] [address]);  Service: Endoscopy;  Laterality: N/A;    DILATION AND CURETTAGE OF UTERUS      HYSTEROSCOPY      TREATMENT OF CARDIAC ARRHYTHMIA N/A 09/26/2019    Procedure: CARDIOVERSION;  Surgeon: Eduardo Sanchez MD;  Location: [facility] EP LAB;  Service: Cardiology;  Laterality: N/A;  AF, DORINA, DCCV, MAC, [place], 3 Prep    TREATMENT OF CARDIAC ARRHYTHMIA N/A 01/09/2020    Procedure: CARDIOVERSION;  Surgeon: Eduardo Sanchez MD;  Location: [facility] EP LAB;  Service: Cardiology;  Laterality: N/A;  AF, DORINA, DCCV, MAC, [place], 3 Prep    WISDOM TOOTH EXTRACTION       Family History   Problem Relation Age of Onset    Breast cancer Mother 42    Allergies Mother     Heart disease Father     Stroke Father     Atrial fibrillation Father     Hypertension Father     Hypothyroidism Father      Hyperlipidemia Father     Diabetes Father     No Known Problems Sister     No Known Problems Brother     Diabetes Maternal Grandmother     Colon cancer Maternal Grandmother     Lung cancer Maternal Grandfather         lung CA, ?Stomach CA    No Known Problems Paternal Grandmother     Heart attacks under age 50 Paternal Grandfather          at 37 u/    Diabetes Maternal Aunt     Heart attack Maternal Aunt 90    Ovarian cancer Neg Hx     Melanoma Neg Hx     Angioedema Neg Hx     Asthma Neg Hx     Atopy Neg Hx     Eczema Neg Hx     Immunodeficiency Neg Hx     Rhinitis Neg Hx     Urticaria Neg Hx     Heart failure Neg Hx      Social History     Tobacco Use    Smoking status: Never    Smokeless tobacco: Never   Substance Use Topics    Alcohol use: Yes     Alcohol/week: 2.0 standard drinks of alcohol     Types: 2 Glasses of wine per week     Comment: weekends     Drug use: Never     Review of Systems   Constitutional:  Negative for chills and fever.   HENT:  Negative for sore throat.    Respiratory:  Negative for cough and shortness of breath.    Cardiovascular:  Negative for chest pain.   Gastrointestinal:  Negative for abdominal pain.   Genitourinary:  Positive for vaginal bleeding. Negative for difficulty urinating and dysuria.   Musculoskeletal: Negative.    Neurological:  Negative for weakness.   Psychiatric/Behavioral:  Negative for confusion.        Physical Exam     Initial Vitals [24 0930]   BP Pulse Resp Temp SpO2   (!) 150/100 66 20 98.1 °F (36.7 °C) 98 %      MAP       --         Physical Exam    Nursing note and vitals reviewed.  Constitutional: She appears well-developed and well-nourished.   HENT:   Head: Normocephalic and atraumatic.   Eyes: Conjunctivae are normal. Pupils are equal, round, and reactive to light.   Neck: Neck supple.   Normal range of motion.  Cardiovascular:  Normal rate.           Pulmonary/Chest: Breath sounds normal.   Abdominal: Abdomen is soft. There is no abdominal  tenderness.   Genitourinary:    Genitourinary Comments: Female chaperone present for pelvic exam.  No active bleeding or blood in the vaginal vault.  No CMT.  No discharge.  No foreign body.     Musculoskeletal:         General: Normal range of motion.      Cervical back: Normal range of motion and neck supple.     Neurological: She is alert and oriented to person, place, and time. GCS score is 15. GCS eye subscore is 4. GCS verbal subscore is 5. GCS motor subscore is 6.   Skin: Skin is warm and dry. Capillary refill takes less than 2 seconds.         ED Course   Procedures  Labs Reviewed   COMPREHENSIVE METABOLIC PANEL - Abnormal; Notable for the following components:       Result Value    Glucose 118 (*)     All other components within normal limits   URINALYSIS, REFLEX TO URINE CULTURE - Abnormal; Notable for the following components:    Occult Blood UA 3+ (*)     Leukocytes, UA Trace (*)     All other components within normal limits    Narrative:     Specimen Source->Urine   URINALYSIS MICROSCOPIC - Abnormal; Notable for the following components:    RBC, UA 26 (*)     All other components within normal limits    Narrative:     Specimen Source->Urine   HIV 1 / 2 ANTIBODY    Narrative:     Release to patient->Immediate   HEPATITIS C ANTIBODY    Narrative:     Release to patient->Immediate   CBC W/ AUTO DIFFERENTIAL   TSH   TYPE & SCREEN   ABO AND RH          Imaging Results    None          Medications - No data to display  Medical Decision Making  73-year-old female presents ED for vaginal bleeding.      Differential includes but not limited to malignancy, anemia, vaginal bleeding, thrombocytopenia    Patient has a benign abdominal exam with reassuring vitals.  Pelvic exam did not show active bleeding.  Although she is high-risk due to being on Eliquis.  CBC is reassuring with normal hemoglobin.  Normal platelets.  We discussed close follow-up with OBGYN as well as return precautions if she develops worsening of  bleeding.     Amount and/or Complexity of Data Reviewed  Labs: ordered.                                      Clinical Impression:  Final diagnoses:  [N95.0] Postmenopausal bleeding (Primary)          ED Disposition Condition    Discharge Stable          ED Prescriptions    None       Follow-up Information       Follow up With Specialties Details Why Contact Info Additional Information    [facility] - Ob/Gyn [facility] Obstetrics and Gynecology Schedule an appointment as soon as possible for a visit   [address]  [facility] [id number] [phone] Main Building, 5th Floor Please park in [facility] and take Clinic elevator             Gopal Beyer PA-C  02/06/24 [id number]

## 2024-02-06 NOTE — TELEPHONE ENCOUNTER
----- Message from Milena Webb sent at 2/6/2024 12:38 PM CST -----  Contact: [phone]  [email or web address]     Patient is calling for Medical Advice regarding:just left the ED     How long has patient had these symptoms:    Pharmacy name and phone#:    Would like response via Glidert:  no     Comments:  Pt is calling she states the hospital is telling her she is needing an appt with you all ASAP please give return call

## 2024-02-06 NOTE — ED NOTES
Patient identifiers verified and correct for  Ms Valdez  C/C: Vaginal bleed SEE NN  APPEARANCE: awake and alert in NAD. PAIN  0/10  SKIN: warm, dry and intact. No breakdown or bruising.  MUSCULOSKELETAL: Patient moving all extremities spontaneously, no obvious swelling or deformities noted. Ambulates independently.  RESPIRATORY: Denies shortness of breath.Respirations unlabored.   CARDIAC: Denies CP, 2+ distal pulses; no peripheral edema  ABDOMEN: S/ND/NT, Denies nausea  : voids spontaneously, denies difficulty  Neurologic: AAO x 4; follows commands equal strength in all extremities; denies numbness/tingling. Denies dizziness Denies new wekaness

## 2024-02-07 ENCOUNTER — OFFICE VISIT (OUTPATIENT)
Dept: OBSTETRICS AND GYNECOLOGY | Facility: CLINIC | Age: 74
End: 2024-02-07
Payer: MEDICARE

## 2024-02-07 VITALS
BODY MASS INDEX: 31.92 KG/M2 | DIASTOLIC BLOOD PRESSURE: 80 MMHG | SYSTOLIC BLOOD PRESSURE: 120 MMHG | WEIGHT: 197.75 LBS

## 2024-02-07 DIAGNOSIS — N95.0 PMB (POSTMENOPAUSAL BLEEDING): Primary | ICD-10-CM

## 2024-02-07 PROCEDURE — 58100 BIOPSY OF UTERUS LINING: CPT | Mod: S$PBB,,, | Performed by: OBSTETRICS & GYNECOLOGY

## 2024-02-07 PROCEDURE — 58100 BIOPSY OF UTERUS LINING: CPT | Mod: PBBFAC | Performed by: OBSTETRICS & GYNECOLOGY

## 2024-02-07 PROCEDURE — 99213 OFFICE O/P EST LOW 20 MIN: CPT | Mod: PBBFAC,25 | Performed by: OBSTETRICS & GYNECOLOGY

## 2024-02-07 PROCEDURE — 88305 TISSUE EXAM BY PATHOLOGIST: CPT | Mod: 26,,, | Performed by: PATHOLOGY

## 2024-02-07 PROCEDURE — 99499 UNLISTED E&M SERVICE: CPT | Mod: S$PBB,,, | Performed by: OBSTETRICS & GYNECOLOGY

## 2024-02-07 PROCEDURE — 99999 PR PBB SHADOW E&M-EST. PATIENT-LVL III: CPT | Mod: PBBFAC,,, | Performed by: OBSTETRICS & GYNECOLOGY

## 2024-02-07 PROCEDURE — 88305 TISSUE EXAM BY PATHOLOGIST: CPT | Performed by: PATHOLOGY

## 2024-02-07 NOTE — PROCEDURES
Procedures  Procedure Note:  Biopsy (Gynecological)  Date/Time: 2024 /9:36 AM   Performed by: Komal Cooley MD  Authorized by: Komal Cooley MD     Consent Done?:  Yes (Written)  Patient was prepped and draped in the normal sterile fashion.  Local anesthesia used?: No      Biopsy Location:  Uterus  Estimated blood loss (cc):  1  Patient tolerated the procedure well with no immediate complications.    73 y.o.  here for endometrial biopsy. Indication: postmenopausal bleeding    Patient's last menstrual period was 2001.   Possibility of pregnancy: no.     Risks reviewed and consent signed. Prior to performing the procedure, a time-out was performed.  The following components of the time out were conducted:  Verification of patient identity  Verification of the exact nature of procedure  Verification of surgical site and side  Review of allergies  Verification of the presence of a signed informed consent  Patient queried about history of vasovagal reaction/fainting    Procedure:     Uterus palpated: anteverted     Vagina prepared.     Dilation was not required.       Endometrial sample obtained with Explora.     small amount of tissue obtained.     Cavity 7 cm    Procedure performed without complication.  Procedure well-tolerated by patient    Follow-up by telephone to review results in 1-2 weeks.      Komal Cooley MD  2024 9:36 AM

## 2024-02-07 NOTE — PROGRESS NOTES
Ms. Valdez is a patient of Dr. Sanchez and was last seen in clinic 8/14/2023.      Subjective:   Patient ID:  Bebe Valdez is a 73 y.o. female who presents for follow up of Atrial Fibrillation  .     HPI:    Ms. Valdez is a 73 y.o. female with AF, hypothyroid, DM, cardiomyopathy here for follow up.    Background:    Referring Cardiologist: Tami De Leon MD  Primary Care Physician: Anil Nelson MD    Mrs. Valdez has a hx of diabetes mellitus, hypothyroidism on synthroid and recent onset of persistent atrial fibrillation. She was exercising with a  in July 2019 and pulse was noted to be elevated and irregular. She saw Dr. Nelson on 8/26/2019 and was diagnosed with atrial fibrillation. She was started on metoprolol and aspirin. TSH was checked and was undetectable. Her synthroid dosage was reduced. She was then assessed by Dr. De Leon who started her on eliquis and referred her here for further evaluation. A transthoracic echocardiogram was performed noting a LVEF of 45% with moderate left atrial enlargement and mildly depressed RV function. She reported no obvious symptoms related to AF. At our initial visit in 2018 we discussed trial of restoration of sinus rhythm to see if her mild systolic cardiomyopathy could be due to AF.    9/26/2019: Underwent successful DORINA/DCCV. EF on DORINA was 45%.      10/25/2019: She is one month s/p cardioversion and back in atrial fibrillation. Rate is controlled. She experienced no symptom improvement in sinus rhythm. However, EF is 45%. We discussed that this could be from the AF or from her period of hyperthyroidism. She is still hyperthyroid and is going to decrease her synthroid again per PCP (has a follow up soon). It would increase the likelihood of maintaining sinus rhythm after cardioversion if her thyroid levels are normal. We discussed anti-arrhythmic options, which include tikosyn or sotalol which both require hospital stays. She does not want to proceed with that route.  It might be possible to use amio after her DCCV, after her thyroid has normalized and her synthroid dose is appropriate, until her follow up echo. If her EF normalizes, could switch to 1C or Multaq.      12/2019: She remained in AF, minimal symptoms although in the presence of a cardiomyopathy. Plan was to proceed with rhythm control once she was euthyroid, and evaluate any symptom improvement or increase in LV function.      1/9/2020: successful DCCV. Multaq initiated. Felt better afterward. Thyroid function now normal. Will repeat echo in 3 months to observe for improvement in LV function in SR.     6/4/2020: She is now 5 months s/p cardioversion and initiation of Multaq. Echo yesterday shows normalization of LV function (40%->55%). EKG with acceptable intervals. She is feeling well. Remains on eliquis for CVA prophylaxis.     12/29/2020: Doing well from a rhythm standpoint, remains in SR on multaq. EF recovered in SR. We discussed that she was a candidate for ablation given her hx of arrhythmia-induced CM. She was not interested in ablation unless absolutely necessary. She strongly prefers to remain on multaq at this time. Frequent PVCs on EKG.      6/3/2021: Holter 1/2021: PVC 10%. No AF.    1/2022: Mrs. Valdez returns for follow-up. She recently developed a rash. Metoprolol was held to see if it could be from that however rash did not improve. She resumed metoprolol. She has done well on multaq. She reports no bleeding issues with eliquis.    2/2023: Mrs. Valdez returns for follow-up. No symptomatic AF on multaq. No nausea. No bleeding with eliquis. She works out with a .  ECG is sinus rhythm In summary, Mrs. Valdez is a pleasant 72 year-old woman with diabetes mellitus, hypothyroidism on synthroid and persistent AF that caused a mild cardiomyopathy. She is maintaining sinus rhythm on multaq and EF has recovered. PVI has been discussed. She prefers to continue multaq as long as it is working. Continue multaq,  metoprolol, and eliquis. RTC in 6 months, sooner if needed. Needs updated CBC.    8/14/2023: She continues to do well from a rhythm standpoint, maintaining sinus rhythm on multaq. ECG with stable intervals. CHADSVASc 3 on eliquis. She is feeling well.    Update (02/14/2024):    2/6/2024: Vaginal bleeding. All testing WNL. Has followed up with GYN and uterine biopsy was negative.    Today she says she feels at baseline. No palps, SON, CP, Lh, syncope reported. Bleeding has resolved.    She is currently taking eliquis 5mg BID for stroke prophylaxis. She is currently being treated with multaq 400mg BID for rhythm control and toprol 50mg daily for HR control. Kidney function is stable, with a creatinine of 0.8 on 2/6/2024.    I have personally reviewed the patient's EKG today, which shows AF at 75bpm.     Relevant Cardiac Test Results:    2D Echo (6/2021):  The left ventricle is normal in size with normal systolic function. The estimated ejection fraction is 55%.  Normal left ventricular diastolic function.  Normal right ventricular size with normal right ventricular systolic function.  Normal central venous pressure (3 mmHg).  Moderate left atrial enlargement.    Current Outpatient Medications   Medication Sig    apixaban (ELIQUIS) 5 mg Tab Take 1 tablet (5 mg total) by mouth 2 (two) times daily.    ascorbic acid, vitamin C, (VITAMIN C) 100 MG tablet Take 100 mg by mouth once daily.    augmented betamethasone dipropionate (DIPROLENE-AF) 0.05 % cream Apply to elbows BID prn scaling.    betamethasone dipropionate 0.05 % cream Apply topically 2 (two) times daily as needed.    CINNAMON BARK (CINNAMON ORAL) Take by mouth once daily. Pt taking 2 pills daily.    clobetasol 0.05% (TEMOVATE) 0.05 % Oint Apply topically 2 (two) times daily.    fish oil-omega-3 fatty acids 300-1,000 mg capsule Take 2 g by mouth once daily.    magnesium 250 mg Tab Take by mouth once.    metFORMIN (GLUCOPHAGE-XR) 500 MG ER 24hr tablet Take 2  "tablets (1,000 mg total) by mouth 2 (two) times daily with meals.    metoprolol succinate (TOPROL-XL) 50 MG 24 hr tablet Take 1 tablet (50 mg total) by mouth once daily.    metroNIDAZOLE (NORITATE) 1 % cream Compound azelaic acid 15% + ivermectin 1% + metronidazole 1% cream. Apply to face once or twice daily.    MULTAQ 400 mg Tab TAKE 1 TABLET BY MOUTH TWICE DAILY WITH MEALS    MV,CA,MIN/IRON/FA/GUARANA/CAFF (ONE-A-DAY WOMEN'S ACTIVE ORAL) Take by mouth.    rosuvastatin (CRESTOR) 10 MG tablet TAKE 1 TABLET(10 MG) BY MOUTH EVERY DAY    RSVPreF3 antigen-AS01E, PF, (AREXVY, PF,) 120 mcg/0.5 mL SusR vaccine Inject into the muscle.    SYNTHROID 175 mcg tablet TAKE 1 TABLET(175 MCG) BY MOUTH BEFORE BREAKFAST    vitamin D (VITAMIN D3) 1000 units Tab Take 1,000 Units by mouth once daily.     No current facility-administered medications for this visit.     Facility-Administered Medications Ordered in Other Visits   Medication    0.9%  NaCl infusion    sodium chloride 0.9% flush 5 mL       Review of Systems   Constitutional: Negative for malaise/fatigue.   Cardiovascular:  Negative for chest pain, dyspnea on exertion, irregular heartbeat, leg swelling and palpitations.   Respiratory:  Negative for shortness of breath.    Hematologic/Lymphatic: Positive for bleeding problem.   Skin:  Negative for rash.   Musculoskeletal:  Negative for myalgias.   Gastrointestinal:  Negative for hematemesis, hematochezia and nausea.   Genitourinary:  Negative for hematuria.   Neurological:  Negative for light-headedness.   Psychiatric/Behavioral:  Negative for altered mental status.    Allergic/Immunologic: Negative for persistent infections.       Objective:          BP (!) 111/49   Pulse 75   Ht 5' 6" (1.676 m)   Wt 89.8 kg (198 lb)   LMP 07/18/2001   BMI 31.96 kg/m²     Physical Exam  Vitals and nursing note reviewed.   Constitutional:       Appearance: Normal appearance. She is well-developed.   HENT:      Head: Normocephalic.      " Nose: Nose normal.   Eyes:      Pupils: Pupils are equal, round, and reactive to light.   Cardiovascular:      Rate and Rhythm: Normal rate. Rhythm irregularly irregular.   Pulmonary:      Effort: No respiratory distress.      Breath sounds: Normal breath sounds.   Musculoskeletal:         General: Normal range of motion.   Skin:     General: Skin is warm and dry.      Findings: No erythema.   Neurological:      Mental Status: She is alert and oriented to person, place, and time.   Psychiatric:         Speech: Speech normal.         Behavior: Behavior normal.           Lab Results   Component Value Date     02/06/2024    K 4.2 02/06/2024    MG 2.3 04/08/2005    BUN 11 02/06/2024    CREATININE 0.8 02/06/2024    ALT 20 02/06/2024    AST 19 02/06/2024    HGB 13.3 02/06/2024    HCT 41.5 02/06/2024    TSH 1.651 02/06/2024    LDLCALC 32.4 (L) 06/21/2023           Assessment:     1. Persistent atrial fibrillation    2. Chronic systolic heart failure, in setting of synthroid mediated hyperthyroidism and persistent atrial fibrillation    3. Chronic anticoagulation    4. History of cardioversion      Plan:     In summary, Ms. Valdez is a 73 y.o. female with AF, hypothyroid, DM, cardiomyopathy here for follow up.  She is back in AF despite multaq. Minimal symptoms but hx of AF-induced CM. She declines amiodarone or tikosyn. Good candidate for PVI. Risks, benefits, and alternatives discussed. All questions answered. She agrees to proceed.  Hx of vaginal bleeding. We also discussed watchman in the future. She will consider if bleeding recurs.    Confirm PVI (UPDATE: WIll proceed  RF-PVI   Carto   Anesthesia   DORINA, cancel if in sinus   Hold eliquis AM of procedure )  Stop  multaq  Continue other meds  RTC as scheduled    *A copy of this note has been sent to Dr. Sanchez*    Follow up as scheduled.    ------------------------------------------------------------------    CHRISTA Soares, NP-C  Cardiac  Electrophysiology

## 2024-02-09 ENCOUNTER — TELEPHONE (OUTPATIENT)
Dept: OBSTETRICS AND GYNECOLOGY | Facility: CLINIC | Age: 74
End: 2024-02-09
Payer: MEDICARE

## 2024-02-09 LAB
FINAL PATHOLOGIC DIAGNOSIS: NORMAL
GROSS: NORMAL
Lab: NORMAL

## 2024-02-09 NOTE — TELEPHONE ENCOUNTER
----- Message from Komal Cooley MD sent at 2/9/2024  2:06 PM CST -----  Please notify patient of normal results.  No further workup needed.

## 2024-02-12 ENCOUNTER — HOSPITAL ENCOUNTER (OUTPATIENT)
Dept: RADIOLOGY | Facility: HOSPITAL | Age: 74
Discharge: HOME | End: 2024-02-12
Attending: INTERNAL MEDICINE
Payer: MEDICARE

## 2024-02-12 VITALS — WEIGHT: 197 LBS | HEIGHT: 66 IN | BODY MASS INDEX: 31.66 KG/M2

## 2024-02-12 DIAGNOSIS — Z12.31 SCREENING MAMMOGRAM, ENCOUNTER FOR: ICD-10-CM

## 2024-02-12 PROCEDURE — 77063 BREAST TOMOSYNTHESIS BI: CPT | Mod: 26,,, | Performed by: RADIOLOGY

## 2024-02-12 PROCEDURE — 77067 SCR MAMMO BI INCL CAD: CPT | Mod: 26,,, | Performed by: RADIOLOGY

## 2024-02-12 PROCEDURE — 77067 SCR MAMMO BI INCL CAD: CPT | Mod: TC

## 2024-02-14 ENCOUNTER — OFFICE VISIT (OUTPATIENT)
Dept: ELECTROPHYSIOLOGY | Facility: CLINIC | Age: 74
End: 2024-02-14
Payer: MEDICARE

## 2024-02-14 ENCOUNTER — HOSPITAL ENCOUNTER (OUTPATIENT)
Dept: CARDIOLOGY | Facility: CLINIC | Age: 74
Discharge: HOME | End: 2024-02-14
Payer: MEDICARE

## 2024-02-14 VITALS
SYSTOLIC BLOOD PRESSURE: 111 MMHG | WEIGHT: 198 LBS | BODY MASS INDEX: 31.82 KG/M2 | HEIGHT: 66 IN | DIASTOLIC BLOOD PRESSURE: 49 MMHG | HEART RATE: 75 BPM

## 2024-02-14 DIAGNOSIS — I48.19 PERSISTENT ATRIAL FIBRILLATION: Primary | ICD-10-CM

## 2024-02-14 DIAGNOSIS — Z79.01 CHRONIC ANTICOAGULATION: ICD-10-CM

## 2024-02-14 DIAGNOSIS — I50.22 CHRONIC SYSTOLIC HEART FAILURE: ICD-10-CM

## 2024-02-14 DIAGNOSIS — Z92.89 HISTORY OF CARDIOVERSION: ICD-10-CM

## 2024-02-14 DIAGNOSIS — I48.19 PERSISTENT ATRIAL FIBRILLATION: ICD-10-CM

## 2024-02-14 LAB
OHS QRS DURATION: 94 MS
OHS QTC CALCULATION: 422 MS

## 2024-02-14 PROCEDURE — 93005 ELECTROCARDIOGRAM TRACING: CPT | Mod: PBBFAC | Performed by: INTERNAL MEDICINE

## 2024-02-14 PROCEDURE — 99214 OFFICE O/P EST MOD 30 MIN: CPT | Mod: S$PBB,,, | Performed by: NURSE PRACTITIONER

## 2024-02-14 PROCEDURE — 99214 OFFICE O/P EST MOD 30 MIN: CPT | Mod: PBBFAC,25 | Performed by: NURSE PRACTITIONER

## 2024-02-14 PROCEDURE — 93010 ELECTROCARDIOGRAM REPORT: CPT | Mod: S$PBB,,, | Performed by: INTERNAL MEDICINE

## 2024-02-14 PROCEDURE — 99999 PR PBB SHADOW E&M-EST. PATIENT-LVL IV: CPT | Mod: PBBFAC,,, | Performed by: NURSE PRACTITIONER

## 2024-02-14 NOTE — Clinical Note
Pt with hx of AF-induced CM now back in AF despite multaq. She is ok with PVI now if you agree. Thanks.

## 2024-02-18 ENCOUNTER — OFFICE VISIT (OUTPATIENT)
Dept: URGENT CARE | Facility: CLINIC | Age: 74
End: 2024-02-18
Payer: MEDICARE

## 2024-02-18 VITALS
DIASTOLIC BLOOD PRESSURE: 92 MMHG | SYSTOLIC BLOOD PRESSURE: 131 MMHG | TEMPERATURE: 98 F | BODY MASS INDEX: 31.82 KG/M2 | HEIGHT: 66 IN | HEART RATE: 85 BPM | WEIGHT: 198 LBS | RESPIRATION RATE: 18 BRPM

## 2024-02-18 DIAGNOSIS — J02.9 SORE THROAT: Primary | ICD-10-CM

## 2024-02-18 DIAGNOSIS — U07.1 COVID: ICD-10-CM

## 2024-02-18 DIAGNOSIS — E78.00 TYPE 2 DIABETES MELLITUS WITH HYPERCHOLESTEROLEMIA: ICD-10-CM

## 2024-02-18 DIAGNOSIS — E11.69 TYPE 2 DIABETES MELLITUS WITH HYPERCHOLESTEROLEMIA: ICD-10-CM

## 2024-02-18 DIAGNOSIS — R05.9 COUGH, UNSPECIFIED TYPE: ICD-10-CM

## 2024-02-18 DIAGNOSIS — J31.0 RHINITIS, UNSPECIFIED TYPE: ICD-10-CM

## 2024-02-18 DIAGNOSIS — E03.2 HYPOTHYROIDISM DUE TO NON-MEDICATION EXOGENOUS SUBSTANCES: ICD-10-CM

## 2024-02-18 DIAGNOSIS — E66.09 CLASS 1 OBESITY DUE TO EXCESS CALORIES WITH SERIOUS COMORBIDITY AND BODY MASS INDEX (BMI) OF 32.0 TO 32.9 IN ADULT: ICD-10-CM

## 2024-02-18 DIAGNOSIS — I48.19 PERSISTENT ATRIAL FIBRILLATION: ICD-10-CM

## 2024-02-18 LAB
CTP QC/QA: YES
CTP QC/QA: YES
MOLECULAR STREP A: NEGATIVE
SARS-COV-2 AG RESP QL IA.RAPID: POSITIVE

## 2024-02-18 PROCEDURE — 87811 SARS-COV-2 COVID19 W/OPTIC: CPT | Mod: QW,S$GLB,, | Performed by: FAMILY MEDICINE

## 2024-02-18 PROCEDURE — 87651 STREP A DNA AMP PROBE: CPT | Mod: QW,S$GLB,, | Performed by: FAMILY MEDICINE

## 2024-02-18 PROCEDURE — 99203 OFFICE O/P NEW LOW 30 MIN: CPT | Mod: S$GLB,,, | Performed by: FAMILY MEDICINE

## 2024-02-18 RX ORDER — BENZONATATE 200 MG/1
200 CAPSULE ORAL 3 TIMES DAILY PRN
Qty: 30 CAPSULE | Refills: 0 | Status: SHIPPED | OUTPATIENT
Start: 2024-02-18 | End: 2024-02-28

## 2024-02-18 RX ORDER — IPRATROPIUM BROMIDE 21 UG/1
2 SPRAY, METERED NASAL 2 TIMES DAILY PRN
Qty: 30 ML | Refills: 0 | Status: SHIPPED | OUTPATIENT
Start: 2024-02-18 | End: 2024-06-03

## 2024-02-18 NOTE — PROGRESS NOTES
"Subjective:      Patient ID: Bebe Valdez is a 73 y.o. female.    Vitals:  height is 5' 5.98" (1.676 m) and weight is 89.8 kg (198 lb). Her oral temperature is 97.8 °F (36.6 °C). Her blood pressure is 131/92 (abnormal) and her pulse is 85. Her respiration is 18.     Chief Complaint: Cough    This is a 73 y.o. female who presents today with a chief complaint of cough, sore throat, congestion and fatigue. Patient states that this started a couple days ago.     Cough  This is a new problem. The current episode started yesterday. The problem has been gradually worsening. The problem occurs constantly. The cough is Non-productive. Associated symptoms include nasal congestion, postnasal drip and a sore throat. Nothing aggravates the symptoms. She has tried nothing for the symptoms. The treatment provided no relief.     HENT:  Positive for postnasal drip and sore throat.    Respiratory:  Positive for cough.     Objective:     Physical Exam   Constitutional: She is oriented to person, place, and time. She appears ill. She appears distressed.   HENT:   Head: Normocephalic and atraumatic.   Ears:   Right Ear: Tympanic membrane, external ear and ear canal normal. impacted cerumen  Left Ear: Tympanic membrane, external ear and ear canal normal. impacted cerumen  Nose: Rhinorrhea and congestion present.   Mouth/Throat: Mucous membranes are moist. Oropharynx is clear.   Eyes: Conjunctivae are normal. Pupils are equal, round, and reactive to light. Extraocular movement intact   Neck: Neck supple. No neck rigidity present.   Cardiovascular: Normal rate, regular rhythm, normal heart sounds and normal pulses.   No murmur heard.  Pulmonary/Chest: Effort normal and breath sounds normal. No respiratory distress.   Abdominal: Normal appearance and bowel sounds are normal. flat abdomen   Musculoskeletal: Normal range of motion.         General: No swelling or tenderness. Normal range of motion.   Neurological: no focal deficit. She is " alert, oriented to person, place, and time and at baseline.   Skin: Skin is warm and dry. Capillary refill takes less than 2 seconds. jaundice  Psychiatric: Her behavior is normal. Mood, judgment and thought content normal.   Nursing note and vitals reviewed.    Assessment:     Plan:   1. Cough, unspecified type  - SARS Coronavirus 2 Antigen, POCT Manual Read  - benzonatate (TESSALON) 200 MG capsule; Take 1 capsule (200 mg total) by mouth 3 (three) times daily as needed.  Dispense: 30 capsule; Refill: 0    2. Sore throat  - POCT Strep A, Molecular  - benzocaine-menthoL 15-3.6 mg Lozg; 1 lozenge by Mucous Membrane route every 8 (eight) hours as needed.  Dispense: 18 lozenge; Refill: 0    3. COVID  - molnupiravir 200 mg capsule (EUA); Take 4 capsules (800 mg total) by mouth every 12 (twelve) hours.  Dispense: 40 capsule; Refill: 0    4. Rhinitis, unspecified type  - ipratropium (ATROVENT) 21 mcg (0.03 %) nasal spray; 2 sprays by Each Nostril route 2 (two) times daily as needed for Rhinitis.  Dispense: 30 mL; Refill: 0   All results discussed with pt prior to discharge from clinic.

## 2024-02-19 NOTE — TELEPHONE ENCOUNTER
Spoke with patient and offered the date of 3/22/2024 to schedule her PVI ablation. Patient states that she has two trips scheduled and she does not want to reschedule them so she requests a date in April. Advised that that first available date in April is 4/8/2024. Patient asks that I tentatively hold that date for her , however would like Dr Sanchez to call her to discuss doing another Cardioversion instead of the ablation as she is fearful of having anesthesia for a 3-4 hour case.

## 2024-02-19 NOTE — TELEPHONE ENCOUNTER
----- Message from Sofia Thornton MA sent at 2/19/2024  4:42 PM CST -----  Regarding: RE: Procedure    ----- Message -----  From: Bev Robbins  Sent: 2/19/2024   3:03 PM CST  To: [facility] Staff  Subject: Procedure                                        Patient calling to get information on her procedure. Please call back @ [phone]. Thank you Bev

## 2024-02-19 NOTE — TELEPHONE ENCOUNTER
----- Message from Sofia Thornton MA sent at 2/19/2024  4:42 PM CST -----  Regarding: RE: Procedure    ----- Message -----  From: Bev Robbins  Sent: 2/19/2024   3:03 PM CST  To: [facility] Staff  Subject: Procedure                                        Patient calling to get information on her procedure. Please call back @ [phone]. Thank you Bev

## 2024-02-20 ENCOUNTER — TELEPHONE (OUTPATIENT)
Dept: ELECTROPHYSIOLOGY | Facility: CLINIC | Age: 74
End: 2024-02-20
Payer: MEDICARE

## 2024-02-20 ENCOUNTER — CLINICAL SUPPORT (OUTPATIENT)
Dept: URGENT CARE | Facility: CLINIC | Age: 74
End: 2024-02-20
Payer: MEDICARE

## 2024-02-20 DIAGNOSIS — R05.9 COUGH, UNSPECIFIED TYPE: Primary | ICD-10-CM

## 2024-02-20 RX ORDER — PROMETHAZINE HYDROCHLORIDE AND DEXTROMETHORPHAN HYDROBROMIDE 6.25; 15 MG/5ML; MG/5ML
5 SYRUP ORAL EVERY 4 HOURS PRN
Qty: 240 ML | Refills: 0 | Status: SHIPPED | OUTPATIENT
Start: 2024-02-20 | End: 2024-03-01

## 2024-02-20 RX ORDER — APIXABAN 5 MG/1
5 TABLET, FILM COATED ORAL 2 TIMES DAILY
Qty: 180 TABLET | Refills: 3 | Status: SHIPPED | OUTPATIENT
Start: 2024-02-20

## 2024-02-20 NOTE — TELEPHONE ENCOUNTER
Spoke with  Valdez and addressed concerns regarding planned ablation. She is ok proceed as planned.

## 2024-03-05 ENCOUNTER — OFFICE VISIT (OUTPATIENT)
Dept: DERMATOLOGY | Facility: CLINIC | Age: 74
End: 2024-03-05
Payer: MEDICARE

## 2024-03-05 DIAGNOSIS — L72.0 MILIA: ICD-10-CM

## 2024-03-05 DIAGNOSIS — L40.0 PSORIASIS VULGARIS: ICD-10-CM

## 2024-03-05 DIAGNOSIS — I48.19 PERSISTENT ATRIAL FIBRILLATION: Primary | ICD-10-CM

## 2024-03-05 DIAGNOSIS — L71.9 ROSACEA: Primary | ICD-10-CM

## 2024-03-05 PROCEDURE — 99214 OFFICE O/P EST MOD 30 MIN: CPT | Mod: S$GLB,,, | Performed by: DERMATOLOGY

## 2024-03-05 RX ORDER — BETAMETHASONE DIPROPIONATE 0.5 MG/G
CREAM TOPICAL
Qty: 50 G | Refills: 2 | Status: SHIPPED | OUTPATIENT
Start: 2024-03-05

## 2024-03-05 NOTE — PROGRESS NOTES
Patient Information  Name: Bebe Valdez  : 1950  MRN: [id number]     Referring Physician:  No ref. provider found   Primary Care Physician:  Amber Dorsey MD   Date of Visit: 2024      Subjective:     History of Present lllness:    Bebe Valdez is a 73 y.o. female who presents with a chief complaint of spot on lip and redness on cheeks, psoriasis.    spot  Location: L upper lip  Duration: 2-3 months  Signs/Symptoms: pimple like bump not going away  Relieving factors/Prior treatments: none    redness   Location: cheeks  Signs/Symptoms: redness  Symptom course: worsening x couple weeks  Current treatment: azelaic acid 15% + ivermectin 1% + metronidazole 1% cream    psoriasis   Location: elbows  Signs/Symptoms: none today  Symptom course: improving  Current treatment: diprolene     Patient was last seen: 2023.  Prior notes by myself reviewed.   Clinical documentation obtained by nursing staff reviewed.    Review of Systems    Objective:   Physical Exam   Constitutional: She appears well-developed and well-nourished. No distress.   Neurological: She is alert and oriented to person, place, and time. She is not disoriented.   Psychiatric: She has a normal mood and affect.   Skin:   Areas Examined (abnormalities noted in diagram):   Head / Face Inspection Performed  RUE Inspected  LUE Inspection Performed                 Diagram Legend     Erythematous scaling macule/papule c/w actinic keratosis       Vascular papule c/w angioma      Pigmented verrucoid papule/plaque c/w seborrheic keratosis      Yellow umbilicated papule c/w sebaceous hyperplasia      Irregularly shaped tan macule c/w lentigo     1-2 mm smooth white papules consistent with Milia      Movable subcutaneous cyst with punctum c/w epidermal inclusion cyst      Subcutaneous movable cyst c/w pilar cyst      Firm pink to brown papule c/w dermatofibroma      Pedunculated fleshy papule(s) c/w skin tag(s)      Evenly pigmented macule c/w  junctional nevus     Mildly variegated pigmented, slightly irregular-bordered macule c/w mildly atypical nevus      Flesh colored to evenly pigmented papule c/w intradermal nevus       Pink pearly papule/plaque c/w basal cell carcinoma      Erythematous hyperkeratotic cursted plaque c/w SCC      Surgical scar with no sign of skin cancer recurrence      Open and closed comedones      Inflammatory papules and pustules      Verrucoid papule consistent consistent with wart     Erythematous eczematous patches and plaques     Dystrophic onycholytic nail with subungual debris c/w onychomycosis     Umbilicated papule    Erythematous-base heme-crusted tan verrucoid plaque consistent with inflamed seborrheic keratosis     Erythematous Silvery Scaling Plaque c/w Psoriasis     See annotation    No images are attached to the encounter or orders placed in the encounter.      [] Data reviewed  [] Prior external notes reviewed  [] Independent review of test  [] Management discussed with another provider  [] Independent historian    Assessment / Plan:        Rosacea   - stable and chronic  -     metroNIDAZOLE (NORITATE) 1 % cream; Compound azelaic acid 15% + ivermectin 1% + metronidazole 1% cream. Apply to face once or twice daily.  Dispense: 30 g; Refill: 11    Psoriasis vulgaris   - stable and chronic  -     augmented betamethasone dipropionate (DIPROLENE-AF) 0.05 % cream; Apply to elbows BID prn scaling.  Dispense: 50 g; Refill: 2    Milia  This is a small, benign cyst. Reassurance provided. No treatment is necessary unless it is symptomatic. These may resolve on their own.      Follow up in about 3 months (around 6/5/2024) for TBSE.      Lashaun Montes De Oca MD, FAAD  [facility]

## 2024-04-02 ENCOUNTER — LAB VISIT (OUTPATIENT)
Dept: LAB | Facility: HOSPITAL | Age: 74
End: 2024-04-02
Attending: INTERNAL MEDICINE
Payer: MEDICARE

## 2024-04-02 DIAGNOSIS — I48.19 PERSISTENT ATRIAL FIBRILLATION: ICD-10-CM

## 2024-04-02 LAB
ANION GAP SERPL CALC-SCNC: 7 MMOL/L (ref 8–16)
APTT PPP: 25.9 SEC (ref 21–32)
BUN SERPL-MCNC: 11 MG/DL (ref 8–23)
CALCIUM SERPL-MCNC: 10.3 MG/DL (ref 8.7–10.5)
CHLORIDE SERPL-SCNC: 108 MMOL/L (ref 95–110)
CO2 SERPL-SCNC: 24 MMOL/L (ref 23–29)
CREAT SERPL-MCNC: 0.7 MG/DL (ref 0.5–1.4)
ERYTHROCYTE [DISTWIDTH] IN BLOOD BY AUTOMATED COUNT: 13.6 % (ref 11.5–14.5)
EST. GFR  (NO RACE VARIABLE): >60 ML/MIN/1.73 M^2
GLUCOSE SERPL-MCNC: 135 MG/DL (ref 70–110)
HCT VFR BLD AUTO: 39.3 % (ref 37–48.5)
HGB BLD-MCNC: 12.4 G/DL (ref 12–16)
INR PPP: 1.1 (ref 0.8–1.2)
MCH RBC QN AUTO: 30 PG (ref 27–31)
MCHC RBC AUTO-ENTMCNC: 31.6 G/DL (ref 32–36)
MCV RBC AUTO: 95 FL (ref 82–98)
PLATELET # BLD AUTO: 241 K/UL (ref 150–450)
PMV BLD AUTO: 11.5 FL (ref 9.2–12.9)
POTASSIUM SERPL-SCNC: 4.2 MMOL/L (ref 3.5–5.1)
PROTHROMBIN TIME: 11.6 SEC (ref 9–12.5)
RBC # BLD AUTO: 4.14 M/UL (ref 4–5.4)
SODIUM SERPL-SCNC: 139 MMOL/L (ref 136–145)
WBC # BLD AUTO: 5.94 K/UL (ref 3.9–12.7)

## 2024-04-02 PROCEDURE — 85730 THROMBOPLASTIN TIME PARTIAL: CPT | Performed by: INTERNAL MEDICINE

## 2024-04-02 PROCEDURE — 36415 COLL VENOUS BLD VENIPUNCTURE: CPT | Mod: PN | Performed by: INTERNAL MEDICINE

## 2024-04-02 PROCEDURE — 85610 PROTHROMBIN TIME: CPT | Performed by: INTERNAL MEDICINE

## 2024-04-02 PROCEDURE — 85027 COMPLETE CBC AUTOMATED: CPT | Performed by: INTERNAL MEDICINE

## 2024-04-02 PROCEDURE — 80048 BASIC METABOLIC PNL TOTAL CA: CPT | Performed by: INTERNAL MEDICINE

## 2024-04-05 ENCOUNTER — TELEPHONE (OUTPATIENT)
Dept: ELECTROPHYSIOLOGY | Facility: CLINIC | Age: 74
End: 2024-04-05
Payer: MEDICARE

## 2024-04-05 NOTE — TELEPHONE ENCOUNTER
Spoke to Patient .9    CONFIRMED procedure arrival time of 9 am on 4/8/2024     Reiterated instructions including:    -Directions to check in desk    -NPO after midnight night prior to procedure. Fasting upon arrival to the hospital the day of the procedure.     -High importance of HOLDING Metformin and Eliquis the morning of the procedure.      - Confirms no new meds prescribed or med changes since scheduling -     -Pre-procedure LABS reviewed with no alerts noted.     -Confirmed absence or presence of implanted device/stimulator N/A    -Confirmed no recent or current fever, cough, or shortness of breath .    -Confirmed no redness, rash, irritation, or yeast infection to skin/ chest / groin area.       -Reviewed current visitor policy    Patient verbalized understanding of above, denies any further questions and appreciated the call.

## 2024-04-08 ENCOUNTER — ANESTHESIA (OUTPATIENT)
Dept: MEDSURG UNIT | Facility: HOSPITAL | Age: 74
End: 2024-04-08
Payer: MEDICARE

## 2024-04-08 ENCOUNTER — ANESTHESIA EVENT (OUTPATIENT)
Dept: MEDSURG UNIT | Facility: HOSPITAL | Age: 74
End: 2024-04-08
Payer: MEDICARE

## 2024-04-08 ENCOUNTER — HOSPITAL ENCOUNTER (OUTPATIENT)
Dept: CARDIOLOGY | Facility: HOSPITAL | Age: 74
Discharge: HOME | End: 2024-04-08
Attending: INTERNAL MEDICINE | Admitting: INTERNAL MEDICINE
Payer: MEDICARE

## 2024-04-08 ENCOUNTER — HOSPITAL ENCOUNTER (OUTPATIENT)
Facility: HOSPITAL | Age: 74
Discharge: HOME | End: 2024-04-09
Attending: INTERNAL MEDICINE | Admitting: INTERNAL MEDICINE
Payer: MEDICARE

## 2024-04-08 VITALS
HEIGHT: 66 IN | BODY MASS INDEX: 31.66 KG/M2 | HEART RATE: 77 BPM | DIASTOLIC BLOOD PRESSURE: 75 MMHG | SYSTOLIC BLOOD PRESSURE: 134 MMHG | WEIGHT: 197 LBS

## 2024-04-08 DIAGNOSIS — I48.91 ATRIAL FIBRILLATION: Primary | ICD-10-CM

## 2024-04-08 DIAGNOSIS — I48.19 PERSISTENT ATRIAL FIBRILLATION: ICD-10-CM

## 2024-04-08 DIAGNOSIS — I49.9 ARRHYTHMIA: ICD-10-CM

## 2024-04-08 LAB
ASCENDING AORTA: 3.5 CM
BSA FOR ECHO PROCEDURE: 2.04 M2
OHS QRS DURATION: 82 MS
OHS QTC CALCULATION: 354 MS
POCT GLUCOSE: 132 MG/DL (ref 70–110)
POCT GLUCOSE: 133 MG/DL (ref 70–110)
POCT GLUCOSE: 169 MG/DL (ref 70–110)
SINUS: 3.4 CM
STJ: 3 CM

## 2024-04-08 PROCEDURE — 93005 ELECTROCARDIOGRAM TRACING: CPT | Mod: 59

## 2024-04-08 PROCEDURE — 63600175 PHARM REV CODE 636 W HCPCS: Performed by: NURSE ANESTHETIST, CERTIFIED REGISTERED

## 2024-04-08 PROCEDURE — C1730 CATH, EP, 19 OR FEW ELECT: HCPCS | Performed by: INTERNAL MEDICINE

## 2024-04-08 PROCEDURE — 93312 ECHO TRANSESOPHAGEAL: CPT | Mod: 26,,, | Performed by: INTERNAL MEDICINE

## 2024-04-08 PROCEDURE — 92960 CARDIOVERSION ELECTRIC EXT: CPT | Mod: 59 | Performed by: INTERNAL MEDICINE

## 2024-04-08 PROCEDURE — C1766 INTRO/SHEATH,STRBLE,NON-PEEL: HCPCS | Performed by: INTERNAL MEDICINE

## 2024-04-08 PROCEDURE — 93657 TX L/R ATRIAL FIB ADDL: CPT | Mod: ,,, | Performed by: INTERNAL MEDICINE

## 2024-04-08 PROCEDURE — 63600175 PHARM REV CODE 636 W HCPCS: Performed by: INTERNAL MEDICINE

## 2024-04-08 PROCEDURE — 93320 DOPPLER ECHO COMPLETE: CPT | Mod: 26,,, | Performed by: INTERNAL MEDICINE

## 2024-04-08 PROCEDURE — 25000003 PHARM REV CODE 250: Performed by: NURSE ANESTHETIST, CERTIFIED REGISTERED

## 2024-04-08 PROCEDURE — 93325 DOPPLER ECHO COLOR FLOW MAPG: CPT

## 2024-04-08 PROCEDURE — 93010 ELECTROCARDIOGRAM REPORT: CPT | Mod: ,,, | Performed by: INTERNAL MEDICINE

## 2024-04-08 PROCEDURE — 37000009 HC ANESTHESIA EA ADD 15 MINS: Performed by: INTERNAL MEDICINE

## 2024-04-08 PROCEDURE — 25000003 PHARM REV CODE 250: Performed by: INTERNAL MEDICINE

## 2024-04-08 PROCEDURE — C1894 INTRO/SHEATH, NON-LASER: HCPCS | Performed by: INTERNAL MEDICINE

## 2024-04-08 PROCEDURE — 93656 COMPRE EP EVAL ABLTJ ATR FIB: CPT | Performed by: INTERNAL MEDICINE

## 2024-04-08 PROCEDURE — D9220A PRA ANESTHESIA: Mod: CRNA,,, | Performed by: NURSE ANESTHETIST, CERTIFIED REGISTERED

## 2024-04-08 PROCEDURE — C1753 CATH, INTRAVAS ULTRASOUND: HCPCS | Performed by: INTERNAL MEDICINE

## 2024-04-08 PROCEDURE — D9220A PRA ANESTHESIA: Mod: ANES,,, | Performed by: ANESTHESIOLOGY

## 2024-04-08 PROCEDURE — 82962 GLUCOSE BLOOD TEST: CPT | Performed by: INTERNAL MEDICINE

## 2024-04-08 PROCEDURE — 93657 TX L/R ATRIAL FIB ADDL: CPT | Performed by: INTERNAL MEDICINE

## 2024-04-08 PROCEDURE — 25500020 PHARM REV CODE 255: Performed by: INTERNAL MEDICINE

## 2024-04-08 PROCEDURE — 37000008 HC ANESTHESIA 1ST 15 MINUTES: Performed by: INTERNAL MEDICINE

## 2024-04-08 PROCEDURE — 63600175 PHARM REV CODE 636 W HCPCS: Performed by: ANESTHESIOLOGY

## 2024-04-08 PROCEDURE — 93325 DOPPLER ECHO COLOR FLOW MAPG: CPT | Mod: 26,,, | Performed by: INTERNAL MEDICINE

## 2024-04-08 PROCEDURE — 92960 CARDIOVERSION ELECTRIC EXT: CPT | Mod: 59,,, | Performed by: INTERNAL MEDICINE

## 2024-04-08 PROCEDURE — 27201423 OPTIME MED/SURG SUP & DEVICES STERILE SUPPLY: Performed by: INTERNAL MEDICINE

## 2024-04-08 PROCEDURE — 93656 COMPRE EP EVAL ABLTJ ATR FIB: CPT | Mod: ,,, | Performed by: INTERNAL MEDICINE

## 2024-04-08 PROCEDURE — C1732 CATH, EP, DIAG/ABL, 3D/VECT: HCPCS | Performed by: INTERNAL MEDICINE

## 2024-04-08 PROCEDURE — 36620 INSERTION CATHETER ARTERY: CPT | Mod: 59,,, | Performed by: ANESTHESIOLOGY

## 2024-04-08 PROCEDURE — 93005 ELECTROCARDIOGRAM TRACING: CPT

## 2024-04-08 RX ORDER — HEPARIN SOD,PORCINE/0.9 % NACL 1000/500ML
INTRAVENOUS SOLUTION INTRAVENOUS
Status: DISCONTINUED | OUTPATIENT
Start: 2024-04-08 | End: 2024-04-09 | Stop reason: HOSPADM

## 2024-04-08 RX ORDER — FUROSEMIDE 10 MG/ML
20 INJECTION INTRAMUSCULAR; INTRAVENOUS ONCE
Status: COMPLETED | OUTPATIENT
Start: 2024-04-08 | End: 2024-04-08

## 2024-04-08 RX ORDER — METOPROLOL SUCCINATE 50 MG/1
50 TABLET, EXTENDED RELEASE ORAL DAILY
Status: DISCONTINUED | OUTPATIENT
Start: 2024-04-09 | End: 2024-04-09 | Stop reason: HOSPADM

## 2024-04-08 RX ORDER — PANTOPRAZOLE SODIUM 40 MG/1
40 TABLET, DELAYED RELEASE ORAL DAILY
Status: DISCONTINUED | OUTPATIENT
Start: 2024-04-09 | End: 2024-04-09 | Stop reason: HOSPADM

## 2024-04-08 RX ORDER — IBUPROFEN 200 MG
16 TABLET ORAL
Status: DISCONTINUED | OUTPATIENT
Start: 2024-04-08 | End: 2024-04-09 | Stop reason: HOSPADM

## 2024-04-08 RX ORDER — SUCRALFATE 1 G/1
1 TABLET ORAL
Status: DISCONTINUED | OUTPATIENT
Start: 2024-04-08 | End: 2024-04-09 | Stop reason: HOSPADM

## 2024-04-08 RX ORDER — DEXAMETHASONE SODIUM PHOSPHATE 4 MG/ML
INJECTION, SOLUTION INTRA-ARTICULAR; INTRALESIONAL; INTRAMUSCULAR; INTRAVENOUS; SOFT TISSUE
Status: DISCONTINUED | OUTPATIENT
Start: 2024-04-08 | End: 2024-04-08

## 2024-04-08 RX ORDER — FENTANYL CITRATE 50 UG/ML
INJECTION, SOLUTION INTRAMUSCULAR; INTRAVENOUS
Status: DISCONTINUED | OUTPATIENT
Start: 2024-04-08 | End: 2024-04-08

## 2024-04-08 RX ORDER — PROPOFOL 10 MG/ML
VIAL (ML) INTRAVENOUS
Status: DISCONTINUED | OUTPATIENT
Start: 2024-04-08 | End: 2024-04-08

## 2024-04-08 RX ORDER — PROTAMINE SULFATE 10 MG/ML
INJECTION, SOLUTION INTRAVENOUS
Status: DISCONTINUED | OUTPATIENT
Start: 2024-04-08 | End: 2024-04-08

## 2024-04-08 RX ORDER — HEPARIN SODIUM 1000 [USP'U]/ML
INJECTION, SOLUTION INTRAVENOUS; SUBCUTANEOUS
Status: DISCONTINUED | OUTPATIENT
Start: 2024-04-08 | End: 2024-04-08

## 2024-04-08 RX ORDER — LIDOCAINE HYDROCHLORIDE 20 MG/ML
INJECTION, SOLUTION INFILTRATION; PERINEURAL
Status: DISCONTINUED | OUTPATIENT
Start: 2024-04-08 | End: 2024-04-09 | Stop reason: HOSPADM

## 2024-04-08 RX ORDER — HEPARIN SODIUM 10000 [USP'U]/100ML
INJECTION, SOLUTION INTRAVENOUS CONTINUOUS PRN
Status: DISCONTINUED | OUTPATIENT
Start: 2024-04-08 | End: 2024-04-08

## 2024-04-08 RX ORDER — ONDANSETRON HYDROCHLORIDE 2 MG/ML
INJECTION, SOLUTION INTRAVENOUS
Status: DISCONTINUED | OUTPATIENT
Start: 2024-04-08 | End: 2024-04-08

## 2024-04-08 RX ORDER — GLUCAGON 1 MG
1 KIT INJECTION
Status: DISCONTINUED | OUTPATIENT
Start: 2024-04-08 | End: 2024-04-09 | Stop reason: HOSPADM

## 2024-04-08 RX ORDER — VASOPRESSIN 20 [USP'U]/ML
INJECTION, SOLUTION INTRAMUSCULAR; SUBCUTANEOUS
Status: DISCONTINUED | OUTPATIENT
Start: 2024-04-08 | End: 2024-04-08

## 2024-04-08 RX ORDER — FENTANYL CITRATE 50 UG/ML
25 INJECTION, SOLUTION INTRAMUSCULAR; INTRAVENOUS EVERY 5 MIN PRN
Status: DISCONTINUED | OUTPATIENT
Start: 2024-04-08 | End: 2024-04-09 | Stop reason: HOSPADM

## 2024-04-08 RX ORDER — IBUPROFEN 200 MG
24 TABLET ORAL
Status: DISCONTINUED | OUTPATIENT
Start: 2024-04-08 | End: 2024-04-09 | Stop reason: HOSPADM

## 2024-04-08 RX ORDER — INSULIN ASPART 100 [IU]/ML
0-5 INJECTION, SOLUTION INTRAVENOUS; SUBCUTANEOUS
Status: DISCONTINUED | OUTPATIENT
Start: 2024-04-08 | End: 2024-04-09 | Stop reason: HOSPADM

## 2024-04-08 RX ORDER — ROCURONIUM BROMIDE 10 MG/ML
INJECTION, SOLUTION INTRAVENOUS
Status: DISCONTINUED | OUTPATIENT
Start: 2024-04-08 | End: 2024-04-08

## 2024-04-08 RX ORDER — MIDAZOLAM HYDROCHLORIDE 1 MG/ML
INJECTION, SOLUTION INTRAMUSCULAR; INTRAVENOUS
Status: DISCONTINUED | OUTPATIENT
Start: 2024-04-08 | End: 2024-04-08

## 2024-04-08 RX ORDER — ONDANSETRON HYDROCHLORIDE 2 MG/ML
4 INJECTION, SOLUTION INTRAVENOUS ONCE AS NEEDED
Status: DISCONTINUED | OUTPATIENT
Start: 2024-04-08 | End: 2024-04-09 | Stop reason: HOSPADM

## 2024-04-08 RX ORDER — LIDOCAINE HYDROCHLORIDE 20 MG/ML
INJECTION INTRAVENOUS
Status: DISCONTINUED | OUTPATIENT
Start: 2024-04-08 | End: 2024-04-08

## 2024-04-08 RX ORDER — ACETAMINOPHEN 325 MG/1
650 TABLET ORAL EVERY 4 HOURS PRN
Status: DISCONTINUED | OUTPATIENT
Start: 2024-04-08 | End: 2024-04-09 | Stop reason: HOSPADM

## 2024-04-08 RX ORDER — HYDROMORPHONE HYDROCHLORIDE 1 MG/ML
0.2 INJECTION, SOLUTION INTRAMUSCULAR; INTRAVENOUS; SUBCUTANEOUS EVERY 5 MIN PRN
Status: DISCONTINUED | OUTPATIENT
Start: 2024-04-08 | End: 2024-04-09 | Stop reason: HOSPADM

## 2024-04-08 RX ORDER — DEXMEDETOMIDINE HYDROCHLORIDE 100 UG/ML
INJECTION, SOLUTION INTRAVENOUS
Status: DISCONTINUED | OUTPATIENT
Start: 2024-04-08 | End: 2024-04-08

## 2024-04-08 RX ORDER — SUCCINYLCHOLINE CHLORIDE 20 MG/ML
INJECTION INTRAMUSCULAR; INTRAVENOUS
Status: DISCONTINUED | OUTPATIENT
Start: 2024-04-08 | End: 2024-04-08

## 2024-04-08 RX ORDER — DIPHENHYDRAMINE HYDROCHLORIDE 50 MG/ML
25 INJECTION INTRAMUSCULAR; INTRAVENOUS EVERY 6 HOURS PRN
Status: DISCONTINUED | OUTPATIENT
Start: 2024-04-08 | End: 2024-04-09 | Stop reason: HOSPADM

## 2024-04-08 RX ORDER — SILVER SULFADIAZINE 10 G/1000G
CREAM TOPICAL DAILY
Status: DISCONTINUED | OUTPATIENT
Start: 2024-04-08 | End: 2024-04-09 | Stop reason: HOSPADM

## 2024-04-08 RX ADMIN — SODIUM CHLORIDE: 0.9 INJECTION, SOLUTION INTRAVENOUS at 01:04

## 2024-04-08 RX ADMIN — SUCCINYLCHOLINE CHLORIDE 120 MG: 20 INJECTION, SOLUTION INTRAMUSCULAR; INTRAVENOUS at 01:04

## 2024-04-08 RX ADMIN — ACETAMINOPHEN 650 MG: 325 TABLET ORAL at 10:04

## 2024-04-08 RX ADMIN — HEPARIN SODIUM 3000 UNITS: 1000 INJECTION, SOLUTION INTRAVENOUS; SUBCUTANEOUS at 02:04

## 2024-04-08 RX ADMIN — HEPARIN SODIUM AND DEXTROSE 12 UNITS/KG/HR: 10000; 5 INJECTION INTRAVENOUS at 02:04

## 2024-04-08 RX ADMIN — HYDROMORPHONE HYDROCHLORIDE 0.2 MG: 1 INJECTION, SOLUTION INTRAMUSCULAR; INTRAVENOUS; SUBCUTANEOUS at 08:04

## 2024-04-08 RX ADMIN — LIDOCAINE HYDROCHLORIDE 80 MG: 20 INJECTION INTRAVENOUS at 01:04

## 2024-04-08 RX ADMIN — SUCRALFATE 1 G: 1 TABLET ORAL at 10:04

## 2024-04-08 RX ADMIN — FENTANYL CITRATE 100 MCG: 50 INJECTION, SOLUTION INTRAMUSCULAR; INTRAVENOUS at 01:04

## 2024-04-08 RX ADMIN — APIXABAN 5 MG: 5 TABLET, FILM COATED ORAL at 09:04

## 2024-04-08 RX ADMIN — PROTAMINE SULFATE 90 MG: 10 INJECTION, SOLUTION INTRAVENOUS at 04:04

## 2024-04-08 RX ADMIN — MIDAZOLAM HYDROCHLORIDE 2 MG: 2 INJECTION, SOLUTION INTRAMUSCULAR; INTRAVENOUS at 01:04

## 2024-04-08 RX ADMIN — ROCURONIUM BROMIDE 5 MG: 10 INJECTION INTRAVENOUS at 01:04

## 2024-04-08 RX ADMIN — PHENYLEPHRINE HYDROCHLORIDE 0.4 MCG/KG/MIN: 10 INJECTION INTRAVENOUS at 01:04

## 2024-04-08 RX ADMIN — HUMAN ALBUMIN MICROSPHERES AND PERFLUTREN 0.66 MG: 10; .22 INJECTION, SOLUTION INTRAVENOUS at 02:04

## 2024-04-08 RX ADMIN — ACETAMINOPHEN 650 MG: 325 TABLET ORAL at 05:04

## 2024-04-08 RX ADMIN — DRONEDARONE 400 MG: 400 TABLET, FILM COATED ORAL at 07:04

## 2024-04-08 RX ADMIN — DEXMEDETOMIDINE 10 MCG: 100 INJECTION, SOLUTION, CONCENTRATE INTRAVENOUS at 04:04

## 2024-04-08 RX ADMIN — FUROSEMIDE 20 MG: 10 INJECTION, SOLUTION INTRAMUSCULAR; INTRAVENOUS at 10:04

## 2024-04-08 RX ADMIN — ONDANSETRON 4 MG: 2 INJECTION INTRAMUSCULAR; INTRAVENOUS at 04:04

## 2024-04-08 RX ADMIN — DEXAMETHASONE SODIUM PHOSPHATE 4 MG: 4 INJECTION, SOLUTION INTRAMUSCULAR; INTRAVENOUS at 01:04

## 2024-04-08 RX ADMIN — HYDROMORPHONE HYDROCHLORIDE 0.2 MG: 1 INJECTION, SOLUTION INTRAMUSCULAR; INTRAVENOUS; SUBCUTANEOUS at 06:04

## 2024-04-08 RX ADMIN — PROPOFOL 150 MG: 10 INJECTION, EMULSION INTRAVENOUS at 01:04

## 2024-04-08 RX ADMIN — VASOPRESSIN 1 UNITS: 20 INJECTION INTRAVENOUS at 02:04

## 2024-04-08 RX ADMIN — HEPARIN SODIUM 18000 UNITS: 1000 INJECTION, SOLUTION INTRAVENOUS; SUBCUTANEOUS at 02:04

## 2024-04-08 RX ADMIN — SILVER SULFADIAZINE: 10 CREAM TOPICAL at 08:04

## 2024-04-08 NOTE — ANESTHESIA PROCEDURE NOTES
Arterial    Diagnosis: a fib    Patient location during procedure: done in OR    Staffing  Authorizing Provider: Gigi Menchaca MD  Performing Provider: Gigi Menchaca MD    Staffing  Performed by: Gigi Menchaca MD  Authorized by: Gigi Menchaca MD    Anesthesiologist was present at the time of the procedure.  Arterial  Skin Prep: chlorhexidine gluconate  Local Infiltration: none  Orientation: right  Location: radial    Catheter Size: 20 G  Catheter placement by Ultrasound guidance. Heme positive aspiration all ports.   Vessel Caliber: patent  Needle advanced into vessel with real time Ultrasound guidance.Insertion Attempts: 1  Assessment  Dressing: secured with tape and tegaderm

## 2024-04-08 NOTE — PLAN OF CARE
Received pt to floor from home accompanied by friend.  AAO x 4. Denies pain or discomfort. Respirations even and unlabored. No distress noted. Pt stable.  Admit assessment complete. IV x 2 placed.  Pt oriented to room and call bell placed within reach.  Will continue to monitor.

## 2024-04-08 NOTE — ANESTHESIA PREPROCEDURE EVALUATION
04/08/2024  Bebe Valdez is a 73 y.o., female.      Pre-op Assessment    I have reviewed the Patient Summary Reports.       I have reviewed the Medications.     Review of Systems  Anesthesia Hx:  No problems with previous Anesthesia               Denies Personal Hx of Anesthesia complications.                    Cardiovascular:                       Atrial Fibrillation     Endocrine:  Diabetes Hypothyroidism   Diabetes                   Hypothyroidism              Physical Exam    Airway:  No airway management difficulties anticipated  Dental:  No active dental issues noted  Chest/Lungs:  Clear to auscultation    Heart:  Rate: Normal  Rhythm: Regular Rhythm  Sounds: Normal        Anesthesia Plan  Type of Anesthesia, risks & benefits discussed:    Anesthesia Type: Gen ETT  Intra-op Monitoring Plan: Art Line  Informed Consent: Informed consent signed with the Patient and all parties understand the risks and agree with anesthesia plan.  All questions answered.   ASA Score: 3  Anesthesia Plan Notes: Chart reviewed. Patient seen and examined. Anesthesia plan discussed and questions answered. E-consent signed. Gigi Menchaca MD    Ready For Surgery From Anesthesia Perspective.     .

## 2024-04-08 NOTE — TRANSFER OF CARE
"Anesthesia Transfer of Care Note    Patient: Bebe Valdez    Procedure(s) Performed: Procedure(s) (LRB):  Ablation atrial fibrillation (N/A)  Transesophageal echo (DORINA) intra-procedure log documentation (N/A)  Cardioversion or Defibrillation    Patient location: PACU    Anesthesia Type: general    Transport from OR: Transported from OR on 6-10 L/min O2 by face mask with adequate spontaneous ventilation    Post pain: adequate analgesia    Post assessment: no apparent anesthetic complications and tolerated procedure well    Post vital signs: stable    Level of consciousness: awake, alert and oriented    Nausea/Vomiting: no nausea/vomiting    Complications: none    Transfer of care protocol was followed      Last vitals: Visit Vitals  /75 (BP Location: Left arm, Patient Position: Lying)   Pulse 63   Temp 36.6 °C (97.9 °F) (Temporal)   Resp 18   Ht 5' 6" (1.676 m)   Wt 89.4 kg (197 lb)   LMP 07/18/2001   SpO2 100%   Breastfeeding No   BMI 31.80 kg/m²     "

## 2024-04-08 NOTE — H&P
[facility] [place]  Electrophysiology  H&P      Bebe Valdez   YOB: 1950   Medical Record Number: [id number]   Attending Physician:    Date of Admission: 04/08/2024       Hospital Day:  0  Current Principal Problem:  <principal problem not specified>      History     Cc: atrial fibrillation     HPI  Mrs Bebe Valdez is a 73 year old female with PMH of persistent atrial fibrillation, HFrEF, HLD, DM2, hyperthyroidism who presents to [facility] for pulmonary vein isolation with Dr. Sanchez. Initially diagnosed 2019 and underwent multiple DCCV and initiation of multaq. She had low EF with normalization after maintenance of sinus rhythm. She did well for a time on metoprolol and multaq, however noted to have recurrence at EP visit 2/2024. Ablation was discussed and she agreed to proceed.       Presenting EKG: Atrial fibrillation   CHADS-VASc: 4 (age, cardiomyopathy, gender, DM)  Anticoagulants: Apixaban 5 mg BID   Antiarrhythmics: None (dronedarone discontinued 2/2024)  LV EF: 55% (TTE 6/2021)  Pertinent labs: Hgb 12.4, INR 1.1, Cr 0.7         Medications - Outpatient  Prior to Admission medications    Medication Sig Start Date End Date Taking? Authorizing Provider   ascorbic acid, vitamin C, (VITAMIN C) 100 MG tablet Take 100 mg by mouth once daily.   Yes Provider, Historical   augmented betamethasone dipropionate (DIPROLENE-AF) 0.05 % cream Apply to elbows BID prn scaling. 3/5/24  Yes Lashaun Montes De Oca MD   betamethasone dipropionate 0.05 % cream Apply topically 2 (two) times daily as needed. 6/29/23  Yes Provider, Historical   CINNAMON BARK (CINNAMON ORAL) Take by mouth once daily. Pt taking 2 pills daily.   Yes Provider, Historical   ELIQUIS 5 mg Tab TAKE 1 TABLET(5 MG) BY MOUTH TWICE DAILY 2/20/24  Yes Eduardo Sanchez MD   fish oil-omega-3 fatty acids 300-1,000 mg capsule Take 2 g by mouth once daily.   Yes Provider, Historical   ipratropium (ATROVENT) 21 mcg (0.03 %) nasal spray 2 sprays by  Each Nostril route 2 (two) times daily as needed for Rhinitis. 2/18/24  Yes Keerthi Muse MD   magnesium 250 mg Tab Take by mouth once.   Yes Provider, Historical   metFORMIN (GLUCOPHAGE-XR) 500 MG ER 24hr tablet Take 2 tablets (1,000 mg total) by mouth 2 (two) times daily with meals. 12/20/23  Yes Amber Dorsey MD   metoprolol succinate (TOPROL-XL) 50 MG 24 hr tablet Take 1 tablet (50 mg total) by mouth once daily. 2/14/23  Yes Eduardo Sanchez MD   metroNIDAZOLE (NORITATE) 1 % cream Compound azelaic acid 15% + ivermectin 1% + metronidazole 1% cream. Apply to face once or twice daily. 3/5/24  Yes Lashaun Montes De Oca MD   MV,CA,MIN/IRON/FA/GUARANA/CAFF (ONE-A-DAY WOMEN'S ACTIVE ORAL) Take by mouth.   Yes Provider, Historical   rosuvastatin (CRESTOR) 10 MG tablet TAKE 1 TABLET(10 MG) BY MOUTH EVERY DAY 3/5/24  Yes Amber Dorsey MD   SYNTHROID 175 mcg tablet TAKE 1 TABLET(175 MCG) BY MOUTH BEFORE BREAKFAST 7/15/23  Yes Amber Dorsey MD   vitamin D (VITAMIN D3) 1000 units Tab Take 1,000 Units by mouth once daily.   Yes Provider, Historical   benzocaine-menthoL 15-3.6 mg Lozg 1 lozenge by Mucous Membrane route every 8 (eight) hours as needed. 2/18/24   Keerthi Muse MD   clobetasol 0.05% (TEMOVATE) 0.05 % Oint Apply topically 2 (two) times daily. 9/29/23   Alison Pagan MD   molnupiravir 200 mg capsule (EUA) Take 4 capsules (800 mg total) by mouth every 12 (twelve) hours. 2/18/24   Keerthi Muse MD   RSVPreF3 antigen-AS01E, PF, (AREXVY, PF,) 120 mcg/0.5 mL SusR vaccine Inject into the muscle. 1/9/24   Fred Herring, PharmD         Medications - Current  Scheduled Meds:  Continuous Infusions:  PRN Meds:.      Allergies  Review of patient's allergies indicates:   Allergen Reactions    Levaquin [levofloxacin] Hives    Tetracycline Hives         Past Medical History  Past Medical History:   Diagnosis Date    Allergy     Anticoagulant long-term use      COVID-19     5/14/22    Diabetes mellitus     Hyperlipidemia     Hypothyroid     Obesity     Rosacea          Past Surgical History  Past Surgical History:   Procedure Laterality Date    CHOLECYSTECTOMY      COLONOSCOPY  07/06/2007    COLONOSCOPY N/A 06/06/2017    Procedure: COLONOSCOPY;  Surgeon: Tre Alvarez MD;  Location: [facility] END[facility] ([address] [place] [address]);  Service: Endoscopy;  Laterality: N/A;    DILATION AND CURETTAGE OF UTERUS      HYSTEROSCOPY      TREATMENT OF CARDIAC ARRHYTHMIA N/A 09/26/2019    Procedure: CARDIOVERSION;  Surgeon: Eduardo Sanchez MD;  Location: [facility] EP LAB;  Service: Cardiology;  Laterality: N/A;  AF, DORINA, DCCV, MAC, AZ, 3 Prep    TREATMENT OF CARDIAC ARRHYTHMIA N/A 01/09/2020    Procedure: CARDIOVERSION;  Surgeon: Eduardo Sanchez MD;  Location: [facility] EP LAB;  Service: Cardiology;  Laterality: N/A;  AF, DORINA, DCCV, MAC, AZ, 3 Prep    WISDOM TOOTH EXTRACTION           Social History  Social History     Socioeconomic History    Marital status:    Tobacco Use    Smoking status: Never     Passive exposure: Never    Smokeless tobacco: Never   Substance and Sexual Activity    Alcohol use: Yes     Alcohol/week: 2.0 standard drinks of alcohol     Types: 2 Glasses of wine per week     Comment: weekends     Drug use: Never    Sexual activity: Not Currently   Other Topics Concern    Are you pregnant or think you may be? No    Breast-feeding No     Social Determinants of Health     Financial Resource Strain: Low Risk  (1/9/2024)    Overall Financial Resource Strain (CARDIA)     Difficulty of Paying Living Expenses: Not hard at all   Food Insecurity: No Food Insecurity (1/9/2024)    Hunger Vital Sign     Worried About Running Out of Food in the Last Year: Never true     Ran Out of Food in the Last Year: Never true   Transportation Needs: No Transportation Needs (1/9/2024)    PRAPARE - Transportation     Lack of Transportation (Medical): No     Lack of Transportation (Non-Medical): No   Physical Activity:  Insufficiently Active (1/9/2024)    Exercise Vital Sign     Days of Exercise per Week: 3 days     Minutes of Exercise per Session: 30 min   Stress: No Stress Concern Present (1/9/2024)    [employer] [place] [employer] - Occupational Stress Questionnaire     Feeling of Stress : Only a little   Social Connections: Unknown (1/9/2024)    Social Connection and Isolation Panel [NHANES]     Frequency of Communication with Friends and Family: Twice a week     Frequency of Social Gatherings with Friends and Family: Once a week     Active Member of Clubs or Organizations: Yes     Attends Club or Organization Meetings: 1 to 4 times per year     Marital Status:    Housing Stability: Unknown (1/9/2024)    Housing Stability Vital Sign     Unable to Pay for Housing in the Last Year: No     Unstable Housing in the Last Year: No         ROS  10 point ROS performed and negative except as stated in HPI     Physical Examination         Vital Signs  Vitals  Temp: 97.9 °F (36.6 °C)  Temp Source: Temporal  Pulse: 63  Heart Rate Source: Monitor  Resp: 18  SpO2: 100 %  BP: 134/75  MAP (mmHg): 98  BP Location: Left arm  BP Method: Automatic  Patient Position: Lying          24 Hour VS Range    Temp:  [97.9 °F (36.6 °C)]   Pulse:  [63]   Resp:  [18]   BP: (134-141)/(75-79)   SpO2:  [100 %]   No intake or output data in the 24 hours ending 04/08/24 1055        Physical Exam:   Constitutional: no acute distress  HEENT: NCAT, EOMI, no scleral icterus  Cardiovascular: Irregularly irregular rhythm   Pulmonary: Normal respiratory effort   Abdomen: nontender, non-distended   Neuro: alert and oriented, no focal deficits  Extremities: warm, no edema   MSK: no deformities  Integument: intact, no rashes       Data       Recent Labs   Lab 04/02/24  0852   WBC 5.94   HGB 12.4   HCT 39.3           Recent Labs   Lab 04/02/24  0852   INR 1.1        Recent Labs   Lab 04/02/24  0852      K 4.2      CO2 24   BUN 11  "  CREATININE 0.7   ANIONGAP 7*   CALCIUM 10.3        No results for input(s): "PROT", "ALBUMIN", "BILITOT", "ALKPHOS", "AST", "ALT" in the last 168 hours.     No results for input(s): "TROPONINI" in the last 168 hours.     No results found for: "BNP"    No results for input(s): "LABBLOO" in the last 168 hours.         Assessment & Plan   73 year old female with PMH of persistent atrial fibrillation, HFrEF, HLD, DM2, hyperthyroidism who presents to [facility] for pulmonary vein isolation with Dr. Sanchez.    Persistent atrial fibrillation:   Risks/benefits/alternatives discussed with patient and she agrees to proceed. Consents signed.   -To EP lab for PVI with Dr. Sanchez  -DORINA prior to rule out GIO thrombus         Ranjeet Laughlin MD  [facility]   Cardiovascular Disease PGY-VI     "

## 2024-04-08 NOTE — CONSULTS
[facility]  DORINA History and Physical      Bebe Valdez  YOB: 1950  Medical Record Number:  [id number]  Attending Physician:  Eduardo Sanchez MD   Date of Admission: 4/8/2024       Hospital Day:  0  Current Principal Problem:  Atrial fibrillation    Patient information was obtained from patient and past medical records.    History     Cc: Atrial fibriIlation    HPI  Mrs. Valdez has a hx of diabetes mellitus, hypothyroidism on synthroid and recent onset of persistent atrial fibrillation. She was exercising with a  in July 2019 and pulse was noted to be elevated and irregular. She saw Dr. Nelson on 8/26/2019 and was diagnosed with atrial fibrillation. She was started on metoprolol and aspirin. TSH was checked and was undetectable. Her synthroid dosage was reduced. She was then assessed by Dr. De Leon who started her on eliquis and referred her here for further evaluation. A transthoracic echocardiogram was performed noting a LVEF of 45% with moderate left atrial enlargement and mildly depressed RV function. She reported no obvious symptoms related to AF. At our initial visit in 2018 we discussed trial of restoration of sinus rhythm to see if her mild systolic cardiomyopathy could be due to AF.     9/26/2019: Underwent successful DORINA/DCCV. EF on DORINA was 45%.      10/25/2019: She is one month s/p cardioversion and back in atrial fibrillation. Rate is controlled. She experienced no symptom improvement in sinus rhythm. However, EF is 45%. We discussed that this could be from the AF or from her period of hyperthyroidism. She is still hyperthyroid and is going to decrease her synthroid again per PCP (has a follow up soon). It would increase the likelihood of maintaining sinus rhythm after cardioversion if her thyroid levels are normal. We discussed anti-arrhythmic options, which include tikosyn or sotalol which both require hospital stays. She does not want to proceed with  that route. It might be possible to use amio after her DCCV, after her thyroid has normalized and her synthroid dose is appropriate, until her follow up echo. If her EF normalizes, could switch to 1C or Multaq.      12/2019: She remained in AF, minimal symptoms although in the presence of a cardiomyopathy. Plan was to proceed with rhythm control once she was euthyroid, and evaluate any symptom improvement or increase in LV function.      1/9/2020: successful DCCV. Multaq initiated. Felt better afterward. Thyroid function now normal. Will repeat echo in 3 months to observe for improvement in LV function in SR.     6/4/2020: She is now 5 months s/p cardioversion and initiation of Multaq. Echo yesterday shows normalization of LV function (40%->55%). EKG with acceptable intervals. She is feeling well. Remains on eliquis for CVA prophylaxis.     12/29/2020: Doing well from a rhythm standpoint, remains in SR on multaq. EF recovered in SR. We discussed that she was a candidate for ablation given her hx of arrhythmia-induced CM. She was not interested in ablation unless absolutely necessary. She strongly prefers to remain on multaq at this time. Frequent PVCs on EKG.      6/3/2021: Holter 1/2021: PVC 10%. No AF.     1/2022: Mrs. Valdez returns for follow-up. She recently developed a rash. Metoprolol was held to see if it could be from that however rash did not improve. She resumed metoprolol. She has done well on multaq. She reports no bleeding issues with eliquis.     2/2023: Mrs. Valdez returns for follow-up. No symptomatic AF on multaq. No nausea. No bleeding with eliquis. She works out with a .  ECG is sinus rhythm In summary, Mrs. Valdez is a pleasant 72 year-old woman with diabetes mellitus, hypothyroidism on synthroid and persistent AF that caused a mild cardiomyopathy. She is maintaining sinus rhythm on multaq and EF has recovered. PVI has been discussed. She prefers to continue multaq as long as it is working.  Continue multaq, metoprolol, and eliquis. RTC in 6 months, sooner if needed. Needs updated CBC.     8/14/2023: She continues to do well from a rhythm standpoint, maintaining sinus rhythm on multaq. ECG with stable intervals. CHADSVASc 3 on eliquis. She is feeling well.     Update (02/14/2024):  2/6/2024: Vaginal bleeding. All testing WNL. Has followed up with GYN and uterine biopsy was negative.     Today she says she feels at baseline. No palps, SON, CP, Lh, syncope reported. Bleeding has resolved. She is currently taking eliquis 5mg BID for stroke prophylaxis. She is currently being treated with multaq 400mg BID for rhythm control and toprol 50mg daily for HR control. Kidney function is stable, with a creatinine of 0.8 on 2/6/2024. I have personally reviewed the patient's EKG today, which shows AF at 75bpm.        Today, she presents for RF-PVI. Accompanied by her friend     Anticoagulant/antiplatelets: Eliquis 5 mg BID   ECG: Atrial fibrillation; 63 bpm  Platelet count: 241 (6 days ago)  INR: 1.1 (6 days ago)    History of stroke:  no  Dysphagia or odynophagia:  no  Liver Disease, esophageal disease, or known varices:  no  Upper GI Bleeding:  no  Snoring:  no   Sleep Apnea:  no  Prior neck surgery or radiation:  no  History of anesthetic difficulties:  no  Family history of anesthetic difficulties:  no  Last oral intake: last pm   Able to move neck in all directions:  yes  Use of GLP-1:  no      Medications - Outpatient  Prior to Admission medications    Medication Sig Start Date End Date Taking? Authorizing Provider   ascorbic acid, vitamin C, (VITAMIN C) 100 MG tablet Take 100 mg by mouth once daily.   Yes Provider, Historical   augmented betamethasone dipropionate (DIPROLENE-AF) 0.05 % cream Apply to elbows BID prn scaling. 3/5/24  Yes Lashaun Montes De Oca MD   betamethasone dipropionate 0.05 % cream Apply topically 2 (two) times daily as needed. 6/29/23  Yes Provider, Historical   CINNAMON BARK (CINNAMON  ORAL) Take by mouth once daily. Pt taking 2 pills daily.   Yes Provider, Historical   ELIQUIS 5 mg Tab TAKE 1 TABLET(5 MG) BY MOUTH TWICE DAILY 2/20/24  Yes Eduardo Sanchez MD   fish oil-omega-3 fatty acids 300-1,000 mg capsule Take 2 g by mouth once daily.   Yes Provider, Historical   ipratropium (ATROVENT) 21 mcg (0.03 %) nasal spray 2 sprays by Each Nostril route 2 (two) times daily as needed for Rhinitis. 2/18/24  Yes Keerthi Muse MD   magnesium 250 mg Tab Take by mouth once.   Yes Provider, Historical   metFORMIN (GLUCOPHAGE-XR) 500 MG ER 24hr tablet Take 2 tablets (1,000 mg total) by mouth 2 (two) times daily with meals. 12/20/23  Yes Amber Dorsey MD   metoprolol succinate (TOPROL-XL) 50 MG 24 hr tablet Take 1 tablet (50 mg total) by mouth once daily. 2/14/23  Yes Eduardo Sanchez MD   metroNIDAZOLE (NORITATE) 1 % cream Compound azelaic acid 15% + ivermectin 1% + metronidazole 1% cream. Apply to face once or twice daily. 3/5/24  Yes Lashaun Montes De Oca MD   MV,CA,MIN/IRON/FA/GUARANA/CAFF (ONE-A-DAY WOMEN'S ACTIVE ORAL) Take by mouth.   Yes Provider, Historical   rosuvastatin (CRESTOR) 10 MG tablet TAKE 1 TABLET(10 MG) BY MOUTH EVERY DAY 3/5/24  Yes Amber Dorsey MD   SYNTHROID 175 mcg tablet TAKE 1 TABLET(175 MCG) BY MOUTH BEFORE BREAKFAST 7/15/23  Yes Amber Dorsey MD   vitamin D (VITAMIN D3) 1000 units Tab Take 1,000 Units by mouth once daily.   Yes Provider, Historical   benzocaine-menthoL 15-3.6 mg Lozg 1 lozenge by Mucous Membrane route every 8 (eight) hours as needed. 2/18/24   Keerthi Muse MD   clobetasol 0.05% (TEMOVATE) 0.05 % Oint Apply topically 2 (two) times daily. 9/29/23   Henne, Alison C., MD   molnupiravir 200 mg capsule (EUA) Take 4 capsules (800 mg total) by mouth every 12 (twelve) hours. 2/18/24   Keerthi Muse MD   RSVPreF3 antigen-AS01E, PF, (AREXVY, PF,) 120 mcg/0.5 mL SusR vaccine Inject into the muscle. 1/9/24   Nevaeh,  Bal Ibarra       Medications - Current  Scheduled Meds:  Continuous Infusions:  PRN Meds:.      Allergies  Review of patient's allergies indicates:   Allergen Reactions    Levaquin [levofloxacin] Hives    Tetracycline Hives       Past Medical History  Past Medical History:   Diagnosis Date    Allergy     Anticoagulant long-term use     COVID-19     5/14/22    Diabetes mellitus     Hyperlipidemia     Hypothyroid     Obesity     Rosacea        Past Surgical History  Past Surgical History:   Procedure Laterality Date    CHOLECYSTECTOMY      COLONOSCOPY  07/06/2007    COLONOSCOPY N/A 06/06/2017    Procedure: COLONOSCOPY;  Surgeon: Tre Alvarez MD;  [facility]  Service: Endoscopy;  Laterality: N/A;    DILATION AND CURETTAGE OF UTERUS      HYSTEROSCOPY      TREATMENT OF CARDIAC ARRHYTHMIA N/A 09/26/2019    Procedure: CARDIOVERSION;  Surgeon: Eduardo Sanchez MD;  Location: [facility] EP LAB;  Service: Cardiology;  Laterality: N/A;  AF, DORINA, DCCV, MAC, NC, 3 Prep    TREATMENT OF CARDIAC ARRHYTHMIA N/A 01/09/2020    Procedure: CARDIOVERSION;  Surgeon: Eduardo Sanchez MD;  Location: [facility] EP LAB;  Service: Cardiology;  Laterality: N/A;  AF, DORINA, DCCV, MAC, NC, 3 Prep    WISDOM TOOTH EXTRACTION         Social History  Social History     Socioeconomic History    Marital status:    Tobacco Use    Smoking status: Never     Passive exposure: Never    Smokeless tobacco: Never   Substance and Sexual Activity    Alcohol use: Yes     Alcohol/week: 2.0 standard drinks of alcohol     Types: 2 Glasses of wine per week     Comment: weekends     Drug use: Never    Sexual activity: Not Currently   Other Topics Concern    Are you pregnant or think you may be? No    Breast-feeding No     Social Determinants of Health     Financial Resource Strain: Low Risk  (1/9/2024)    Overall Financial Resource Strain (CARDIA)     Difficulty of Paying Living Expenses: Not hard at all   Food Insecurity: No Food Insecurity  (1/9/2024)    Hunger Vital Sign     Worried About Running Out of Food in the Last Year: Never true     Ran Out of Food in the Last Year: Never true   Transportation Needs: No Transportation Needs (1/9/2024)    PRAPARE - Transportation     Lack of Transportation (Medical): No     Lack of Transportation (Non-Medical): No   Physical Activity: Insufficiently Active (1/9/2024)    Exercise Vital Sign     Days of Exercise per Week: 3 days     Minutes of Exercise per Session: 30 min   Stress: No Stress Concern Present (1/9/2024)    [place] [employer] - Occupational Stress Questionnaire     Feeling of Stress : Only a little   Social Connections: Unknown (1/9/2024)    Social Connection and Isolation Panel [NHANES]     Frequency of Communication with Friends and Family: Twice a week     Frequency of Social Gatherings with Friends and Family: Once a week     Active Member of Clubs or Organizations: Yes     Attends Club or Organization Meetings: 1 to 4 times per year     Marital Status:    Housing Stability: Unknown (1/9/2024)    Housing Stability Vital Sign     Unable to Pay for Housing in the Last Year: No     Unstable Housing in the Last Year: No       ROS  10 point ROS performed and negative except as stated in HPI     Physical Examination     Vital Signs  24 Hour VS Range    Temp:  [97.9 °F (36.6 °C)]   Pulse:  [63]   Resp:  [18]   BP: (134-141)/(75-79)   SpO2:  [100 %]   No intake or output data in the 24 hours ending 04/08/24 0952      Physical Exam:   Constitutional: no acute distress  HEENT: NCAT, EOMI, no scleral icterus  Cardiovascular: Irregular rate and rhythm   Pulmonary: Normal respiratory effort   Abdomen: nontender, non-distended   Neuro: alert and oriented, no focal deficits  Extremities: warm, no edema   MSK: no deformities  Integument: intact, no rashes     Data       Recent Labs   Lab 04/02/24  0852   WBC 5.94   HGB 12.4   HCT 39.3           Recent Labs   Lab  "04/02/24  0852   INR 1.1        Recent Labs   Lab 04/02/24  0852      K 4.2      CO2 24   BUN 11   CREATININE 0.7   ANIONGAP 7*   CALCIUM 10.3        No results for input(s): "PROT", "ALBUMIN", "BILITOT", "ALKPHOS", "AST", "ALT" in the last 168 hours.     No results for input(s): "TROPONINI" in the last 168 hours.     No results found for: "BNP"    No results for input(s): "LABBLOO" in the last 168 hours.       Assessment & Plan     #Atrial fibrillation   -Bebe Valdez is a 73 year old female who presents for DORINA   -on Eliquis for CVA prophylaxis, last dose yesterday evening       TTE 06/15/21  The left ventricle is normal in size with normal systolic function. The estimated ejection fraction is 55%.  Normal left ventricular diastolic function.  Normal right ventricular size with normal right ventricular systolic function.  Normal central venous pressure (3 mmHg).  Moderate left atrial enlargement.    TTE 06/03/20  Normal left ventricular systolic function. The estimated ejection fraction is 55%.  Normal LV diastolic function.  No wall motion abnormalities.  Normal right ventricular systolic function.  Mild left atrial enlargement.  Mild tricuspid regurgitation.  The estimated PA systolic pressure is 24 mmHg.  Normal central venous pressure (3 mmHg).      -No absolute contraindications of esophageal stricture, tumor, perforation, laceration,or diverticulum and/or active GI bleed.  -The risks, benefits & alternatives of the procedure were explained to the patient.   -The risks of transesophageal echo include but are not limited to:  Dental trauma, esophageal trauma/perforation, bleeding, laryngospasm/brochospasm, aspiration, sore throat/hoarseness, & dislodgement of the endotracheal tube/nasogastric tube (where applicable).    -The risks of moderate sedation include hypotension, respiratory depression, arrhythmias, bronchospasm, & death.    -Informed consent was obtained. The patient is agreeable to " proceed with the procedure and all questions and concerns addressed.    Case was discussed with an attending physician in echocardiography lab prior to procedure.    Jamaica Perez PA-C  [facility]

## 2024-04-08 NOTE — ANESTHESIA PROCEDURE NOTES
Intubation    Date/Time: 4/8/2024 1:18 PM    Performed by: Nely Javier CRNA  Authorized by: Gigi Menchaca MD    Intubation:     Induction:  Intravenous    Intubated:  Postinduction    Mask Ventilation:  Easy mask    Attempts:  1    Attempted By:  CRNA    Method of Intubation:  Video laryngoscopy    Blade:  Way 3    Laryngeal View Grade: Grade I - full view of cords      Difficult Airway Encountered?: No      Complications:  None    Airway Device:  Oral endotracheal tube    Airway Device Size:  7.0    Style/Cuff Inflation:  Cuffed (MOP)    Tube secured:  22    Secured at:  The lips    Placement Verified By:  Capnometry and Revisualization with laryngoscopy    Complicating Factors:  None    Findings Post-Intubation:  BS equal bilateral and atraumatic/condition of teeth unchanged  Notes:      Head and neck in neutral position.

## 2024-04-09 VITALS
TEMPERATURE: 98 F | HEIGHT: 66 IN | OXYGEN SATURATION: 98 % | HEART RATE: 71 BPM | WEIGHT: 196.88 LBS | DIASTOLIC BLOOD PRESSURE: 76 MMHG | BODY MASS INDEX: 31.64 KG/M2 | SYSTOLIC BLOOD PRESSURE: 114 MMHG | RESPIRATION RATE: 18 BRPM

## 2024-04-09 LAB
OHS QRS DURATION: 86 MS
OHS QTC CALCULATION: 474 MS
POC ACTIVATED CLOTTING TIME K: 136 SEC (ref 74–137)
POC ACTIVATED CLOTTING TIME K: 147 SEC (ref 74–137)
POC ACTIVATED CLOTTING TIME K: 304 SEC (ref 74–137)
POC ACTIVATED CLOTTING TIME K: 325 SEC (ref 74–137)
POC ACTIVATED CLOTTING TIME K: 352 SEC (ref 74–137)
POC ACTIVATED CLOTTING TIME K: 363 SEC (ref 74–137)
POCT GLUCOSE: 137 MG/DL (ref 70–110)
POCT GLUCOSE: 149 MG/DL (ref 70–110)
SAMPLE: ABNORMAL
SAMPLE: NORMAL

## 2024-04-09 PROCEDURE — 25000003 PHARM REV CODE 250: Performed by: INTERNAL MEDICINE

## 2024-04-09 RX ORDER — PANTOPRAZOLE SODIUM 40 MG/1
40 TABLET, DELAYED RELEASE ORAL DAILY
Qty: 30 TABLET | Refills: 0 | Status: SHIPPED | OUTPATIENT
Start: 2024-04-09 | End: 2024-06-03

## 2024-04-09 RX ORDER — FUROSEMIDE 20 MG/1
20 TABLET ORAL DAILY PRN
Qty: 20 TABLET | Refills: 0 | Status: SHIPPED | OUTPATIENT
Start: 2024-04-09 | End: 2025-04-09

## 2024-04-09 RX ORDER — SUCRALFATE 1 G/1
1 TABLET ORAL
Qty: 120 TABLET | Refills: 0 | Status: SHIPPED | OUTPATIENT
Start: 2024-04-09 | End: 2024-06-03

## 2024-04-09 RX ADMIN — METOPROLOL SUCCINATE 50 MG: 50 TABLET, EXTENDED RELEASE ORAL at 08:04

## 2024-04-09 RX ADMIN — PANTOPRAZOLE SODIUM 40 MG: 40 TABLET, DELAYED RELEASE ORAL at 08:04

## 2024-04-09 RX ADMIN — DRONEDARONE 400 MG: 400 TABLET, FILM COATED ORAL at 08:04

## 2024-04-09 RX ADMIN — APIXABAN 5 MG: 5 TABLET, FILM COATED ORAL at 08:04

## 2024-04-09 RX ADMIN — SUCRALFATE 1 G: 1 TABLET ORAL at 11:04

## 2024-04-09 NOTE — NURSING TRANSFER
Nursing Transfer Note      4/8/2024   7:05 PM    Nurse giving handoff: DENNIS Barahona RN PACU  Nurse receiving handoff:Juanita RN CSU    Reason patient is being transferred: post procedure    Transfer To: 329    Transfer via stretcher    Transfer with  O2, cardiac monitoring    Transported by RN x1    Transfer Vital Signs:  Please see flow sheet    Telemetry: Box Number [id number], Rate 70, Rhythm A fib, and Telemetry  Shanice  Order for Tele Monitor? Yes    Additional Lines: purewick    4eyes on Skin: yes    Medicines sent: Apixaban tab x1, silver sulfa cream    Any special needs or follow-up needed: routine    Patient belongings transferred with patient: Yes    Chart send with patient: Yes    Notified: friend    Patient reassessed at: 4/8/2024 at 2045  1  Upon arrival to floor: cardiac monitor applied, patient oriented to room, and bed in lowest position

## 2024-04-09 NOTE — PROGRESS NOTES
At 2000 hrs removed Rt groin sutures, noted some bleeding- applied pressure on the site for 15 mins. Bilateral groin site dressings look clean, dry and intact.

## 2024-04-09 NOTE — PLAN OF CARE
AAOx4, VSS. Ambulated to bathroom x2 after bedrest. Bled from right groin, resolved. CCU doctor was notfied. No new orders received.   Patient resting comfortably. Reviewed plan of care and safety precautions.   Bed alarm on, close monitoring ongoing for bleeding risks.     Problem: Adult Inpatient Plan of Care  Goal: Plan of Care Review  4/9/2024 0439 by Kai Ramsey RN  Outcome: Ongoing, Progressing  4/9/2024 0438 by Kai Ramsey RN  Outcome: Ongoing, Progressing  Goal: Patient-Specific Goal (Individualized)  4/9/2024 0439 by Kai Ramsey RN  Outcome: Ongoing, Progressing  4/9/2024 0438 by Kai Ramsey RN  Outcome: Ongoing, Progressing  Goal: Absence of Hospital-Acquired Illness or Injury  4/9/2024 0439 by Kai Ramsey RN  Outcome: Ongoing, Progressing  4/9/2024 0438 by Kai Ramsey RN  Outcome: Ongoing, Progressing  Goal: Optimal Comfort and Wellbeing  4/9/2024 0439 by Kai Ramsey RN  Outcome: Ongoing, Progressing  4/9/2024 0438 by Kai Ramsey RN  Outcome: Ongoing, Progressing  Goal: Readiness for Transition of Care  4/9/2024 0439 by Kai Ramsey RN  Outcome: Ongoing, Progressing  4/9/2024 0438 by Kai Ramsey RN  Outcome: Ongoing, Progressing     Problem: Adult Inpatient Plan of Care  Goal: Readiness for Transition of Care  4/9/2024 0439 by Kai Ramsey RN  Outcome: Ongoing, Progressing  4/9/2024 0438 by Kai Ramsey RN  Outcome: Ongoing, Progressing     Problem: Diabetes Comorbidity  Goal: Blood Glucose Level Within Targeted Range  4/9/2024 0439 by Kai Ramsey RN  Outcome: Ongoing, Progressing  4/9/2024 0438 by Kai Ramsey RN  Outcome: Ongoing, Progressing     Problem: Fall Injury Risk  Goal: Absence of Fall and Fall-Related Injury  Outcome: Ongoing, Progressing     Problem: Dysrhythmia  Goal: Normalized Cardiac Rhythm  Outcome: Ongoing, Progressing

## 2024-04-09 NOTE — NURSING
Nurses Note -- 4 Eyes      4/9/2024   4:45 AM      Skin assessed during: Admit      [] No Altered Skin Integrity Present    []Prevention Measures Documented      [x] Yes- Altered Skin Integrity Present or Discovered   [x] LDA Added if Not in Epic (Describe Wound)   [x] New Altered Skin Integrity was Present on Admit and Documented in LDA   [] Wound Image Taken    Wound Care Consulted? No    Attending Nurse:  Kai Rivera RN/Staff Member:  CELSA Mcdonnell and CELSA Quinn    Bilateral groin access sites. CDI

## 2024-04-09 NOTE — DISCHARGE SUMMARY
[facility]  Cardiac Electrophysiology  Discharge Summary        Patient Name: Bebe Valdez   MRN: [id number]   Admission Date: 4/8/2024    Hospital Length of Stay: 0   Discharge Date: 04/09/2024    Attending Physician: Eduardo Sanchez MD   Discharging Provider: Ranjeet Laughlin MD      HPI:   Mrs Bebe Valdez is a 73 year old female with PMH of persistent atrial fibrillation, HFrEF, HLD, DM2, hyperthyroidism who presents to [facility] for pulmonary vein isolation with Dr. Sanchez. Initially diagnosed 2019 and underwent multiple DCCV and initiation of multaq. She had low EF with normalization after maintenance of sinus rhythm. She did well for a time on metoprolol and multaq, however noted to have recurrence at EP visit 2/2024. Ablation was discussed and she agreed to proceed.         Presenting EKG: Atrial fibrillation   CHADS-VASc: 4 (age, cardiomyopathy, gender, DM)  Anticoagulants: Apixaban 5 mg BID   Antiarrhythmics: None (dronedarone discontinued 2/2024)  LV EF: 55% (TTE 6/2021)  Pertinent labs: Hgb 12.4, INR 1.1, Cr 0.7     Hospital Course:   Mrs Valdez underwent successful PVI for treatment of atrial fibrillation. She tolerated the procedure well with no immediate complications. She completed 6 hours of bed rest and ambulated without evidence of bleeding or hematoma. She was monitored overnight and discharged home the morning following procedure.     Follow up:   Follow up with Dr. Sanchez in 3 months     Disposition:   Home or Self Care         Medication List        START taking these medications      dronedarone 400 mg Tab  Commonly known as: MULTAQ  Take 1 tablet (400 mg total) by mouth 2 (two) times daily with meals.     furosemide 20 MG tablet  Commonly known as: LASIX  Take 1 tablet (20 mg total) by mouth daily as needed (leg swelling, shortness of breath, weight gain >3 lbs in 1 day or >5 lbs in 3 days).     pantoprazole 40 MG tablet  Commonly known as: PROTONIX  Take 1 tablet (40 mg total) by mouth once  daily.     sucralfate 1 gram tablet  Commonly known as: CARAFATE  Take 1 tablet (1 g total) by mouth 4 (four) times daily before meals and nightly.            CONTINUE taking these medications      AREXVY (PF) 120 mcg/0.5 mL Susr vaccine  Generic drug: RSVPreF3 antigen-AS01E (PF)  Inject into the muscle.     ascorbic acid (vitamin C) 100 MG tablet  Commonly known as: VITAMIN C     augmented betamethasone dipropionate 0.05 % cream  Commonly known as: DIPROLENE-AF  Apply to elbows BID prn scaling.     benzocaine-menthoL 15-3.6 mg Lozg  1 lozenge by Mucous Membrane route every 8 (eight) hours as needed.     betamethasone dipropionate 0.05 % cream     CINNAMON ORAL     clobetasol 0.05% 0.05 % Oint  Commonly known as: TEMOVATE  Apply topically 2 (two) times daily.     ELIQUIS 5 mg Tab  Generic drug: apixaban  TAKE 1 TABLET(5 MG) BY MOUTH TWICE DAILY     fish oil-omega-3 fatty acids 300-1,000 mg capsule     ipratropium 21 mcg (0.03 %) nasal spray  Commonly known as: ATROVENT  2 sprays by Each Nostril route 2 (two) times daily as needed for Rhinitis.     magnesium 250 mg Tab     metFORMIN 500 MG ER 24hr tablet  Commonly known as: GLUCOPHAGE-XR  Take 2 tablets (1,000 mg total) by mouth 2 (two) times daily with meals.     metoprolol succinate 50 MG 24 hr tablet  Commonly known as: TOPROL-XL  Take 1 tablet (50 mg total) by mouth once daily.     metroNIDAZOLE 1 % cream  Commonly known as: NORITATE  Compound azelaic acid 15% + ivermectin 1% + metronidazole 1% cream. Apply to face once or twice daily.     molnupiravir 200 mg capsule (EUA)  Take 4 capsules (800 mg total) by mouth every 12 (twelve) hours.     ONE-A-DAY WOMEN'S ACTIVE ORAL     rosuvastatin 10 MG tablet  Commonly known as: CRESTOR  TAKE 1 TABLET(10 MG) BY MOUTH EVERY DAY     SYNTHROID 175 MCG tablet  Generic drug: levothyroxine  TAKE 1 TABLET(175 MCG) BY MOUTH BEFORE BREAKFAST     vitamin D 1000 units Tab  Commonly known as: VITAMIN D3               Where to Get  Your Medications        These medications were sent to [facility]  [address] [facility] [id number]      Hours: Mon-Fri 7a-7p, Sat-Sun 10a-4p Phone: [phone]   dronedarone 400 mg Tab  furosemide 20 MG tablet  pantoprazole 40 MG tablet  sucralfate 1 gram tablet           Ranjeet Laughlin MD  [facility]  Cardiovascular Disease, PGY-VI

## 2024-04-09 NOTE — NURSING
1435-Pt discharge per md orders. Pt verbalize complete understanding and follow up appointment. Pt was given a copy of home care d/c instructions w/ a list of home medications.  Address all issues prior d/c . Pt left w/ all of____ valuables. Pt voice no concerns. Transport notified of discharge. Tele box place in dirty bin by .

## 2024-04-09 NOTE — PLAN OF CARE
Problem: Adult Inpatient Plan of Care  Goal: Plan of Care Review  Outcome: Met  Goal: Patient-Specific Goal (Individualized)  Outcome: Met  Goal: Absence of Hospital-Acquired Illness or Injury  Outcome: Met  Goal: Optimal Comfort and Wellbeing  Outcome: Met  Goal: Readiness for Transition of Care  Outcome: Met     Problem: Diabetes Comorbidity  Goal: Blood Glucose Level Within Targeted Range  Outcome: Met     Problem: Fall Injury Risk  Goal: Absence of Fall and Fall-Related Injury  Outcome: Met     Problem: Dysrhythmia  Goal: Normalized Cardiac Rhythm  Outcome: Met

## 2024-04-09 NOTE — NURSING
Patient bleeding from right groin site after ambulating to restroom with RN assistance.   Pressure applied for 5 minutes, site cleansed with saline and new gauze applied with clear tegaderm dressing.   No hematoma felt or noted. Strong femoral pulse palpable.  Vitals 121/58 , Spo2 95% room air, HR 70. Denies chest pain or shortness of breath.   CCU on call for EP notified. No new orders received. Dr will come to bedside to assess per call.   Will continue to monitor closely for signs of bleeding. Bed alarm on, external female cather in place. Instructed patient to please stay in bed and call RN for assistance.   Ongoing cardiac monitoring.     0300- sites assessed, no signs of bleeding noted. Patient denies pain or sob

## 2024-04-09 NOTE — ANESTHESIA POSTPROCEDURE EVALUATION
Anesthesia Post Evaluation    Patient: Bebe Valdez    Procedure(s) Performed: Procedure(s) (LRB):  Ablation atrial fibrillation (N/A)  Transesophageal echo (DORINA) intra-procedure log documentation (N/A)  Cardioversion or Defibrillation    Final Anesthesia Type: general      Patient location during evaluation: PACU  Patient participation: Yes- Able to Participate  Level of consciousness: awake and alert and oriented  Post-procedure vital signs: reviewed and stable  Pain management: adequate  Airway patency: patent  KEVIN mitigation strategies: Intraoperative administration of CPAP, nasopharyngeal airway, or oral appliance during sedation, Multimodal analgesia, Extubation while patient is awake, Verification of full reversal of neuromuscular block and Extubation and recovery carried out in lateral, semiupright, or other nonsupine position  PONV status at discharge: No PONV  Anesthetic complications: no      Cardiovascular status: hemodynamically stable  Respiratory status: unassisted  Hydration status: euvolemic  Follow-up not needed.              Vitals Value Taken Time   /84 04/09/24 0839   Temp 36.6 °C (97.8 °F) 04/09/24 0839   Pulse 71 04/09/24 0839   Resp 19 04/09/24 0839   SpO2 97 % 04/09/24 0839         Event Time   Out of Recovery 21:08:00         Pain/Robert Score: Pain Rating Prior to Med Admin: 4 (4/8/2024 10:27 PM)  Pain Rating Post Med Admin: 0 (4/8/2024 11:27 PM)  Robert Score: 10 (4/9/2024  1:30 AM)

## 2024-04-09 NOTE — NURSING
Patient states she will not take hospital's levothyroxine because it gives her palpitations. Patient stated that she will take her home synthroid once she is discharged.   Also does not want insulin and will resume metformin upon discharge.   Glucose monitored AC/HS . As of last night her glucose was 164 on arrival to [facility] .   Groin sites assessed, no bleeding or hematoma noted. Will continue to monitor closely.

## 2024-04-12 ENCOUNTER — TELEPHONE (OUTPATIENT)
Dept: OBSTETRICS AND GYNECOLOGY | Facility: CLINIC | Age: 74
End: 2024-04-12
Payer: MEDICARE

## 2024-04-12 DIAGNOSIS — I48.19 PERSISTENT ATRIAL FIBRILLATION: Primary | ICD-10-CM

## 2024-04-17 ENCOUNTER — TELEPHONE (OUTPATIENT)
Dept: OBSTETRICS AND GYNECOLOGY | Facility: CLINIC | Age: 74
End: 2024-04-17
Payer: MEDICARE

## 2024-04-17 NOTE — TELEPHONE ENCOUNTER
----- Message from Judith Childs sent at 4/17/2024 12:08 PM CDT -----  Name of Who is calling :  ARTUR REIS [[id number]]      What is the request in detail: On April 25 or around that time pt wants to be scheduled to see an obgyn  at the [facility] location         Can the clinic reply by [facility]:no             What number to call back if not in [facility]:[phone]

## 2024-04-18 ENCOUNTER — TELEPHONE (OUTPATIENT)
Dept: OBSTETRICS AND GYNECOLOGY | Facility: CLINIC | Age: 74
End: 2024-04-18
Payer: MEDICARE

## 2024-04-18 ENCOUNTER — PATIENT MESSAGE (OUTPATIENT)
Dept: ELECTROPHYSIOLOGY | Facility: CLINIC | Age: 74
End: 2024-04-18
Payer: MEDICARE

## 2024-04-18 NOTE — TELEPHONE ENCOUNTER
----- Message from Marina Silva sent at 4/17/2024  4:02 PM CDT -----      Name of Who is Calling: ARTUR REIS [[id number]]      What is the request in detail: Pt returned call to the office.Please contact to further discuss and advise.          Can the clinic reply by [facility]: Y      What [facility]:  [phone]

## 2024-04-30 DIAGNOSIS — I48.91 ATRIAL FIBRILLATION, UNSPECIFIED TYPE: ICD-10-CM

## 2024-05-03 RX ORDER — METOPROLOL SUCCINATE 50 MG/1
50 TABLET, EXTENDED RELEASE ORAL
Qty: 90 TABLET | Refills: 3 | Status: SHIPPED | OUTPATIENT
Start: 2024-05-03

## 2024-06-02 NOTE — PROGRESS NOTES
[facility] Note    Chief Complaint      Chief Complaint   Patient presents with    Follow-up       History of Present Illness      Bebe Valdez is a 73 y.o.  F with A fib, Chronic Sys HF, Hypothyroidism, DM - II, HLD, Aortic Atherosclerosis, Obesity, and Left shoulder adhesive capsulitis, Last visit - 12/20/23    Edema - Swelling in RT foot.  She did just get back from [place] 2 days ago. She did get up every couple hrs to stretch her legs and worse compression hose. She took furosemide 20 mg last night  and the swelling improved.      Paroxysmal a fib -s/p cardioversion 9/26/19 and in '20 . Fu by EP, Dr. Sanchez. Pt on long term anticoagulation with Eliquis 5 mg BID, Toprol 50 mg/d, and Multaq 400 mg BID.   S/p Cardioversion and ablation - 4/8/24.      H/o Chronic Sys CHF - Improved. Prev EF 40% per Echo 1/9/20 and now 55% per echo 6/15/21 Pt on Multaq 400 mgBID and Toprol 50 mg/d.  Echo - 6/15/21 - EF - 55%; mod LAE       Hypothyroidism -  Pt on Brand synthroid 175 mcg/d. Controlled in June.       DM - II -Pt on Metformin  mg 2 tabs BID.  No evidence of microalbuminuria. Ha1c remains controlled at 6.3. Continue current regimen.       HLD - Pt on Crestor 10 mg QHS. Well controlled in June.      Aortic atherosclerosis - Pt on Crestor 10 mg QHS.     Obesity - BMI - 31.14- Down 6 lb- Pt on low carb/low chol diet. She does yoga x 2/wk and works out with a  3 times/wk.         Left shoulder adhesive capsulitis - Pt in PTx.      Ptosis - Pt was offered Sx by [facility] but defers for now. Not affecting her vision at present.      H/o postmenopausal Vaginal bleeding -  Resolved. Seen in   ED - 2/6/24 - PMB - Pelvic U/S 10/12/23 -  neg. EMB - 2/7/24 - benign.      HCM - Flu - 9/25/23; RSV - 1/9/24;  Tdap - 9/3/15;  PCV 13 - 10/10/16;  PVX 23 - 10/16/17;  Shingrix -8/26/19 and 11/5/19 ;  Zostavax - 7/30/13; COVID -19 Vaccine (Pfizer)  #1 - 2/202/1 ; #2 - 3/13/21; # 3 - 10/14/21; # 4  8/4/22; # 5 1/16/23; # 6 7/18/23; # 7 10/24/23; # 8 2/12/24; MGM - 2/12/24-repeat 1 yr;  DEXA - 7/26/22 -neg ;  PAP - no longer gets; Hep C Screen - 10/3/16 - neg;  C-scope -6/6/17 - repeat 10 yrs ;  Prev PCP - Dr. Nelson; EP - Dr Sanchez; Prev Cards - Dr. De Leon; Derm - Dr. Lan; Ophtho - Dr. Louie; Retina Sp - Dr. Yepez; Derm - Dr. Younger    Patient Care Team:  Amber Dorsey MD as PCP - General (Internal Medicine)  Brigitte Marvin RD as Dietitian (Nutrition)  Tobi Louie MD (Ophthalmology)  Katherine Schafer MA as Care Coordinator     Health Maintenance:  Immunization History   Administered Date(s) Administered    COVID-19, MRNA, LN-S, PF (Pfizer) (Gray Cap) 08/04/2022    COVID-19, MRNA, LN-S, PF (Pfizer) (Purple Cap) 02/20/2021, 03/13/2021, 10/14/2021    COVID-19, mRNA, LNP-S, PF, duane-sucrose, 30 mcg/0.3 mL (Pfizer 2023 Ages 12+) 10/24/2023    COVID-19, mRNA, LNP-S, bivalent booster, PF (PFIZER OMICRON) 01/16/2023, 07/18/2023    Influenza 1950, 10/05/2015    Influenza (FLUAD) - Quadrivalent - Adjuvanted - PF *Preferred* (65+) 10/14/2021, 09/25/2023    Influenza - High Dose - PF (65 years and older) 10/10/2016, 09/19/2017, 09/26/2018, 09/12/2019, 10/13/2022    Influenza - Quadrivalent - High Dose - PF (65 years and older) 09/21/2020, 10/13/2022    Influenza A (H1N1) 2009 Monovalent - IM 01/18/2010    Pneumococcal Conjugate - 13 Valent 10/10/2016    Pneumococcal Polysaccharide - 23 Valent 10/16/2017    RSVpreF (Arexvy) 01/09/2024    Tdap 09/03/2015    Zoster 07/30/2013    Zoster Recombinant 08/26/2019, 11/05/2019      Health Maintenance   Topic Date Due    Foot Exam  06/28/2023    Hemoglobin A1c  06/20/2024    Lipid Panel  06/21/2024    Eye Exam  09/06/2024    Mammogram  02/12/2025    High Dose Statin  06/03/2025    DEXA Scan  07/26/2025    TETANUS VACCINE  09/03/2025    Colorectal Cancer Screening  06/06/2027    Hepatitis C Screening  Completed    Shingles Vaccine  Completed        Past  Medical History:  Past Medical History:   Diagnosis Date    Allergy     Anticoagulant long-term use     COVID-19     5/14/22;  2/18/24    Diabetes mellitus     Hyperlipidemia     Hypothyroid     Obesity     Rosacea        Past Surgical History:   has a past surgical history that includes Colonoscopy (07/06/2007); Cholecystectomy; Hysteroscopy; Dilation and curettage of uterus; Colonoscopy (N/A, 06/06/2017); Treatment of cardiac arrhythmia (N/A, 09/26/2019); Treatment of cardiac arrhythmia (N/A, 01/09/2020); [place] tooth extraction; Ablation of arrhythmogenic focus for atrial fibrillation (N/A, 4/8/2024); Treatment of cardiac arrhythmia (4/8/2024); and echocardiogram,transesophageal (N/A, 4/8/2024).    Family History:  family history includes Allergies in her mother; Atrial fibrillation in her father; Breast cancer (age of onset: 42) in her mother; Colon cancer in her maternal grandmother; Diabetes in her father, maternal aunt, and maternal grandmother; Heart attack (age of onset: 90) in her maternal aunt; Heart attacks under age 50 in her paternal grandfather; Heart disease in her father; Hyperlipidemia in her father; Hypertension in her father; Hypothyroidism in her father; Lung cancer in her maternal grandfather; No Known Problems in her brother, paternal grandmother, and sister; Stroke in her father.     Social History:  Social History     Tobacco Use    Smoking status: Never     Passive exposure: Never    Smokeless tobacco: Never   Substance Use Topics    Alcohol use: Yes     Alcohol/week: 2.0 standard drinks of alcohol     Types: 2 Glasses of wine per week     Comment: weekends     Drug use: Never       Review of Systems   Constitutional:  Negative for chills, diaphoresis and fever.   HENT:  Positive for nasal congestion. Negative for hearing loss and tinnitus.         Blood tinged rhinorrhea - rec NS irrigation BID.    Eyes:  Negative for visual disturbance.   Respiratory:  Negative for cough, chest tightness,  shortness of breath and wheezing.    Cardiovascular:  Positive for leg swelling. Negative for chest pain and palpitations.   Gastrointestinal:  Negative for abdominal pain, constipation, diarrhea, nausea and vomiting.   Endocrine: Negative for cold intolerance and heat intolerance.   Genitourinary:  Positive for frequency. Negative for bladder incontinence, dysuria and vaginal bleeding.        After taking lasix.    Musculoskeletal:  Negative for arthralgias and myalgias.   Neurological:  Negative for dizziness, vertigo and headaches.   Psychiatric/Behavioral:  Negative for dysphoric mood. The patient is not nervous/anxious.         Medications:    Current Outpatient Medications:     ascorbic acid, vitamin C, (VITAMIN C) 100 MG tablet, Take 100 mg by mouth once daily., Disp: , Rfl:     augmented betamethasone dipropionate (DIPROLENE-AF) 0.05 % cream, Apply to elbows BID prn scaling., Disp: 50 g, Rfl: 2    betamethasone dipropionate 0.05 % cream, Apply topically 2 (two) times daily as needed., Disp: , Rfl:     CINNAMON BARK (CINNAMON ORAL), Take by mouth once daily. Pt taking 2 pills daily., Disp: , Rfl:     clobetasol 0.05% (TEMOVATE) 0.05 % Oint, Apply topically 2 (two) times daily., Disp: 30 g, Rfl: 2    dronedarone (MULTAQ) 400 mg Tab, Take 1 tablet (400 mg total) by mouth 2 (two) times daily with meals., Disp: 180 tablet, Rfl: 3    ELIQUIS 5 mg Tab, TAKE 1 TABLET(5 MG) BY MOUTH TWICE DAILY, Disp: 180 tablet, Rfl: 3    fish oil-omega-3 fatty acids 300-1,000 mg capsule, Take 2 g by mouth once daily., Disp: , Rfl:     furosemide (LASIX) 20 MG tablet, Take 1 tablet (20 mg total) by mouth daily as needed (leg swelling, shortness of breath, weight gain >3 lbs in 1 day or >5 lbs in 3 days)., Disp: 20 tablet, Rfl: 0    magnesium 250 mg Tab, Take by mouth once., Disp: , Rfl:     metFORMIN (GLUCOPHAGE-XR) 500 MG ER 24hr tablet, Take 2 tablets (1,000 mg total) by mouth 2 (two) times daily with meals., Disp: 360 tablet,  "Rfl: 1    metoprolol succinate (TOPROL-XL) 50 MG 24 hr tablet, TAKE 1 TABLET(50 MG) BY MOUTH EVERY DAY, Disp: 90 tablet, Rfl: 3    metroNIDAZOLE (NORITATE) 1 % cream, Compound azelaic acid 15% + ivermectin 1% + metronidazole 1% cream. Apply to face once or twice daily., Disp: 30 g, Rfl: 11    MV,CA,MIN/IRON/FA/GUARANA/CAFF (ONE-A-DAY WOMEN'S ACTIVE ORAL), Take by mouth., Disp: , Rfl:     rosuvastatin (CRESTOR) 10 MG tablet, TAKE 1 TABLET(10 MG) BY MOUTH EVERY DAY, Disp: 90 tablet, Rfl: 1    vitamin D (VITAMIN D3) 1000 units Tab, Take 1,000 Units by mouth once daily., Disp: , Rfl:     SYNTHROID 175 mcg tablet, Take 1 tablet (175 mcg total) by mouth before breakfast., Disp: 90 tablet, Rfl: 3  No current facility-administered medications for this visit.    Facility-Administered Medications Ordered in Other Visits:     0.9%  NaCl infusion, , Intravenous, Continuous, Steffi Christian NP, New Bag at 09/26/19 0913    sodium chloride 0.9% flush 5 mL, 5 mL, Intravenous, PRN, Steffi Christian NP     Allergies:  Review of patient's allergies indicates:   Allergen Reactions    Levaquin [levofloxacin] Hives    Tetracycline Hives       Physical Exam      Vital Signs  Temp: 98.3 °F (36.8 °C)  Temp Source: Oral  Pulse: 71  Resp: 18  SpO2: 96 %  BP: 114/74  BP Location: Left arm  Patient Position: Sitting  Pain Score: 0-No pain  Height and Weight  Height: 5' 6" (167.6 cm)  Weight: 87.5 kg (192 lb 14.4 oz)  BSA (Calculated - sq m): 2.02 sq meters  BMI (Calculated): 31.2  Weight in (lb) to have BMI = 25: 154.6      Patient Position: Sitting      Physical Exam  Vitals reviewed.   Constitutional:       General: She is not in acute distress.     Appearance: Normal appearance. She is not ill-appearing, toxic-appearing or diaphoretic.   HENT:      Head: Normocephalic and atraumatic.      Right Ear: Tympanic membrane normal.      Left Ear: Tympanic membrane normal.      Ears:      Comments:    Eyes:      Extraocular Movements: " Extraocular movements intact.      Conjunctiva/sclera: Conjunctivae normal.      Pupils: Pupils are equal, round, and reactive to light.      Comments: Mehdi upper lid ptosis.     Neck:      Vascular: No carotid bruit.   Cardiovascular:      Rate and Rhythm: Normal rate and regular rhythm.      Heart sounds: Murmur heard.      Comments: 2+edema to BLE  Pulmonary:      Effort: No respiratory distress.      Breath sounds: Normal breath sounds. No wheezing.   Abdominal:      General: Bowel sounds are normal. There is no distension.      Palpations: Abdomen is soft.      Tenderness: There is no abdominal tenderness. There is no guarding or rebound.   Neurological:      General: No focal deficit present.      Mental Status: She is alert and oriented to person, place, and time.   Psychiatric:         Mood and Affect: Mood normal.         Behavior: Behavior normal.          Laboratory:  CBC:  Recent Labs   Lab 06/21/23  1058 02/06/24  1046 04/02/24  0852   WBC 6.32 6.65 5.94   RBC 4.50 4.51 4.14   Hemoglobin 13.4 13.3 12.4   Hematocrit 42.6 41.5 39.3   Platelets 271 249 241   MCV 95 92 95   MCH 29.8 29.5 30.0   MCHC 31.5 L 32.0 31.6 L       CMP:  Recent Labs   Lab 06/21/23  1058 12/20/23  1523 02/06/24  1046 04/02/24  0852   Glucose 121 H 97 118 H 135 H   Calcium 10.1 10.1 10.3 10.3   Albumin 3.6 3.7 3.8  --    Total Protein 6.5 6.4 6.9  --    Sodium 142 138 141 139   Potassium 4.3 4.1 4.2 4.2   CO2 25 24 25 24   Chloride 107 106 108 108   BUN 12 11 11 11   Creatinine 0.8 0.8 0.8 0.7   Alkaline Phosphatase 54 L 49 L 57  --    ALT 18 17 20  --    AST 19 19 19  --    Total Bilirubin 0.7 0.7 1.0  --        URINALYSIS:  Recent Labs   Lab 02/06/24  1123   Color, UA Yellow   Specific Gravity, UA 1.020   pH, UA 5.0   Protein, UA Negative   Bacteria Rare   Nitrite, UA Negative   Leukocytes, UA Trace A        LIPIDS:  Recent Labs   Lab 01/05/22  1215 06/28/22  1445 12/19/22  1242 06/21/23  1058 12/20/23  1523 02/06/24  1046   TSH  --     < >  --  1.764 3.452 1.651   HDL 48  --  41 41  --   --    Cholesterol 105 L  --  96 L 92 L  --   --    Triglycerides 94  --  82 93  --   --    LDL Cholesterol 38.2 L  --  38.6 L 32.4 L  --   --    HDL/Cholesterol Ratio 45.7  --  42.7 44.6  --   --    Non-HDL Cholesterol 57  --  55 51  --   --    Total Cholesterol/HDL Ratio 2.2  --  2.3 2.2  --   --     < > = values in this interval not displayed.       TSH:  Recent Labs   Lab 06/21/23  1058 12/20/23  1523 02/06/24  1046   TSH 1.764 3.452 1.651       A1C:  Recent Labs   Lab 01/05/22  1215 06/28/22  1445 12/19/22  1242 06/21/23  1058 12/20/23  1523   Hemoglobin A1C 6.3 H 6.3 H 6.4 H 6.3 H 6.3 H       Urine Microalbumin/Cr:  Recent Labs   Lab 01/05/22  1223 12/13/22  1438 12/20/23  1506   Microalb/Creat Ratio 21.9 10.3 Unable to calculate            Recent Labs   Lab 02/06/24  1046   Hepatitis C Ab Non-reactive       Assessment/Plan     Bebe Valdez is a 73 y.o.female with:    Type 2 diabetes mellitus with hypercholesterolemia  -     Hemoglobin A1C; Future; Expected date: 06/03/2024  - Controlled.  Cont current.  Repeat labs.     Hypothyroidism, unspecified type  -     SYNTHROID 175 mcg tablet; Take 1 tablet (175 mcg total) by mouth before breakfast.  Dispense: 90 tablet; Refill: 3  - Controlled.  Cont current.     Chronic systolic heart failure  - Stable.  Cont current regimen. Pt takes lasix prn.     Atrial fibrillation, unspecified type  - Stable.  Cont current regimen.     Aortic atherosclerosis  -Stable.  Cont current regimen.    Hyperlipidemia, unspecified hyperlipidemia type  -     Lipid Panel; Future; Expected date: 06/03/2024  - Controlled.  Cont current regimen. Repeat FLP.     Venous insufficiency  - Rec elev BLE, low sodium diet, and compression stockings/socks prn as discussed.     Edema, unspecified type  -     US Lower Extremity Veins Bilateral; Future; Expected date: 06/03/2024  - Will check U/S - pt with a fib and recent flight to [place]. Suspect  not a DVT but will check U/S. Pt is on Eliquis.  Rec elev BLE, low sodium diet, and compression stockings/socks prn, Lasix prn.        Chronic conditions status updated as per HPI.  Other than changes above, cont current medications and maintain follow up with specialists.   Follow up in about 6 months (around 12/3/2024) for fu chronic issues or sooner if needed.      Amber Dorsey MD  [facility]

## 2024-06-03 ENCOUNTER — HOSPITAL ENCOUNTER (OUTPATIENT)
Dept: RADIOLOGY | Facility: HOSPITAL | Age: 74
Discharge: HOME | End: 2024-06-03
Attending: INTERNAL MEDICINE
Payer: MEDICARE

## 2024-06-03 ENCOUNTER — OFFICE VISIT (OUTPATIENT)
Dept: PRIMARY CARE CLINIC | Facility: CLINIC | Age: 74
End: 2024-06-03
Payer: MEDICARE

## 2024-06-03 VITALS
DIASTOLIC BLOOD PRESSURE: 74 MMHG | TEMPERATURE: 98 F | HEIGHT: 66 IN | WEIGHT: 192.88 LBS | BODY MASS INDEX: 31 KG/M2 | OXYGEN SATURATION: 96 % | HEART RATE: 71 BPM | RESPIRATION RATE: 18 BRPM | SYSTOLIC BLOOD PRESSURE: 114 MMHG

## 2024-06-03 DIAGNOSIS — I50.22 CHRONIC SYSTOLIC HEART FAILURE: ICD-10-CM

## 2024-06-03 DIAGNOSIS — R60.9 EDEMA, UNSPECIFIED TYPE: ICD-10-CM

## 2024-06-03 DIAGNOSIS — E78.5 HYPERLIPIDEMIA, UNSPECIFIED HYPERLIPIDEMIA TYPE: ICD-10-CM

## 2024-06-03 DIAGNOSIS — E03.9 HYPOTHYROIDISM, UNSPECIFIED TYPE: ICD-10-CM

## 2024-06-03 DIAGNOSIS — I48.91 ATRIAL FIBRILLATION, UNSPECIFIED TYPE: ICD-10-CM

## 2024-06-03 DIAGNOSIS — E78.00 TYPE 2 DIABETES MELLITUS WITH HYPERCHOLESTEROLEMIA: Primary | ICD-10-CM

## 2024-06-03 DIAGNOSIS — E11.69 TYPE 2 DIABETES MELLITUS WITH HYPERCHOLESTEROLEMIA: Primary | ICD-10-CM

## 2024-06-03 DIAGNOSIS — I70.0 AORTIC ATHEROSCLEROSIS: ICD-10-CM

## 2024-06-03 DIAGNOSIS — I87.2 VENOUS INSUFFICIENCY: ICD-10-CM

## 2024-06-03 PROCEDURE — 93970 EXTREMITY STUDY: CPT | Mod: 26,,, | Performed by: RADIOLOGY

## 2024-06-03 PROCEDURE — 99215 OFFICE O/P EST HI 40 MIN: CPT | Mod: PBBFAC,PN,25 | Performed by: INTERNAL MEDICINE

## 2024-06-03 PROCEDURE — 99214 OFFICE O/P EST MOD 30 MIN: CPT | Mod: S$PBB,,, | Performed by: INTERNAL MEDICINE

## 2024-06-03 PROCEDURE — 93970 EXTREMITY STUDY: CPT | Mod: TC

## 2024-06-03 PROCEDURE — 99999 PR PBB SHADOW E&M-EST. PATIENT-LVL V: CPT | Mod: PBBFAC,,, | Performed by: INTERNAL MEDICINE

## 2024-06-03 RX ORDER — LEVOTHYROXINE SODIUM 175 UG/1
175 TABLET ORAL
Qty: 90 TABLET | Refills: 3 | Status: SHIPPED | OUTPATIENT
Start: 2024-06-03

## 2024-06-04 ENCOUNTER — TELEPHONE (OUTPATIENT)
Dept: PRIMARY CARE CLINIC | Facility: CLINIC | Age: 74
End: 2024-06-04
Payer: MEDICARE

## 2024-06-04 DIAGNOSIS — Z79.899 ON POTASSIUM WASTING DIURETIC THERAPY: Primary | ICD-10-CM

## 2024-06-04 NOTE — TELEPHONE ENCOUNTER
"Attempted to contact the patient. Returning her call. Could not leave a voicemail. Telephone said "caller not available, try again later".   "

## 2024-06-04 NOTE — PROGRESS NOTES
I sent pt a my chart message - ( I added a BMP for her upcoming labs)  I reviewed your Ultrasound which showed no evidence of DVT/Blood clot in either leg.  Continue your Lasix once daily as needed.  Let me know if this does not improve or if you have any other issues/concerns.   Dr. ROBERT

## 2024-06-04 NOTE — TELEPHONE ENCOUNTER
----- Message from Nuzhat Helton sent at 6/4/2024 12:00 PM CDT -----  Contact: Bebe   Patient is returning a phone call.  Who left a message for the patient: Komal   Does patient know what this is regarding:  Lab   Would you like a call back, or a response through your [facility] portal?:   call back   Comments:

## 2024-06-11 ENCOUNTER — PATIENT MESSAGE (OUTPATIENT)
Dept: OBSTETRICS AND GYNECOLOGY | Facility: CLINIC | Age: 74
End: 2024-06-11
Payer: MEDICARE

## 2024-06-11 ENCOUNTER — OFFICE VISIT (OUTPATIENT)
Dept: OBSTETRICS AND GYNECOLOGY | Facility: CLINIC | Age: 74
End: 2024-06-11
Payer: MEDICARE

## 2024-06-11 ENCOUNTER — TELEPHONE (OUTPATIENT)
Dept: OBSTETRICS AND GYNECOLOGY | Facility: CLINIC | Age: 74
End: 2024-06-11
Payer: MEDICARE

## 2024-06-11 VITALS
SYSTOLIC BLOOD PRESSURE: 132 MMHG | HEART RATE: 71 BPM | BODY MASS INDEX: 30.47 KG/M2 | DIASTOLIC BLOOD PRESSURE: 82 MMHG | WEIGHT: 189.63 LBS | HEIGHT: 66 IN

## 2024-06-11 DIAGNOSIS — N95.0 POSTMENOPAUSAL BLEEDING: ICD-10-CM

## 2024-06-11 PROCEDURE — 99214 OFFICE O/P EST MOD 30 MIN: CPT | Mod: PBBFAC,PN | Performed by: OBSTETRICS & GYNECOLOGY

## 2024-06-11 PROCEDURE — 99213 OFFICE O/P EST LOW 20 MIN: CPT | Mod: S$PBB,,, | Performed by: OBSTETRICS & GYNECOLOGY

## 2024-06-11 PROCEDURE — 99999 PR PBB SHADOW E&M-EST. PATIENT-LVL IV: CPT | Mod: PBBFAC,,, | Performed by: OBSTETRICS & GYNECOLOGY

## 2024-06-11 NOTE — PROGRESS NOTES
Chief Complaint   Patient presents with    postmenopausal bleeding       HPI:   73 y.o.  here today for follow up of PMB. PMH hypothyroidism, hyperlipidemia, atrial fibrillation on Eliquis (has been on it for a few years). Had an episode of significant PMB 24 and was seen by Dr. Hernandez, EMBx done and was negative/benign. Has not had further episodes of bleeding since then.     TVUS (10/2023): Uterus 5x3x3 cm, EMS normal 3 mm, ROV not definitively visualized, LOV WNL     Planning to travel soon to [place]/ for theater festival.     Labs / Significant Studies  Pap (): NILM    Past Medical History:   Diagnosis Date    Allergy     Anticoagulant long-term use     COVID-19     22;  24    Diabetes mellitus     Hyperlipidemia     Hypothyroid     Obesity     Rosacea      Past Surgical History:   Procedure Laterality Date    ABLATION OF ARRHYTHMOGENIC FOCUS FOR ATRIAL FIBRILLATION N/A 2024    Procedure: Ablation atrial fibrillation;  Surgeon: Eduardo Sanchez MD;  Location: [facility] EP LAB;  Service: Cardiology;  Laterality: N/A;  AF, DORINA (Cx if SR), PVI, RFA, Carto, Gen, CA, 3 Prep    CHOLECYSTECTOMY      COLONOSCOPY  2007    COLONOSCOPY N/A 2017    Procedure: COLONOSCOPY;  Surgeon: Tre Alvarez MD;  [facility]O [facility]  Service: Endoscopy;  Laterality: N/A;    DILATION AND CURETTAGE OF UTERUS      ECHOCARDIOGRAM,TRANSESOPHAGEAL N/A 2024    Procedure: Transesophageal echo (DORINA) intra-procedure log documentation;  Surgeon: Elena Peters MD;  Location: [facility] EP LAB;  Service: Cardiology;  Laterality: N/A;    HYSTEROSCOPY      TREATMENT OF CARDIAC ARRHYTHMIA N/A 2019    Procedure: CARDIOVERSION;  Surgeon: Eduardo Sanchez MD;  Location: [facility] EP LAB;  Service: Cardiology;  Laterality: N/A;  AF, DORINA, DCCV, MAC, CA, 3 Prep    TREATMENT OF CARDIAC ARRHYTHMIA N/A 2020    Procedure: CARDIOVERSION;  Surgeon: Eduardo Sanchez MD;  Location: [facility] EP LAB;  Service:  Cardiology;  Laterality: N/A;  AF, DORINA, DCCV, MAC, VT, 3 Prep    TREATMENT OF CARDIAC ARRHYTHMIA  4/8/2024    Procedure: Cardioversion or Defibrillation;  Surgeon: Eduardo Sanchez MD;  Location: [facility] EP LAB;  Service: Cardiology;;    WISDOM TOOTH EXTRACTION         Current Outpatient Medications:     ascorbic acid, vitamin C, (VITAMIN C) 100 MG tablet, Take 100 mg by mouth once daily., Disp: , Rfl:     augmented betamethasone dipropionate (DIPROLENE-AF) 0.05 % cream, Apply to elbows BID prn scaling., Disp: 50 g, Rfl: 2    betamethasone dipropionate 0.05 % cream, Apply topically 2 (two) times daily as needed., Disp: , Rfl:     CINNAMON BARK (CINNAMON ORAL), Take by mouth once daily. Pt taking 2 pills daily., Disp: , Rfl:     clobetasol 0.05% (TEMOVATE) 0.05 % Oint, Apply topically 2 (two) times daily., Disp: 30 g, Rfl: 2    dronedarone (MULTAQ) 400 mg Tab, Take 1 tablet (400 mg total) by mouth 2 (two) times daily with meals., Disp: 180 tablet, Rfl: 3    ELIQUIS 5 mg Tab, TAKE 1 TABLET(5 MG) BY MOUTH TWICE DAILY, Disp: 180 tablet, Rfl: 3    fish oil-omega-3 fatty acids 300-1,000 mg capsule, Take 2 g by mouth once daily., Disp: , Rfl:     furosemide (LASIX) 20 MG tablet, Take 1 tablet (20 mg total) by mouth daily as needed (leg swelling, shortness of breath, weight gain >3 lbs in 1 day or >5 lbs in 3 days)., Disp: 20 tablet, Rfl: 0    magnesium 250 mg Tab, Take by mouth once., Disp: , Rfl:     metFORMIN (GLUCOPHAGE-XR) 500 MG ER 24hr tablet, Take 2 tablets (1,000 mg total) by mouth 2 (two) times daily with meals., Disp: 360 tablet, Rfl: 1    metoprolol succinate (TOPROL-XL) 50 MG 24 hr tablet, TAKE 1 TABLET(50 MG) BY MOUTH EVERY DAY, Disp: 90 tablet, Rfl: 3    metroNIDAZOLE (NORITATE) 1 % cream, Compound azelaic acid 15% + ivermectin 1% + metronidazole 1% cream. Apply to face once or twice daily., Disp: 30 g, Rfl: 11    MV,CA,MIN/IRON/FA/GUARANA/CAFF (ONE-A-DAY WOMEN'S ACTIVE ORAL), Take by mouth., Disp: , Rfl:      rosuvastatin (CRESTOR) 10 MG tablet, TAKE 1 TABLET(10 MG) BY MOUTH EVERY DAY, Disp: 90 tablet, Rfl: 1    SYNTHROID 175 mcg tablet, Take 1 tablet (175 mcg total) by mouth before breakfast., Disp: 90 tablet, Rfl: 3    vitamin D (VITAMIN D3) 1000 units Tab, Take 1,000 Units by mouth once daily., Disp: , Rfl:   No current facility-administered medications for this visit.    Facility-Administered Medications Ordered in Other Visits:     0.9%  NaCl infusion, , Intravenous, Continuous, Steffi Christian, NP, New Bag at 19 0913    sodium chloride 0.9% flush 5 mL, 5 mL, Intravenous, PRN, Steffi Christian NP  Review of patient's allergies indicates:   Allergen Reactions    Levaquin [levofloxacin] Hives    Tetracycline Hives     OB History    Para Term  AB Living   0 0 0 0 0 0   SAB IAB Ectopic Multiple Live Births   0 0 0 0 0     Social History     Tobacco Use    Smoking status: Never     Passive exposure: Never    Smokeless tobacco: Never   Substance Use Topics    Alcohol use: Yes     Alcohol/week: 2.0 standard drinks of alcohol     Types: 2 Glasses of wine per week     Comment: weekends     Drug use: Never     Family History   Problem Relation Name Age of Onset    Breast cancer Mother  42    Allergies Mother      Heart disease Father      Stroke Father      Atrial fibrillation Father      Hypertension Father      Hypothyroidism Father      Hyperlipidemia Father      Diabetes Father      No Known Problems Sister Nathalia     No Known Problems Brother Nikko     Diabetes Maternal Grandmother      Colon cancer Maternal Grandmother      Lung cancer Maternal Grandfather          lung CA, ?Stomach CA    No Known Problems Paternal Grandmother      Heart attacks under age 50 Paternal Grandfather           at 37 u/    Diabetes Maternal Aunt Arlene     Heart attack Maternal Aunt Arlene 90    Ovarian cancer Neg Hx      Melanoma Neg Hx      Angioedema Neg Hx      Asthma Neg Hx      Atopy Neg Hx      Eczema  Neg Hx      Immunodeficiency Neg Hx      Rhinitis Neg Hx      Urticaria Neg Hx      Heart failure Neg Hx         Review of Systems   Negative except as in HPI    Physical Exam   Vitals:    06/11/24 1401   BP: 132/82   Pulse: 71     Body mass index is 30.6 kg/m².    Physical Exam  Constitutional:       General: She is not in acute distress.     Appearance: Normal appearance.   HENT:      Head: Normocephalic and atraumatic.   Eyes:      Extraocular Movements: Extraocular movements intact.      Pupils: Pupils are equal, round, and reactive to light.   Pulmonary:      Effort: Pulmonary effort is normal.   Musculoskeletal:         General: Normal range of motion.      Cervical back: Normal range of motion.   Neurological:      General: No focal deficit present.      Mental Status: She is alert and oriented to person, place, and time.   Skin:     General: Skin is warm and dry.   Psychiatric:         Mood and Affect: Mood normal.         Behavior: Behavior normal.         Thought Content: Thought content normal.   Vitals reviewed.        Labs reviewed: TVUS, Pap, Endometrial biopsy    ASSESSMENT:   Patient Active Problem List   Diagnosis    Hypothyroid    Class 1 obesity with serious comorbidity in adult    Type 2 diabetes mellitus with hypercholesterolemia    Persistent atrial fibrillation    Chronic systolic heart failure, in setting of synthroid mediated hyperthyroidism and persistent atrial fibrillation    History of cardioversion    Chronic anticoagulation    Aortic atherosclerosis    COVID    Personal history of COVID-19 on 5/14/2022    Hyperlipidemia    Vaginal bleeding    Vulvar irritation    Rhinitis    Sore throat    Cough    Postmenopausal bleeding       PLAN:  Problem List Items Addressed This Visit          Renal/    Postmenopausal bleeding    Current Assessment & Plan     No further episodes. Precautions reviewed. Likely secondary to blood thinner (taking for Afib, s/p ablation procedure and has follow up  scheduled w/ cardiology).              Total time spent on this encounter was 20 minutes.  This includes preparing to see the patient;  obtaining/reviewing separately obtained history;  performing a medical exam and/or evaluation;   counseling/educating the patient;  ordering medications, tests, of procedures;   referring/communicating with other health care professionals;  EMR documentation;  interpreting/communicating results to the patient;  and/or care coordination.    Follow up in about 3 months (around 9/11/2024) for Annual.         Louann Laughlin MD  Department of Obstetrics & Gynecology  [facility]

## 2024-06-13 ENCOUNTER — LAB VISIT (OUTPATIENT)
Dept: LAB | Facility: HOSPITAL | Age: 74
End: 2024-06-13
Payer: MEDICARE

## 2024-06-13 DIAGNOSIS — E11.69 TYPE 2 DIABETES MELLITUS WITH HYPERCHOLESTEROLEMIA: ICD-10-CM

## 2024-06-13 DIAGNOSIS — E78.00 TYPE 2 DIABETES MELLITUS WITH HYPERCHOLESTEROLEMIA: ICD-10-CM

## 2024-06-13 DIAGNOSIS — E78.5 HYPERLIPIDEMIA, UNSPECIFIED HYPERLIPIDEMIA TYPE: ICD-10-CM

## 2024-06-13 DIAGNOSIS — Z79.899 ON POTASSIUM WASTING DIURETIC THERAPY: ICD-10-CM

## 2024-06-13 LAB
ANION GAP SERPL CALC-SCNC: 7 MMOL/L (ref 8–16)
BUN SERPL-MCNC: 10 MG/DL (ref 8–23)
CALCIUM SERPL-MCNC: 10.1 MG/DL (ref 8.7–10.5)
CHLORIDE SERPL-SCNC: 103 MMOL/L (ref 95–110)
CHOLEST SERPL-MCNC: 80 MG/DL (ref 120–199)
CHOLEST/HDLC SERPL: 1.9 {RATIO} (ref 2–5)
CO2 SERPL-SCNC: 25 MMOL/L (ref 23–29)
CREAT SERPL-MCNC: 0.8 MG/DL (ref 0.5–1.4)
EST. GFR  (NO RACE VARIABLE): >60 ML/MIN/1.73 M^2
ESTIMATED AVG GLUCOSE: 137 MG/DL (ref 68–131)
GLUCOSE SERPL-MCNC: 157 MG/DL (ref 70–110)
HBA1C MFR BLD: 6.4 % (ref 4–5.6)
HDLC SERPL-MCNC: 43 MG/DL (ref 40–75)
HDLC SERPL: 53.8 % (ref 20–50)
LDLC SERPL CALC-MCNC: 17.4 MG/DL (ref 63–159)
NONHDLC SERPL-MCNC: 37 MG/DL
POTASSIUM SERPL-SCNC: 4.4 MMOL/L (ref 3.5–5.1)
SODIUM SERPL-SCNC: 135 MMOL/L (ref 136–145)
TRIGL SERPL-MCNC: 98 MG/DL (ref 30–150)

## 2024-06-13 PROCEDURE — 80061 LIPID PANEL: CPT | Performed by: INTERNAL MEDICINE

## 2024-06-13 PROCEDURE — 80048 BASIC METABOLIC PNL TOTAL CA: CPT | Performed by: INTERNAL MEDICINE

## 2024-06-13 PROCEDURE — 36415 COLL VENOUS BLD VENIPUNCTURE: CPT | Mod: PN | Performed by: INTERNAL MEDICINE

## 2024-06-13 PROCEDURE — 83036 HEMOGLOBIN GLYCOSYLATED A1C: CPT | Performed by: INTERNAL MEDICINE

## 2024-06-17 NOTE — PROGRESS NOTES
I sent pt a my chart message -  I reviewed your labs.  Your Ha1c was well controlled at 6.4.  Your Cholesterol looked good.  Your kidney function  looked good. Your sodium was low normal at 135.  We will continue to monitor this.  No further recommendations at this time.  Dr. ROBERT

## 2024-06-17 NOTE — ASSESSMENT & PLAN NOTE
No further episodes. Precautions reviewed. Likely secondary to blood thinner (taking for Afib, s/p ablation procedure and has follow up scheduled w/ cardiology).

## 2024-06-24 ENCOUNTER — TELEPHONE (OUTPATIENT)
Dept: OBSTETRICS AND GYNECOLOGY | Facility: CLINIC | Age: 74
End: 2024-06-24
Payer: MEDICARE

## 2024-06-24 ENCOUNTER — OFFICE VISIT (OUTPATIENT)
Dept: PRIMARY CARE CLINIC | Facility: CLINIC | Age: 74
End: 2024-06-24
Payer: MEDICARE

## 2024-06-24 VITALS
SYSTOLIC BLOOD PRESSURE: 132 MMHG | OXYGEN SATURATION: 96 % | WEIGHT: 194 LBS | BODY MASS INDEX: 31.18 KG/M2 | DIASTOLIC BLOOD PRESSURE: 80 MMHG | HEIGHT: 66 IN | HEART RATE: 64 BPM

## 2024-06-24 DIAGNOSIS — N76.0 ACUTE VAGINITIS: ICD-10-CM

## 2024-06-24 DIAGNOSIS — R30.0 DYSURIA: Primary | ICD-10-CM

## 2024-06-24 LAB
BILIRUB UR QL STRIP: NEGATIVE
CLARITY UR REFRACT.AUTO: CLEAR
COLOR UR AUTO: YELLOW
GLUCOSE UR QL STRIP: NEGATIVE
HGB UR QL STRIP: NEGATIVE
KETONES UR QL STRIP: NEGATIVE
LEUKOCYTE ESTERASE UR QL STRIP: NEGATIVE
NITRITE UR QL STRIP: NEGATIVE
PH UR STRIP: 6 [PH] (ref 5–8)
PROT UR QL STRIP: NEGATIVE
SP GR UR STRIP: 1.01 (ref 1–1.03)
URN SPEC COLLECT METH UR: NORMAL

## 2024-06-24 PROCEDURE — 99215 OFFICE O/P EST HI 40 MIN: CPT | Mod: PBBFAC,PN | Performed by: NURSE PRACTITIONER

## 2024-06-24 PROCEDURE — 99999 PR PBB SHADOW E&M-EST. PATIENT-LVL V: CPT | Mod: PBBFAC,,, | Performed by: NURSE PRACTITIONER

## 2024-06-24 PROCEDURE — 99214 OFFICE O/P EST MOD 30 MIN: CPT | Mod: S$PBB,,, | Performed by: NURSE PRACTITIONER

## 2024-06-24 PROCEDURE — 81003 URINALYSIS AUTO W/O SCOPE: CPT | Performed by: NURSE PRACTITIONER

## 2024-06-24 RX ORDER — NITROFURANTOIN 25; 75 MG/1; MG/1
100 CAPSULE ORAL 2 TIMES DAILY
Qty: 10 CAPSULE | Refills: 0 | Status: SHIPPED | OUTPATIENT
Start: 2024-06-24

## 2024-06-24 RX ORDER — FLUCONAZOLE 150 MG/1
150 TABLET ORAL DAILY
Qty: 1 TABLET | Refills: 0 | Status: SHIPPED | OUTPATIENT
Start: 2024-06-24 | End: 2024-06-25

## 2024-06-24 NOTE — TELEPHONE ENCOUNTER
----- Message from Chintan Nitin sent at 6/24/2024  8:06 AM CDT -----  Regarding: Self  [phone]  Type:  Sooner Appointment Request    Patient is requesting a sooner appointment.  Patient declined first available appointment listed as well as another facility and provider .  Patient will not accept being placed on the waitlist and is requesting a message be sent to doctor.    Name of Caller:  Self     When is the first available appointment?  August     Symptoms:  possible Uti    Would the patient rather a call back or a response via My [facility]?  Call back     Best Call Back Number:  [phone]     Additional Information:

## 2024-06-24 NOTE — TELEPHONE ENCOUNTER
Called pt. Pt asked if any openings today. I Told her unfortunately not but I could see about getting her in Thursday, pt declined. Pt states she booked an appt w a NP today bc she will be out of town later this week. Pt states she appreciated the call and doesn't need any other future appt.

## 2024-06-24 NOTE — PROGRESS NOTES
[facility] Note    Chief Complaint      Chief Complaint   Patient presents with    Urinary Frequency       History of Present Illness      Bebe Valdez is a 73 y.o. female who presents today for   Chief Complaint   Patient presents with    Urinary Frequency         Patient is known to me.  She reports urinary frequency for the past few days.  She we will be traveling to [facility] tomorrow for a week when he is afraid that she may have a urinary tract infection starting.  She denies any chest pain any shortness a breath at this time.  Otherwise she is feeling well today.         Review of Systems   Genitourinary:  Positive for urgency.   All 12 systems otherwise negative.       Family History:  family history includes Allergies in her mother; Atrial fibrillation in her father; Breast cancer (age of onset: 42) in her mother; Colon cancer in her maternal grandmother; Diabetes in her father, maternal aunt, and maternal grandmother; Heart attack (age of onset: 90) in her maternal aunt; Heart attacks under age 50 in her paternal grandfather; Heart disease in her father; Hyperlipidemia in her father; Hypertension in her father; Hypothyroidism in her father; Lung cancer in her maternal grandfather; No Known Problems in her brother, paternal grandmother, and sister; Stroke in her father.   Family history was reviewed with patient.     Medications:  Outpatient Encounter Medications as of 6/24/2024   Medication Sig Dispense Refill    ascorbic acid, vitamin C, (VITAMIN C) 100 MG tablet Take 100 mg by mouth once daily.      augmented betamethasone dipropionate (DIPROLENE-AF) 0.05 % cream Apply to elbows BID prn scaling. 50 g 2    betamethasone dipropionate 0.05 % cream Apply topically 2 (two) times daily as needed.      CINNAMON BARK (CINNAMON ORAL) Take by mouth once daily. Pt taking 2 pills daily.      clobetasol 0.05% (TEMOVATE) 0.05 % Oint Apply topically 2 (two) times daily. 30 g 2    dronedarone  (MULTAQ) 400 mg Tab Take 1 tablet (400 mg total) by mouth 2 (two) times daily with meals. 180 tablet 3    ELIQUIS 5 mg Tab TAKE 1 TABLET(5 MG) BY MOUTH TWICE DAILY 180 tablet 3    fish oil-omega-3 fatty acids 300-1,000 mg capsule Take 2 g by mouth once daily.      furosemide (LASIX) 20 MG tablet Take 1 tablet (20 mg total) by mouth daily as needed (leg swelling, shortness of breath, weight gain >3 lbs in 1 day or >5 lbs in 3 days). 20 tablet 0    magnesium 250 mg Tab Take by mouth once.      metFORMIN (GLUCOPHAGE-XR) 500 MG ER 24hr tablet Take 2 tablets (1,000 mg total) by mouth 2 (two) times daily with meals. 360 tablet 1    metoprolol succinate (TOPROL-XL) 50 MG 24 hr tablet TAKE 1 TABLET(50 MG) BY MOUTH EVERY DAY 90 tablet 3    metroNIDAZOLE (NORITATE) 1 % cream Compound azelaic acid 15% + ivermectin 1% + metronidazole 1% cream. Apply to face once or twice daily. 30 g 11    MV,CA,MIN/IRON/FA/GUARANA/CAFF (ONE-A-DAY WOMEN'S ACTIVE ORAL) Take by mouth.      rosuvastatin (CRESTOR) 10 MG tablet TAKE 1 TABLET(10 MG) BY MOUTH EVERY DAY 90 tablet 1    SYNTHROID 175 mcg tablet Take 1 tablet (175 mcg total) by mouth before breakfast. 90 tablet 3    vitamin D (VITAMIN D3) 1000 units Tab Take 1,000 Units by mouth once daily.      fluconazole (DIFLUCAN) 150 MG Tab Take 1 tablet (150 mg total) by mouth once daily. for 1 day 1 tablet 0    nitrofurantoin, macrocrystal-monohydrate, (MACROBID) 100 MG capsule Take 1 capsule (100 mg total) by mouth 2 (two) times daily. 10 capsule 0     Facility-Administered Encounter Medications as of 6/24/2024   Medication Dose Route Frequency Provider Last Rate Last Admin    0.9%  NaCl infusion   Intravenous Continuous Steffi Christian NP   New Bag at 09/26/19 0913    sodium chloride 0.9% flush 5 mL  5 mL Intravenous PRN Steffi Christian NP           Allergies:  Review of patient's allergies indicates:   Allergen Reactions    Levaquin [levofloxacin] Hives    Tetracycline Hives  "      Health Maintenance:  Health Maintenance   Topic Date Due    Foot Exam  06/28/2023    Eye Exam  09/06/2024    Hemoglobin A1c  12/13/2024    Mammogram  02/12/2025    High Dose Statin  06/11/2025    Lipid Panel  06/13/2025    DEXA Scan  07/26/2025    TETANUS VACCINE  09/03/2025    Colorectal Cancer Screening  06/06/2027    Hepatitis C Screening  Completed    Shingles Vaccine  Completed     Health Maintenance Topics with due status: Not Due       Topic Last Completion Date    TETANUS VACCINE 09/03/2015    Colorectal Cancer Screening 06/06/2017    DEXA Scan 07/26/2022    Diabetes Urine Screening 12/20/2023    Mammogram 02/12/2024    High Dose Statin 06/11/2024    Lipid Panel 06/13/2024    Hemoglobin A1c 06/13/2024       Physical Exam      Vital Signs  Pulse: 64  SpO2: 96 %  BP: 132/80  BP Location: Right arm  Patient Position: Sitting  Pain Score: 0-No pain  Height and Weight  Height: 5' 6" (167.6 cm)  Weight: 88 kg (194 lb 0.1 oz)  BSA (Calculated - sq m): 2.02 sq meters  BMI (Calculated): 31.3  Weight in (lb) to have BMI = 25: 154.6]    Physical Exam  Vitals reviewed.   Constitutional:       Appearance: Normal appearance. She is normal weight.   HENT:      Head: Normocephalic and atraumatic.      Nose: Nose normal.      Mouth/Throat:      Mouth: Mucous membranes are moist.      Pharynx: Oropharynx is clear.   Cardiovascular:      Rate and Rhythm: Normal rate and regular rhythm.      Pulses: Normal pulses.      Heart sounds: Normal heart sounds.   Pulmonary:      Effort: Pulmonary effort is normal.      Breath sounds: Normal breath sounds.   Musculoskeletal:         General: Normal range of motion.      Cervical back: Normal range of motion and neck supple.   Skin:     General: Skin is warm and dry.      Capillary Refill: Capillary refill takes less than 2 seconds.   Neurological:      General: No focal deficit present.      Mental Status: She is alert and oriented to person, place, and time. Mental status is at " baseline.   Psychiatric:         Mood and Affect: Mood normal.         Behavior: Behavior normal.         Thought Content: Thought content normal.         Judgment: Judgment normal.            Assessment/Plan     Bebe Valdez is a 73 y.o.female with:    Dysuria  -     Urinalysis, Reflex to Urine Culture Urine, Clean Catch  -     nitrofurantoin, macrocrystal-monohydrate, (MACROBID) 100 MG capsule; Take 1 capsule (100 mg total) by mouth 2 (two) times daily.  Dispense: 10 capsule; Refill: 0    Acute vaginitis  -     fluconazole (DIFLUCAN) 150 MG Tab; Take 1 tablet (150 mg total) by mouth once daily. for 1 day  Dispense: 1 tablet; Refill: 0        As above, continue current medications and maintain follow up with specialists.  Return to clinic as needed.    Greater than 50% of visit was spent face to face with patient.  All questions were answered to patient's satisfaction.          Samanta Charles, NP-C  [facility]

## 2024-07-05 NOTE — TELEPHONE ENCOUNTER
Lab order linked to lab appointment.   Outreach attempt was made to schedule a Medicare Wellness Visit. This was the first attempt. Contact was not made, left message.      Sub mwv due around 6/27/2023   Outreach attempt was made to schedule a Medicare Wellness Visit. This was the second attempt. Contact was not made, left message.   Skin normal color for race, warm, dry and intact. No evidence of rash.

## 2024-07-10 NOTE — PROGRESS NOTES
Ms. Valdez is a patient of Dr. Sanchez and was last seen in clinic 2/14/2024.      Subjective:   Patient ID:  Bebe Valdez is a 73 y.o. female who presents for follow up of Atrial Fibrillation  .     HPI:    Ms. Valdez is a 73 y.o. female with AF, hypothyroid, DM, cardiomyopathy here for follow up after ablation.    Background:    Referring Cardiologist: Tami De Leon MD  Primary Care Physician: Anil Nelson MD    Mrs. Valdez has a hx of diabetes mellitus, hypothyroidism on synthroid and recent onset of persistent atrial fibrillation. She was exercising with a  in July 2019 and pulse was noted to be elevated and irregular. She saw Dr. Nelson on 8/26/2019 and was diagnosed with atrial fibrillation. She was started on metoprolol and aspirin. TSH was checked and was undetectable. Her synthroid dosage was reduced. She was then assessed by Dr. De Leon who started her on eliquis and referred her here for further evaluation. A transthoracic echocardiogram was performed noting a LVEF of 45% with moderate left atrial enlargement and mildly depressed RV function. She reported no obvious symptoms related to AF. At our initial visit in 2018 we discussed trial of restoration of sinus rhythm to see if her mild systolic cardiomyopathy could be due to AF.    9/26/2019: Underwent successful DORINA/DCCV. EF on DORINA was 45%.      10/25/2019: She is one month s/p cardioversion and back in atrial fibrillation. Rate is controlled. She experienced no symptom improvement in sinus rhythm. However, EF is 45%. We discussed that this could be from the AF or from her period of hyperthyroidism. She is still hyperthyroid and is going to decrease her synthroid again per PCP (has a follow up soon). It would increase the likelihood of maintaining sinus rhythm after cardioversion if her thyroid levels are normal. We discussed anti-arrhythmic options, which include tikosyn or sotalol which both require hospital stays. She does not want to proceed  with that route. It might be possible to use amio after her DCCV, after her thyroid has normalized and her synthroid dose is appropriate, until her follow up echo. If her EF normalizes, could switch to 1C or Multaq.      12/2019: She remained in AF, minimal symptoms although in the presence of a cardiomyopathy. Plan was to proceed with rhythm control once she was euthyroid, and evaluate any symptom improvement or increase in LV function.      1/9/2020: successful DCCV. Multaq initiated. Felt better afterward. Thyroid function now normal. Will repeat echo in 3 months to observe for improvement in LV function in SR.     6/4/2020: She is now 5 months s/p cardioversion and initiation of Multaq. Echo yesterday shows normalization of LV function (40%->55%). EKG with acceptable intervals. She is feeling well. Remains on eliquis for CVA prophylaxis.     12/29/2020: Doing well from a rhythm standpoint, remains in SR on multaq. EF recovered in SR. We discussed that she was a candidate for ablation given her hx of arrhythmia-induced CM. She was not interested in ablation unless absolutely necessary. She strongly prefers to remain on multaq at this time. Frequent PVCs on EKG.      6/3/2021: Holter 1/2021: PVC 10%. No AF.    1/2022: Mrs. Valdez returns for follow-up. She recently developed a rash. Metoprolol was held to see if it could be from that however rash did not improve. She resumed metoprolol. She has done well on multaq. She reports no bleeding issues with eliquis.    2/2023: Mrs. Valdez returns for follow-up. No symptomatic AF on multaq. No nausea. No bleeding with eliquis. She works out with a .  ECG is sinus rhythm In summary, Mrs. Valdez is a pleasant 72 year-old woman with diabetes mellitus, hypothyroidism on synthroid and persistent AF that caused a mild cardiomyopathy. She is maintaining sinus rhythm on multaq and EF has recovered. PVI has been discussed. She prefers to continue multaq as long as it is working.  Continue multaq, metoprolol, and eliquis. RTC in 6 months, sooner if needed. Needs updated CBC.    8/14/2023: She continues to do well from a rhythm standpoint, maintaining sinus rhythm on multaq. ECG with stable intervals. CHADSVASc 3 on eliquis. She is feeling well.    2/6/2024: Vaginal bleeding. All testing WNL. Has followed up with GYN and uterine biopsy was negative.    2/14/2024: She is back in AF despite multaq. Minimal symptoms but hx of AF-induced CM. She declines amiodarone or tikosyn. Good candidate for PVI. Risks, benefits, and alternatives discussed. All questions answered. She agrees to proceed.  Hx of vaginal bleeding. We also discussed watchman in the future. She will consider if bleeding recurs.    Update (07/15/2024):    4/8/2024:    Cardioversion prior to ablation.    Successful pulmonary vein RF ablation.    Successful LA posterior wall isolation targetting CFAEs as potential triggers for AF.    Today she denies cardiac symptoms. Ms. Valdez reports no chest pain with exertion or at rest, palpitations, SOB, SON, dizziness, or syncope.  Recent ED visit for rash -> prednisone -> clearing up.    She is currently taking eliquis 5mg BID for stroke prophylaxis and denies significant bleeding episodes. She is currently being treated with multaq 400mg BID and toprol 50mg daily.  Kidney function is stable, with a creatinine of 0.8 on 6/13/2024.    I have personally reviewed the patient's EKG today, which shows sinus rhythm at 70bpm. NV interval is 168. QRS is 92. QTc is 425.    Relevant Cardiac Test Results:    DORINA (4/8/2024):    Left Atrium: Left atrium is dilated. The left atrial appendage appears normal. The left atrial appendage has a windsock morphology. Appendage velocity is normal at greater than 40 cm/sec. There is no thrombus in the cavity.    Left Ventricle: There is normal systolic function with a visually estimated ejection fraction of 60 - 65%.    Right Ventricle: Normal right ventricular cavity  size. Wall thickness is normal. Right ventricle wall motion  is normal. Systolic function is normal.    Aortic Valve: The aortic valve is a trileaflet valve.    Mitral Valve: There is no stenosis.    Aorta: Ascending aorta is normal measuring 3.5 cm. Descending aorta is normal. Grade 2 atherosclerosis with mild thickening. There is no mass present.    Current Outpatient Medications   Medication Sig    ascorbic acid, vitamin C, (VITAMIN C) 100 MG tablet Take 100 mg by mouth once daily.    augmented betamethasone dipropionate (DIPROLENE-AF) 0.05 % cream Apply to elbows BID prn scaling.    betamethasone dipropionate 0.05 % cream Apply topically 2 (two) times daily as needed.    CINNAMON BARK (CINNAMON ORAL) Take by mouth once daily. Pt taking 2 pills daily.    clobetasol 0.05% (TEMOVATE) 0.05 % Oint Apply topically 2 (two) times daily.    dronedarone (MULTAQ) 400 mg Tab Take 1 tablet (400 mg total) by mouth 2 (two) times daily with meals.    ELIQUIS 5 mg Tab TAKE 1 TABLET(5 MG) BY MOUTH TWICE DAILY    fish oil-omega-3 fatty acids 300-1,000 mg capsule Take 2 g by mouth once daily.    furosemide (LASIX) 20 MG tablet Take 1 tablet (20 mg total) by mouth daily as needed (leg swelling, shortness of breath, weight gain >3 lbs in 1 day or >5 lbs in 3 days).    magnesium 250 mg Tab Take by mouth once.    metFORMIN (GLUCOPHAGE-XR) 500 MG ER 24hr tablet Take 2 tablets (1,000 mg total) by mouth 2 (two) times daily with meals.    metoprolol succinate (TOPROL-XL) 50 MG 24 hr tablet TAKE 1 TABLET(50 MG) BY MOUTH EVERY DAY    metroNIDAZOLE (NORITATE) 1 % cream Compound azelaic acid 15% + ivermectin 1% + metronidazole 1% cream. Apply to face once or twice daily.    MV,CA,MIN/IRON/FA/GUARANA/CAFF (ONE-A-DAY WOMEN'S ACTIVE ORAL) Take by mouth.    nitrofurantoin, macrocrystal-monohydrate, (MACROBID) 100 MG capsule Take 1 capsule (100 mg total) by mouth 2 (two) times daily.    rosuvastatin (CRESTOR) 10 MG tablet TAKE 1 TABLET(10 MG) BY  "MOUTH EVERY DAY    SYNTHROID 175 mcg tablet Take 1 tablet (175 mcg total) by mouth before breakfast.    vitamin D (VITAMIN D3) 1000 units Tab Take 1,000 Units by mouth once daily.     No current facility-administered medications for this visit.     Facility-Administered Medications Ordered in Other Visits   Medication    0.9%  NaCl infusion    sodium chloride 0.9% flush 5 mL       Review of Systems   Constitutional: Negative for malaise/fatigue.   Cardiovascular:  Negative for chest pain, dyspnea on exertion, irregular heartbeat, leg swelling and palpitations.   Respiratory:  Negative for shortness of breath.    Hematologic/Lymphatic: Negative for bleeding problem.   Skin:  Positive for rash (resolving).   Musculoskeletal:  Negative for myalgias.   Gastrointestinal:  Negative for hematemesis, hematochezia and nausea.   Genitourinary:  Negative for hematuria.   Neurological:  Negative for light-headedness.   Psychiatric/Behavioral:  Negative for altered mental status.    Allergic/Immunologic: Negative for persistent infections.       Objective:          /79   Pulse 70   Ht 5' 6" (1.676 m)   Wt 85 kg (187 lb 6.3 oz)   LMP 07/18/2001   BMI 30.25 kg/m²     Physical Exam  Vitals and nursing note reviewed.   Constitutional:       Appearance: Normal appearance. She is well-developed.   HENT:      Head: Normocephalic.      Nose: Nose normal.   Eyes:      Pupils: Pupils are equal, round, and reactive to light.   Cardiovascular:      Rate and Rhythm: Normal rate and regular rhythm.   Pulmonary:      Effort: No respiratory distress.      Breath sounds: Normal breath sounds.   Musculoskeletal:         General: Normal range of motion.   Skin:     General: Skin is warm and dry.      Findings: No erythema.   Neurological:      Mental Status: She is alert and oriented to person, place, and time.   Psychiatric:         Speech: Speech normal.         Behavior: Behavior normal.           Lab Results   Component Value Date "     (L) 06/13/2024    K 4.4 06/13/2024    MG 2.3 04/08/2005    BUN 10 06/13/2024    CREATININE 0.8 06/13/2024    ALT 20 02/06/2024    AST 19 02/06/2024    HGB 12.4 04/02/2024    HCT 39.3 04/02/2024    TSH 1.651 02/06/2024    LDLCALC 17.4 (L) 06/13/2024       Recent Labs   Lab 04/02/24  0852   INR 1.1       Assessment:     1. Persistent atrial fibrillation    2. Chronic systolic heart failure, in setting of synthroid mediated hyperthyroidism and persistent atrial fibrillation    3. Chronic anticoagulation    4. History of cardioversion      Plan:     In summary, Ms. Valdez is a 73 y.o. female with AF, hypothyroid, DM, cardiomyopathy here for follow up after ablation.  She is 3 months s/p ablation (PVI/PWI).  She is doing well from a rhythm standpoint, with no documented or symptomatic recurrence of arrhythmia since procedure.  DORINA showed normal LVEF. Will finish bottle of multaq then stop. CHADSVASc 4 and she should continue eliquis for CVA prophylaxis.    Finish multaq bottle then stop  Continue other meds  RTC 3mo, sooner if needed    *A copy of this note has been sent to Dr. Sanchez*    Follow up in about 3 months (around 10/15/2024).    ------------------------------------------------------------------    CHRISTA Soares, NP-C  Cardiac Electrophysiology

## 2024-07-15 ENCOUNTER — HOSPITAL ENCOUNTER (OUTPATIENT)
Dept: CARDIOLOGY | Facility: CLINIC | Age: 74
Discharge: HOME | End: 2024-07-15
Payer: MEDICARE

## 2024-07-15 ENCOUNTER — OFFICE VISIT (OUTPATIENT)
Dept: ELECTROPHYSIOLOGY | Facility: CLINIC | Age: 74
End: 2024-07-15
Payer: MEDICARE

## 2024-07-15 VITALS
HEIGHT: 66 IN | SYSTOLIC BLOOD PRESSURE: 127 MMHG | BODY MASS INDEX: 30.11 KG/M2 | HEART RATE: 70 BPM | DIASTOLIC BLOOD PRESSURE: 79 MMHG | WEIGHT: 187.38 LBS

## 2024-07-15 DIAGNOSIS — Z92.89 HISTORY OF CARDIOVERSION: ICD-10-CM

## 2024-07-15 DIAGNOSIS — I50.22 CHRONIC SYSTOLIC HEART FAILURE: ICD-10-CM

## 2024-07-15 DIAGNOSIS — I48.19 PERSISTENT ATRIAL FIBRILLATION: Primary | ICD-10-CM

## 2024-07-15 DIAGNOSIS — I48.19 PERSISTENT ATRIAL FIBRILLATION: ICD-10-CM

## 2024-07-15 DIAGNOSIS — Z79.01 CHRONIC ANTICOAGULATION: ICD-10-CM

## 2024-07-15 LAB
OHS QRS DURATION: 92 MS
OHS QTC CALCULATION: 425 MS

## 2024-07-15 PROCEDURE — 99214 OFFICE O/P EST MOD 30 MIN: CPT | Mod: PBBFAC,25 | Performed by: NURSE PRACTITIONER

## 2024-07-15 PROCEDURE — 93010 ELECTROCARDIOGRAM REPORT: CPT | Mod: S$PBB,,, | Performed by: INTERNAL MEDICINE

## 2024-07-15 PROCEDURE — 99214 OFFICE O/P EST MOD 30 MIN: CPT | Mod: S$PBB,,, | Performed by: NURSE PRACTITIONER

## 2024-07-15 PROCEDURE — 99999 PR PBB SHADOW E&M-EST. PATIENT-LVL IV: CPT | Mod: PBBFAC,,, | Performed by: NURSE PRACTITIONER

## 2024-07-15 PROCEDURE — 93005 ELECTROCARDIOGRAM TRACING: CPT | Mod: PBBFAC | Performed by: INTERNAL MEDICINE

## 2024-08-28 DIAGNOSIS — E11.69 TYPE 2 DIABETES MELLITUS WITH HYPERCHOLESTEROLEMIA: ICD-10-CM

## 2024-08-28 DIAGNOSIS — E78.00 TYPE 2 DIABETES MELLITUS WITH HYPERCHOLESTEROLEMIA: ICD-10-CM

## 2024-08-28 RX ORDER — METFORMIN HYDROCHLORIDE 500 MG/1
1000 TABLET, EXTENDED RELEASE ORAL 2 TIMES DAILY WITH MEALS
Qty: 360 TABLET | Refills: 1 | Status: SHIPPED | OUTPATIENT
Start: 2024-08-28

## 2024-08-28 NOTE — TELEPHONE ENCOUNTER
No care due was identified.  [facility] Embedded Care Due Messages. Reference number: [id number].   8/28/2024 3:39:59 AM CDT

## 2024-08-28 NOTE — TELEPHONE ENCOUNTER
Refill Decision Note   Bebe Valdez  is requesting a refill authorization.  Brief Assessment and Rationale for Refill:  Approve     Medication Therapy Plan:        Comments:     Note composed:10:24 AM 08/28/2024            ID bands checked. Infant's ID band removed and stapled to footprint sheet, the mother verified as correct, signed and witnessed by RN. Hugs tag removed. Baby discharge instructions given and reviewed. Family member driving mother and baby home. Mother states she has safe crib for baby at home and has signed safe sleep paperwork verifying this. Mother understands to make appt with [facility] in [facility] days for baby's first check up by Thursday 4-27-18. Baby pink, without distress, harnessed into carseat at discharge.  Give extra formula and diapers per mother's request.

## 2024-09-03 DIAGNOSIS — E78.5 HYPERLIPIDEMIA, UNSPECIFIED HYPERLIPIDEMIA TYPE: ICD-10-CM

## 2024-09-03 RX ORDER — ROSUVASTATIN CALCIUM 10 MG/1
TABLET, COATED ORAL
Qty: 90 TABLET | Refills: 1 | Status: SHIPPED | OUTPATIENT
Start: 2024-09-03

## 2024-09-03 NOTE — TELEPHONE ENCOUNTER
Refill Decision Note   Bebedoris Valdez  is requesting a refill authorization.  Brief Assessment and Rationale for Refill:  Approve     Medication Therapy Plan:        Comments:     Note composed:10:00 AM 09/03/2024

## 2024-09-03 NOTE — TELEPHONE ENCOUNTER
No care due was identified.  [facility] Embedded Care Due Messages. Reference number: [id number].   9/03/2024 3:45:33 AM CDT

## 2024-09-18 ENCOUNTER — TELEPHONE (OUTPATIENT)
Dept: ELECTROPHYSIOLOGY | Facility: CLINIC | Age: 74
End: 2024-09-18
Payer: MEDICARE

## 2024-10-01 ENCOUNTER — TELEPHONE (OUTPATIENT)
Dept: OBSTETRICS AND GYNECOLOGY | Facility: CLINIC | Age: 74
End: 2024-10-01
Payer: MEDICARE

## 2024-10-01 NOTE — TELEPHONE ENCOUNTER
Called pt and lvm in regards to appt today. Stated Dr. Laughlin has to unexpectedly leave clinic, so we unfortunately have to r/s her appt. Asked her to protal message or call us back at [phone] to reschedule

## 2024-10-15 ENCOUNTER — OFFICE VISIT (OUTPATIENT)
Dept: OBSTETRICS AND GYNECOLOGY | Facility: CLINIC | Age: 74
End: 2024-10-15
Payer: MEDICARE

## 2024-10-15 VITALS
WEIGHT: 188.25 LBS | SYSTOLIC BLOOD PRESSURE: 128 MMHG | BODY MASS INDEX: 30.39 KG/M2 | DIASTOLIC BLOOD PRESSURE: 88 MMHG

## 2024-10-15 DIAGNOSIS — N95.8 GENITOURINARY SYNDROME OF MENOPAUSE: ICD-10-CM

## 2024-10-15 DIAGNOSIS — Z01.419 ENCOUNTER FOR WELL WOMAN EXAM WITH ROUTINE GYNECOLOGICAL EXAM: Primary | ICD-10-CM

## 2024-10-15 DIAGNOSIS — Z12.4 CERVICAL CANCER SCREENING: ICD-10-CM

## 2024-10-15 PROCEDURE — 88175 CYTOPATH C/V AUTO FLUID REDO: CPT | Performed by: OBSTETRICS & GYNECOLOGY

## 2024-10-15 PROCEDURE — 99999 PR PBB SHADOW E&M-EST. PATIENT-LVL III: CPT | Mod: PBBFAC,,, | Performed by: OBSTETRICS & GYNECOLOGY

## 2024-10-15 PROCEDURE — 87624 HPV HI-RISK TYP POOLED RSLT: CPT | Performed by: OBSTETRICS & GYNECOLOGY

## 2024-10-15 PROCEDURE — 99213 OFFICE O/P EST LOW 20 MIN: CPT | Mod: PBBFAC,PN | Performed by: OBSTETRICS & GYNECOLOGY

## 2024-10-15 RX ORDER — ESTRADIOL 0.1 MG/G
CREAM VAGINAL
Qty: 42.5 G | Refills: 3 | Status: SHIPPED | OUTPATIENT
Start: 2024-10-15

## 2024-10-15 NOTE — PROGRESS NOTES
Chief Complaint: Annual exam    Chief Complaint   Patient presents with    Well Woman       HPI:   74 y.o.  here today for annual exam. Denies any changes to health history since last visit. PMH includes atrial fibrillation s/p ablation in April, on anticoagulation with Eliquis 5 mg. She is postmenopausal, history of recent bleeding episode with negative/normal EMBx, denies continued bleeding since then. Denies abnormal breast symptoms. Mother with breast cancer diagnosed at age 42, otherwise negative FH of breast, uterine, ovarian, or colon cancer.  Patient is doing well today with no complaints or concerns. She is not sexually active. She has been vaccinated against COVID-19. She received PNA, Shingles, RSV vaccine.     , works as a theater .    LMP Dates from Last 1 Encounters:   LMP: 2001       Labs / Significant Studies    Pap (): NILM, desires  MMG (2024): BIRADS-1  Colonoscopy (): Repeat 10 years  DEXA (2022): Normal, repeat in 3-5 years    Past Medical History:   Diagnosis Date    Allergy     Anticoagulant long-term use     COVID-19     22;  24    Diabetes mellitus     Encounter for well woman exam with routine gynecological exam 10/15/2024    Hyperlipidemia     Hypothyroid     Obesity     Postmenopausal bleeding 2024    Rosacea      Past Surgical History:   Procedure Laterality Date    ABLATION OF ARRHYTHMOGENIC FOCUS FOR ATRIAL FIBRILLATION N/A 2024    Procedure: Ablation atrial fibrillation;  Surgeon: Eduardo Sanchez MD;  Location: [facility] EP LAB;  Service: Cardiology;  Laterality: N/A;  AF, DORINA (Cx if SR), PVI, RFA, Carto, Gen, DC, 3 Prep    CHOLECYSTECTOMY      COLONOSCOPY  2007    COLONOSCOPY N/A 2017    Procedure: COLONOSCOPY;  Surgeon: Tre Alvarez MD;  Location: [facility] ENDO ([address] [place] [address]);  Service: Endoscopy;  Laterality: N/A;    DILATION AND CURETTAGE OF UTERUS      ECHOCARDIOGRAM,TRANSESOPHAGEAL N/A 2024    Procedure:  Transesophageal echo (DORINA) intra-procedure log documentation;  Surgeon: Elena Peters MD;  Location: [facility] EP LAB;  Service: Cardiology;  Laterality: N/A;    HYSTEROSCOPY      TREATMENT OF CARDIAC ARRHYTHMIA N/A 09/26/2019    Procedure: CARDIOVERSION;  Surgeon: Eduardo Sanchez MD;  Location: [facility] EP LAB;  Service: Cardiology;  Laterality: N/A;  AF, DORINA, DCCV, MAC, OH, 3 Prep    TREATMENT OF CARDIAC ARRHYTHMIA N/A 01/09/2020    Procedure: CARDIOVERSION;  Surgeon: Eduardo Sanchez MD;  Location: [facility] EP LAB;  Service: Cardiology;  Laterality: N/A;  AF, DORINA, DCCV, MAC, OH, 3 Prep    TREATMENT OF CARDIAC ARRHYTHMIA  4/8/2024    Procedure: Cardioversion or Defibrillation;  Surgeon: Eduardo Sanchez MD;  Location: [facility] EP LAB;  Service: Cardiology;;    WISDOM TOOTH EXTRACTION         Current Outpatient Medications:     ascorbic acid, vitamin C, (VITAMIN C) 100 MG tablet, Take 100 mg by mouth once daily., Disp: , Rfl:     augmented betamethasone dipropionate (DIPROLENE-AF) 0.05 % cream, Apply to elbows BID prn scaling., Disp: 50 g, Rfl: 2    betamethasone dipropionate 0.05 % cream, Apply topically 2 (two) times daily as needed., Disp: , Rfl:     CINNAMON BARK (CINNAMON ORAL), Take by mouth once daily. Pt taking 2 pills daily., Disp: , Rfl:     ELIQUIS 5 mg Tab, TAKE 1 TABLET(5 MG) BY MOUTH TWICE DAILY, Disp: 180 tablet, Rfl: 3    fish oil-omega-3 fatty acids 300-1,000 mg capsule, Take 2 g by mouth once daily., Disp: , Rfl:     magnesium 250 mg Tab, Take by mouth once., Disp: , Rfl:     metFORMIN (GLUCOPHAGE-XR) 500 MG ER 24hr tablet, TAKE 2 TABLETS(1000 MG) BY MOUTH TWICE DAILY WITH MEALS, Disp: 360 tablet, Rfl: 1    metoprolol succinate (TOPROL-XL) 50 MG 24 hr tablet, TAKE 1 TABLET(50 MG) BY MOUTH EVERY DAY, Disp: 90 tablet, Rfl: 3    MV,CA,MIN/IRON/FA/GUARANA/CAFF (ONE-A-DAY WOMEN'S ACTIVE ORAL), Take by mouth., Disp: , Rfl:     rosuvastatin (CRESTOR) 10 MG tablet, TAKE 1 TABLET(10 MG) BY MOUTH EVERY DAY, Disp: 90  tablet, Rfl: 1    SYNTHROID 175 mcg tablet, Take 1 tablet (175 mcg total) by mouth before breakfast., Disp: 90 tablet, Rfl: 3    vitamin D (VITAMIN D3) 1000 units Tab, Take 1,000 Units by mouth once daily., Disp: , Rfl:     clobetasol 0.05% (TEMOVATE) 0.05 % Oint, Apply topically 2 (two) times daily. (Patient not taking: Reported on 10/15/2024), Disp: 30 g, Rfl: 2    estradioL (ESTRACE) 0.01 % (0.1 mg/gram) vaginal cream, Apply a pea sized amount externally twice a week, Disp: 42.5 g, Rfl: 3    furosemide (LASIX) 20 MG tablet, Take 1 tablet (20 mg total) by mouth daily as needed (leg swelling, shortness of breath, weight gain >3 lbs in 1 day or >5 lbs in 3 days). (Patient not taking: Reported on 10/15/2024), Disp: 20 tablet, Rfl: 0    metroNIDAZOLE (NORITATE) 1 % cream, Compound azelaic acid 15% + ivermectin 1% + metronidazole 1% cream. Apply to face once or twice daily. (Patient not taking: Reported on 10/15/2024), Disp: 30 g, Rfl: 11    nitrofurantoin, macrocrystal-monohydrate, (MACROBID) 100 MG capsule, Take 1 capsule (100 mg total) by mouth 2 (two) times daily. (Patient not taking: Reported on 10/15/2024), Disp: 10 capsule, Rfl: 0  No current facility-administered medications for this visit.    Facility-Administered Medications Ordered in Other Visits:     0.9%  NaCl infusion, , Intravenous, Continuous, Steffi Christian NP, New Bag at 19 0913    sodium chloride 0.9% flush 5 mL, 5 mL, Intravenous, PRN, Steffi Christian NP  Review of patient's allergies indicates:   Allergen Reactions    Levaquin [levofloxacin] Hives    Tetracycline Hives     OB History    Para Term  AB Living   0 0 0 0 0 0   SAB IAB Ectopic Multiple Live Births   0 0 0 0 0     Social History     Tobacco Use    Smoking status: Never     Passive exposure: Never    Smokeless tobacco: Never   Substance Use Topics    Alcohol use: Yes     Alcohol/week: 2.0 standard drinks of alcohol     Types: 2 Glasses of wine per week      Comment: occ    Drug use: Never     Family History   Problem Relation Name Age of Onset    Breast cancer Mother  42    Allergies Mother      Heart disease Father      Stroke Father      Atrial fibrillation Father      Hypertension Father      Hypothyroidism Father      Hyperlipidemia Father      Diabetes Father      No Known Problems Sister Nathalia     No Known Problems Brother Nikko     Diabetes Maternal Grandmother      Colon cancer Maternal Grandmother      Lung cancer Maternal Grandfather          lung CA, ?Stomach CA    No Known Problems Paternal Grandmother      Heart attacks under age 50 Paternal Grandfather           at 37 u/    Diabetes Maternal Aunt Arlene     Heart attack Maternal Aunt Arlene 90    Ovarian cancer Neg Hx      Melanoma Neg Hx      Angioedema Neg Hx      Asthma Neg Hx      Atopy Neg Hx      Eczema Neg Hx      Immunodeficiency Neg Hx      Rhinitis Neg Hx      Urticaria Neg Hx      Heart failure Neg Hx         Review of Systems   Negative except as in HPI     Physical Exam   Vitals:    10/15/24 1400   BP: 128/88     Body mass index is 30.39 kg/m².    Physical Exam  Constitutional:       General: She is not in acute distress.     Appearance: Normal appearance.     Genitourinary:    Vulva, vagina, uterus and urethral meatus normal.   The external female genitalia was normal.   No external genitalia lesions identified,Genitalia hair distrobution normal .         No vaginal discharge or bleeding in the vagina.    No vaginal prolapse present.     Moderate vaginal atrophy present.  Right adnexum displays no mass, no tenderness and no fullness. Left adnexum displays no mass, no tenderness and no fullness. Cervix is normal. Cervix exhibits no motion tenderness and no lesion. Uerus contour normal  Uterus is not tender and no mass.    Uterus is anteverted.   Breasts:     Right: No inverted nipple, mass, nipple discharge or skin change.      Left: No inverted nipple, mass, nipple discharge or skin  change.   HENT:      Head: Normocephalic and atraumatic.   Eyes:      Extraocular Movements: Extraocular movements intact.      Pupils: Pupils are equal, round, and reactive to light.   Neck:      Thyroid: No thyroid mass, thyromegaly or thyroid tenderness.   Pulmonary:      Effort: Pulmonary effort is normal.   Abdominal:      General: Abdomen is flat. There is no distension.      Palpations: Abdomen is soft.      Tenderness: There is no abdominal tenderness. There is no guarding or rebound.   Musculoskeletal:         General: Normal range of motion.      Cervical back: Normal range of motion.   Lymphadenopathy:      Cervical: No cervical adenopathy.      Upper Body:      Right upper body: No supraclavicular or axillary adenopathy.      Left upper body: No supraclavicular or axillary adenopathy.   Neurological:      General: No focal deficit present.      Mental Status: She is alert and oriented to person, place, and time.   Skin:     General: Skin is warm and dry.   Psychiatric:         Mood and Affect: Mood normal.         Behavior: Behavior normal.   Vitals reviewed.        ASSESSMENT:   Annual Well Women Exam  Patient Active Problem List   Diagnosis    Hypothyroid    Class 1 obesity with serious comorbidity in adult    Type 2 diabetes mellitus with hypercholesterolemia    Persistent atrial fibrillation    Chronic systolic heart failure, in setting of synthroid mediated hyperthyroidism and persistent atrial fibrillation    History of cardioversion    Chronic anticoagulation    Aortic atherosclerosis    COVID    Personal history of COVID-19 on 5/14/2022    Hyperlipidemia    Vaginal bleeding    Vulvar irritation    Rhinitis    Sore throat    Cough    Encounter for well woman exam with routine gynecological exam    Genitourinary syndrome of menopause     Health Maintenance Due   Topic Date Due    Foot Exam  06/28/2023    COVID-19 Vaccine (8 - 2024-25 season) 09/01/2024    Eye Exam  09/06/2024     Health Maintenance  Topics with due status: Not Due       Topic Last Completion Date    TETANUS VACCINE 09/03/2015    Colorectal Cancer Screening 06/06/2017    DEXA Scan 07/26/2022    Diabetes Urine Screening 12/20/2023    Mammogram 02/12/2024    Lipid Panel 06/13/2024    Hemoglobin A1c 06/13/2024    High Dose Statin 10/15/2024       PLAN:  Problem List Items Addressed This Visit          Renal/    Encounter for well woman exam with routine gynecological exam - Primary    Current Assessment & Plan     Normal breast and pelvic exams except as noted. Pap/cotesting collected today per patient request, if normal can discontinue. Continue breast self awareness. MMG, colonoscopy, DEXA scan up to date. Recommend 30 minutes of exercise 5 times weekly. Counseled patient on promoting a healthy lifestyle including regular weightbearing physical activity (30 minutes on most days of the week), adequate nutrition (protein, calcium, and vitamin D), continued smoking cessation, no or moderate alcohol intake, and fall prevention. Follow up with PCP as scheduled for routine health maintenance.          Genitourinary syndrome of menopause    Relevant Medications    estradioL (ESTRACE) 0.01 % (0.1 mg/gram) vaginal cream     Other Visit Diagnoses       Cervical cancer screening                Follow up in about 1 year (around 10/15/2025) for Annual.       Louann Laughlin MD  Department of Obstetrics & Gynecology  [facility]

## 2024-10-16 LAB
CLINICAL INFO: NORMAL
DATE OF PREVIOUS PAP: NORMAL
DATE PREVIOUS BX: NO
LMP START DATE: NORMAL
SPECIMEN SOURCE CVX/VAG CYTO: NORMAL

## 2024-10-17 PROBLEM — N95.0 POSTMENOPAUSAL BLEEDING: Status: RESOLVED | Noted: 2024-06-11 | Resolved: 2024-10-17

## 2024-10-17 NOTE — ASSESSMENT & PLAN NOTE
Normal breast and pelvic exams except as noted. Pap/cotesting collected today per patient request, if normal can discontinue. Continue breast self awareness. MMG, colonoscopy, DEXA scan up to date. Recommend 30 minutes of exercise 5 times weekly. Counseled patient on promoting a healthy lifestyle including regular weightbearing physical activity (30 minutes on most days of the week), adequate nutrition (protein, calcium, and vitamin D), continued smoking cessation, no or moderate alcohol intake, and fall prevention. Follow up with PCP as scheduled for routine health maintenance.

## 2024-11-01 PROBLEM — Z79.01 ON ANTICOAGULANT THERAPY: Status: ACTIVE | Noted: 2024-11-01

## 2024-11-01 NOTE — PROGRESS NOTES
Ms. Valdez is a patient of Dr. Sanchez and was last seen in clinic 7/15/2024.      Subjective:   Patient ID:  Bebe Valdez is a 74 y.o. female who presents for follow up of Atrial Fibrillation  .     HPI:    Ms. Valdez is a 74 y.o. female with AF (4/8/2024 PVI/PWI), hypothyroid, DM, cardiomyopathy here for follow up.    Background:    Referring Cardiologist: Tami De Leon MD  Primary Care Physician: Anil Nelson MD    Mrs. Valdez has a hx of diabetes mellitus, hypothyroidism on synthroid and recent onset of persistent atrial fibrillation. She was exercising with a  in July 2019 and pulse was noted to be elevated and irregular. She saw Dr. Nelson on 8/26/2019 and was diagnosed with atrial fibrillation. She was started on metoprolol and aspirin. TSH was checked and was undetectable. Her synthroid dosage was reduced. She was then assessed by Dr. De Leon who started her on eliquis and referred her here for further evaluation. A transthoracic echocardiogram was performed noting a LVEF of 45% with moderate left atrial enlargement and mildly depressed RV function. She reported no obvious symptoms related to AF. At our initial visit in 2018 we discussed trial of restoration of sinus rhythm to see if her mild systolic cardiomyopathy could be due to AF.    9/26/2019: Underwent successful DORINA/DCCV. EF on DORINA was 45%.      10/25/2019: She is one month s/p cardioversion and back in atrial fibrillation. Rate is controlled. She experienced no symptom improvement in sinus rhythm. However, EF is 45%. We discussed that this could be from the AF or from her period of hyperthyroidism. She is still hyperthyroid and is going to decrease her synthroid again per PCP (has a follow up soon). It would increase the likelihood of maintaining sinus rhythm after cardioversion if her thyroid levels are normal. We discussed anti-arrhythmic options, which include tikosyn or sotalol which both require hospital stays. She does not want to  proceed with that route. It might be possible to use amio after her DCCV, after her thyroid has normalized and her synthroid dose is appropriate, until her follow up echo. If her EF normalizes, could switch to 1C or Multaq.      12/2019: She remained in AF, minimal symptoms although in the presence of a cardiomyopathy. Plan was to proceed with rhythm control once she was euthyroid, and evaluate any symptom improvement or increase in LV function.      1/9/2020: successful DCCV. Multaq initiated. Felt better afterward. Thyroid function now normal. Will repeat echo in 3 months to observe for improvement in LV function in SR.     6/4/2020: She is now 5 months s/p cardioversion and initiation of Multaq. Echo yesterday shows normalization of LV function (40%->55%). EKG with acceptable intervals. She is feeling well. Remains on eliquis for CVA prophylaxis.     12/29/2020: Doing well from a rhythm standpoint, remains in SR on multaq. EF recovered in SR. We discussed that she was a candidate for ablation given her hx of arrhythmia-induced CM. She was not interested in ablation unless absolutely necessary. She strongly prefers to remain on multaq at this time. Frequent PVCs on EKG.      6/3/2021: Holter 1/2021: PVC 10%. No AF.    1/2022: Mrs. Valdez returns for follow-up. She recently developed a rash. Metoprolol was held to see if it could be from that however rash did not improve. She resumed metoprolol. She has done well on multaq. She reports no bleeding issues with eliquis.    2/2023: Mrs. Valdez returns for follow-up. No symptomatic AF on multaq. No nausea. No bleeding with eliquis. She works out with a .  ECG is sinus rhythm In summary, Mrs. Valdez is a pleasant 72 year-old woman with diabetes mellitus, hypothyroidism on synthroid and persistent AF that caused a mild cardiomyopathy. She is maintaining sinus rhythm on multaq and EF has recovered. PVI has been discussed. She prefers to continue multaq as long as it is  working. Continue multaq, metoprolol, and eliquis. RTC in 6 months, sooner if needed. Needs updated CBC.    8/14/2023: She continues to do well from a rhythm standpoint, maintaining sinus rhythm on multaq. ECG with stable intervals. CHADSVASc 3 on eliquis. She is feeling well.    2/6/2024: Vaginal bleeding. All testing WNL. Has followed up with GYN and uterine biopsy was negative.    2/14/2024: She is back in AF despite multaq. Minimal symptoms but hx of AF-induced CM. She declines amiodarone or tikosyn. Good candidate for PVI. Risks, benefits, and alternatives discussed. All questions answered. She agrees to proceed.  Hx of vaginal bleeding. We also discussed watchman in the future. She will consider if bleeding recurs.    4/8/2024:    Cardioversion prior to ablation.    Successful pulmonary vein RF ablation.    Successful LA posterior wall isolation targetting CFAEs as potential triggers for AF.    7/15/2024: She is 3 months s/p ablation (PVI/PWI).  She is doing well from a rhythm standpoint, with no documented or symptomatic recurrence of arrhythmia since procedure.  DORINA showed normal LVEF. Will finish bottle of multaq then stop. CHADSVASc 4 and she should continue eliquis for CVA prophylaxis.    Update (11/05/2024):    Today she says she is feeling well overall. Working out with  3 days a week, yoga frequently. HRs WNL,   Ms. Valdez reports no chest pain with exertion or at rest, palpitations, SOB, SON, dizziness, or syncope.    She is currently taking eliquis 5mg BID for stroke prophylaxis and denies significant bleeding episodes. She is currently being treated with toprol 50mg daily.  Kidney function is stable, with a creatinine of 0.8 on 6/13/2024.    I have personally reviewed the patient's EKG today, which shows sinus rhythm at 74bpm. IN interval is 190. QRS is 94. QTc is 408.    Relevant Cardiac Test Results:    DORINA (4/8/2024):    Left Atrium: Left atrium is dilated. The left atrial appendage appears  normal. The left atrial appendage has a windsock morphology. Appendage velocity is normal at greater than 40 cm/sec. There is no thrombus in the cavity.    Left Ventricle: There is normal systolic function with a visually estimated ejection fraction of 60 - 65%.    Right Ventricle: Normal right ventricular cavity size. Wall thickness is normal. Right ventricle wall motion  is normal. Systolic function is normal.    Aortic Valve: The aortic valve is a trileaflet valve.    Mitral Valve: There is no stenosis.    Aorta: Ascending aorta is normal measuring 3.5 cm. Descending aorta is normal. Grade 2 atherosclerosis with mild thickening. There is no mass present.    Current Outpatient Medications   Medication Sig    ascorbic acid, vitamin C, (VITAMIN C) 100 MG tablet Take 100 mg by mouth once daily.    augmented betamethasone dipropionate (DIPROLENE-AF) 0.05 % cream Apply to elbows BID prn scaling.    betamethasone dipropionate 0.05 % cream Apply topically 2 (two) times daily as needed.    CINNAMON BARK (CINNAMON ORAL) Take by mouth once daily. Pt taking 2 pills daily.    clobetasol 0.05% (TEMOVATE) 0.05 % Oint Apply topically 2 (two) times daily. (Patient not taking: Reported on 10/15/2024)    ELIQUIS 5 mg Tab TAKE 1 TABLET(5 MG) BY MOUTH TWICE DAILY    estradioL (ESTRACE) 0.01 % (0.1 mg/gram) vaginal cream Apply a pea sized amount externally twice a week    fish oil-omega-3 fatty acids 300-1,000 mg capsule Take 2 g by mouth once daily.    furosemide (LASIX) 20 MG tablet Take 1 tablet (20 mg total) by mouth daily as needed (leg swelling, shortness of breath, weight gain >3 lbs in 1 day or >5 lbs in 3 days). (Patient not taking: Reported on 10/15/2024)    magnesium 250 mg Tab Take by mouth once.    metFORMIN (GLUCOPHAGE-XR) 500 MG ER 24hr tablet TAKE 2 TABLETS(1000 MG) BY MOUTH TWICE DAILY WITH MEALS    metoprolol succinate (TOPROL-XL) 50 MG 24 hr tablet TAKE 1 TABLET(50 MG) BY MOUTH EVERY DAY    metroNIDAZOLE (NORITATE)  "1 % cream Compound azelaic acid 15% + ivermectin 1% + metronidazole 1% cream. Apply to face once or twice daily. (Patient not taking: Reported on 10/15/2024)    MV,CA,MIN/IRON/FA/GUARANA/CAFF (ONE-A-DAY WOMEN'S ACTIVE ORAL) Take by mouth.    nitrofurantoin, macrocrystal-monohydrate, (MACROBID) 100 MG capsule Take 1 capsule (100 mg total) by mouth 2 (two) times daily. (Patient not taking: Reported on 10/15/2024)    rosuvastatin (CRESTOR) 10 MG tablet TAKE 1 TABLET(10 MG) BY MOUTH EVERY DAY    SYNTHROID 175 mcg tablet Take 1 tablet (175 mcg total) by mouth before breakfast.    vitamin D (VITAMIN D3) 1000 units Tab Take 1,000 Units by mouth once daily.     No current facility-administered medications for this visit.     Facility-Administered Medications Ordered in Other Visits   Medication    0.9%  NaCl infusion    sodium chloride 0.9% flush 5 mL       Review of Systems   Constitutional: Negative for malaise/fatigue.   Cardiovascular:  Negative for chest pain, dyspnea on exertion, irregular heartbeat, leg swelling and palpitations.   Respiratory:  Negative for shortness of breath.    Hematologic/Lymphatic: Negative for bleeding problem.   Skin:  Negative for rash.   Musculoskeletal:  Negative for myalgias.   Gastrointestinal:  Negative for hematemesis, hematochezia and nausea.   Genitourinary:  Negative for hematuria.   Neurological:  Negative for light-headedness.   Psychiatric/Behavioral:  Negative for altered mental status.    Allergic/Immunologic: Negative for persistent infections.       Objective:          BP (!) 130/90 (BP Location: Right arm, Patient Position: Sitting)   Pulse 74   Ht 5' 6" (1.676 m)   Wt 84.4 kg (186 lb 1.1 oz)   LMP 07/18/2001   BMI 30.03 kg/m²     Physical Exam  Vitals and nursing note reviewed.   Constitutional:       Appearance: Normal appearance. She is well-developed.   HENT:      Head: Normocephalic.      Nose: Nose normal.   Eyes:      Pupils: Pupils are equal, round, and reactive " to light.   Cardiovascular:      Rate and Rhythm: Normal rate and regular rhythm.   Pulmonary:      Effort: No respiratory distress.   Musculoskeletal:         General: Normal range of motion.   Skin:     General: Skin is warm and dry.      Findings: No erythema.   Neurological:      Mental Status: She is alert and oriented to person, place, and time.   Psychiatric:         Speech: Speech normal.         Behavior: Behavior normal.           Lab Results   Component Value Date     (L) 06/13/2024    K 4.4 06/13/2024    MG 2.3 04/08/2005    BUN 10 06/13/2024    CREATININE 0.8 06/13/2024    ALT 20 02/06/2024    AST 19 02/06/2024    HGB 12.4 04/02/2024    HCT 39.3 04/02/2024    TSH 1.651 02/06/2024    LDLCALC 17.4 (L) 06/13/2024       Recent Labs   Lab 04/02/24  0852   INR 1.1       Assessment:     1. Persistent atrial fibrillation    2. On anticoagulant therapy    3. Chronic systolic heart failure, in setting of synthroid mediated hyperthyroidism and persistent atrial fibrillation    4. History of cardiac radiofrequency ablation (RFA)      Plan:     In summary, Ms. Valdez is a 74 y.o. female with AF (4/8/2024 PVI/PWI), hypothyroid, DM, cardiomyopathy here for follow up.  She is now 7 months s/p PVI/PWI. She continues to do well from a rhythm standpoint, with no documented or symptomatic recurrence of arrhythmia since procedure.  No longer on multaq. CHADSVASc 3 on eliquis. Exercising frequently and doing well.    Continue current meds  RTC 6 mo, sooner if needed    *A copy of this note has been sent to Dr. Sanchez*    Follow up in about 6 months (around 5/5/2025).    ------------------------------------------------------------------    Zoë Valera, CHRISTA, NP-C  Cardiac Electrophysiology

## 2024-11-05 ENCOUNTER — OFFICE VISIT (OUTPATIENT)
Dept: ELECTROPHYSIOLOGY | Facility: CLINIC | Age: 74
End: 2024-11-05
Payer: MEDICARE

## 2024-11-05 ENCOUNTER — HOSPITAL ENCOUNTER (OUTPATIENT)
Dept: CARDIOLOGY | Facility: CLINIC | Age: 74
Discharge: HOME | End: 2024-11-05
Payer: MEDICARE

## 2024-11-05 VITALS
HEART RATE: 74 BPM | WEIGHT: 186.06 LBS | HEIGHT: 66 IN | SYSTOLIC BLOOD PRESSURE: 130 MMHG | BODY MASS INDEX: 29.9 KG/M2 | DIASTOLIC BLOOD PRESSURE: 90 MMHG

## 2024-11-05 DIAGNOSIS — Z98.890 HISTORY OF CARDIAC RADIOFREQUENCY ABLATION (RFA): ICD-10-CM

## 2024-11-05 DIAGNOSIS — I48.19 PERSISTENT ATRIAL FIBRILLATION: Primary | ICD-10-CM

## 2024-11-05 DIAGNOSIS — I48.19 PERSISTENT ATRIAL FIBRILLATION: ICD-10-CM

## 2024-11-05 DIAGNOSIS — Z79.01 ON ANTICOAGULANT THERAPY: ICD-10-CM

## 2024-11-05 DIAGNOSIS — I50.22 CHRONIC SYSTOLIC HEART FAILURE: ICD-10-CM

## 2024-11-05 LAB
OHS QRS DURATION: 94 MS
OHS QTC CALCULATION: 408 MS

## 2024-11-05 PROCEDURE — 93010 ELECTROCARDIOGRAM REPORT: CPT | Mod: S$PBB,,, | Performed by: STUDENT IN AN ORGANIZED HEALTH CARE EDUCATION/TRAINING PROGRAM

## 2024-11-05 PROCEDURE — 99214 OFFICE O/P EST MOD 30 MIN: CPT | Mod: PBBFAC,25 | Performed by: NURSE PRACTITIONER

## 2024-11-05 PROCEDURE — 93005 ELECTROCARDIOGRAM TRACING: CPT | Mod: PBBFAC | Performed by: STUDENT IN AN ORGANIZED HEALTH CARE EDUCATION/TRAINING PROGRAM

## 2024-11-05 PROCEDURE — 99999 PR PBB SHADOW E&M-EST. PATIENT-LVL IV: CPT | Mod: PBBFAC,,, | Performed by: NURSE PRACTITIONER

## 2024-12-01 NOTE — PROGRESS NOTES
[facility] Note    Chief Complaint      Chief Complaint   Patient presents with    Follow-up       History of Present Illness      Bebe Valdez is a 74 y.o.F with A fib, Chronic Sys HF, Hypothyroidism, DM - II, HLD, Aortic Atherosclerosis, Obesity, and Left shoulder adhesive capsulitis, Last visit - 6/3/24     Interim:   Saw Samanta Melvin - 6/24/24 - Tx with macrobid  UC - 7/5/24 - Urticaria. Then was seen in ED 7/6/24.     BLE U/S - 6/3/24 - no evidence of DVT/Blood clot in either leg.  Continue  Lasix once daily as needed. She has not been needing it.      Hyponatremia - Sodium was low normal at 135. We will continue to monitor this.      Paroxysmal a fib -s/p cardioversion 9/26/19 and in '20 . Fu by EP, Dr. Sanchez. Pt on long term anticoagulation with Eliquis 5 mg BID, Toprol 50 mg/d, and is off Multaq 400 mg BID.   S/p Cardioversion and ablation - 4/8/24.      H/o Chronic Sys CHF - Improved. Prev EF 40% per Echo 1/9/20 and now 55% per echo 6/15/21 Pt on Multaq 400 mgBID and Toprol 50 mg/d.  Echo - 6/15/21 - EF - 55%; mod LAE. EP stopped Multaq. Has fu in May with EP, Dr. Sanchez.      Hypothyroidism -  Pt on Brand synthroid 175 mcg/d. Controlled in Feb.       DM - II -Pt on Metformin  mg 2 tabs BID.  No evidence of microalbuminuria. Ha1c remains controlled at 6.4 in June. Continue current regimen.       HLD - Pt on Crestor 10 mg QHS. Well controlled in June.      Aortic atherosclerosis - Pt on Crestor 10 mg QHS.     Overweight - BMI - 29.96 - Down 7 lb since last visit- Pt on low carb/low chol diet. She does yoga x 2/wk and works out with a  3 times/wk.         Left shoulder adhesive capsulitis - Pt in PTx.      Ptosis - Pt was offered Sx by [facility] but defers for now. Not affecting her vision at present.      H/o postmenopausal Vaginal bleeding -  Resolved. Seen in   ED - 2/6/24 - PMB - Pelvic U/S 10/12/23 -  neg. EMB - 2/7/24 - benign.      HCM - Flu - 9/7/24; RSV -  1/9/24;  Tdap - 9/3/15;  PCV 13 - 10/10/16;  PVX 23 - 10/16/17; Shingrix -8/26/19 and 11/5/19 ;  Zostavax - 7/30/13; COVID -19 Vaccine (Pfizer)  #1 - 2/202/1 ; #2 - 3/13/21; # 3 - 10/14/21; # 4 8/4/22; # 5 1/16/23; # 6 7/18/23; # 7 10/24/23; # 8 2/12/24; # 9 ;  MGM - 2/12/24-repeat 1 yr;  DEXA - 7/26/22 -neg ;  PAP - no longer gets; Hep C Screen - 10/3/16 - neg;  C-scope -6/6/17 - repeat 10 yrs ;  Prev PCP - Dr. Nelson; EP - Dr Sanchez; Prev Cards - Dr. De Leon; Derm - Dr. Lan; Ophtho - Dr. Louie; Retina Sp - Dr. Yepez; Derm - Dr. Younger    Patient Care Team:  Amber Dorsey MD as PCP - General (Internal Medicine)  Brigitte Marvin RD as Dietitian (Nutrition)  Tobi Louie MD (Ophthalmology)     Health Maintenance:  Immunization History   Administered Date(s) Administered    COVID-19, MRNA, LN-S, PF (Pfizer) (Gray Cap) 08/04/2022    COVID-19, MRNA, LN-S, PF (Pfizer) (Purple Cap) 02/20/2021, 03/13/2021, 10/14/2021    COVID-19, mRNA, LNP-S, PF, duane-sucrose, 30 mcg/0.3 mL (Pfizer Ages 12+) 10/24/2023    COVID-19, mRNA, LNP-S, bivalent booster, PF (PFIZER OMICRON) 01/16/2023, 07/18/2023    Influenza 1950, 10/05/2015    Influenza (FLUAD) - Quadrivalent - Adjuvanted - PF *Preferred* (65+) 10/14/2021, 09/25/2023    Influenza - Quadrivalent - High Dose - PF (65 years and older) 09/21/2020, 10/13/2022    Influenza - Trivalent - Fluzone High Dose - PF (65 years and older) 10/10/2016, 09/19/2017, 09/26/2018, 09/12/2019, 10/13/2022    Influenza A (H1N1) 2009 Monovalent - IM 01/18/2010    Pneumococcal Conjugate - 13 Valent 10/10/2016    Pneumococcal Polysaccharide - 23 Valent 10/16/2017    RSVpreF (Arexvy) 01/09/2024    Tdap 09/03/2015    Zoster 07/30/2013    Zoster Recombinant 08/26/2019, 11/05/2019      Health Maintenance   Topic Date Due    Foot Exam  06/28/2023    COVID-19 Vaccine (8 - 2024-25 season) 09/01/2024    Eye Exam  09/06/2024    Hemoglobin A1c  12/13/2024    Diabetes Urine Screening  12/20/2024     Mammogram  02/12/2025    Lipid Panel  06/13/2025    DEXA Scan  07/26/2025    TETANUS VACCINE  09/03/2025    High Dose Statin  12/02/2025    Colorectal Cancer Screening  06/06/2027    Hepatitis C Screening  Completed    Shingles Vaccine  Completed    Influenza Vaccine  Completed    RSV Vaccine (Age 60+ and Pregnant patients)  Completed    Pneumococcal Vaccines (Age 65+)  Completed        Past Medical History:  Past Medical History:   Diagnosis Date    Allergy     Anticoagulant long-term use     COVID-19     5/14/22;  2/18/24    Diabetes mellitus     Encounter for well woman exam with routine gynecological exam 10/15/2024    Hyperlipidemia     Hypothyroid     Obesity     Postmenopausal bleeding 06/11/2024    Rosacea        Past Surgical History:   has a past surgical history that includes Colonoscopy (07/06/2007); Cholecystectomy; Hysteroscopy; Dilation and curettage of uterus; Colonoscopy (N/A, 06/06/2017); Treatment of cardiac arrhythmia (N/A, 09/26/2019); Treatment of cardiac arrhythmia (N/A, 01/09/2020); [place] tooth extraction; Ablation of arrhythmogenic focus for atrial fibrillation (N/A, 4/8/2024); Treatment of cardiac arrhythmia (4/8/2024); and echocardiogram,transesophageal (N/A, 4/8/2024).    Family History:  family history includes Allergies in her mother; Atrial fibrillation in her father; Breast cancer (age of onset: 42) in her mother; Colon cancer in her maternal grandmother; Diabetes in her father, maternal aunt, and maternal grandmother; Heart attack (age of onset: 90) in her maternal aunt; Heart attacks under age 50 in her paternal grandfather; Heart disease in her father; Hyperlipidemia in her father; Hypertension in her father; Hypothyroidism in her father; Lung cancer in her maternal grandfather; No Known Problems in her brother, paternal grandmother, and sister; Stroke in her father.     Social History:  Social History     Tobacco Use    Smoking status: Never     Passive exposure: Never     Smokeless tobacco: Never   Substance Use Topics    Alcohol use: Yes     Alcohol/week: 2.0 standard drinks of alcohol     Types: 2 Glasses of wine per week     Comment: occ    Drug use: Never       Review of Systems   Constitutional:  Negative for chills, diaphoresis, fever and night sweats.   HENT:  Negative for hearing loss.    Eyes:  Negative for visual disturbance.   Respiratory:  Negative for cough, chest tightness, shortness of breath and wheezing.    Cardiovascular:  Negative for chest pain, palpitations and leg swelling.   Gastrointestinal:  Positive for diarrhea. Negative for abdominal pain, constipation, nausea, vomiting and reflux.        On occasion. 1/wk max. Only in AM. Soft, brown stool x 1-2.   Endocrine: Negative for cold intolerance, heat intolerance and polydipsia.   Genitourinary:  Negative for bladder incontinence, dysuria, frequency, hematuria and vaginal bleeding.   Musculoskeletal:  Positive for neck pain. Negative for arthralgias and myalgias.        Neck pain off and on this past wk after using the telephone for a long period.  Turning her neck Rt made it worse.  Icy hot prn helps.    Neurological:  Negative for dizziness and headaches.   Psychiatric/Behavioral:  Negative for depressed mood. The patient is not nervous/anxious.         Medications:    Current Outpatient Medications:     ascorbic acid, vitamin C, (VITAMIN C) 100 MG tablet, Take 100 mg by mouth once daily., Disp: , Rfl:     augmented betamethasone dipropionate (DIPROLENE-AF) 0.05 % cream, Apply to elbows BID prn scaling., Disp: 50 g, Rfl: 2    CINNAMON BARK (CINNAMON ORAL), Take by mouth once daily. Pt taking 2 pills daily., Disp: , Rfl:     ELIQUIS 5 mg Tab, TAKE 1 TABLET(5 MG) BY MOUTH TWICE DAILY, Disp: 180 tablet, Rfl: 3    fish oil-omega-3 fatty acids 300-1,000 mg capsule, Take 2 g by mouth once daily., Disp: , Rfl:     furosemide (LASIX) 20 MG tablet, Take 1 tablet (20 mg total) by mouth daily as needed (leg swelling,  "shortness of breath, weight gain >3 lbs in 1 day or >5 lbs in 3 days)., Disp: 20 tablet, Rfl: 0    magnesium 250 mg Tab, Take by mouth once., Disp: , Rfl:     metFORMIN (GLUCOPHAGE-XR) 500 MG ER 24hr tablet, TAKE 2 TABLETS(1000 MG) BY MOUTH TWICE DAILY WITH MEALS, Disp: 360 tablet, Rfl: 1    metoprolol succinate (TOPROL-XL) 50 MG 24 hr tablet, TAKE 1 TABLET(50 MG) BY MOUTH EVERY DAY, Disp: 90 tablet, Rfl: 3    metroNIDAZOLE (NORITATE) 1 % cream, Compound azelaic acid 15% + ivermectin 1% + metronidazole 1% cream. Apply to face once or twice daily., Disp: 30 g, Rfl: 11    MV,CA,MIN/IRON/FA/GUARANA/CAFF (ONE-A-DAY WOMEN'S ACTIVE ORAL), Take by mouth., Disp: , Rfl:     rosuvastatin (CRESTOR) 10 MG tablet, TAKE 1 TABLET(10 MG) BY MOUTH EVERY DAY, Disp: 90 tablet, Rfl: 1    SYNTHROID 175 mcg tablet, Take 1 tablet (175 mcg total) by mouth before breakfast., Disp: 90 tablet, Rfl: 3    vitamin D (VITAMIN D3) 1000 units Tab, Take 1,000 Units by mouth once daily., Disp: , Rfl:     betamethasone dipropionate 0.05 % cream, Apply topically 2 (two) times daily as needed. (Patient not taking: Reported on 12/2/2024), Disp: , Rfl:     clobetasol 0.05% (TEMOVATE) 0.05 % Oint, Apply topically 2 (two) times daily., Disp: 30 g, Rfl: 2  No current facility-administered medications for this visit.    Facility-Administered Medications Ordered in Other Visits:     0.9%  NaCl infusion, , Intravenous, Continuous, Steffi Christian NP, New Bag at 09/26/19 0913    sodium chloride 0.9% flush 5 mL, 5 mL, Intravenous, PRN, Steffi Christian NP     Allergies:  Review of patient's allergies indicates:   Allergen Reactions    Levaquin [levofloxacin] Hives    Tetracycline Hives       Physical Exam      Vital Signs  Temp: 97.6 °F (36.4 °C)  Pulse: 65  Resp: 20  BP: 126/84  BP Location: Left arm  Patient Position: Sitting  Pain Score: 0-No pain  Height and Weight  Height: 5' 6" (167.6 cm)  Weight: 84.2 kg (185 lb 10 oz)  BSA (Calculated - sq " m): 1.98 sq meters  BMI (Calculated): 30  Weight in (lb) to have BMI = 25: 154.6      Patient Position: Sitting      Physical Exam  Vitals reviewed.   Constitutional:       General: She is not in acute distress.     Appearance: Normal appearance. She is not ill-appearing, toxic-appearing or diaphoretic.   HENT:      Head: Normocephalic and atraumatic.      Right Ear: Tympanic membrane normal.      Left Ear: Tympanic membrane normal.      Mouth/Throat:      Mouth: Mucous membranes are moist.   Eyes:      Extraocular Movements: Extraocular movements intact.      Conjunctiva/sclera: Conjunctivae normal.      Pupils: Pupils are equal, round, and reactive to light.      Comments: Mehdi ptosis   Neck:      Vascular: No carotid bruit.   Cardiovascular:      Rate and Rhythm: Normal rate and regular rhythm.      Pulses: Normal pulses.      Heart sounds: Normal heart sounds. No murmur heard.  Pulmonary:      Effort: No respiratory distress.      Breath sounds: Normal breath sounds. No wheezing.   Abdominal:      General: Bowel sounds are normal. There is no distension.      Palpations: Abdomen is soft.      Tenderness: There is no abdominal tenderness. There is no guarding or rebound.   Musculoskeletal:      Comments: FROM  of neck.  Slight pain on lateral flexion and rotation of neck to Rt.    Neurological:      General: No focal deficit present.      Mental Status: She is alert and oriented to person, place, and time.   Psychiatric:         Mood and Affect: Mood normal.         Behavior: Behavior normal.          Laboratory:  CBC:  Recent Labs   Lab 06/21/23  1058 02/06/24  1046 04/02/24  0852   WBC 6.32 6.65 5.94   RBC 4.50 4.51 4.14   Hemoglobin 13.4 13.3 12.4   Hematocrit 42.6 41.5 39.3   Platelets 271 249 241   MCV 95 92 95   MCH 29.8 29.5 30.0   MCHC 31.5 L 32.0 31.6 L       CMP:  Recent Labs   Lab 06/21/23  1058 12/20/23  1523 02/06/24  1046 04/02/24  0852 06/13/24  1455   Glucose 121 H 97 118 H   < > 157 H   Calcium  10.1 10.1 10.3   < > 10.1   Albumin 3.6 3.7 3.8  --   --    Total Protein 6.5 6.4 6.9  --   --    Sodium 142 138 141   < > 135 L   Potassium 4.3 4.1 4.2   < > 4.4   CO2 25 24 25   < > 25   Chloride 107 106 108   < > 103   BUN 12 11 11   < > 10   Creatinine 0.8 0.8 0.8   < > 0.8   Alkaline Phosphatase 54 L 49 L 57  --   --    ALT 18 17 20  --   --    AST 19 19 19  --   --    Total Bilirubin 0.7 0.7 1.0  --   --     < > = values in this interval not displayed.       URINALYSIS:  Recent Labs   Lab 02/06/24  1123 06/24/24  1003   Color, UA Yellow Yellow   Specific Gravity, UA 1.020 1.015   pH, UA 5.0 6.0   Protein, UA Negative Negative   Bacteria Rare  --    Nitrite, UA Negative Negative   Leukocytes, UA Trace A Negative        LIPIDS:  Recent Labs   Lab 12/19/22  1242 06/21/23  1058 12/20/23  1523 02/06/24  1046 06/13/24  1455   TSH  --  1.764 3.452 1.651  --    HDL 41 41  --   --  43   Cholesterol 96 L 92 L  --   --  80 L   Triglycerides 82 93  --   --  98   LDL Cholesterol 38.6 L 32.4 L  --   --  17.4 L   HDL/Cholesterol Ratio 42.7 44.6  --   --  53.8 H   Non-HDL Cholesterol 55 51  --   --  37   Total Cholesterol/HDL Ratio 2.3 2.2  --   --  1.9 L       TSH:  Recent Labs   Lab 06/21/23  1058 12/20/23  1523 02/06/24  1046   TSH 1.764 3.452 1.651       A1C:  Recent Labs   Lab 01/05/22  1215 06/28/22  1445 12/19/22  1242 06/21/23  1058 12/20/23  1523 06/13/24  1455   Hemoglobin A1C 6.3 H 6.3 H 6.4 H 6.3 H 6.3 H 6.4 H       Urine Microalbumin/Cr:  Recent Labs   Lab 01/05/22  1223 12/13/22  1438 12/20/23  1506   Microalb/Creat Ratio 21.9 10.3 Unable to calculate            Recent Labs   Lab 02/06/24  1046   Hepatitis C Ab Non-reactive       Assessment/Plan     Bebe Valdez is a 74 y.o.female with:    Hyperlipidemia, unspecified hyperlipidemia type  - Controlled.  Cont current.     Type 2 diabetes mellitus with hypercholesterolemia  -     Comprehensive Metabolic Panel; Future; Expected date: 12/02/2024  -     Hemoglobin  A1C; Future; Expected date: 12/02/2024  -     Microalbumin/Creatinine Ratio, Urine  - Controlled.  Cont current.     Hypothyroidism, unspecified type  -     TSH; Future; Expected date: 12/02/2024  - Stable.  Cont current regimen.    Paroxysmal atrial fibrillation  -     CBC Auto Differential; Future; Expected date: 12/02/2024  - Stable.  Cont current regimen.    Aortic atherosclerosis  - Stable.  Cont current regimen.    Overweight (BMI 25.0-29.9)  - Cont diet and exercise for wt loss.     Screening mammogram, encounter for  -     Mammo Digital Screening Bilat w/ Fernando; Future; Expected date: 02/13/2025       Chronic conditions status updated as per HPI.  Other than changes above, cont current medications and maintain follow up with specialists.   Follow up in about 6 months (around 6/2/2025) for fu chronic issues or sooner if needed.      Amber Dorsey MD  [facility]

## 2024-12-02 ENCOUNTER — LAB VISIT (OUTPATIENT)
Dept: LAB | Facility: HOSPITAL | Age: 74
End: 2024-12-02
Attending: INTERNAL MEDICINE
Payer: MEDICARE

## 2024-12-02 ENCOUNTER — OFFICE VISIT (OUTPATIENT)
Dept: PRIMARY CARE CLINIC | Facility: CLINIC | Age: 74
End: 2024-12-02
Payer: MEDICARE

## 2024-12-02 VITALS
DIASTOLIC BLOOD PRESSURE: 84 MMHG | SYSTOLIC BLOOD PRESSURE: 126 MMHG | HEART RATE: 65 BPM | TEMPERATURE: 98 F | BODY MASS INDEX: 29.83 KG/M2 | HEIGHT: 66 IN | WEIGHT: 185.63 LBS | RESPIRATION RATE: 20 BRPM

## 2024-12-02 DIAGNOSIS — E66.3 OVERWEIGHT (BMI 25.0-29.9): ICD-10-CM

## 2024-12-02 DIAGNOSIS — E78.00 TYPE 2 DIABETES MELLITUS WITH HYPERCHOLESTEROLEMIA: ICD-10-CM

## 2024-12-02 DIAGNOSIS — E11.69 TYPE 2 DIABETES MELLITUS WITH HYPERCHOLESTEROLEMIA: ICD-10-CM

## 2024-12-02 DIAGNOSIS — I48.0 PAROXYSMAL ATRIAL FIBRILLATION: ICD-10-CM

## 2024-12-02 DIAGNOSIS — I70.0 AORTIC ATHEROSCLEROSIS: ICD-10-CM

## 2024-12-02 DIAGNOSIS — E03.9 HYPOTHYROIDISM, UNSPECIFIED TYPE: ICD-10-CM

## 2024-12-02 DIAGNOSIS — I48.91 ATRIAL FIBRILLATION, UNSPECIFIED TYPE: ICD-10-CM

## 2024-12-02 DIAGNOSIS — Z12.31 SCREENING MAMMOGRAM, ENCOUNTER FOR: ICD-10-CM

## 2024-12-02 DIAGNOSIS — E78.5 HYPERLIPIDEMIA, UNSPECIFIED HYPERLIPIDEMIA TYPE: Primary | ICD-10-CM

## 2024-12-02 LAB
ALBUMIN SERPL BCP-MCNC: 3.8 G/DL (ref 3.5–5.2)
ALBUMIN/CREAT UR: 11.7 UG/MG (ref 0–30)
ALP SERPL-CCNC: 56 U/L (ref 40–150)
ALT SERPL W/O P-5'-P-CCNC: 21 U/L (ref 10–44)
ANION GAP SERPL CALC-SCNC: 6 MMOL/L (ref 8–16)
AST SERPL-CCNC: 21 U/L (ref 10–40)
BASOPHILS # BLD AUTO: 0.03 K/UL (ref 0–0.2)
BASOPHILS NFR BLD: 0.5 % (ref 0–1.9)
BILIRUB SERPL-MCNC: 0.8 MG/DL (ref 0.1–1)
BUN SERPL-MCNC: 12 MG/DL (ref 8–23)
CALCIUM SERPL-MCNC: 10.3 MG/DL (ref 8.7–10.5)
CHLORIDE SERPL-SCNC: 105 MMOL/L (ref 95–110)
CO2 SERPL-SCNC: 27 MMOL/L (ref 23–29)
CREAT SERPL-MCNC: 0.8 MG/DL (ref 0.5–1.4)
CREAT UR-MCNC: 171 MG/DL (ref 15–325)
DIFFERENTIAL METHOD BLD: ABNORMAL
EOSINOPHIL # BLD AUTO: 0.1 K/UL (ref 0–0.5)
EOSINOPHIL NFR BLD: 2.5 % (ref 0–8)
ERYTHROCYTE [DISTWIDTH] IN BLOOD BY AUTOMATED COUNT: 13.3 % (ref 11.5–14.5)
EST. GFR  (NO RACE VARIABLE): >60 ML/MIN/1.73 M^2
ESTIMATED AVG GLUCOSE: 137 MG/DL (ref 68–131)
GLUCOSE SERPL-MCNC: 121 MG/DL (ref 70–110)
HBA1C MFR BLD: 6.4 % (ref 4–5.6)
HCT VFR BLD AUTO: 42.7 % (ref 37–48.5)
HGB BLD-MCNC: 13.6 G/DL (ref 12–16)
IMM GRANULOCYTES # BLD AUTO: 0.02 K/UL (ref 0–0.04)
IMM GRANULOCYTES NFR BLD AUTO: 0.4 % (ref 0–0.5)
LYMPHOCYTES # BLD AUTO: 1.7 K/UL (ref 1–4.8)
LYMPHOCYTES NFR BLD: 30 % (ref 18–48)
MCH RBC QN AUTO: 29.8 PG (ref 27–31)
MCHC RBC AUTO-ENTMCNC: 31.9 G/DL (ref 32–36)
MCV RBC AUTO: 93 FL (ref 82–98)
MICROALBUMIN UR DL<=1MG/L-MCNC: 20 UG/ML
MONOCYTES # BLD AUTO: 0.6 K/UL (ref 0.3–1)
MONOCYTES NFR BLD: 10.5 % (ref 4–15)
NEUTROPHILS # BLD AUTO: 3.2 K/UL (ref 1.8–7.7)
NEUTROPHILS NFR BLD: 56.1 % (ref 38–73)
NRBC BLD-RTO: 0 /100 WBC
PLATELET # BLD AUTO: 246 K/UL (ref 150–450)
PMV BLD AUTO: 11.2 FL (ref 9.2–12.9)
POTASSIUM SERPL-SCNC: 4.1 MMOL/L (ref 3.5–5.1)
PROT SERPL-MCNC: 6.8 G/DL (ref 6–8.4)
RBC # BLD AUTO: 4.57 M/UL (ref 4–5.4)
SODIUM SERPL-SCNC: 138 MMOL/L (ref 136–145)
WBC # BLD AUTO: 5.63 K/UL (ref 3.9–12.7)

## 2024-12-02 PROCEDURE — G2211 COMPLEX E/M VISIT ADD ON: HCPCS | Mod: S$PBB,,, | Performed by: INTERNAL MEDICINE

## 2024-12-02 PROCEDURE — 80053 COMPREHEN METABOLIC PANEL: CPT | Performed by: INTERNAL MEDICINE

## 2024-12-02 PROCEDURE — 99214 OFFICE O/P EST MOD 30 MIN: CPT | Mod: PBBFAC,PN | Performed by: INTERNAL MEDICINE

## 2024-12-02 PROCEDURE — 85025 COMPLETE CBC W/AUTO DIFF WBC: CPT | Performed by: INTERNAL MEDICINE

## 2024-12-02 PROCEDURE — 83036 HEMOGLOBIN GLYCOSYLATED A1C: CPT | Performed by: INTERNAL MEDICINE

## 2024-12-02 PROCEDURE — 99999 PR PBB SHADOW E&M-EST. PATIENT-LVL IV: CPT | Mod: PBBFAC,,, | Performed by: INTERNAL MEDICINE

## 2024-12-02 PROCEDURE — 36415 COLL VENOUS BLD VENIPUNCTURE: CPT | Mod: PN | Performed by: INTERNAL MEDICINE

## 2024-12-02 PROCEDURE — 84443 ASSAY THYROID STIM HORMONE: CPT | Performed by: INTERNAL MEDICINE

## 2024-12-02 PROCEDURE — 82570 ASSAY OF URINE CREATININE: CPT | Performed by: INTERNAL MEDICINE

## 2024-12-02 PROCEDURE — 84439 ASSAY OF FREE THYROXINE: CPT | Performed by: INTERNAL MEDICINE

## 2024-12-02 PROCEDURE — 99214 OFFICE O/P EST MOD 30 MIN: CPT | Mod: S$PBB,,, | Performed by: INTERNAL MEDICINE

## 2024-12-03 DIAGNOSIS — E03.9 HYPOTHYROIDISM, UNSPECIFIED TYPE: Primary | ICD-10-CM

## 2024-12-03 LAB
T4 FREE SERPL-MCNC: 1.46 NG/DL (ref 0.71–1.51)
TSH SERPL DL<=0.005 MIU/L-ACNC: 0.11 UIU/ML (ref 0.4–4)

## 2024-12-03 RX ORDER — LEVOTHYROXINE SODIUM 175 UG/1
TABLET ORAL
Qty: 90 TABLET | Refills: 1 | Status: SHIPPED | OUTPATIENT
Start: 2024-12-03

## 2024-12-03 NOTE — PROGRESS NOTES
I sent pt a my chart message -  I reviewed your labs.  Your Ha1c remains controlled at 6.4. Your Tsh is low at 0.109.  We will decrease your Brand synthroid 175 mcg daily to 175 mcg 6 days per wk (Mon.- Sat.) and 1/2 tab on Sundays. We can repeat the thyroid functions in 6 wks. Make sure you are taking this 30 minutes before any other meds or food. Your kidney function and liver functions looked good.  You are not anemic. No further recommendations at this time.  Dr. ROBERT

## 2024-12-05 ENCOUNTER — PATIENT MESSAGE (OUTPATIENT)
Dept: PRIMARY CARE CLINIC | Facility: CLINIC | Age: 74
End: 2024-12-05
Payer: MEDICARE

## 2024-12-06 NOTE — TELEPHONE ENCOUNTER
She needs to adjust the dose.  Over treatment of hypothyroidism can cause Decrease in bone density and can cause palpitations and I would worry about a fib with overtreatment.  TSH is her thyroid function. It is fine to take the Synthroid 60 min before other food or meds as she is doing.  It should be taken at least 30 minutes prior to food meds.  Dr. ROBERT

## 2025-01-28 DIAGNOSIS — I48.91 ATRIAL FIBRILLATION, UNSPECIFIED TYPE: ICD-10-CM

## 2025-01-28 RX ORDER — METOPROLOL SUCCINATE 50 MG/1
50 TABLET, EXTENDED RELEASE ORAL
Qty: 90 TABLET | Refills: 3 | Status: SHIPPED | OUTPATIENT
Start: 2025-01-28

## 2025-02-10 DIAGNOSIS — E78.00 TYPE 2 DIABETES MELLITUS WITH HYPERCHOLESTEROLEMIA: ICD-10-CM

## 2025-02-10 DIAGNOSIS — E11.69 TYPE 2 DIABETES MELLITUS WITH HYPERCHOLESTEROLEMIA: ICD-10-CM

## 2025-02-10 RX ORDER — METFORMIN HYDROCHLORIDE 500 MG/1
1000 TABLET, EXTENDED RELEASE ORAL 2 TIMES DAILY WITH MEALS
Qty: 360 TABLET | Refills: 1 | Status: SHIPPED | OUTPATIENT
Start: 2025-02-10

## 2025-02-11 NOTE — TELEPHONE ENCOUNTER
Refill Decision Note   Bebe Valdez  is requesting a refill authorization.  Brief Assessment and Rationale for Refill:  Approve     Medication Therapy Plan:         Comments:     Note composed:10:40 PM 02/10/2025

## 2025-02-11 NOTE — TELEPHONE ENCOUNTER
No care due was identified.  [facility] Embedded Care Due Messages. Reference number: [id number].   2/10/2025 7:57:51 PM CST

## 2025-02-15 DIAGNOSIS — E11.69 TYPE 2 DIABETES MELLITUS WITH HYPERCHOLESTEROLEMIA: ICD-10-CM

## 2025-02-15 DIAGNOSIS — E03.2 HYPOTHYROIDISM DUE TO NON-MEDICATION EXOGENOUS SUBSTANCES: ICD-10-CM

## 2025-02-15 DIAGNOSIS — E66.811 CLASS 1 OBESITY DUE TO EXCESS CALORIES WITH SERIOUS COMORBIDITY AND BODY MASS INDEX (BMI) OF 32.0 TO 32.9 IN ADULT: ICD-10-CM

## 2025-02-15 DIAGNOSIS — E78.00 TYPE 2 DIABETES MELLITUS WITH HYPERCHOLESTEROLEMIA: ICD-10-CM

## 2025-02-15 DIAGNOSIS — I48.19 PERSISTENT ATRIAL FIBRILLATION: ICD-10-CM

## 2025-02-15 DIAGNOSIS — E66.09 CLASS 1 OBESITY DUE TO EXCESS CALORIES WITH SERIOUS COMORBIDITY AND BODY MASS INDEX (BMI) OF 32.0 TO 32.9 IN ADULT: ICD-10-CM

## 2025-02-17 ENCOUNTER — HOSPITAL ENCOUNTER (OUTPATIENT)
Dept: RADIOLOGY | Facility: HOSPITAL | Age: 75
Discharge: HOME | End: 2025-02-17
Attending: INTERNAL MEDICINE
Payer: MEDICARE

## 2025-02-17 VITALS — BODY MASS INDEX: 29.7 KG/M2 | WEIGHT: 184 LBS

## 2025-02-17 DIAGNOSIS — Z12.31 SCREENING MAMMOGRAM, ENCOUNTER FOR: ICD-10-CM

## 2025-02-17 PROCEDURE — 77067 SCR MAMMO BI INCL CAD: CPT | Mod: TC

## 2025-02-17 RX ORDER — APIXABAN 5 MG/1
5 TABLET, FILM COATED ORAL 2 TIMES DAILY
Qty: 180 TABLET | Refills: 3 | Status: SHIPPED | OUTPATIENT
Start: 2025-02-17 | End: 2025-02-20

## 2025-02-18 ENCOUNTER — RESULTS FOLLOW-UP (OUTPATIENT)
Dept: PRIMARY CARE CLINIC | Facility: CLINIC | Age: 75
End: 2025-02-18

## 2025-02-18 NOTE — PROGRESS NOTES
I sent pt a my chart message -  I reviewed your Mammogram -   There are scattered areas of fibroglandular density. There is no evidence of suspicious masses, microcalcifications or architectural distortion.  Impression:   No mammographic evidence of malignancy.  Routine screening mammogram in 1 year is recommended.  Dr. ROBERT

## 2025-02-19 DIAGNOSIS — E78.00 TYPE 2 DIABETES MELLITUS WITH HYPERCHOLESTEROLEMIA: ICD-10-CM

## 2025-02-19 DIAGNOSIS — E66.09 CLASS 1 OBESITY DUE TO EXCESS CALORIES WITH SERIOUS COMORBIDITY AND BODY MASS INDEX (BMI) OF 32.0 TO 32.9 IN ADULT: ICD-10-CM

## 2025-02-19 DIAGNOSIS — E11.69 TYPE 2 DIABETES MELLITUS WITH HYPERCHOLESTEROLEMIA: ICD-10-CM

## 2025-02-19 DIAGNOSIS — I48.19 PERSISTENT ATRIAL FIBRILLATION: ICD-10-CM

## 2025-02-19 DIAGNOSIS — E03.2 HYPOTHYROIDISM DUE TO NON-MEDICATION EXOGENOUS SUBSTANCES: ICD-10-CM

## 2025-02-19 DIAGNOSIS — E66.811 CLASS 1 OBESITY DUE TO EXCESS CALORIES WITH SERIOUS COMORBIDITY AND BODY MASS INDEX (BMI) OF 32.0 TO 32.9 IN ADULT: ICD-10-CM

## 2025-02-19 NOTE — TELEPHONE ENCOUNTER
----- Message from Tereza sent at 2/19/2025  4:52 PM CST -----  Regarding: FW: Refill  Please see the message below and call the pt she says [facility] is telling her that the Rx was denied.Thanks  ----- Message -----  From: Tereza Weinstein  Sent: 2/19/2025   1:01 PM CST  To: Daniel [facility] Staff  Subject: Refill                                           Pt [phone] says the pharmacist is telling her they have not received her refill request for Eliquis 5 mg. She would like someone from the office to call the pharmacy in reference to the refill, and give her a call. Thanks

## 2025-02-19 NOTE — TELEPHONE ENCOUNTER
[facility] called . Spoke with Zeny who stated that they have not received a prescription for the Eliquis.  Verbal order for refill of Eliquis 5 mg BID provided.  Patient notified and confirms that she has enough medication to last her 2 more days.

## 2025-02-20 RX ORDER — APIXABAN 5 MG/1
5 TABLET, FILM COATED ORAL 2 TIMES DAILY
Qty: 180 TABLET | Refills: 3 | Status: SHIPPED | OUTPATIENT
Start: 2025-02-20

## 2025-02-20 NOTE — TELEPHONE ENCOUNTER
----- Message from Med Assistant Davison sent at 2/19/2025  4:24 PM CST -----  Regarding: FW: Refill    ----- Message -----  From: Tereza Weinstein  Sent: 2/19/2025   1:01 PM CST  To: Daniel [facility] Staff  Subject: Refill                                           Pt [phone] says the pharmacist is telling her they have not received her refill request for Eliquis 5 mg. She would like someone from the office to call the pharmacy in reference to the refill, and give her a call. Thanks

## 2025-02-21 DIAGNOSIS — Z00.00 ENCOUNTER FOR MEDICARE ANNUAL WELLNESS EXAM: ICD-10-CM

## 2025-03-11 ENCOUNTER — TELEPHONE (OUTPATIENT)
Dept: DERMATOLOGY | Facility: CLINIC | Age: 75
End: 2025-03-11
Payer: MEDICARE

## 2025-03-11 NOTE — TELEPHONE ENCOUNTER
----- Message from Med Assistant Stallworth sent at 3/7/2025  2:55 PM CST -----   [phone] (Today,  1:38 PM)Pt is calling to schedule a appt to renew compound prescription please call

## 2025-03-12 ENCOUNTER — PATIENT MESSAGE (OUTPATIENT)
Dept: DERMATOLOGY | Facility: CLINIC | Age: 75
End: 2025-03-12
Payer: MEDICARE

## 2025-03-13 ENCOUNTER — OFFICE VISIT (OUTPATIENT)
Dept: DERMATOLOGY | Facility: CLINIC | Age: 75
End: 2025-03-13
Payer: MEDICARE

## 2025-03-13 DIAGNOSIS — L71.9 ROSACEA: ICD-10-CM

## 2025-03-13 DIAGNOSIS — I48.0 PAROXYSMAL ATRIAL FIBRILLATION: Primary | ICD-10-CM

## 2025-03-13 DIAGNOSIS — I78.1 TELANGIECTASIA: Primary | ICD-10-CM

## 2025-03-13 PROCEDURE — 99214 OFFICE O/P EST MOD 30 MIN: CPT | Mod: S$GLB,,, | Performed by: DERMATOLOGY

## 2025-03-13 NOTE — PROGRESS NOTES
Patient Information  Name: Bebe Valdez  : 1950  MRN: [id number]     Referring Physician:  No ref. provider found   Primary Care Physician:  Amber Dorsey MD   Date of Visit: 3/13/25      Subjective:     History of Present lllness:    Bebe Valdez is a 74 y.o. female who presents with a chief complaint of rosacea.    Location: face  Signs/Symptoms: has been experiencing more redness on cheeks; no bumps or pimples  Symptom course: worsening  Current treatment: Rx rosacea compound    Patient was last seen: 3/5/2024.  Prior notes by myself reviewed.   Clinical documentation obtained by nursing staff reviewed.    Review of Systems    Objective:   Physical Exam   Constitutional: She appears well-developed and well-nourished. No distress.   Neurological: She is alert and oriented to person, place, and time. She is not disoriented.   Psychiatric: She has a normal mood and affect.   Skin:   Areas Examined (abnormalities noted in diagram):   Head / Face Inspection Performed            Diagram Legend     Erythematous scaling macule/papule c/w actinic keratosis       Vascular papule c/w angioma      Pigmented verrucoid papule/plaque c/w seborrheic keratosis      Yellow umbilicated papule c/w sebaceous hyperplasia      Irregularly shaped tan macule c/w lentigo     1-2 mm smooth white papules consistent with Milia      Movable subcutaneous cyst with punctum c/w epidermal inclusion cyst      Subcutaneous movable cyst c/w pilar cyst      Firm pink to brown papule c/w dermatofibroma      Pedunculated fleshy papule(s) c/w skin tag(s)      Evenly pigmented macule c/w junctional nevus     Mildly variegated pigmented, slightly irregular-bordered macule c/w mildly atypical nevus      Flesh colored to evenly pigmented papule c/w intradermal nevus       Pink pearly papule/plaque c/w basal cell carcinoma      Erythematous hyperkeratotic cursted plaque c/w SCC      Surgical scar with no sign of skin cancer recurrence       Open and closed comedones      Inflammatory papules and pustules      Verrucoid papule consistent consistent with wart     Erythematous eczematous patches and plaques     Dystrophic onycholytic nail with subungual debris c/w onychomycosis     Umbilicated papule    Erythematous-base heme-crusted tan verrucoid plaque consistent with inflamed seborrheic keratosis     Erythematous Silvery Scaling Plaque c/w Psoriasis     See annotation    No images are attached to the encounter or orders placed in the encounter.      [] Data reviewed  [] Prior external notes reviewed  [] Independent review of test  [] Management discussed with another provider  [] Independent historian    Assessment / Plan:        Rosacea  Telangiectasia  - chronic problem, not at treatment goal  -     metroNIDAZOLE (NORITATE) 1 % cream; Compound azelaic acid 15% + ivermectin 1% + metronidazole 1% cream. Apply to face once or twice daily.  Dispense: 30 g; Refill: 11  Offered a trial of a topical vasoconstrictor, such as Rhofade or Mirvaso. Discussed benefits and risks of treatment, including but not limited to flushing, rebound erythema, burning sensation, and headache.  Recommended and discussed risks, benefits, alternatives, and side effects of pulse dye laser (PDL). Last laser tx was in 2019.      Follow up for laser.      Lashaun Montes De Oca MD, FAAD  [facility]

## 2025-03-14 ENCOUNTER — PROCEDURE VISIT (OUTPATIENT)
Dept: DERMATOLOGY | Facility: CLINIC | Age: 75
End: 2025-03-14
Payer: MEDICARE

## 2025-03-14 DIAGNOSIS — I78.1 TELANGIECTASIA: ICD-10-CM

## 2025-03-14 DIAGNOSIS — Z41.1 ENCOUNTER FOR COSMETIC LASER PROCEDURE: Primary | ICD-10-CM

## 2025-03-14 NOTE — PROGRESS NOTES
Patient Information  Name: Bebe Valdez  : 1950  MRN: [id number]     Date of Visit: 3/14/25

## 2025-04-08 DIAGNOSIS — E03.9 HYPOTHYROIDISM, UNSPECIFIED TYPE: ICD-10-CM

## 2025-04-08 RX ORDER — LEVOTHYROXINE SODIUM 175 UG/1
TABLET ORAL
Qty: 90 TABLET | Refills: 2 | Status: SHIPPED | OUTPATIENT
Start: 2025-04-08

## 2025-04-08 NOTE — TELEPHONE ENCOUNTER
Care Due:                  Date            Visit Type   Department     Provider  --------------------------------------------------------------------------------                                EP -                              PRIMARY      [facility]  Last Visit: 12-      CARE ([facility])   CARE           Amberrita Forbese                              EP -                              PRIMARY      [facility]  Next Visit: 05-      CARE ([facility])   CARE           Amber  Giambrone                                                            Last  Test          Frequency    Reason                     Performed    Due Date  --------------------------------------------------------------------------------    Lipid Panel.  12 months..  rosuvastatin.............  06- 06-    [employer] Embedded Care Due Messages. Reference number: [id number].   4/08/2025 12:02:44 PM CDT

## 2025-04-08 NOTE — TELEPHONE ENCOUNTER
Provider Staff:  Action required for this patient    Requires labs      Please see care gap opportunities below in Care Due Message.    Thanks!  [facility]     Appointments      Date Provider   Last Visit   12/2/2024 Amber Dorsey MD   Next Visit   5/6/2025 Amber Dorsey MD     Refill Decision Note   Bebe Valdez  is requesting a refill authorization.  Brief Assessment and Rationale for Refill:  Approve     Medication Therapy Plan:  T4 WNL (12/02/24)      Comments:     Note composed:12:04 PM 04/08/2025

## 2025-04-30 NOTE — PROGRESS NOTES
Ms. Valdez is a patient of Dr. Sanchez and was last seen in clinic 11/5/2024.      Subjective:   Patient ID:  Bebe Valdez is a 74 y.o. female who presents for follow up of Atrial Fibrillation  .     HPI:    Ms. Valdez is a 74 y.o. female with AF (4/8/2024 PVI/PWI), hypothyroid, DM, cardiomyopathy here for follow up.    Background:    Referring Cardiologist: Tami De Leon MD  Primary Care Physician: Anil Nelson MD    Mrs. Valdez has a hx of diabetes mellitus, hypothyroidism on synthroid and recent onset of persistent atrial fibrillation. She was exercising with a  in July 2019 and pulse was noted to be elevated and irregular. She saw Dr. Nelson on 8/26/2019 and was diagnosed with atrial fibrillation. She was started on metoprolol and aspirin. TSH was checked and was undetectable. Her synthroid dosage was reduced. She was then assessed by Dr. De Leon who started her on eliquis and referred her here for further evaluation. A transthoracic echocardiogram was performed noting a LVEF of 45% with moderate left atrial enlargement and mildly depressed RV function. She reported no obvious symptoms related to AF. At our initial visit in 2018 we discussed trial of restoration of sinus rhythm to see if her mild systolic cardiomyopathy could be due to AF.    9/26/2019: Underwent successful DORINA/DCCV. EF on DORINA was 45%.      10/25/2019: She is one month s/p cardioversion and back in atrial fibrillation. Rate is controlled. She experienced no symptom improvement in sinus rhythm. However, EF is 45%. We discussed that this could be from the AF or from her period of hyperthyroidism. She is still hyperthyroid and is going to decrease her synthroid again per PCP (has a follow up soon). It would increase the likelihood of maintaining sinus rhythm after cardioversion if her thyroid levels are normal. We discussed anti-arrhythmic options, which include tikosyn or sotalol which both require hospital stays. She does not want to  proceed with that route. It might be possible to use amio after her DCCV, after her thyroid has normalized and her synthroid dose is appropriate, until her follow up echo. If her EF normalizes, could switch to 1C or Multaq.      12/2019: She remained in AF, minimal symptoms although in the presence of a cardiomyopathy. Plan was to proceed with rhythm control once she was euthyroid, and evaluate any symptom improvement or increase in LV function.      1/9/2020: successful DCCV. Multaq initiated. Felt better afterward. Thyroid function now normal. Will repeat echo in 3 months to observe for improvement in LV function in SR.     6/4/2020: She is now 5 months s/p cardioversion and initiation of Multaq. Echo yesterday shows normalization of LV function (40%->55%). EKG with acceptable intervals. She is feeling well. Remains on eliquis for CVA prophylaxis.     12/29/2020: Doing well from a rhythm standpoint, remains in SR on multaq. EF recovered in SR. We discussed that she was a candidate for ablation given her hx of arrhythmia-induced CM. She was not interested in ablation unless absolutely necessary. She strongly prefers to remain on multaq at this time. Frequent PVCs on EKG.      6/3/2021: Holter 1/2021: PVC 10%. No AF.    1/2022: Mrs. Valdez returns for follow-up. She recently developed a rash. Metoprolol was held to see if it could be from that however rash did not improve. She resumed metoprolol. She has done well on multaq. She reports no bleeding issues with eliquis.    2/2023: Mrs. Valdez returns for follow-up. No symptomatic AF on multaq. No nausea. No bleeding with eliquis. She works out with a .  ECG is sinus rhythm In summary, Mrs. Valdez is a pleasant 72 year-old woman with diabetes mellitus, hypothyroidism on synthroid and persistent AF that caused a mild cardiomyopathy. She is maintaining sinus rhythm on multaq and EF has recovered. PVI has been discussed. She prefers to continue multaq as long as it is  working. Continue multaq, metoprolol, and eliquis. RTC in 6 months, sooner if needed. Needs updated CBC.    8/14/2023: She continues to do well from a rhythm standpoint, maintaining sinus rhythm on multaq. ECG with stable intervals. CHADSVASc 3 on eliquis. She is feeling well.    2/6/2024: Vaginal bleeding. All testing WNL. Has followed up with GYN and uterine biopsy was negative.    2/14/2024: She is back in AF despite multaq. Minimal symptoms but hx of AF-induced CM. She declines amiodarone or tikosyn. Good candidate for PVI. Risks, benefits, and alternatives discussed. All questions answered. She agrees to proceed.  Hx of vaginal bleeding. We also discussed watchman in the future. She will consider if bleeding recurs.    4/8/2024:    Cardioversion prior to ablation.    Successful pulmonary vein RF ablation.    Successful LA posterior wall isolation targetting CFAEs as potential triggers for AF.    7/15/2024: She is 3 months s/p ablation (PVI/PWI).  She is doing well from a rhythm standpoint, with no documented or symptomatic recurrence of arrhythmia since procedure.  DORINA showed normal LVEF. Will finish bottle of multaq then stop. CHADSVASc 4 and she should continue eliquis for CVA prophylaxis.    11/5/2024: She is now 7 months s/p PVI/PWI. She continues to do well from a rhythm standpoint, with no documented or symptomatic recurrence of arrhythmia since procedure. No longer on multaq. CHADSVASc 3 on eliquis. Exercising frequently and doing well.    Update (05/07/2025):    Today she is feeling well. Recent cruise to [place]. Lots of walking. No SON, CP, palps, LH, syncope.    She is currently taking eliquis 5mg BID for stroke prophylaxis and denies significant bleeding episodes. She is currently being treated with toprol 50mg daily.  Kidney function is stable, with a creatinine of 0.8 on 12/2/2024.    I have personally reviewed the patient's EKG today, which shows sinus rhythm at 75bpm. [place] interval is 198. QRS is  94. QTc is 424.    Relevant Cardiac Test Results:    DORINA (4/8/2024):    Left Atrium: Left atrium is dilated. The left atrial appendage appears normal. The left atrial appendage has a windsock morphology. Appendage velocity is normal at greater than 40 cm/sec. There is no thrombus in the cavity.    Left Ventricle: There is normal systolic function with a visually estimated ejection fraction of 60 - 65%.    Right Ventricle: Normal right ventricular cavity size. Wall thickness is normal. Right ventricle wall motion  is normal. Systolic function is normal.    Aortic Valve: The aortic valve is a trileaflet valve.    Mitral Valve: There is no stenosis.    Aorta: Ascending aorta is normal measuring 3.5 cm. Descending aorta is normal. Grade 2 atherosclerosis with mild thickening. There is no mass present.    Current Outpatient Medications   Medication Sig    ascorbic acid, vitamin C, (VITAMIN C) 100 MG tablet Take 100 mg by mouth once daily.    augmented betamethasone dipropionate (DIPROLENE-AF) 0.05 % cream Apply to elbows BID prn scaling.    betamethasone dipropionate 0.05 % cream Apply topically 2 (two) times daily as needed.    CINNAMON BARK (CINNAMON ORAL) Take by mouth once daily. Pt taking 2 pills daily.    clobetasol 0.05% (TEMOVATE) 0.05 % Oint Apply topically 2 (two) times daily.    ELIQUIS 5 mg Tab TAKE 1 TABLET(5 MG) BY MOUTH TWICE DAILY    fish oil-omega-3 fatty acids 300-1,000 mg capsule Take 2 g by mouth once daily.    furosemide (LASIX) 20 MG tablet Take 1 tablet (20 mg total) by mouth daily as needed (leg swelling, shortness of breath, weight gain >3 lbs in 1 day or >5 lbs in 3 days).    magnesium 250 mg Tab Take by mouth once.    metFORMIN (GLUCOPHAGE-XR) 500 MG ER 24hr tablet TAKE 2 TABLETS(1000 MG) BY MOUTH TWICE DAILY WITH MEALS    metoprolol succinate (TOPROL-XL) 50 MG 24 hr tablet TAKE 1 TABLET(50 MG) BY MOUTH EVERY DAY    metroNIDAZOLE (NORITATE) 1 % cream Compound azelaic acid 15% + ivermectin 1% +  "metronidazole 1% cream. Apply to face once or twice daily.    MV,CA,MIN/IRON/FA/GUARANA/CAFF (ONE-A-DAY WOMEN'S ACTIVE ORAL) Take by mouth.    rosuvastatin (CRESTOR) 10 MG tablet TAKE 1 TABLET(10 MG) BY MOUTH EVERY DAY    SYNTHROID 175 mcg tablet TAKE 1 TABLET BY MOUTH EVERY DAY SIX DAYS A WEEK( MONDAY THROUGH SATURDAY) AND 1/2 TABLET ON SUNDAYS    vitamin D (VITAMIN D3) 1000 units Tab Take 1,000 Units by mouth once daily.     No current facility-administered medications for this visit.     Facility-Administered Medications Ordered in Other Visits   Medication    0.9%  NaCl infusion    sodium chloride 0.9% flush 5 mL       Review of Systems   Constitutional: Negative for malaise/fatigue.   Cardiovascular:  Negative for chest pain, dyspnea on exertion, irregular heartbeat, leg swelling and palpitations.   Respiratory:  Negative for shortness of breath.    Hematologic/Lymphatic: Negative for bleeding problem.   Skin:  Negative for rash.   Musculoskeletal:  Negative for myalgias.   Gastrointestinal:  Negative for hematemesis, hematochezia and nausea.   Genitourinary:  Negative for hematuria.   Neurological:  Negative for light-headedness.   Psychiatric/Behavioral:  Negative for altered mental status.    Allergic/Immunologic: Negative for persistent infections.       Objective:          /80 (Patient Position: Sitting)   Pulse 75   Ht 5' 6" (1.676 m)   Wt 85.2 kg (187 lb 13.3 oz)   LMP 07/18/2001   BMI 30.32 kg/m²     Physical Exam  Vitals and nursing note reviewed.   Constitutional:       Appearance: Normal appearance. She is well-developed.   HENT:      Head: Normocephalic.      Nose: Nose normal.   Eyes:      Pupils: Pupils are equal, round, and reactive to light.   Cardiovascular:      Rate and Rhythm: Normal rate and regular rhythm.   Pulmonary:      Effort: No respiratory distress.   Musculoskeletal:         General: Normal range of motion.   Skin:     General: Skin is warm and dry.      Findings: No " erythema.   Neurological:      Mental Status: She is alert and oriented to person, place, and time.   Psychiatric:         Speech: Speech normal.         Behavior: Behavior normal.           Lab Results   Component Value Date     05/06/2025     12/02/2024    K 4.1 05/06/2025    K 4.1 12/02/2024    MG 2.3 04/08/2005    BUN 12 05/06/2025    CREATININE 0.6 05/06/2025    ALT 16 05/06/2025    ALT 21 12/02/2024    AST 17 05/06/2025    AST 21 12/02/2024    HGB 13.6 12/02/2024    HCT 42.7 12/02/2024    TSH 0.012 (L) 05/06/2025    TSH 0.109 (L) 12/02/2024    LDLCALC 17.4 (L) 06/13/2024       Recent Labs   Lab 04/02/24  0852   INR 1.1       Assessment:     1. Persistent atrial fibrillation    2. Chronic systolic heart failure    3. Aortic atherosclerosis    4. Chronic anticoagulation    5. History of cardioversion    6. History of cardiac radiofrequency ablation (RFA)        Plan:     In summary, Ms. Valdez is a 74 y.o. female with AF (4/8/2024 PVI/PWI), hypothyroid, DM, cardiomyopathy here for follow up.  She is now 13 months s/p PVI. She continues to do well from a rhythm standpoint, with no documented or symptomatic recurrence of arrhythmia since procedure.  Not on AAD. CHADSVASc 3 on eliquis. ECG showing NSR with occ PVC. She is feeling well.    Continue current regimen  RTC 6 mo, sooner if needed    *A copy of this note has been sent to Dr. Sanchez*    Follow up in about 6 months (around 11/7/2025).      ------------------------------------------------------------------    Zoë Valera, CHRISTA, NP-C  Cardiac Electrophysiology

## 2025-05-05 NOTE — PROGRESS NOTES
[facility] Note    Chief Complaint      Chief Complaint   Patient presents with    Follow-up       History of Present Illness      Bebe Valdez is a 74 y.o.  .F with A fib, Chronic Sys HF, Hypothyroidism, DM - II, HLD, Aortic Atherosclerosis, Obesity, and Left shoulder adhesive capsulitis, Last visit - 12/2/24     Interim:   Saw Samanta Soodncer - 6/24/24 - Tx with macrobid  UC - 7/5/24 - Urticaria. Then was seen in ED 7/6/24.      Split RT toe nail - partially  fell off after recent cruise. Denies any trauma or pain.     BLE U/S - 6/3/24 - no evidence of DVT/Blood clot in either leg.  Continue  Lasix once daily as needed. She has not been needing it.      Hyponatremia - Sodium was low normal at 135. We will continue to monitor this.      Paroxysmal a fib -s/p cardioversion 9/26/19 and in '20 . Fu by EP, Dr. Sanchez. Pt on long term anticoagulation with Eliquis 5 mg BID, Toprol 50 mg/d, and is off Multaq 400 mg BID.   S/p Cardioversion and ablation - 4/8/24.      H/o Chronic Sys CHF - Improved. Prev EF 40% per Echo 1/9/20 and now 55% per echo 6/15/21 Pt on Multaq 400 mgBID and Toprol 50 mg/d.  Echo - 6/15/21 - EF - 55%; mod LAE. EP stopped Multaq. Has fu in May with EPDr. Sanchez.      Hypothyroidism -  Pt on Brand synthroid.  TSH is low at 0.109. We decreased Brand synthroid 175 mcg daily to 175 mcg 6 days per wk (Mon.- Sat.) and 1/2 tab on Sundays. We can repeat the thyroid functions. Cont. taking this 30 minutes before any other meds or food.       DM - II -Pt on Metformin  mg 2 tabs BID.  No evidence of microalbuminuria. Ha1c remains controlled at 6.4 in Dec. Continue current regimen.    No evidence of microalbuminuria -12/2/24.      HLD - Pt on Crestor 10 mg QHS. Well controlled in June.      Aortic atherosclerosis - Pt on Crestor 10 mg QHS.     Overweight - BMI - 30.10 - Unchanged since last visit- Pt on low carb/low chol diet. She does yoga x 2/wk and works out with a  3  times/wk.         Left shoulder adhesive capsulitis - Pt in PTx.      Ptosis - Pt was offered Sx by Ophtho but defers for now. Not affecting her vision at present.      H/o postmenopausal Vaginal bleeding -  Resolved. Seen in   ED - 2/6/24 - PMB - Pelvic U/S 10/12/23 -  neg. EMB - 2/7/24 - benign.      HCM - Flu - 9/7/24; RSV - 1/9/24;  Tdap - 9/3/15;  PCV 13 - 10/10/16;  PVX 23 - 10/16/17; Shingrix -8/26/19 and 11/5/19 ;  Zostavax - 7/30/13; COVID -19 Vaccine (Pfizer)  #1 - 2/202/1 ; #2 - 3/13/21; # 3 - 10/14/21; # 4 8/4/22; # 5 1/16/23; # 6 7/18/23; # 7 10/24/23; # 8 2/12/24; # 9 ;  MGM - 2/17/25 - repeat 1 yr;  DEXA - 7/26/22 -neg ;  PAP - no longer gets; Hep C Screen - 10/3/16 - neg;  C-scope -6/6/17 - repeat 10 yrs ;  Prev PCP - Dr. Nelson; EP - Dr Sanchez; Prev Cards - Dr. De Leon; Derm - Dr. Lan; Ophtho - Dr. Louie; Retina Sp - Dr. Yepez; Derm - Dr. Younger     Patient Care Team:  Amber Dorsey MD as PCP - General (Internal Medicine)  Brigitte Marvin RD as Dietitian (Nutrition)  Tobi Luoie MD (Ophthalmology)     Health Maintenance:  Immunization History   Administered Date(s) Administered    COVID-19, MRNA, LN-S, PF (Pfizer) (Gray Cap) 08/04/2022    COVID-19, MRNA, LN-S, PF (Pfizer) (Purple Cap) 02/20/2021, 03/13/2021, 10/14/2021    COVID-19, mRNA, LNP-S, PF, duane-sucrose, 30 mcg/0.3 mL (Pfizer Ages 12+) 10/24/2023    COVID-19, mRNA, LNP-S, bivalent booster, PF (PFIZER OMICRON) 01/16/2023, 07/18/2023    Influenza 1950, 10/05/2015    Influenza (FLUAD) - Quadrivalent - Adjuvanted - PF *Preferred* (65+) 10/14/2021, 09/25/2023    Influenza - Quadrivalent - High Dose - PF (65 years and older) 09/21/2020, 10/13/2022    Influenza - Trivalent - Fluzone High Dose - PF (65 years and older) 10/10/2016, 09/19/2017, 09/26/2018, 09/12/2019, 10/13/2022    Influenza A (H1N1) 2009 Monovalent - IM 01/18/2010    Pneumococcal Conjugate - 13 Valent 10/10/2016    Pneumococcal Polysaccharide - 23 Valent  10/16/2017    RSVpreF (Arexvy) 01/09/2024    Tdap 09/03/2015    Zoster 07/30/2013    Zoster Recombinant 08/26/2019, 11/05/2019      Health Maintenance   Topic Date Due    Foot Exam  06/28/2023    COVID-19 Vaccine (8 - 2024-25 season) 09/01/2024    Diabetic Eye Exam  09/06/2024    Hemoglobin A1c  06/02/2025    DEXA Scan  07/26/2025    Lipid Panel  06/13/2025    TETANUS VACCINE  09/03/2025    Diabetes Urine Screening  12/02/2025    Mammogram  02/17/2026    High Dose Statin  05/06/2026    Colorectal Cancer Screening  06/06/2027    Hepatitis C Screening  Completed    Shingles Vaccine  Completed    Influenza Vaccine  Completed    RSV Vaccine (Age 60+ and Pregnant patients)  Completed    Pneumococcal Vaccines (Age 50+)  Completed        Past Medical History:  Past Medical History:   Diagnosis Date    Allergy     Anticoagulant long-term use     COVID-19     5/14/22;  2/18/24    Diabetes mellitus     Encounter for well woman exam with routine gynecological exam 10/15/2024    Hyperlipidemia     Hypothyroid     Obesity     Postmenopausal bleeding 06/11/2024    Rosacea        Past Surgical History:   has a past surgical history that includes Colonoscopy (07/06/2007); Cholecystectomy; Hysteroscopy; Dilation and curettage of uterus; Colonoscopy (N/A, 06/06/2017); Treatment of cardiac arrhythmia (N/A, 09/26/2019); Treatment of cardiac arrhythmia (N/A, 01/09/2020); [place] tooth extraction; Ablation of arrhythmogenic focus for atrial fibrillation (N/A, 4/8/2024); Treatment of cardiac arrhythmia (4/8/2024); and echocardiogram,transesophageal (N/A, 4/8/2024).    Family History:  family history includes Allergies in her mother; Atrial fibrillation in her father; Breast cancer (age of onset: 42) in her mother; Colon cancer in her maternal grandmother; Diabetes in her father, maternal aunt, and maternal grandmother; Heart attack (age of onset: 90) in her maternal aunt; Heart attacks under age 50 in her paternal grandfather; Heart  "disease in her father; Hyperlipidemia in her father; Hypertension in her father; Hypothyroidism in her father; Lung cancer in her maternal grandfather; No Known Problems in her brother, paternal grandmother, and sister; Stroke in her father.     Social History:  Social History[1]    Review of Systems   Constitutional:  Negative for chills, fever and night sweats.   HENT:  Negative for hearing loss.    Eyes:  Negative for visual disturbance.   Respiratory:  Negative for chest tightness and shortness of breath.    Cardiovascular:  Negative for chest pain, palpitations and leg swelling.   Gastrointestinal:  Positive for diarrhea. Negative for abdominal pain, constipation, nausea and vomiting.        On occasion.    Endocrine: Negative for cold intolerance, heat intolerance and polydipsia.   Genitourinary:  Negative for bladder incontinence, dysuria, frequency, hematuria and vaginal bleeding.   Musculoskeletal:  Negative for arthralgias and myalgias.   Neurological:  Negative for dizziness and headaches.   Psychiatric/Behavioral:  Negative for depressed mood. The patient is not nervous/anxious.         Medications:  Current Medications[2]     Allergies:  Review of patient's allergies indicates:   Allergen Reactions    Levaquin [levofloxacin] Hives    Tetracycline Hives       Physical Exam      Vital Signs  Temp: 97.6 °F (36.4 °C)  Temp Source: Oral  Pulse: 76  Resp: 17  SpO2: 96 %  BP: 128/82  BP Location: Right forearm  Patient Position: Sitting  Pain Score: 0-No pain  Height and Weight  Height: 5' 6" (167.6 cm)  Weight: 84.6 kg (186 lb 8.2 oz)  BSA (Calculated - sq m): 1.98 sq meters  BMI (Calculated): 30.1  Weight in (lb) to have BMI = 25: 154.6      Patient Position: Sitting      Physical Exam  Vitals reviewed.   Constitutional:       General: She is not in acute distress.     Appearance: Normal appearance. She is not ill-appearing, toxic-appearing or diaphoretic.   HENT:      Head: Normocephalic and atraumatic.     "  Right Ear: Tympanic membrane normal.      Left Ear: Tympanic membrane normal.   Eyes:      Extraocular Movements: Extraocular movements intact.      Conjunctiva/sclera: Conjunctivae normal.      Pupils: Pupils are equal, round, and reactive to light.   Neck:      Vascular: No carotid bruit.   Cardiovascular:      Rate and Rhythm: Normal rate and regular rhythm.      Pulses: Normal pulses.      Heart sounds: Normal heart sounds.   Pulmonary:      Effort: Pulmonary effort is normal.   Abdominal:      General: Bowel sounds are normal. There is no distension.      Palpations: Abdomen is soft.      Tenderness: There is no abdominal tenderness. There is no guarding or rebound.   Skin:     Comments: Rt fifth toenail attached partially distally   Neurological:      General: No focal deficit present.      Mental Status: She is alert and oriented to person, place, and time.   Psychiatric:         Mood and Affect: Mood normal.         Behavior: Behavior normal.          Laboratory:  CBC:  Recent Labs   Lab 02/06/24  1046 04/02/24  0852 12/02/24  1505   WBC 6.65 5.94 5.63   RBC 4.51 4.14 4.57   Hemoglobin 13.3 12.4 13.6   Hematocrit 41.5 39.3 42.7   Platelets 249 241 246   MCV 92 95 93   MCH 29.5 30.0 29.8   MCHC 32.0 31.6 L 31.9 L       CMP:  Recent Labs   Lab 12/20/23  1523 02/06/24  1046 04/02/24  0852 12/02/24  1505   Glucose 97 118 H   < > 121 H   Calcium 10.1 10.3   < > 10.3   Albumin 3.7 3.8  --  3.8   Total Protein 6.4 6.9  --  6.8   Sodium 138 141   < > 138   Potassium 4.1 4.2   < > 4.1   CO2 24 25   < > 27   Chloride 106 108   < > 105   BUN 11 11   < > 12   Creatinine 0.8 0.8   < > 0.8   Alkaline Phosphatase 49 L 57  --  56   ALT 17 20  --  21   AST 19 19  --  21   Total Bilirubin 0.7 1.0  --  0.8    < > = values in this interval not displayed.           URINALYSIS:  Recent Labs   Lab 02/06/24  1123 06/24/24  1003   Color, UA Yellow Yellow   Specific Gravity, UA 1.020 1.015   pH, UA 5.0 6.0   Protein, UA Negative  Negative   Bacteria Rare  --    Nitrite, UA Negative Negative   Leukocytes, UA Trace A Negative        LIPIDS:  Recent Labs   Lab 12/19/22  1242 06/21/23  1058 12/20/23  1523 02/06/24  1046 06/13/24  1455 12/02/24  1505   TSH  --  1.764 3.452 1.651  --  0.109 L   HDL 41 41  --   --  43  --    Cholesterol 96 L 92 L  --   --  80 L  --    Triglycerides 82 93  --   --  98  --    LDL Cholesterol 38.6 L 32.4 L  --   --  17.4 L  --    HDL/Cholesterol Ratio 42.7 44.6  --   --  53.8 H  --    Non-HDL Cholesterol 55 51  --   --  37  --    Total Cholesterol/HDL Ratio 2.3 2.2  --   --  1.9 L  --        TSH:  Recent Labs   Lab 12/20/23  1523 02/06/24  1046 12/02/24  1505   TSH 3.452 1.651 0.109 L       A1C:  Recent Labs   Lab 06/28/22  1445 12/19/22  1242 06/21/23  1058 12/20/23  1523 06/13/24  1455 12/02/24  1505   Hemoglobin A1C 6.3 H 6.4 H 6.3 H 6.3 H 6.4 H 6.4 H       Urine Microalbumin/Cr:  Recent Labs   Lab 12/13/22  1438 12/20/23  1506 12/02/24  1505   Microalb/Creat Ratio 10.3 Unable to calculate 11.7            Recent Labs   Lab 02/06/24  1046   Hepatitis C Ab Non-reactive       Assessment/Plan     Bebe Valdez is a 74 y.o.female with:    Hypothyroidism, unspecified type  -     TSH; Future; Expected date: 05/06/2025  -     T4, Free; Future; Expected date: 05/06/2025  - Due for repeat TFT's. Cont current.     Hyperlipidemia, unspecified hyperlipidemia type  - Controlled.  Cont current.     Type 2 diabetes mellitus with hypercholesterolemia  -     Hemoglobin A1C; Future; Expected date: 05/06/2025  -     Comprehensive Metabolic Panel; Future; Expected date: 05/06/2025  - Controlled.  Cont current.     Aortic atherosclerosis  - Stable.  Cont current regimen.    Paroxysmal atrial fibrillation  - Stable.  Cont current regimen.    Splitting of toenail  -     Ambulatory referral/consult to Podiatry; Future; Expected date: 05/13/2025    Postmenopausal  -     DXA Bone Density Axial Skeleton 1 or more sites; Future; Expected  date: 07/27/2025    Screening mammogram, encounter for  -     Mammo Digital Screening Bilat w/ Fernando (XPD); Future; Expected date: 02/18/2026         Chronic conditions status updated as per HPI.  Other than changes above, cont current medications and maintain follow up with specialists.    Follow up in about 6 months (around 11/6/2025).      Amber Dorsey MD  [facility]                       [1]   Social History  Tobacco Use    Smoking status: Never     Passive exposure: Never    Smokeless tobacco: Never   Substance Use Topics    Alcohol use: Yes     Alcohol/week: 2.0 standard drinks of alcohol     Types: 2 Glasses of wine per week     Comment: occ    Drug use: Never   [2]   Current Outpatient Medications:     ascorbic acid, vitamin C, (VITAMIN C) 100 MG tablet, Take 100 mg by mouth once daily., Disp: , Rfl:     augmented betamethasone dipropionate (DIPROLENE-AF) 0.05 % cream, Apply to elbows BID prn scaling., Disp: 50 g, Rfl: 2    betamethasone dipropionate 0.05 % cream, Apply topically 2 (two) times daily as needed., Disp: , Rfl:     CINNAMON BARK (CINNAMON ORAL), Take by mouth once daily. Pt taking 2 pills daily., Disp: , Rfl:     clobetasol 0.05% (TEMOVATE) 0.05 % Oint, Apply topically 2 (two) times daily., Disp: 30 g, Rfl: 2    ELIQUIS 5 mg Tab, TAKE 1 TABLET(5 MG) BY MOUTH TWICE DAILY, Disp: 180 tablet, Rfl: 3    fish oil-omega-3 fatty acids 300-1,000 mg capsule, Take 2 g by mouth once daily., Disp: , Rfl:     magnesium 250 mg Tab, Take by mouth once., Disp: , Rfl:     metFORMIN (GLUCOPHAGE-XR) 500 MG ER 24hr tablet, TAKE 2 TABLETS(1000 MG) BY MOUTH TWICE DAILY WITH MEALS, Disp: 360 tablet, Rfl: 1    metoprolol succinate (TOPROL-XL) 50 MG 24 hr tablet, TAKE 1 TABLET(50 MG) BY MOUTH EVERY DAY, Disp: 90 tablet, Rfl: 3    metroNIDAZOLE (NORITATE) 1 % cream, Compound azelaic acid 15% + ivermectin 1% + metronidazole 1% cream. Apply to face once or twice daily., Disp: 30 g, Rfl: 11     MV,CA,MIN/IRON/FA/GUARANA/CAFF (ONE-A-DAY WOMEN'S ACTIVE ORAL), Take by mouth., Disp: , Rfl:     rosuvastatin (CRESTOR) 10 MG tablet, TAKE 1 TABLET(10 MG) BY MOUTH EVERY DAY, Disp: 90 tablet, Rfl: 1    SYNTHROID 175 mcg tablet, TAKE 1 TABLET BY MOUTH EVERY DAY SIX DAYS A WEEK( MONDAY THROUGH SATURDAY) AND 1/2 TABLET ON SUNDAYS, Disp: 90 tablet, Rfl: 2    vitamin D (VITAMIN D3) 1000 units Tab, Take 1,000 Units by mouth once daily., Disp: , Rfl:     furosemide (LASIX) 20 MG tablet, Take 1 tablet (20 mg total) by mouth daily as needed (leg swelling, shortness of breath, weight gain >3 lbs in 1 day or >5 lbs in 3 days)., Disp: 20 tablet, Rfl: 0  No current facility-administered medications for this visit.    Facility-Administered Medications Ordered in Other Visits:     0.9%  NaCl infusion, , Intravenous, Continuous, Steffi Christian NP, New Bag at 09/26/19 0913    sodium chloride 0.9% flush 5 mL, 5 mL, Intravenous, PRN, Steffi Christian, NP

## 2025-05-06 ENCOUNTER — LAB VISIT (OUTPATIENT)
Dept: LAB | Facility: HOSPITAL | Age: 75
End: 2025-05-06
Attending: INTERNAL MEDICINE
Payer: MEDICARE

## 2025-05-06 ENCOUNTER — OFFICE VISIT (OUTPATIENT)
Dept: PRIMARY CARE CLINIC | Facility: CLINIC | Age: 75
End: 2025-05-06
Payer: MEDICARE

## 2025-05-06 VITALS
WEIGHT: 186.5 LBS | RESPIRATION RATE: 17 BRPM | HEIGHT: 66 IN | OXYGEN SATURATION: 96 % | TEMPERATURE: 98 F | SYSTOLIC BLOOD PRESSURE: 128 MMHG | BODY MASS INDEX: 29.97 KG/M2 | DIASTOLIC BLOOD PRESSURE: 82 MMHG | HEART RATE: 76 BPM

## 2025-05-06 DIAGNOSIS — E78.00 TYPE 2 DIABETES MELLITUS WITH HYPERCHOLESTEROLEMIA: ICD-10-CM

## 2025-05-06 DIAGNOSIS — E03.9 HYPOTHYROIDISM, UNSPECIFIED TYPE: ICD-10-CM

## 2025-05-06 DIAGNOSIS — Z78.0 POSTMENOPAUSAL: ICD-10-CM

## 2025-05-06 DIAGNOSIS — E11.69 TYPE 2 DIABETES MELLITUS WITH HYPERCHOLESTEROLEMIA: ICD-10-CM

## 2025-05-06 DIAGNOSIS — E78.5 HYPERLIPIDEMIA, UNSPECIFIED HYPERLIPIDEMIA TYPE: ICD-10-CM

## 2025-05-06 DIAGNOSIS — Z12.31 SCREENING MAMMOGRAM, ENCOUNTER FOR: ICD-10-CM

## 2025-05-06 DIAGNOSIS — L60.3 SPLITTING OF TOENAIL: ICD-10-CM

## 2025-05-06 DIAGNOSIS — E03.9 HYPOTHYROIDISM, UNSPECIFIED TYPE: Primary | ICD-10-CM

## 2025-05-06 DIAGNOSIS — I48.0 PAROXYSMAL ATRIAL FIBRILLATION: ICD-10-CM

## 2025-05-06 DIAGNOSIS — I70.0 AORTIC ATHEROSCLEROSIS: ICD-10-CM

## 2025-05-06 LAB
ALBUMIN SERPL BCP-MCNC: 3.5 G/DL (ref 3.5–5.2)
ALP SERPL-CCNC: 57 UNIT/L (ref 40–150)
ALT SERPL W/O P-5'-P-CCNC: 16 UNIT/L (ref 10–44)
ANION GAP (OHS): 6 MMOL/L (ref 8–16)
AST SERPL-CCNC: 17 UNIT/L (ref 11–45)
BILIRUB SERPL-MCNC: 0.7 MG/DL (ref 0.1–1)
BUN SERPL-MCNC: 12 MG/DL (ref 8–23)
CALCIUM SERPL-MCNC: 10 MG/DL (ref 8.7–10.5)
CHLORIDE SERPL-SCNC: 103 MMOL/L (ref 95–110)
CO2 SERPL-SCNC: 28 MMOL/L (ref 23–29)
CREAT SERPL-MCNC: 0.6 MG/DL (ref 0.5–1.4)
EAG (OHS): 140 MG/DL (ref 68–131)
GFR SERPLBLD CREATININE-BSD FMLA CKD-EPI: >60 ML/MIN/1.73/M2
GLUCOSE SERPL-MCNC: 124 MG/DL (ref 70–110)
HBA1C MFR BLD: 6.5 % (ref 4–5.6)
POTASSIUM SERPL-SCNC: 4.1 MMOL/L (ref 3.5–5.1)
PROT SERPL-MCNC: 6.4 GM/DL (ref 6–8.4)
SODIUM SERPL-SCNC: 137 MMOL/L (ref 136–145)
T4 FREE SERPL-MCNC: 1.37 NG/DL (ref 0.71–1.51)
TSH SERPL-ACNC: 0.01 UIU/ML (ref 0.4–4)

## 2025-05-06 PROCEDURE — 84439 ASSAY OF FREE THYROXINE: CPT

## 2025-05-06 PROCEDURE — 99999 PR PBB SHADOW E&M-EST. PATIENT-LVL V: CPT | Mod: PBBFAC,,, | Performed by: INTERNAL MEDICINE

## 2025-05-06 PROCEDURE — 36415 COLL VENOUS BLD VENIPUNCTURE: CPT | Mod: PN

## 2025-05-06 PROCEDURE — 80053 COMPREHEN METABOLIC PANEL: CPT

## 2025-05-06 PROCEDURE — 99214 OFFICE O/P EST MOD 30 MIN: CPT | Mod: S$PBB,,, | Performed by: INTERNAL MEDICINE

## 2025-05-06 PROCEDURE — 84443 ASSAY THYROID STIM HORMONE: CPT

## 2025-05-06 PROCEDURE — 99215 OFFICE O/P EST HI 40 MIN: CPT | Mod: PBBFAC,PN | Performed by: INTERNAL MEDICINE

## 2025-05-06 PROCEDURE — 83036 HEMOGLOBIN GLYCOSYLATED A1C: CPT

## 2025-05-07 ENCOUNTER — HOSPITAL ENCOUNTER (OUTPATIENT)
Dept: CARDIOLOGY | Facility: CLINIC | Age: 75
Discharge: HOME | End: 2025-05-07
Payer: MEDICARE

## 2025-05-07 ENCOUNTER — RESULTS FOLLOW-UP (OUTPATIENT)
Dept: PRIMARY CARE CLINIC | Facility: CLINIC | Age: 75
End: 2025-05-07
Payer: MEDICARE

## 2025-05-07 ENCOUNTER — OFFICE VISIT (OUTPATIENT)
Dept: PODIATRY | Facility: CLINIC | Age: 75
End: 2025-05-07
Payer: MEDICARE

## 2025-05-07 ENCOUNTER — OFFICE VISIT (OUTPATIENT)
Dept: ELECTROPHYSIOLOGY | Facility: CLINIC | Age: 75
End: 2025-05-07
Payer: MEDICARE

## 2025-05-07 VITALS
SYSTOLIC BLOOD PRESSURE: 145 MMHG | DIASTOLIC BLOOD PRESSURE: 89 MMHG | HEIGHT: 66 IN | BODY MASS INDEX: 29.97 KG/M2 | HEART RATE: 74 BPM | WEIGHT: 186.5 LBS

## 2025-05-07 VITALS
WEIGHT: 187.81 LBS | BODY MASS INDEX: 30.18 KG/M2 | DIASTOLIC BLOOD PRESSURE: 80 MMHG | HEIGHT: 66 IN | SYSTOLIC BLOOD PRESSURE: 122 MMHG | HEART RATE: 75 BPM

## 2025-05-07 DIAGNOSIS — E03.9 HYPOTHYROIDISM, UNSPECIFIED TYPE: Primary | ICD-10-CM

## 2025-05-07 DIAGNOSIS — L60.9 LOOSE TOENAIL: Primary | ICD-10-CM

## 2025-05-07 DIAGNOSIS — Z92.89 HISTORY OF CARDIOVERSION: ICD-10-CM

## 2025-05-07 DIAGNOSIS — L60.3 SPLITTING OF TOENAIL: ICD-10-CM

## 2025-05-07 DIAGNOSIS — Z98.890 HISTORY OF CARDIAC RADIOFREQUENCY ABLATION (RFA): ICD-10-CM

## 2025-05-07 DIAGNOSIS — E78.00 TYPE 2 DIABETES MELLITUS WITH HYPERCHOLESTEROLEMIA: ICD-10-CM

## 2025-05-07 DIAGNOSIS — Z79.01 CHRONIC ANTICOAGULATION: ICD-10-CM

## 2025-05-07 DIAGNOSIS — I50.22 CHRONIC SYSTOLIC HEART FAILURE: ICD-10-CM

## 2025-05-07 DIAGNOSIS — E11.69 TYPE 2 DIABETES MELLITUS WITH HYPERCHOLESTEROLEMIA: ICD-10-CM

## 2025-05-07 DIAGNOSIS — I48.19 PERSISTENT ATRIAL FIBRILLATION: Primary | ICD-10-CM

## 2025-05-07 DIAGNOSIS — I48.0 PAROXYSMAL ATRIAL FIBRILLATION: ICD-10-CM

## 2025-05-07 DIAGNOSIS — I70.0 AORTIC ATHEROSCLEROSIS: ICD-10-CM

## 2025-05-07 LAB
OHS QRS DURATION: 94 MS
OHS QTC CALCULATION: 424 MS

## 2025-05-07 PROCEDURE — 99999 PR PBB SHADOW E&M-EST. PATIENT-LVL IV: CPT | Mod: PBBFAC,,, | Performed by: NURSE PRACTITIONER

## 2025-05-07 PROCEDURE — 99999 PR PBB SHADOW E&M-EST. PATIENT-LVL IV: CPT | Mod: PBBFAC,,, | Performed by: PODIATRIST

## 2025-05-07 PROCEDURE — 99202 OFFICE O/P NEW SF 15 MIN: CPT | Mod: S$PBB,,, | Performed by: PODIATRIST

## 2025-05-07 PROCEDURE — 99214 OFFICE O/P EST MOD 30 MIN: CPT | Mod: PBBFAC,27,25 | Performed by: NURSE PRACTITIONER

## 2025-05-07 PROCEDURE — 93010 ELECTROCARDIOGRAM REPORT: CPT | Mod: S$PBB,,, | Performed by: INTERNAL MEDICINE

## 2025-05-07 PROCEDURE — 93005 ELECTROCARDIOGRAM TRACING: CPT | Mod: PBBFAC | Performed by: INTERNAL MEDICINE

## 2025-05-07 PROCEDURE — 99214 OFFICE O/P EST MOD 30 MIN: CPT | Mod: PBBFAC,PN | Performed by: PODIATRIST

## 2025-05-07 NOTE — PROGRESS NOTES
PODIATRY    PATIENT ID:  Bebe Valdez is a 74 y.o. female.     CHIEF CONCERN:   Nail Problem (Splitting of toenail )         MEDICAL DECISION MAKING:      Problem List Items Addressed This Visit       Chronic anticoagulation    Type 2 diabetes mellitus with hypercholesterolemia     Other Visit Diagnoses         Loose toenail    -  Primary      Splitting of toenail                I counseled the patient on the patient's conditions, their implications and medical management.    The toenail is loose.  I trimmed it back.  No immediate complications.  There is no acute infection noted.  No tenderness to palpation  Continue to monitor.  She and activity modification as needed for relief.  Return to clinic as needed.        HISTORY OF PRESENT ILLNESS:  Bebe Valdez is a 74 y.o. female with concerns regarding: Nail Problem (Splitting of toenail )   She reported that part of the nail came off of the right 5th toe when she took off her socks.  It was not painful.  She did not notice it initially.  No recent acute trauma recall to the area.  She did say that about 20 years ago she injured the same toe and did have to see a doctor on a cruise.      Patient Active Problem List   Diagnosis    Hypothyroid    Class 1 obesity with serious comorbidity in adult    Type 2 diabetes mellitus with hypercholesterolemia    Persistent atrial fibrillation    Chronic systolic heart failure, in setting of synthroid mediated hyperthyroidism and persistent atrial fibrillation    History of cardioversion    Chronic anticoagulation    Aortic atherosclerosis    COVID    Personal history of COVID-19 on 5/14/2022    Hyperlipidemia    Vaginal bleeding    Vulvar irritation    Rhinitis    Sore throat    Cough    Encounter for well woman exam with routine gynecological exam    Genitourinary syndrome of menopause    On anticoagulant therapy    History of cardiac radiofrequency ablation (RFA)       Current Outpatient Medications on File Prior to Visit    Medication Sig Dispense Refill    ascorbic acid, vitamin C, (VITAMIN C) 100 MG tablet Take 100 mg by mouth once daily.      augmented betamethasone dipropionate (DIPROLENE-AF) 0.05 % cream Apply to elbows BID prn scaling. 50 g 2    betamethasone dipropionate 0.05 % cream Apply topically 2 (two) times daily as needed.      CINNAMON BARK (CINNAMON ORAL) Take by mouth once daily. Pt taking 2 pills daily.      clobetasol 0.05% (TEMOVATE) 0.05 % Oint Apply topically 2 (two) times daily. 30 g 2    ELIQUIS 5 mg Tab TAKE 1 TABLET(5 MG) BY MOUTH TWICE DAILY 180 tablet 3    fish oil-omega-3 fatty acids 300-1,000 mg capsule Take 2 g by mouth once daily.      magnesium 250 mg Tab Take by mouth once.      metFORMIN (GLUCOPHAGE-XR) 500 MG ER 24hr tablet TAKE 2 TABLETS(1000 MG) BY MOUTH TWICE DAILY WITH MEALS 360 tablet 1    metoprolol succinate (TOPROL-XL) 50 MG 24 hr tablet TAKE 1 TABLET(50 MG) BY MOUTH EVERY DAY 90 tablet 3    metroNIDAZOLE (NORITATE) 1 % cream Compound azelaic acid 15% + ivermectin 1% + metronidazole 1% cream. Apply to face once or twice daily. 30 g 11    MV,CA,MIN/IRON/FA/GUARANA/CAFF (ONE-A-DAY WOMEN'S ACTIVE ORAL) Take by mouth.      rosuvastatin (CRESTOR) 10 MG tablet TAKE 1 TABLET(10 MG) BY MOUTH EVERY DAY 90 tablet 1    SYNTHROID 175 mcg tablet TAKE 1 TABLET BY MOUTH EVERY DAY SIX DAYS A WEEK( MONDAY THROUGH SATURDAY) AND 1/2 TABLET ON SUNDAYS 90 tablet 2    vitamin D (VITAMIN D3) 1000 units Tab Take 1,000 Units by mouth once daily.      furosemide (LASIX) 20 MG tablet Take 1 tablet (20 mg total) by mouth daily as needed (leg swelling, shortness of breath, weight gain >3 lbs in 1 day or >5 lbs in 3 days). 20 tablet 0     Current Facility-Administered Medications on File Prior to Visit   Medication Dose Route Frequency Provider Last Rate Last Admin    0.9%  NaCl infusion   Intravenous Continuous Steffi Christian NP   New Bag at 09/26/19 0933    sodium chloride 0.9% flush 5 mL  5 mL Intravenous PRN  "Steffi Christian NP           Review of patient's allergies indicates:   Allergen Reactions    Levaquin [levofloxacin] Hives    Tetracycline Hives         ROS:   General ROS: negative for - chills, fever or night sweats  Respiratory ROS: no cough, shortness of breath, or wheezing  Cardiovascular ROS: no chest pain or dyspnea on exertion      EXAM:     Vitals:    05/07/25 0937   BP: (!) 145/89   Pulse: 74   Weight: 84.6 kg (186 lb 8.2 oz)   Height: 5' 6" (1.676 m)        General:  Alert and Oriented x 3;  No acute distress    Vascular:   Dorsalis Pedis:  present   Posterior Tibial:  present  Capillary refill time:  3 seconds  Temperature of toes warm to touch  Edema:  Trace       Neurological:     Sharp touch:  normal  Light touch: normal  Tinels Sign:  Absent  Mulders Click:   Absent      Dermatological:   Skin: warm, moist, and appropriate for age  Wounds/Ulcers:  Absent  Bruising:  Absent  Erythema:  Absent  Loose spicule of toenail right 5th toe.  No paronychia.  No tenderness to palpation .  No cellulitis.      Musculoskeletal:   Metatarsophalangeal range of motion:   full range of motion  Subtalar joint range of motion: full range of motion  Ankle joint range of motion:  full range of motion  5/5 muscle strength  Adductovarus right 5th toe.       "

## 2025-05-07 NOTE — PROGRESS NOTES
I sent pt a my chart message -   I reviewed your labs.  Your Ha1c was controlled at 6.5. Your TSH is low.  We will need to decrease your Synthroid from 175 mcg 6 days per wk and 1/2 tab on one day per wk to 1 full tab 5 days per wk and 1/2 tab twice weekly and repeat your Thyroid functions in 6 wks.  Your kidney function and liver functions looked good.  No further recommendations at this time.  Dr. ROBERT

## 2025-05-16 ENCOUNTER — HOSPITAL ENCOUNTER (OUTPATIENT)
Dept: RADIOLOGY | Facility: HOSPITAL | Age: 75
Discharge: HOME | End: 2025-05-16
Attending: NURSE PRACTITIONER
Payer: MEDICARE

## 2025-05-16 ENCOUNTER — OFFICE VISIT (OUTPATIENT)
Dept: PRIMARY CARE CLINIC | Facility: CLINIC | Age: 75
End: 2025-05-16
Payer: MEDICARE

## 2025-05-16 ENCOUNTER — PROCEDURE VISIT (OUTPATIENT)
Dept: DERMATOLOGY | Facility: CLINIC | Age: 75
End: 2025-05-16
Payer: MEDICARE

## 2025-05-16 VITALS
DIASTOLIC BLOOD PRESSURE: 82 MMHG | WEIGHT: 184.5 LBS | OXYGEN SATURATION: 95 % | HEIGHT: 66 IN | BODY MASS INDEX: 29.65 KG/M2 | SYSTOLIC BLOOD PRESSURE: 128 MMHG | HEART RATE: 72 BPM

## 2025-05-16 DIAGNOSIS — M54.6 ACUTE LEFT-SIDED THORACIC BACK PAIN: ICD-10-CM

## 2025-05-16 DIAGNOSIS — I87.2 VENOUS INSUFFICIENCY: ICD-10-CM

## 2025-05-16 DIAGNOSIS — Z41.1 ENCOUNTER FOR COSMETIC LASER PROCEDURE: Primary | ICD-10-CM

## 2025-05-16 DIAGNOSIS — I78.1 TELANGIECTASIA: ICD-10-CM

## 2025-05-16 DIAGNOSIS — M54.6 ACUTE LEFT-SIDED THORACIC BACK PAIN: Primary | ICD-10-CM

## 2025-05-16 PROCEDURE — 99999 PR PBB SHADOW E&M-EST. PATIENT-LVL V: CPT | Mod: PBBFAC,,, | Performed by: NURSE PRACTITIONER

## 2025-05-16 PROCEDURE — 72070 X-RAY EXAM THORAC SPINE 2VWS: CPT | Mod: 26,,, | Performed by: RADIOLOGY

## 2025-05-16 PROCEDURE — 72070 X-RAY EXAM THORAC SPINE 2VWS: CPT | Mod: TC,PO

## 2025-05-16 PROCEDURE — 72100 X-RAY EXAM L-S SPINE 2/3 VWS: CPT | Mod: 26,,, | Performed by: RADIOLOGY

## 2025-05-16 PROCEDURE — 72100 X-RAY EXAM L-S SPINE 2/3 VWS: CPT | Mod: TC,PO

## 2025-05-16 PROCEDURE — 99215 OFFICE O/P EST HI 40 MIN: CPT | Mod: PBBFAC,PN | Performed by: NURSE PRACTITIONER

## 2025-05-16 NOTE — PROGRESS NOTES
[facility] Note    Chief Complaint      Chief Complaint   Patient presents with    Back Pain       History of Present Illness      Bebe Valdez is a 74 y.o. female who presents today for   Chief Complaint   Patient presents with    Back Pain         Ms. Valdez presents to the clinic with mid to lower left back pain. She denies injury and reports having the pain now for 5 days, only to get worse with each day. She describes the pain as an intermittent, ache. She reports that it is hard to describe the pain. She rates her pain as 3/10 most often than not. No radiation to her legs. She denies chest pain and shortness of breath. Denies frequent or painful urination. She has taken nothing for the pain. Hx of paroxysmal afib, hypothyroid, hyperlipidemia, aortic atherosclerosis, type II DM.    Back Pain  This is a new problem. The current episode started in the past 7 days. The problem occurs intermittently. The problem has been waxing and waning since onset. The pain is present in the thoracic spine. The pain does not radiate. The pain is at a severity of 3/10. The pain is mild. The pain is The same all the time. Pertinent negatives include no abdominal pain, dysuria, fever or headaches.        Review of Systems   Constitutional:  Negative for chills and fever.   Gastrointestinal:  Negative for abdominal pain, constipation, diarrhea, nausea and vomiting.   Genitourinary:  Positive for flank pain. Negative for dysuria.        Left   Musculoskeletal:  Positive for back pain and myalgias. Negative for falls and joint pain.   Neurological:  Negative for dizziness and headaches.   Endo/Heme/Allergies:  Bruises/bleeds easily.        On eliquis        Family History:  family history includes Allergies in her mother; Atrial fibrillation in her father; Breast cancer (age of onset: 42) in her mother; Colon cancer in her maternal grandmother; Diabetes in her father, maternal aunt, and maternal grandmother; Heart  "attack (age of onset: 90) in her maternal aunt; Heart attacks under age 50 in her paternal grandfather; Heart disease in her father; Hyperlipidemia in her father; Hypertension in her father; Hypothyroidism in her father; Lung cancer in her maternal grandfather; No Known Problems in her brother, paternal grandmother, and sister; Stroke in her father.   Family history was reviewed with patient.     Medications:  Encounter Medications[1]    Allergies:  Review of patient's allergies indicates:   Allergen Reactions    Levaquin [levofloxacin] Hives    Tetracycline Hives       Health Maintenance:  Health Maintenance   Topic Date Due    Foot Exam  06/28/2023    COVID-19 Vaccine (8 - 2024-25 season) 09/01/2024    Diabetic Eye Exam  09/06/2024    Lipid Panel  06/13/2025    DEXA Scan  07/26/2025    TETANUS VACCINE  09/03/2025    Hemoglobin A1c  11/06/2025    Diabetes Urine Screening  12/02/2025    Mammogram  02/17/2026    High Dose Statin  05/16/2026    Colorectal Cancer Screening  06/06/2027    Hepatitis C Screening  Completed    Shingles Vaccine  Completed    Influenza Vaccine  Completed    RSV Vaccine (Age 60+ and Pregnant patients)  Completed    Pneumococcal Vaccines (Age 50+)  Completed     Health Maintenance Topics with due status: Not Due       Topic Last Completion Date    TETANUS VACCINE 09/03/2015    Colorectal Cancer Screening 06/06/2017    Diabetes Urine Screening 12/02/2024    Mammogram 02/17/2025    Hemoglobin A1c 05/06/2025    High Dose Statin 05/16/2025       Physical Exam      Vital Signs  Pulse: 72  SpO2: 95 %  BP: 128/82  BP Location: Right arm  Patient Position: Sitting  Pain Score:   2  Pain Loc: Back (back and arm)  Height and Weight  Height: 5' 6" (167.6 cm)  Weight: 83.7 kg (184 lb 8.4 oz)  BSA (Calculated - sq m): 1.97 sq meters  BMI (Calculated): 29.8  Weight in (lb) to have BMI = 25: 154.6]    Physical Exam  Vitals reviewed.   Constitutional:       General: She is not in acute distress.     " Appearance: Normal appearance. She is normal weight.   HENT:      Head: Normocephalic and atraumatic.   Eyes:      Extraocular Movements: Extraocular movements intact.      Conjunctiva/sclera: Conjunctivae normal.      Pupils: Pupils are equal, round, and reactive to light.   Cardiovascular:      Rate and Rhythm: Normal rate.   Musculoskeletal:         General: Normal range of motion.      Cervical back: Normal range of motion and neck supple.      Thoracic back: No swelling, edema or signs of trauma. Normal range of motion.      Lumbar back: Normal range of motion.   Skin:     General: Skin is warm and dry.   Neurological:      General: No focal deficit present.      Mental Status: She is alert and oriented to person, place, and time.   Psychiatric:         Mood and Affect: Mood normal.         Behavior: Behavior normal.         Thought Content: Thought content normal.            Assessment/Plan     Bebe Valdez is a 74 y.o.female with:    Acute left-sided thoracic back pain  -     X-Ray Thoracic Spine AP Lateral; Future; Expected date: 05/16/2025  -     X-Ray Lumbar Spine AP And Lateral; Future; Expected date: 05/16/2025  Muscle strain  Possibility. Her pain is more localized and aching.  She denies heavy lifting.  She takes yoga several times a week with   -treatment plan once x-rays have resulted  -options include orthopedic referral, referral to PT, treat for muscle strain/sprain (tylenol and robaxin)      As above, continue current medications and maintain follow up with specialists.  Return to clinic as needed.    Greater than 50% of visit was spent face to face with patient.  All questions were answered to patient's satisfaction.          Laurie C Ray, NP-C [facility]                     [1]   Outpatient Encounter Medications as of 5/16/2025   Medication Sig Dispense Refill    ascorbic acid, vitamin C, (VITAMIN C) 100 MG tablet Take 100 mg by mouth once daily.      augmented betamethasone  dipropionate (DIPROLENE-AF) 0.05 % cream Apply to elbows BID prn scaling. 50 g 2    betamethasone dipropionate 0.05 % cream Apply topically 2 (two) times daily as needed.      CINNAMON BARK (CINNAMON ORAL) Take by mouth once daily. Pt taking 2 pills daily.      clobetasol 0.05% (TEMOVATE) 0.05 % Oint Apply topically 2 (two) times daily. 30 g 2    ELIQUIS 5 mg Tab TAKE 1 TABLET(5 MG) BY MOUTH TWICE DAILY 180 tablet 3    fish oil-omega-3 fatty acids 300-1,000 mg capsule Take 2 g by mouth once daily.      furosemide (LASIX) 20 MG tablet Take 1 tablet (20 mg total) by mouth daily as needed (leg swelling, shortness of breath, weight gain >3 lbs in 1 day or >5 lbs in 3 days). 20 tablet 0    magnesium 250 mg Tab Take by mouth once.      metFORMIN (GLUCOPHAGE-XR) 500 MG ER 24hr tablet TAKE 2 TABLETS(1000 MG) BY MOUTH TWICE DAILY WITH MEALS 360 tablet 1    metoprolol succinate (TOPROL-XL) 50 MG 24 hr tablet TAKE 1 TABLET(50 MG) BY MOUTH EVERY DAY 90 tablet 3    metroNIDAZOLE (NORITATE) 1 % cream Compound azelaic acid 15% + ivermectin 1% + metronidazole 1% cream. Apply to face once or twice daily. 30 g 11    MV,CA,MIN/IRON/FA/GUARANA/CAFF (ONE-A-DAY WOMEN'S ACTIVE ORAL) Take by mouth.      rosuvastatin (CRESTOR) 10 MG tablet TAKE 1 TABLET(10 MG) BY MOUTH EVERY DAY 90 tablet 1    SYNTHROID 175 mcg tablet TAKE 1 TABLET BY MOUTH EVERY DAY SIX DAYS A WEEK( MONDAY THROUGH SATURDAY) AND 1/2 TABLET ON SUNDAYS 90 tablet 2    vitamin D (VITAMIN D3) 1000 units Tab Take 1,000 Units by mouth once daily.       Facility-Administered Encounter Medications as of 5/16/2025   Medication Dose Route Frequency Provider Last Rate Last Admin    0.9%  NaCl infusion   Intravenous Continuous Steffi Christian NP   New Bag at 09/26/19 0913    sodium chloride 0.9% flush 5 mL  5 mL Intravenous PRN Steffi Christian NP

## 2025-05-16 NOTE — PROGRESS NOTES
Patient Information  Name: Bebe Valdez  : 1950  MRN: [id number]     Date of Visit: 25

## 2025-05-21 ENCOUNTER — PATIENT MESSAGE (OUTPATIENT)
Dept: PRIMARY CARE CLINIC | Facility: CLINIC | Age: 75
End: 2025-05-21
Payer: MEDICARE

## 2025-05-21 ENCOUNTER — RESULTS FOLLOW-UP (OUTPATIENT)
Dept: PRIMARY CARE CLINIC | Facility: CLINIC | Age: 75
End: 2025-05-21

## 2025-05-21 NOTE — TELEPHONE ENCOUNTER
Pt states she is not having pain anymore and she is wondering if jay would still recommend PT. LOV 05/16/25.

## 2025-05-22 ENCOUNTER — TELEPHONE (OUTPATIENT)
Dept: PRIMARY CARE CLINIC | Facility: CLINIC | Age: 75
End: 2025-05-22
Payer: MEDICARE

## 2025-05-22 NOTE — TELEPHONE ENCOUNTER
Called the pt this morning and left message regarding PT. She sent a message reporting her back pain has diminished and questioned whether she needed to go to physical therapy.  I left her a message letting her know that it was up to her.

## 2025-07-28 ENCOUNTER — HOSPITAL ENCOUNTER (OUTPATIENT)
Dept: RADIOLOGY | Facility: HOSPITAL | Age: 75
Discharge: HOME | End: 2025-07-28
Attending: INTERNAL MEDICINE
Payer: MEDICARE

## 2025-07-28 DIAGNOSIS — Z78.0 POSTMENOPAUSAL: ICD-10-CM

## 2025-07-28 PROCEDURE — 77080 DXA BONE DENSITY AXIAL: CPT | Mod: TC

## 2025-08-10 DIAGNOSIS — E11.69 TYPE 2 DIABETES MELLITUS WITH HYPERCHOLESTEROLEMIA: ICD-10-CM

## 2025-08-10 DIAGNOSIS — E78.00 TYPE 2 DIABETES MELLITUS WITH HYPERCHOLESTEROLEMIA: ICD-10-CM

## 2025-08-10 RX ORDER — METFORMIN HYDROCHLORIDE 500 MG/1
1000 TABLET, EXTENDED RELEASE ORAL 2 TIMES DAILY WITH MEALS
Qty: 360 TABLET | Refills: 0 | Status: SHIPPED | OUTPATIENT
Start: 2025-08-10

## 2025-08-29 DIAGNOSIS — E78.5 HYPERLIPIDEMIA, UNSPECIFIED HYPERLIPIDEMIA TYPE: ICD-10-CM

## 2025-08-29 RX ORDER — ROSUVASTATIN CALCIUM 10 MG/1
10 TABLET, COATED ORAL
Qty: 90 TABLET | Refills: 0 | Status: SHIPPED | OUTPATIENT
Start: 2025-08-29

## (undated) DEVICE — PAD DEFIB CADENCE ADULT R2

## (undated) DEVICE — INTRO AGILIS MED CRL 8.5F 71CM

## (undated) DEVICE — R CATH BIDIRECTIONL DF CRV 7FR

## (undated) DEVICE — CATH THERMOCOOL SMTCH SF D F

## (undated) DEVICE — ELECTRODE REM PLYHSV RETURN 9

## (undated) DEVICE — NDL PERCUTANEOUS ENTRYBSDN 18

## (undated) DEVICE — LINE PRESSURE MONITORING 96IN

## (undated) DEVICE — BASIN SPLASH SHLD W/PAD BLUE

## (undated) DEVICE — PACK EP DRAPE OMC

## (undated) DEVICE — NDL TRNSSPTL BRK-1 18GA 71CM

## (undated) DEVICE — NDL TRNSSPTL BRK-1 18GA 98CM

## (undated) DEVICE — SHEATH INTRODUCER 9FR 11CM

## (undated) DEVICE — INTRODUCER HEMOSTASIS 7.5F

## (undated) DEVICE — INTRO 8.5FR 63CM SRO

## (undated) DEVICE — PATCH CARTO REFERENCE

## (undated) DEVICE — KIT PROBE COVER WITH GEL

## (undated) DEVICE — R CATH ACUSON ACUNAV 8FR

## (undated) DEVICE — SHEATH HEMOSTASIS 8.5FR

## (undated) DEVICE — CATH PENTARY F 2-6-2MM 115CM

## (undated) DEVICE — INTRO FAST-CATH SL1 8.5FR 63CM